# Patient Record
Sex: FEMALE | Race: WHITE | NOT HISPANIC OR LATINO
[De-identification: names, ages, dates, MRNs, and addresses within clinical notes are randomized per-mention and may not be internally consistent; named-entity substitution may affect disease eponyms.]

---

## 2017-01-06 PROBLEM — J40 BRONCHITIS: Status: ACTIVE | Noted: 2017-01-06

## 2017-02-28 ENCOUNTER — RX RENEWAL (OUTPATIENT)
Age: 82
End: 2017-02-28

## 2017-03-23 ENCOUNTER — APPOINTMENT (OUTPATIENT)
Dept: INTERNAL MEDICINE | Facility: CLINIC | Age: 82
End: 2017-03-23

## 2017-03-23 VITALS
SYSTOLIC BLOOD PRESSURE: 136 MMHG | DIASTOLIC BLOOD PRESSURE: 74 MMHG | HEART RATE: 80 BPM | WEIGHT: 136 LBS | HEIGHT: 62 IN | BODY MASS INDEX: 25.03 KG/M2 | OXYGEN SATURATION: 96 %

## 2017-06-05 ENCOUNTER — APPOINTMENT (OUTPATIENT)
Dept: INTERNAL MEDICINE | Facility: CLINIC | Age: 82
End: 2017-06-05

## 2017-06-05 VITALS — SYSTOLIC BLOOD PRESSURE: 140 MMHG | DIASTOLIC BLOOD PRESSURE: 80 MMHG

## 2017-06-05 VITALS
BODY MASS INDEX: 25.03 KG/M2 | HEIGHT: 62 IN | DIASTOLIC BLOOD PRESSURE: 80 MMHG | TEMPERATURE: 98.2 F | WEIGHT: 136 LBS | HEART RATE: 83 BPM | SYSTOLIC BLOOD PRESSURE: 158 MMHG | OXYGEN SATURATION: 97 %

## 2017-06-05 LAB
ALBUMIN SERPL ELPH-MCNC: 4.3 G/DL
ALP BLD-CCNC: 92 U/L
ALT SERPL-CCNC: 13 U/L
ANION GAP SERPL CALC-SCNC: 18 MMOL/L
APPEARANCE: CLEAR
AST SERPL-CCNC: 17 U/L
BACTERIA: NEGATIVE
BASOPHILS # BLD AUTO: 0.04 K/UL
BASOPHILS NFR BLD AUTO: 0.6 %
BILIRUB SERPL-MCNC: 0.3 MG/DL
BILIRUBIN URINE: NEGATIVE
BLOOD URINE: NEGATIVE
BUN SERPL-MCNC: 16 MG/DL
CALCIUM OXALATE CRYSTALS: ABNORMAL
CALCIUM SERPL-MCNC: 9.5 MG/DL
CHLORIDE SERPL-SCNC: 97 MMOL/L
CHOLEST SERPL-MCNC: 251 MG/DL
CHOLEST/HDLC SERPL: 2 RATIO
CO2 SERPL-SCNC: 22 MMOL/L
COLOR: YELLOW
CREAT SERPL-MCNC: 0.93 MG/DL
EOSINOPHIL # BLD AUTO: 0.08 K/UL
EOSINOPHIL NFR BLD AUTO: 1.2 %
GLUCOSE QUALITATIVE U: NORMAL MG/DL
GLUCOSE SERPL-MCNC: 160 MG/DL
HBA1C MFR BLD HPLC: 6.7 %
HCT VFR BLD CALC: 42.2 %
HDLC SERPL-MCNC: 126 MG/DL
HGB BLD-MCNC: 13.8 G/DL
IMM GRANULOCYTES NFR BLD AUTO: 0.1 %
KETONES URINE: NEGATIVE
LDLC SERPL CALC-MCNC: 114 MG/DL
LEUKOCYTE ESTERASE URINE: ABNORMAL
LYMPHOCYTES # BLD AUTO: 1.93 K/UL
LYMPHOCYTES NFR BLD AUTO: 28.3 %
MAN DIFF?: NORMAL
MCHC RBC-ENTMCNC: 30.8 PG
MCHC RBC-ENTMCNC: 32.7 GM/DL
MCV RBC AUTO: 94.2 FL
MICROSCOPIC-UA: NORMAL
MONOCYTES # BLD AUTO: 0.83 K/UL
MONOCYTES NFR BLD AUTO: 12.2 %
NEUTROPHILS # BLD AUTO: 3.93 K/UL
NEUTROPHILS NFR BLD AUTO: 57.6 %
NITRITE URINE: NEGATIVE
PH URINE: 6
PLATELET # BLD AUTO: 310 K/UL
POTASSIUM SERPL-SCNC: 5 MMOL/L
PROT SERPL-MCNC: 7.3 G/DL
PROTEIN URINE: NEGATIVE MG/DL
RBC # BLD: 4.48 M/UL
RBC # FLD: 14.1 %
RED BLOOD CELLS URINE: 2 /HPF
SODIUM SERPL-SCNC: 137 MMOL/L
SPECIFIC GRAVITY URINE: 1.01
SQUAMOUS EPITHELIAL CELLS: 2 /HPF
TRIGL SERPL-MCNC: 54 MG/DL
UROBILINOGEN URINE: NORMAL MG/DL
WBC # FLD AUTO: 6.82 K/UL
WHITE BLOOD CELLS URINE: 8 /HPF

## 2017-06-22 ENCOUNTER — APPOINTMENT (OUTPATIENT)
Dept: INTERNAL MEDICINE | Facility: CLINIC | Age: 82
End: 2017-06-22

## 2017-06-29 ENCOUNTER — APPOINTMENT (OUTPATIENT)
Dept: INTERNAL MEDICINE | Facility: CLINIC | Age: 82
End: 2017-06-29

## 2017-06-29 VITALS
DIASTOLIC BLOOD PRESSURE: 70 MMHG | OXYGEN SATURATION: 98 % | WEIGHT: 136 LBS | HEIGHT: 62 IN | TEMPERATURE: 98.3 F | HEART RATE: 107 BPM | BODY MASS INDEX: 25.03 KG/M2 | SYSTOLIC BLOOD PRESSURE: 140 MMHG

## 2017-06-29 VITALS — DIASTOLIC BLOOD PRESSURE: 80 MMHG | SYSTOLIC BLOOD PRESSURE: 130 MMHG

## 2017-09-28 ENCOUNTER — APPOINTMENT (OUTPATIENT)
Dept: INTERNAL MEDICINE | Facility: CLINIC | Age: 82
End: 2017-09-28
Payer: MEDICARE

## 2017-09-28 VITALS
HEART RATE: 85 BPM | BODY MASS INDEX: 24.51 KG/M2 | SYSTOLIC BLOOD PRESSURE: 138 MMHG | OXYGEN SATURATION: 98 % | DIASTOLIC BLOOD PRESSURE: 70 MMHG | WEIGHT: 134 LBS

## 2017-09-28 DIAGNOSIS — Z23 ENCOUNTER FOR IMMUNIZATION: ICD-10-CM

## 2017-09-28 DIAGNOSIS — E87.5 HYPERKALEMIA: ICD-10-CM

## 2017-09-28 LAB
ALBUMIN SERPL ELPH-MCNC: 4 G/DL
ALP BLD-CCNC: 95 U/L
ALT SERPL-CCNC: 19 U/L
ANION GAP SERPL CALC-SCNC: 17 MMOL/L
APPEARANCE: CLEAR
AST SERPL-CCNC: 18 U/L
BACTERIA: NEGATIVE
BASOPHILS # BLD AUTO: 0.04 K/UL
BASOPHILS NFR BLD AUTO: 0.7 %
BILIRUB SERPL-MCNC: 0.4 MG/DL
BILIRUBIN URINE: NEGATIVE
BLOOD URINE: NEGATIVE
BUN SERPL-MCNC: 14 MG/DL
CALCIUM SERPL-MCNC: 9.4 MG/DL
CHLORIDE SERPL-SCNC: 101 MMOL/L
CHOLEST SERPL-MCNC: 226 MG/DL
CHOLEST/HDLC SERPL: 2.1 RATIO
CO2 SERPL-SCNC: 22 MMOL/L
COLOR: YELLOW
CREAT SERPL-MCNC: 0.98 MG/DL
EOSINOPHIL # BLD AUTO: 0.14 K/UL
EOSINOPHIL NFR BLD AUTO: 2.4 %
GLUCOSE QUALITATIVE U: NORMAL MG/DL
GLUCOSE SERPL-MCNC: 162 MG/DL
HBA1C MFR BLD HPLC: 6.3 %
HCT VFR BLD CALC: 41.9 %
HDLC SERPL-MCNC: 108 MG/DL
HGB BLD-MCNC: 13.6 G/DL
HYALINE CASTS: 1 /LPF
IMM GRANULOCYTES NFR BLD AUTO: 0.2 %
KETONES URINE: NEGATIVE
LDLC SERPL CALC-MCNC: 105 MG/DL
LEUKOCYTE ESTERASE URINE: ABNORMAL
LYMPHOCYTES # BLD AUTO: 1.91 K/UL
LYMPHOCYTES NFR BLD AUTO: 32.8 %
MAN DIFF?: NORMAL
MCHC RBC-ENTMCNC: 30.6 PG
MCHC RBC-ENTMCNC: 32.5 GM/DL
MCV RBC AUTO: 94.4 FL
MICROSCOPIC-UA: NORMAL
MONOCYTES # BLD AUTO: 0.8 K/UL
MONOCYTES NFR BLD AUTO: 13.7 %
NEUTROPHILS # BLD AUTO: 2.93 K/UL
NEUTROPHILS NFR BLD AUTO: 50.2 %
NITRITE URINE: NEGATIVE
PH URINE: 6
PLATELET # BLD AUTO: 269 K/UL
POTASSIUM SERPL-SCNC: 5.8 MMOL/L
PROT SERPL-MCNC: 7.2 G/DL
PROTEIN URINE: NEGATIVE MG/DL
RBC # BLD: 4.44 M/UL
RBC # FLD: 14.2 %
RED BLOOD CELLS URINE: 1 /HPF
SODIUM SERPL-SCNC: 140 MMOL/L
SPECIFIC GRAVITY URINE: 1.01
SQUAMOUS EPITHELIAL CELLS: 4 /HPF
T4 FREE SERPL-MCNC: 1.9 NG/DL
TRIGL SERPL-MCNC: 67 MG/DL
TSH SERPL-ACNC: 3.82 UIU/ML
UROBILINOGEN URINE: NORMAL MG/DL
WBC # FLD AUTO: 5.83 K/UL
WHITE BLOOD CELLS URINE: 4 /HPF

## 2017-09-28 PROCEDURE — 90686 IIV4 VACC NO PRSV 0.5 ML IM: CPT

## 2017-09-28 PROCEDURE — G0008: CPT

## 2017-09-28 PROCEDURE — 99213 OFFICE O/P EST LOW 20 MIN: CPT | Mod: 25

## 2017-10-02 LAB
ALBUMIN SERPL ELPH-MCNC: 4.2 G/DL
ALP BLD-CCNC: 90 U/L
ALT SERPL-CCNC: 13 U/L
ANION GAP SERPL CALC-SCNC: 16 MMOL/L
AST SERPL-CCNC: 16 U/L
BILIRUB SERPL-MCNC: 0.2 MG/DL
BUN SERPL-MCNC: 17 MG/DL
CALCIUM SERPL-MCNC: 9.2 MG/DL
CHLORIDE SERPL-SCNC: 100 MMOL/L
CO2 SERPL-SCNC: 24 MMOL/L
CREAT SERPL-MCNC: 0.9 MG/DL
GLUCOSE SERPL-MCNC: 148 MG/DL
POTASSIUM SERPL-SCNC: 4.8 MMOL/L
PROT SERPL-MCNC: 7.3 G/DL
SODIUM SERPL-SCNC: 140 MMOL/L

## 2017-11-24 ENCOUNTER — RX RENEWAL (OUTPATIENT)
Age: 82
End: 2017-11-24

## 2017-12-14 ENCOUNTER — APPOINTMENT (OUTPATIENT)
Dept: INTERNAL MEDICINE | Facility: CLINIC | Age: 82
End: 2017-12-14
Payer: MEDICARE

## 2017-12-14 VITALS
DIASTOLIC BLOOD PRESSURE: 72 MMHG | OXYGEN SATURATION: 98 % | HEART RATE: 84 BPM | SYSTOLIC BLOOD PRESSURE: 128 MMHG | WEIGHT: 133 LBS | BODY MASS INDEX: 24.33 KG/M2

## 2017-12-14 PROCEDURE — 99213 OFFICE O/P EST LOW 20 MIN: CPT

## 2018-03-15 ENCOUNTER — APPOINTMENT (OUTPATIENT)
Dept: INTERNAL MEDICINE | Facility: CLINIC | Age: 83
End: 2018-03-15
Payer: MEDICARE

## 2018-03-15 VITALS
WEIGHT: 136 LBS | OXYGEN SATURATION: 97 % | HEART RATE: 88 BPM | BODY MASS INDEX: 24.88 KG/M2 | SYSTOLIC BLOOD PRESSURE: 140 MMHG | DIASTOLIC BLOOD PRESSURE: 68 MMHG

## 2018-03-15 PROCEDURE — 99213 OFFICE O/P EST LOW 20 MIN: CPT

## 2018-05-17 ENCOUNTER — APPOINTMENT (OUTPATIENT)
Dept: INTERNAL MEDICINE | Facility: CLINIC | Age: 83
End: 2018-05-17
Payer: MEDICARE

## 2018-05-17 VITALS
SYSTOLIC BLOOD PRESSURE: 143 MMHG | HEART RATE: 81 BPM | DIASTOLIC BLOOD PRESSURE: 79 MMHG | WEIGHT: 134 LBS | BODY MASS INDEX: 24.66 KG/M2 | OXYGEN SATURATION: 95 % | HEIGHT: 62 IN

## 2018-05-17 PROCEDURE — 36415 COLL VENOUS BLD VENIPUNCTURE: CPT

## 2018-05-17 PROCEDURE — 99213 OFFICE O/P EST LOW 20 MIN: CPT | Mod: 25

## 2018-05-17 NOTE — HISTORY OF PRESENT ILLNESS
[FreeTextEntry1] : Not taking Januvia anymore; Numerous side affects; [de-identified] : As outlined; Seen by  Dr. Jaramillo and hemorrhoids treated; All   wounds healed as per Dr. Jaramillo; Sugar has gone up since Januvia stopped\par Blood sugar this morning 147: \par Check 1 AIC today;  Appetite good:

## 2018-05-21 LAB
ALBUMIN SERPL ELPH-MCNC: 4.3 G/DL
ALP BLD-CCNC: 79 U/L
ALT SERPL-CCNC: 18 U/L
ANION GAP SERPL CALC-SCNC: 15 MMOL/L
AST SERPL-CCNC: 19 U/L
BILIRUB SERPL-MCNC: 0.3 MG/DL
BUN SERPL-MCNC: 15 MG/DL
CALCIUM SERPL-MCNC: 9.7 MG/DL
CHLORIDE SERPL-SCNC: 99 MMOL/L
CO2 SERPL-SCNC: 24 MMOL/L
CREAT SERPL-MCNC: 0.93 MG/DL
GLUCOSE SERPL-MCNC: 119 MG/DL
HBA1C MFR BLD HPLC: 6.8 %
POTASSIUM SERPL-SCNC: 5 MMOL/L
PROT SERPL-MCNC: 7.3 G/DL
SODIUM SERPL-SCNC: 138 MMOL/L

## 2018-06-18 ENCOUNTER — RX RENEWAL (OUTPATIENT)
Age: 83
End: 2018-06-18

## 2018-07-17 ENCOUNTER — APPOINTMENT (OUTPATIENT)
Dept: INTERNAL MEDICINE | Facility: CLINIC | Age: 83
End: 2018-07-17
Payer: MEDICARE

## 2018-07-17 VITALS
OXYGEN SATURATION: 99 % | WEIGHT: 133.25 LBS | SYSTOLIC BLOOD PRESSURE: 138 MMHG | TEMPERATURE: 98.8 F | HEIGHT: 62 IN | DIASTOLIC BLOOD PRESSURE: 80 MMHG | BODY MASS INDEX: 24.52 KG/M2 | HEART RATE: 100 BPM

## 2018-07-17 PROCEDURE — 99213 OFFICE O/P EST LOW 20 MIN: CPT | Mod: 25

## 2018-07-17 PROCEDURE — 36415 COLL VENOUS BLD VENIPUNCTURE: CPT

## 2018-07-17 NOTE — HISTORY OF PRESENT ILLNESS
[FreeTextEntry1] : No chief complaint [de-identified] : No chief complaint; Followed by Dr. Bower; re Diabetes;  No problems with medications. Asking for shingle vaccine; Had gotten pneumococcal vaccine. No change in other medications  pending review of labs

## 2018-07-24 LAB
ALBUMIN SERPL ELPH-MCNC: 4.2 G/DL
ALP BLD-CCNC: 75 U/L
ALT SERPL-CCNC: 15 U/L
ANION GAP SERPL CALC-SCNC: 14 MMOL/L
AST SERPL-CCNC: 17 U/L
BASOPHILS # BLD AUTO: 0.03 K/UL
BASOPHILS NFR BLD AUTO: 0.5 %
BILIRUB SERPL-MCNC: 0.2 MG/DL
BUN SERPL-MCNC: 15 MG/DL
CALCIUM SERPL-MCNC: 9 MG/DL
CHLORIDE SERPL-SCNC: 101 MMOL/L
CO2 SERPL-SCNC: 23 MMOL/L
CREAT SERPL-MCNC: 0.73 MG/DL
EOSINOPHIL # BLD AUTO: 0.1 K/UL
EOSINOPHIL NFR BLD AUTO: 1.6 %
GLUCOSE SERPL-MCNC: 138 MG/DL
HBA1C MFR BLD HPLC: 7.1 %
HCT VFR BLD CALC: 41.3 %
HGB BLD-MCNC: 13.1 G/DL
IMM GRANULOCYTES NFR BLD AUTO: 0.2 %
LYMPHOCYTES # BLD AUTO: 1.82 K/UL
LYMPHOCYTES NFR BLD AUTO: 28.7 %
MAN DIFF?: NORMAL
MCHC RBC-ENTMCNC: 30.8 PG
MCHC RBC-ENTMCNC: 31.7 GM/DL
MCV RBC AUTO: 96.9 FL
MONOCYTES # BLD AUTO: 0.66 K/UL
MONOCYTES NFR BLD AUTO: 10.4 %
NEUTROPHILS # BLD AUTO: 3.73 K/UL
NEUTROPHILS NFR BLD AUTO: 58.6 %
PLATELET # BLD AUTO: 256 K/UL
POTASSIUM SERPL-SCNC: 5 MMOL/L
PROT SERPL-MCNC: 6.9 G/DL
RBC # BLD: 4.26 M/UL
RBC # FLD: 14.7 %
SODIUM SERPL-SCNC: 138 MMOL/L
WBC # FLD AUTO: 6.35 K/UL

## 2018-10-17 ENCOUNTER — APPOINTMENT (OUTPATIENT)
Age: 83
End: 2018-10-17
Payer: MEDICARE

## 2018-10-17 ENCOUNTER — APPOINTMENT (OUTPATIENT)
Dept: INTERNAL MEDICINE | Facility: CLINIC | Age: 83
End: 2018-10-17

## 2018-10-17 VITALS — OXYGEN SATURATION: 98 % | HEART RATE: 87 BPM | SYSTOLIC BLOOD PRESSURE: 148 MMHG | DIASTOLIC BLOOD PRESSURE: 87 MMHG

## 2018-10-17 VITALS — HEART RATE: 82 BPM | DIASTOLIC BLOOD PRESSURE: 74 MMHG | SYSTOLIC BLOOD PRESSURE: 130 MMHG

## 2018-10-17 PROCEDURE — 99213 OFFICE O/P EST LOW 20 MIN: CPT

## 2018-10-17 NOTE — ASSESSMENT
[FreeTextEntry1] : Appears to have a stable examination;  WIll go to [facility] for exercises;

## 2018-10-29 ENCOUNTER — APPOINTMENT (OUTPATIENT)
Dept: INTERNAL MEDICINE | Facility: CLINIC | Age: 83
End: 2018-10-29
Payer: MEDICARE

## 2018-10-29 VITALS
BODY MASS INDEX: 24.29 KG/M2 | HEIGHT: 62 IN | TEMPERATURE: 98.3 F | HEART RATE: 86 BPM | WEIGHT: 132 LBS | DIASTOLIC BLOOD PRESSURE: 80 MMHG | OXYGEN SATURATION: 98 % | SYSTOLIC BLOOD PRESSURE: 139 MMHG

## 2018-10-29 PROCEDURE — 99212 OFFICE O/P EST SF 10 MIN: CPT

## 2018-10-29 NOTE — HISTORY OF PRESENT ILLNESS
[FreeTextEntry1] : Spider veins bleeding [de-identified] : As above;  Some leg swelling;  Has now stopped bleeding;  No pain; Ambulating well;

## 2018-10-29 NOTE — ASSESSMENT
[FreeTextEntry1] : As outlined in my above note; Compression stockings;  If it starts bleeding again the will have her seen by vascular surgeon;

## 2019-01-16 ENCOUNTER — APPOINTMENT (OUTPATIENT)
Dept: INTERNAL MEDICINE | Facility: CLINIC | Age: 84
End: 2019-01-16
Payer: MEDICARE

## 2019-01-16 VITALS
WEIGHT: 132 LBS | OXYGEN SATURATION: 98 % | BODY MASS INDEX: 24.29 KG/M2 | DIASTOLIC BLOOD PRESSURE: 88 MMHG | HEART RATE: 87 BPM | TEMPERATURE: 98.6 F | HEIGHT: 62 IN | SYSTOLIC BLOOD PRESSURE: 158 MMHG

## 2019-01-16 VITALS — SYSTOLIC BLOOD PRESSURE: 140 MMHG | DIASTOLIC BLOOD PRESSURE: 80 MMHG

## 2019-01-16 PROCEDURE — 99213 OFFICE O/P EST LOW 20 MIN: CPT

## 2019-01-16 NOTE — HISTORY OF PRESENT ILLNESS
[FreeTextEntry1] : Follow up on various medical problems; Diabetes, hypertension,  [de-identified] : As above;  Discussion took place regarding compression stockings;  Has compression stockings on today however;  No problem going up and down stairs;

## 2019-04-16 ENCOUNTER — APPOINTMENT (OUTPATIENT)
Dept: INTERNAL MEDICINE | Facility: CLINIC | Age: 84
End: 2019-04-16
Payer: MEDICARE

## 2019-04-16 VITALS
OXYGEN SATURATION: 99 % | TEMPERATURE: 98.7 F | HEART RATE: 95 BPM | SYSTOLIC BLOOD PRESSURE: 172 MMHG | WEIGHT: 131 LBS | BODY MASS INDEX: 24.11 KG/M2 | HEIGHT: 62 IN | DIASTOLIC BLOOD PRESSURE: 84 MMHG

## 2019-04-16 VITALS — SYSTOLIC BLOOD PRESSURE: 135 MMHG | DIASTOLIC BLOOD PRESSURE: 80 MMHG

## 2019-04-16 PROCEDURE — 36415 COLL VENOUS BLD VENIPUNCTURE: CPT

## 2019-04-16 PROCEDURE — 99213 OFFICE O/P EST LOW 20 MIN: CPT | Mod: 25

## 2019-04-16 NOTE — PHYSICAL EXAM
[No Acute Distress] : no acute distress [Well Nourished] : well nourished [Well Developed] : well developed [Well-Appearing] : well-appearing [PERRL] : pupils equal round and reactive to light [Normal Sclera/Conjunctiva] : normal sclera/conjunctiva [EOMI] : extraocular movements intact [Normal Outer Ear/Nose] : the outer ears and nose were normal in appearance [Normal Oropharynx] : the oropharynx was normal [No JVD] : no jugular venous distention [Supple] : supple [No Lymphadenopathy] : no lymphadenopathy [Thyroid Normal, No Nodules] : the thyroid was normal and there were no nodules present [No Respiratory Distress] : no respiratory distress  [Clear to Auscultation] : lungs were clear to auscultation bilaterally [No Accessory Muscle Use] : no accessory muscle use [Normal Rate] : normal rate  [Regular Rhythm] : with a regular rhythm [Normal S1, S2] : normal S1 and S2 [No Murmur] : no murmur heard [No Carotid Bruits] : no carotid bruits [No Abdominal Bruit] : a ~M bruit was not heard ~T in the abdomen [No Varicosities] : no varicosities [No Edema] : there was no peripheral edema [Pedal Pulses Present] : the pedal pulses are present [No Extremity Clubbing/Cyanosis] : no extremity clubbing/cyanosis [No Palpable Aorta] : no palpable aorta [Soft] : abdomen soft [Non Tender] : non-tender [Non-distended] : non-distended [No Masses] : no abdominal mass palpated [No HSM] : no HSM [Normal Bowel Sounds] : normal bowel sounds [Normal Posterior Cervical Nodes] : no posterior cervical lymphadenopathy [Normal Anterior Cervical Nodes] : no anterior cervical lymphadenopathy [No CVA Tenderness] : no CVA  tenderness [No Spinal Tenderness] : no spinal tenderness [No Joint Swelling] : no joint swelling [Grossly Normal Strength/Tone] : grossly normal strength/tone [Normal Gait] : normal gait [No Rash] : no rash [Coordination Grossly Intact] : coordination grossly intact [No Focal Deficits] : no focal deficits [Deep Tendon Reflexes (DTR)] : deep tendon reflexes were 2+ and symmetric [Normal Affect] : the affect was normal [Normal Insight/Judgement] : insight and judgment were intact

## 2019-04-16 NOTE — HEALTH RISK ASSESSMENT
[No falls in past year] : Patient reported no falls in the past year [0] : 2) Feeling down, depressed, or hopeless: Not at all (0) [] : No [[id number]] : 0

## 2019-04-16 NOTE — HISTORY OF PRESENT ILLNESS
[FreeTextEntry1] : Recently seen by /Sathish; Labs noted;  Also seen by Dr. Jaramillo   Some blood in stool [de-identified] : Presently feels not right;  Also constipation.  Expressed thoughts of depression;\par Labs reviewed from Dr. Bower;   Thyroid function normal; Also A1C 7.2

## 2019-04-16 NOTE — ASSESSMENT
[FreeTextEntry1] : Looks okay; All labs to be done;  No change in medications;  Further plans after review of studies

## 2019-04-18 LAB
ALBUMIN SERPL ELPH-MCNC: 4.2 G/DL
ALP BLD-CCNC: 111 U/L
ALT SERPL-CCNC: 13 U/L
ANION GAP SERPL CALC-SCNC: 13 MMOL/L
AST SERPL-CCNC: 17 U/L
BASOPHILS # BLD AUTO: 0.06 K/UL
BASOPHILS NFR BLD AUTO: 0.7 %
BILIRUB SERPL-MCNC: 0.3 MG/DL
BUN SERPL-MCNC: 15 MG/DL
CALCIUM SERPL-MCNC: 9.6 MG/DL
CHLORIDE SERPL-SCNC: 95 MMOL/L
CO2 SERPL-SCNC: 22 MMOL/L
CREAT SERPL-MCNC: 0.71 MG/DL
EOSINOPHIL # BLD AUTO: 0.05 K/UL
EOSINOPHIL NFR BLD AUTO: 0.6 %
ESTIMATED AVERAGE GLUCOSE: 169 MG/DL
GLUCOSE SERPL-MCNC: 196 MG/DL
HBA1C MFR BLD HPLC: 7.5 %
HCT VFR BLD CALC: 44.4 %
HGB BLD-MCNC: 14.1 G/DL
IMM GRANULOCYTES NFR BLD AUTO: 0.4 %
LYMPHOCYTES # BLD AUTO: 1.49 K/UL
LYMPHOCYTES NFR BLD AUTO: 17.6 %
MAN DIFF?: NORMAL
MCHC RBC-ENTMCNC: 30.9 PG
MCHC RBC-ENTMCNC: 31.8 GM/DL
MCV RBC AUTO: 97.4 FL
MONOCYTES # BLD AUTO: 0.9 K/UL
MONOCYTES NFR BLD AUTO: 10.7 %
NEUTROPHILS # BLD AUTO: 5.92 K/UL
NEUTROPHILS NFR BLD AUTO: 70 %
PLATELET # BLD AUTO: 253 K/UL
POTASSIUM SERPL-SCNC: 5 MMOL/L
PROT SERPL-MCNC: 6.9 G/DL
RBC # BLD: 4.56 M/UL
RBC # FLD: 13.6 %
SODIUM SERPL-SCNC: 130 MMOL/L
T4 FREE SERPL-MCNC: 1.7 NG/DL
TSH SERPL-ACNC: 2.86 UIU/ML
WBC # FLD AUTO: 8.45 K/UL

## 2019-05-03 ENCOUNTER — APPOINTMENT (OUTPATIENT)
Dept: INTERNAL MEDICINE | Facility: CLINIC | Age: 84
End: 2019-05-03
Payer: MEDICARE

## 2019-05-03 ENCOUNTER — LABORATORY RESULT (OUTPATIENT)
Age: 84
End: 2019-05-03

## 2019-05-03 VITALS
SYSTOLIC BLOOD PRESSURE: 142 MMHG | HEART RATE: 97 BPM | BODY MASS INDEX: 23.59 KG/M2 | TEMPERATURE: 98.6 F | DIASTOLIC BLOOD PRESSURE: 82 MMHG | OXYGEN SATURATION: 98 % | WEIGHT: 129 LBS

## 2019-05-03 VITALS — SYSTOLIC BLOOD PRESSURE: 130 MMHG | DIASTOLIC BLOOD PRESSURE: 80 MMHG

## 2019-05-03 PROCEDURE — 36415 COLL VENOUS BLD VENIPUNCTURE: CPT

## 2019-05-03 PROCEDURE — 99213 OFFICE O/P EST LOW 20 MIN: CPT | Mod: 25

## 2019-05-03 NOTE — PHYSICAL EXAM
[No Acute Distress] : no acute distress [Well Nourished] : well nourished [Well Developed] : well developed [Well-Appearing] : well-appearing [Normal Sclera/Conjunctiva] : normal sclera/conjunctiva [PERRL] : pupils equal round and reactive to light [EOMI] : extraocular movements intact [Normal Oropharynx] : the oropharynx was normal [Normal Outer Ear/Nose] : the outer ears and nose were normal in appearance [No JVD] : no jugular venous distention [Supple] : supple [No Lymphadenopathy] : no lymphadenopathy [Thyroid Normal, No Nodules] : the thyroid was normal and there were no nodules present [Clear to Auscultation] : lungs were clear to auscultation bilaterally [No Respiratory Distress] : no respiratory distress  [Normal Rate] : normal rate  [No Accessory Muscle Use] : no accessory muscle use [Regular Rhythm] : with a regular rhythm [Normal S1, S2] : normal S1 and S2 [No Murmur] : no murmur heard [No Carotid Bruits] : no carotid bruits [No Abdominal Bruit] : a ~M bruit was not heard ~T in the abdomen [No Varicosities] : no varicosities [No Edema] : there was no peripheral edema [Pedal Pulses Present] : the pedal pulses are present [No Palpable Aorta] : no palpable aorta [Soft] : abdomen soft [No Extremity Clubbing/Cyanosis] : no extremity clubbing/cyanosis [Non Tender] : non-tender [Non-distended] : non-distended [Normal Bowel Sounds] : normal bowel sounds [No HSM] : no HSM [No Masses] : no abdominal mass palpated [Normal Posterior Cervical Nodes] : no posterior cervical lymphadenopathy [Normal Anterior Cervical Nodes] : no anterior cervical lymphadenopathy [No Spinal Tenderness] : no spinal tenderness [No CVA Tenderness] : no CVA  tenderness [No Joint Swelling] : no joint swelling [Grossly Normal Strength/Tone] : grossly normal strength/tone [No Rash] : no rash [Coordination Grossly Intact] : coordination grossly intact [Normal Gait] : normal gait [Deep Tendon Reflexes (DTR)] : deep tendon reflexes were 2+ and symmetric [No Focal Deficits] : no focal deficits [Normal Insight/Judgement] : insight and judgment were intact [Normal Affect] : the affect was normal

## 2019-05-03 NOTE — HISTORY OF PRESENT ILLNESS
[FreeTextEntry1] : Emergency office vist; Has been feeling fatigued; Multiple phone calls; Also glucoses erractic [de-identified] : In addition to above feels constipated at times; Some days feels better than others; Was able to go up stairs without difficulty today; Last labs patient with hyponatremia;  Did get Shringrix vaccine recently

## 2019-05-03 NOTE — HEALTH RISK ASSESSMENT
[0] : 1) Little interest or pleasure doing things: Not at all (0) [No falls in past year] : Patient reported no falls in the past year [] : No [[id number]] : 0

## 2019-05-03 NOTE — ASSESSMENT
[FreeTextEntry1] : Unclear the reason for all her symptoms; The hyponatremia last visit could be a contributing factor; Will repeat all labs including Urine and Urine Sodium and make recommendation after review of these studies; Her exam however is normal;

## 2019-05-06 LAB
ALBUMIN SERPL ELPH-MCNC: 3.9 G/DL
ALP BLD-CCNC: 98 U/L
ALT SERPL-CCNC: 18 U/L
ANION GAP SERPL CALC-SCNC: 12 MMOL/L
APPEARANCE: ABNORMAL
AST SERPL-CCNC: 17 U/L
BASOPHILS # BLD AUTO: 0.06 K/UL
BASOPHILS NFR BLD AUTO: 0.8 %
BILIRUB SERPL-MCNC: 0.2 MG/DL
BILIRUBIN URINE: NEGATIVE
BLOOD URINE: NEGATIVE
BUN SERPL-MCNC: 17 MG/DL
CALCIUM SERPL-MCNC: 9.1 MG/DL
CHLORIDE SERPL-SCNC: 97 MMOL/L
CO2 SERPL-SCNC: 25 MMOL/L
COLOR: YELLOW
CREAT SERPL-MCNC: 0.9 MG/DL
EOSINOPHIL # BLD AUTO: 0.04 K/UL
EOSINOPHIL NFR BLD AUTO: 0.5 %
ESTIMATED AVERAGE GLUCOSE: 169 MG/DL
GLUCOSE QUALITATIVE U: ABNORMAL
GLUCOSE SERPL-MCNC: 213 MG/DL
HBA1C MFR BLD HPLC: 7.5 %
HCT VFR BLD CALC: 40.7 %
HGB BLD-MCNC: 13.2 G/DL
IMM GRANULOCYTES NFR BLD AUTO: 0.3 %
KETONES URINE: NEGATIVE
LEUKOCYTE ESTERASE URINE: ABNORMAL
LYMPHOCYTES # BLD AUTO: 1.34 K/UL
LYMPHOCYTES NFR BLD AUTO: 17.6 %
MAN DIFF?: NORMAL
MCHC RBC-ENTMCNC: 31.2 PG
MCHC RBC-ENTMCNC: 32.4 GM/DL
MCV RBC AUTO: 96.2 FL
MONOCYTES # BLD AUTO: 0.88 K/UL
MONOCYTES NFR BLD AUTO: 11.6 %
NEUTROPHILS # BLD AUTO: 5.26 K/UL
NEUTROPHILS NFR BLD AUTO: 69.2 %
NITRITE URINE: NEGATIVE
PH URINE: 6
PLATELET # BLD AUTO: 284 K/UL
POTASSIUM SERPL-SCNC: 4.9 MMOL/L
PROT SERPL-MCNC: 6.2 G/DL
PROTEIN URINE: NORMAL
RBC # BLD: 4.23 M/UL
RBC # FLD: 13.7 %
SODIUM ?TM SUB UR QN: 84 MMOL/L
SODIUM SERPL-SCNC: 134 MMOL/L
SPECIFIC GRAVITY URINE: 1.02
UROBILINOGEN URINE: NORMAL
WBC # FLD AUTO: 7.6 K/UL

## 2019-05-06 RX ORDER — FLUCONAZOLE 150 MG/1
150 TABLET ORAL
Qty: 3 | Refills: 3 | Status: ACTIVE | COMMUNITY
Start: 2019-05-06 | End: 1900-01-01

## 2019-05-16 ENCOUNTER — LABORATORY RESULT (OUTPATIENT)
Age: 84
End: 2019-05-16

## 2019-05-16 ENCOUNTER — APPOINTMENT (OUTPATIENT)
Dept: INTERNAL MEDICINE | Facility: CLINIC | Age: 84
End: 2019-05-16
Payer: MEDICARE

## 2019-05-16 VITALS
DIASTOLIC BLOOD PRESSURE: 79 MMHG | OXYGEN SATURATION: 97 % | BODY MASS INDEX: 23.74 KG/M2 | HEIGHT: 62 IN | SYSTOLIC BLOOD PRESSURE: 143 MMHG | WEIGHT: 129 LBS | HEART RATE: 94 BPM | TEMPERATURE: 98.4 F

## 2019-05-16 PROCEDURE — 99213 OFFICE O/P EST LOW 20 MIN: CPT

## 2019-05-16 NOTE — HEALTH RISK ASSESSMENT
[No falls in past year] : Patient reported no falls in the past year [0] : 2) Feeling down, depressed, or hopeless: Not at all (0) [] : No [[id number]] : 0

## 2019-05-16 NOTE — HISTORY OF PRESENT ILLNESS
[FreeTextEntry1] : Feels better after taking Fluconazole;  Repeated dose another three days [de-identified] : In addition appetite good and sugars improved;

## 2019-05-17 ENCOUNTER — LABORATORY RESULT (OUTPATIENT)
Age: 84
End: 2019-05-17

## 2019-05-17 LAB
APPEARANCE: ABNORMAL
BILIRUBIN URINE: NEGATIVE
BLOOD URINE: NEGATIVE
COLOR: NORMAL
GLUCOSE QUALITATIVE U: ABNORMAL
KETONES URINE: NEGATIVE
LEUKOCYTE ESTERASE URINE: ABNORMAL
NITRITE URINE: NEGATIVE
PH URINE: 6
PROTEIN URINE: NEGATIVE
SPECIFIC GRAVITY URINE: 1.01
UROBILINOGEN URINE: NORMAL

## 2019-05-30 ENCOUNTER — FORM ENCOUNTER (OUTPATIENT)
Age: 84
End: 2019-05-30

## 2019-05-31 ENCOUNTER — OUTPATIENT (OUTPATIENT)
Dept: OUTPATIENT SERVICES | Facility: HOSPITAL | Age: 84
LOS: 1 days | End: 2019-05-31
Payer: MEDICARE

## 2019-05-31 ENCOUNTER — APPOINTMENT (OUTPATIENT)
Dept: ULTRASOUND IMAGING | Facility: HOSPITAL | Age: 84
End: 2019-05-31
Payer: MEDICARE

## 2019-05-31 PROCEDURE — 76770 US EXAM ABDO BACK WALL COMP: CPT

## 2019-05-31 PROCEDURE — 76770 US EXAM ABDO BACK WALL COMP: CPT | Mod: 26

## 2019-07-15 ENCOUNTER — APPOINTMENT (OUTPATIENT)
Dept: INTERNAL MEDICINE | Facility: CLINIC | Age: 84
End: 2019-07-15
Payer: MEDICARE

## 2019-07-15 ENCOUNTER — LABORATORY RESULT (OUTPATIENT)
Age: 84
End: 2019-07-15

## 2019-07-15 VITALS
WEIGHT: 132 LBS | OXYGEN SATURATION: 97 % | HEART RATE: 82 BPM | HEIGHT: 62 IN | BODY MASS INDEX: 24.29 KG/M2 | SYSTOLIC BLOOD PRESSURE: 148 MMHG | DIASTOLIC BLOOD PRESSURE: 81 MMHG | TEMPERATURE: 97.8 F

## 2019-07-15 PROCEDURE — 36415 COLL VENOUS BLD VENIPUNCTURE: CPT

## 2019-07-15 PROCEDURE — 99213 OFFICE O/P EST LOW 20 MIN: CPT | Mod: 25

## 2019-07-15 NOTE — HISTORY OF PRESENT ILLNESS
[FreeTextEntry1] : Follow up on many problems: Hypertension. Hypothyroidism; Also Diabetes [de-identified] : As above; Saw Dr. Bower and no changes made in medication' Urine problem appears to have resolved; Left hand swollen;

## 2019-07-15 NOTE — ASSESSMENT
[FreeTextEntry1] : Stable examination;  No change in medications ; Will obtain all labs; fax to Dr. Vannesa Bower

## 2019-07-15 NOTE — HEALTH RISK ASSESSMENT
[No] : No [No falls in past year] : Patient reported no falls in the past year [0] : 2) Feeling down, depressed, or hopeless: Not at all (0) [] : No [[id number]] : 0

## 2019-07-16 ENCOUNTER — LABORATORY RESULT (OUTPATIENT)
Age: 84
End: 2019-07-16

## 2019-07-22 LAB
APPEARANCE: ABNORMAL
BILIRUBIN URINE: NEGATIVE
BLOOD URINE: NORMAL
CHOLEST SERPL-MCNC: 215 MG/DL
CHOLEST/HDLC SERPL: 2.1 RATIO
COLOR: YELLOW
ESTIMATED AVERAGE GLUCOSE: 171 MG/DL
GLUCOSE QUALITATIVE U: ABNORMAL
HBA1C MFR BLD HPLC: 7.6 %
HDLC SERPL-MCNC: 102 MG/DL
KETONES URINE: NEGATIVE
LDLC SERPL CALC-MCNC: 99 MG/DL
LEUKOCYTE ESTERASE URINE: ABNORMAL
NITRITE URINE: NEGATIVE
PH URINE: 6
PROTEIN URINE: NORMAL
SPECIFIC GRAVITY URINE: 1.01
T4 FREE SERPL-MCNC: 1.6 NG/DL
TRIGL SERPL-MCNC: 71 MG/DL
TSH SERPL-ACNC: 2.78 UIU/ML
UROBILINOGEN URINE: NORMAL

## 2019-09-16 ENCOUNTER — APPOINTMENT (OUTPATIENT)
Dept: INTERNAL MEDICINE | Facility: CLINIC | Age: 84
End: 2019-09-16
Payer: MEDICARE

## 2019-09-16 VITALS
BODY MASS INDEX: 24.29 KG/M2 | TEMPERATURE: 97.9 F | OXYGEN SATURATION: 98 % | HEART RATE: 89 BPM | SYSTOLIC BLOOD PRESSURE: 137 MMHG | WEIGHT: 132 LBS | DIASTOLIC BLOOD PRESSURE: 81 MMHG | HEIGHT: 62 IN

## 2019-09-16 PROCEDURE — 99213 OFFICE O/P EST LOW 20 MIN: CPT | Mod: 25

## 2019-09-16 PROCEDURE — 36415 COLL VENOUS BLD VENIPUNCTURE: CPT

## 2019-09-16 NOTE — HISTORY OF PRESENT ILLNESS
[FreeTextEntry1] : Feels like she has another UTI; Burning [de-identified] : In addition to above lost 4 teeth;

## 2019-09-16 NOTE — HEALTH RISK ASSESSMENT
[No] : No [No falls in past year] : Patient reported no falls in the past year [0] : 1) Little interest or pleasure doing things: Not at all (0) [] : No [[id number]] : 0

## 2019-10-02 LAB
25(OH)D3 SERPL-MCNC: 23.2 NG/ML
ALBUMIN SERPL ELPH-MCNC: 4.3 G/DL
ALP BLD-CCNC: 136 U/L
ALT SERPL-CCNC: 14 U/L
ANION GAP SERPL CALC-SCNC: 12 MMOL/L
APPEARANCE: ABNORMAL
AST SERPL-CCNC: 18 U/L
BACTERIA: NEGATIVE
BASOPHILS # BLD AUTO: 0.05 K/UL
BASOPHILS NFR BLD AUTO: 0.7 %
BILIRUB SERPL-MCNC: 0.3 MG/DL
BILIRUBIN URINE: NEGATIVE
BLOOD URINE: NEGATIVE
BUN SERPL-MCNC: 16 MG/DL
CALCIUM SERPL-MCNC: 9.5 MG/DL
CHLORIDE SERPL-SCNC: 98 MMOL/L
CHOLEST SERPL-MCNC: 222 MG/DL
CHOLEST/HDLC SERPL: 2.1 RATIO
CO2 SERPL-SCNC: 25 MMOL/L
COLOR: NORMAL
CREAT SERPL-MCNC: 0.81 MG/DL
EOSINOPHIL # BLD AUTO: 0.06 K/UL
EOSINOPHIL NFR BLD AUTO: 0.8 %
ESTIMATED AVERAGE GLUCOSE: 169 MG/DL
GLUCOSE QUALITATIVE U: ABNORMAL
GLUCOSE SERPL-MCNC: 152 MG/DL
HBA1C MFR BLD HPLC: 7.5 %
HCT VFR BLD CALC: 44 %
HDLC SERPL-MCNC: 106 MG/DL
HGB BLD-MCNC: 13.7 G/DL
HYALINE CASTS: 0 /LPF
IMM GRANULOCYTES NFR BLD AUTO: 0.3 %
KETONES URINE: NEGATIVE
LDLC SERPL CALC-MCNC: 102 MG/DL
LEUKOCYTE ESTERASE URINE: ABNORMAL
LYMPHOCYTES # BLD AUTO: 1.7 K/UL
LYMPHOCYTES NFR BLD AUTO: 22.8 %
MAN DIFF?: NORMAL
MCHC RBC-ENTMCNC: 31.1 GM/DL
MCHC RBC-ENTMCNC: 31.1 PG
MCV RBC AUTO: 99.8 FL
MICROSCOPIC-UA: NORMAL
MONOCYTES # BLD AUTO: 0.92 K/UL
MONOCYTES NFR BLD AUTO: 12.4 %
NEUTROPHILS # BLD AUTO: 4.69 K/UL
NEUTROPHILS NFR BLD AUTO: 63 %
NITRITE URINE: NEGATIVE
PH URINE: 6.5
PLATELET # BLD AUTO: 292 K/UL
POTASSIUM SERPL-SCNC: 5.2 MMOL/L
PROT SERPL-MCNC: 7.3 G/DL
PROTEIN URINE: NEGATIVE
RBC # BLD: 4.41 M/UL
RBC # FLD: 13.7 %
RED BLOOD CELLS URINE: 4 /HPF
SODIUM SERPL-SCNC: 135 MMOL/L
SPECIFIC GRAVITY URINE: 1.01
SQUAMOUS EPITHELIAL CELLS: 8 /HPF
T4 FREE SERPL-MCNC: 1.5 NG/DL
TRIGL SERPL-MCNC: 71 MG/DL
TSH SERPL-ACNC: 3.79 UIU/ML
UROBILINOGEN URINE: NORMAL
WBC # FLD AUTO: 7.44 K/UL
WHITE BLOOD CELLS URINE: 13 /HPF

## 2019-11-11 ENCOUNTER — APPOINTMENT (OUTPATIENT)
Dept: INTERNAL MEDICINE | Facility: CLINIC | Age: 84
End: 2019-11-11
Payer: MEDICARE

## 2019-11-11 VITALS
OXYGEN SATURATION: 99 % | HEIGHT: 62 IN | SYSTOLIC BLOOD PRESSURE: 152 MMHG | TEMPERATURE: 97.8 F | BODY MASS INDEX: 24.48 KG/M2 | HEART RATE: 82 BPM | WEIGHT: 133 LBS | DIASTOLIC BLOOD PRESSURE: 84 MMHG

## 2019-11-11 DIAGNOSIS — Z00.00 ENCOUNTER FOR GENERAL ADULT MEDICAL EXAMINATION W/OUT ABNORMAL FINDINGS: ICD-10-CM

## 2019-11-11 PROCEDURE — 99213 OFFICE O/P EST LOW 20 MIN: CPT

## 2019-11-11 NOTE — HISTORY OF PRESENT ILLNESS
[FreeTextEntry1] : Patient here for follow up; \par  [de-identified] : Saw Dr. Cardoza and all okay;  Hearing decreased;  Medication with out change;

## 2019-11-11 NOTE — PHYSICAL EXAM
[No Acute Distress] : no acute distress [Well Nourished] : well nourished [Well Developed] : well developed [Well-Appearing] : well-appearing [Normal Sclera/Conjunctiva] : normal sclera/conjunctiva [PERRL] : pupils equal round and reactive to light [EOMI] : extraocular movements intact [Normal Outer Ear/Nose] : the outer ears and nose were normal in appearance [Normal Oropharynx] : the oropharynx was normal [No JVD] : no jugular venous distention [No Lymphadenopathy] : no lymphadenopathy [Thyroid Normal, No Nodules] : the thyroid was normal and there were no nodules present [Supple] : supple [No Respiratory Distress] : no respiratory distress  [No Accessory Muscle Use] : no accessory muscle use [Clear to Auscultation] : lungs were clear to auscultation bilaterally [Normal Rate] : normal rate  [Regular Rhythm] : with a regular rhythm [Normal S1, S2] : normal S1 and S2 [No Murmur] : no murmur heard [No Abdominal Bruit] : a ~M bruit was not heard ~T in the abdomen [No Carotid Bruits] : no carotid bruits [No Varicosities] : no varicosities [Pedal Pulses Present] : the pedal pulses are present [No Edema] : there was no peripheral edema [No Palpable Aorta] : no palpable aorta [No Extremity Clubbing/Cyanosis] : no extremity clubbing/cyanosis [Soft] : abdomen soft [Non Tender] : non-tender [Non-distended] : non-distended [No Masses] : no abdominal mass palpated [No HSM] : no HSM [Normal Bowel Sounds] : normal bowel sounds [Normal Posterior Cervical Nodes] : no posterior cervical lymphadenopathy [Normal Anterior Cervical Nodes] : no anterior cervical lymphadenopathy [No CVA Tenderness] : no CVA  tenderness [No Spinal Tenderness] : no spinal tenderness [No Joint Swelling] : no joint swelling [No Rash] : no rash [Grossly Normal Strength/Tone] : grossly normal strength/tone [Coordination Grossly Intact] : coordination grossly intact [No Focal Deficits] : no focal deficits [Normal Gait] : normal gait [Deep Tendon Reflexes (DTR)] : deep tendon reflexes were 2+ and symmetric [Normal Affect] : the affect was normal [Normal Insight/Judgement] : insight and judgment were intact

## 2020-01-13 ENCOUNTER — APPOINTMENT (OUTPATIENT)
Dept: INTERNAL MEDICINE | Facility: CLINIC | Age: 85
End: 2020-01-13
Payer: MEDICARE

## 2020-01-13 VITALS
DIASTOLIC BLOOD PRESSURE: 85 MMHG | HEIGHT: 62 IN | OXYGEN SATURATION: 99 % | SYSTOLIC BLOOD PRESSURE: 159 MMHG | HEART RATE: 91 BPM | BODY MASS INDEX: 24.29 KG/M2 | TEMPERATURE: 98.7 F | WEIGHT: 132 LBS

## 2020-01-13 DIAGNOSIS — Z87.440 PERSONAL HISTORY OF URINARY (TRACT) INFECTIONS: ICD-10-CM

## 2020-01-13 DIAGNOSIS — I78.1 NEVUS, NON-NEOPLASTIC: ICD-10-CM

## 2020-01-13 PROCEDURE — 36415 COLL VENOUS BLD VENIPUNCTURE: CPT

## 2020-01-13 PROCEDURE — 99213 OFFICE O/P EST LOW 20 MIN: CPT | Mod: 25

## 2020-01-13 NOTE — ASSESSMENT
[FreeTextEntry1] : Not clear reason for above symptoms; Will however start antibiotic since sounds like last episode\par All labs and urine

## 2020-01-13 NOTE — HEALTH RISK ASSESSMENT
[No] : No [No falls in past year] : Patient reported no falls in the past year [0] : 2) Feeling down, depressed, or hopeless: Not at all (0) [[id number]] : 0

## 2020-01-13 NOTE — HISTORY OF PRESENT ILLNESS
[FreeTextEntry1] : Feels weak; Fatigued;  [de-identified] : In addition feels depressed at times in addition to the fatigue; Also cyst under arm\par Trying therapy but weak;\par Has appetite; \par No fever;

## 2020-01-13 NOTE — PHYSICAL EXAM
[No Acute Distress] : no acute distress [Well Nourished] : well nourished [Well-Appearing] : well-appearing [Normal Sclera/Conjunctiva] : normal sclera/conjunctiva [Well Developed] : well developed [EOMI] : extraocular movements intact [PERRL] : pupils equal round and reactive to light [Normal Oropharynx] : the oropharynx was normal [No JVD] : no jugular venous distention [Normal Outer Ear/Nose] : the outer ears and nose were normal in appearance [Supple] : supple [No Lymphadenopathy] : no lymphadenopathy [No Respiratory Distress] : no respiratory distress  [Thyroid Normal, No Nodules] : the thyroid was normal and there were no nodules present [Clear to Auscultation] : lungs were clear to auscultation bilaterally [No Accessory Muscle Use] : no accessory muscle use [Normal Rate] : normal rate  [Regular Rhythm] : with a regular rhythm [No Murmur] : no murmur heard [Normal S1, S2] : normal S1 and S2 [No Carotid Bruits] : no carotid bruits [No Varicosities] : no varicosities [No Abdominal Bruit] : a ~M bruit was not heard ~T in the abdomen [Pedal Pulses Present] : the pedal pulses are present [No Palpable Aorta] : no palpable aorta [No Edema] : there was no peripheral edema [Soft] : abdomen soft [No Extremity Clubbing/Cyanosis] : no extremity clubbing/cyanosis [Non-distended] : non-distended [Non Tender] : non-tender [No HSM] : no HSM [No Masses] : no abdominal mass palpated [Normal Bowel Sounds] : normal bowel sounds [Normal Posterior Cervical Nodes] : no posterior cervical lymphadenopathy [No Spinal Tenderness] : no spinal tenderness [No CVA Tenderness] : no CVA  tenderness [Normal Anterior Cervical Nodes] : no anterior cervical lymphadenopathy [Grossly Normal Strength/Tone] : grossly normal strength/tone [No Joint Swelling] : no joint swelling [Coordination Grossly Intact] : coordination grossly intact [No Rash] : no rash [Normal Gait] : normal gait [No Focal Deficits] : no focal deficits [Deep Tendon Reflexes (DTR)] : deep tendon reflexes were 2+ and symmetric [Normal Affect] : the affect was normal [Normal Insight/Judgement] : insight and judgment were intact

## 2020-01-16 LAB
ALBUMIN SERPL ELPH-MCNC: 3.8 G/DL
ALP BLD-CCNC: 287 U/L
ALT SERPL-CCNC: 13 U/L
ANION GAP SERPL CALC-SCNC: 12 MMOL/L
APPEARANCE: CLEAR
AST SERPL-CCNC: 21 U/L
BASOPHILS # BLD AUTO: 0.04 K/UL
BASOPHILS NFR BLD AUTO: 0.4 %
BILIRUB SERPL-MCNC: 0.2 MG/DL
BILIRUBIN URINE: NEGATIVE
BLOOD URINE: NEGATIVE
BUN SERPL-MCNC: 15 MG/DL
CALCIUM SERPL-MCNC: 9.3 MG/DL
CHLORIDE SERPL-SCNC: 95 MMOL/L
CO2 SERPL-SCNC: 24 MMOL/L
COLOR: NORMAL
CREAT SERPL-MCNC: 0.73 MG/DL
EOSINOPHIL # BLD AUTO: 0.05 K/UL
EOSINOPHIL NFR BLD AUTO: 0.6 %
ESTIMATED AVERAGE GLUCOSE: 189 MG/DL
GLUCOSE QUALITATIVE U: ABNORMAL
GLUCOSE SERPL-MCNC: 194 MG/DL
HBA1C MFR BLD HPLC: 8.2 %
HCT VFR BLD CALC: 43.3 %
HGB BLD-MCNC: 14 G/DL
IMM GRANULOCYTES NFR BLD AUTO: 0.3 %
KETONES URINE: ABNORMAL
LEUKOCYTE ESTERASE URINE: NEGATIVE
LYMPHOCYTES # BLD AUTO: 1.4 K/UL
LYMPHOCYTES NFR BLD AUTO: 15.7 %
MAN DIFF?: NORMAL
MCHC RBC-ENTMCNC: 30.8 PG
MCHC RBC-ENTMCNC: 32.3 GM/DL
MCV RBC AUTO: 95.2 FL
MONOCYTES # BLD AUTO: 1.06 K/UL
MONOCYTES NFR BLD AUTO: 11.9 %
NEUTROPHILS # BLD AUTO: 6.36 K/UL
NEUTROPHILS NFR BLD AUTO: 71.1 %
NITRITE URINE: NEGATIVE
PH URINE: 6.5
PLATELET # BLD AUTO: 336 K/UL
POTASSIUM SERPL-SCNC: 5.1 MMOL/L
PROT SERPL-MCNC: 6.8 G/DL
PROTEIN URINE: NORMAL
RBC # BLD: 4.55 M/UL
RBC # FLD: 13.5 %
SODIUM SERPL-SCNC: 131 MMOL/L
SPECIFIC GRAVITY URINE: 1.01
T4 FREE SERPL-MCNC: 1.6 NG/DL
TSH SERPL-ACNC: 2.69 UIU/ML
UROBILINOGEN URINE: NORMAL
WBC # FLD AUTO: 8.94 K/UL

## 2020-01-28 ENCOUNTER — LABORATORY RESULT (OUTPATIENT)
Age: 85
End: 2020-01-28

## 2020-01-28 ENCOUNTER — APPOINTMENT (OUTPATIENT)
Dept: INTERNAL MEDICINE | Facility: CLINIC | Age: 85
End: 2020-01-28
Payer: MEDICARE

## 2020-01-28 PROCEDURE — 36415 COLL VENOUS BLD VENIPUNCTURE: CPT

## 2020-01-30 ENCOUNTER — INPATIENT (INPATIENT)
Facility: HOSPITAL | Age: 85
LOS: 7 days | Discharge: HOME | DRG: 580 | End: 2020-02-07
Attending: INTERNAL MEDICINE | Admitting: INTERNAL MEDICINE
Payer: MEDICARE

## 2020-01-30 VITALS
HEIGHT: 63 IN | WEIGHT: 130.07 LBS | RESPIRATION RATE: 16 BRPM | DIASTOLIC BLOOD PRESSURE: 81 MMHG | HEART RATE: 92 BPM | TEMPERATURE: 98 F | SYSTOLIC BLOOD PRESSURE: 154 MMHG | OXYGEN SATURATION: 93 %

## 2020-01-30 DIAGNOSIS — J90 PLEURAL EFFUSION, NOT ELSEWHERE CLASSIFIED: ICD-10-CM

## 2020-01-30 DIAGNOSIS — E03.9 HYPOTHYROIDISM, UNSPECIFIED: ICD-10-CM

## 2020-01-30 DIAGNOSIS — R63.8 OTHER SYMPTOMS AND SIGNS CONCERNING FOOD AND FLUID INTAKE: ICD-10-CM

## 2020-01-30 DIAGNOSIS — Z91.89 OTHER SPECIFIED PERSONAL RISK FACTORS, NOT ELSEWHERE CLASSIFIED: ICD-10-CM

## 2020-01-30 DIAGNOSIS — R74.8 ABNORMAL LEVELS OF OTHER SERUM ENZYMES: ICD-10-CM

## 2020-01-30 DIAGNOSIS — E87.1 HYPO-OSMOLALITY AND HYPONATREMIA: ICD-10-CM

## 2020-01-30 DIAGNOSIS — I10 ESSENTIAL (PRIMARY) HYPERTENSION: ICD-10-CM

## 2020-01-30 DIAGNOSIS — E11.9 TYPE 2 DIABETES MELLITUS WITHOUT COMPLICATIONS: ICD-10-CM

## 2020-01-30 DIAGNOSIS — R53.81 OTHER MALAISE: ICD-10-CM

## 2020-01-30 LAB
ALBUMIN SERPL ELPH-MCNC: 3.8 G/DL — SIGNIFICANT CHANGE UP (ref 3.3–5)
ALBUMIN SERPL ELPH-MCNC: 4 G/DL
ALP BLD-CCNC: 323 U/L
ALP SERPL-CCNC: 367 U/L — HIGH (ref 40–120)
ALT FLD-CCNC: 11 U/L — SIGNIFICANT CHANGE UP (ref 10–45)
ALT SERPL-CCNC: 11 U/L
ANION GAP SERPL CALC-SCNC: 13 MMOL/L — SIGNIFICANT CHANGE UP (ref 5–17)
ANION GAP SERPL CALC-SCNC: 16 MMOL/L
APPEARANCE UR: CLEAR — SIGNIFICANT CHANGE UP
APPEARANCE: CLEAR
AST SERPL-CCNC: 22 U/L
AST SERPL-CCNC: 29 U/L — SIGNIFICANT CHANGE UP (ref 10–40)
BASOPHILS # BLD AUTO: 0.06 K/UL
BASOPHILS NFR BLD AUTO: 0.8 %
BILIRUB SERPL-MCNC: 0.2 MG/DL — SIGNIFICANT CHANGE UP (ref 0.2–1.2)
BILIRUB SERPL-MCNC: 0.3 MG/DL
BILIRUB UR-MCNC: NEGATIVE — SIGNIFICANT CHANGE UP
BILIRUBIN URINE: NEGATIVE
BLOOD URINE: ABNORMAL
BUN SERPL-MCNC: 11 MG/DL — SIGNIFICANT CHANGE UP (ref 7–23)
BUN SERPL-MCNC: 12 MG/DL
CALCIUM SERPL-MCNC: 9.1 MG/DL — SIGNIFICANT CHANGE UP (ref 8.4–10.5)
CALCIUM SERPL-MCNC: 9.4 MG/DL
CHLORIDE SERPL-SCNC: 91 MMOL/L
CHLORIDE SERPL-SCNC: 96 MMOL/L — SIGNIFICANT CHANGE UP (ref 96–108)
CO2 SERPL-SCNC: 22 MMOL/L
CO2 SERPL-SCNC: 24 MMOL/L — SIGNIFICANT CHANGE UP (ref 22–31)
COLOR SPEC: YELLOW — SIGNIFICANT CHANGE UP
COLOR: YELLOW
CREAT SERPL-MCNC: 0.68 MG/DL — SIGNIFICANT CHANGE UP (ref 0.5–1.3)
CREAT SERPL-MCNC: 0.8 MG/DL
DIFF PNL FLD: NEGATIVE — SIGNIFICANT CHANGE UP
EOSINOPHIL # BLD AUTO: 0.07 K/UL
EOSINOPHIL NFR BLD AUTO: 1 %
ESTIMATED AVERAGE GLUCOSE: 192 MG/DL
GGT SERPL-CCNC: 14 U/L — SIGNIFICANT CHANGE UP (ref 8–40)
GLUCOSE QUALITATIVE U: ABNORMAL
GLUCOSE SERPL-MCNC: 218 MG/DL — HIGH (ref 70–99)
GLUCOSE SERPL-MCNC: 253 MG/DL
GLUCOSE UR QL: 100
HBA1C MFR BLD HPLC: 8.3 %
HCT VFR BLD CALC: 42.4 % — SIGNIFICANT CHANGE UP (ref 34.5–45)
HCT VFR BLD CALC: 43.9 %
HGB BLD-MCNC: 13.8 G/DL — SIGNIFICANT CHANGE UP (ref 11.5–15.5)
HGB BLD-MCNC: 13.9 G/DL
IMM GRANULOCYTES NFR BLD AUTO: 0.4 %
KETONES UR-MCNC: 15 MG/DL
KETONES URINE: NORMAL
LEUKOCYTE ESTERASE UR-ACNC: NEGATIVE — SIGNIFICANT CHANGE UP
LEUKOCYTE ESTERASE URINE: NEGATIVE
LYMPHOCYTES # BLD AUTO: 1.44 K/UL
LYMPHOCYTES NFR BLD AUTO: 19.6 %
MAGNESIUM SERPL-MCNC: 2.1 MG/DL — SIGNIFICANT CHANGE UP (ref 1.6–2.6)
MAN DIFF?: NORMAL
MCHC RBC-ENTMCNC: 30 PG — SIGNIFICANT CHANGE UP (ref 27–34)
MCHC RBC-ENTMCNC: 30.5 PG
MCHC RBC-ENTMCNC: 31.7 GM/DL
MCHC RBC-ENTMCNC: 32.5 GM/DL — SIGNIFICANT CHANGE UP (ref 32–36)
MCV RBC AUTO: 92.2 FL — SIGNIFICANT CHANGE UP (ref 80–100)
MCV RBC AUTO: 96.3 FL
MONOCYTES # BLD AUTO: 0.94 K/UL
MONOCYTES NFR BLD AUTO: 12.8 %
NEUTROPHILS # BLD AUTO: 4.81 K/UL
NEUTROPHILS NFR BLD AUTO: 65.4 %
NITRITE UR-MCNC: NEGATIVE — SIGNIFICANT CHANGE UP
NITRITE URINE: NEGATIVE
NRBC # BLD: 0 /100 WBCS — SIGNIFICANT CHANGE UP (ref 0–0)
NT-PROBNP SERPL-SCNC: 114 PG/ML — SIGNIFICANT CHANGE UP (ref 0–300)
PH UR: 6.5 — SIGNIFICANT CHANGE UP (ref 5–8)
PH URINE: 6.5
PHOSPHATE SERPL-MCNC: 2.7 MG/DL — SIGNIFICANT CHANGE UP (ref 2.5–4.5)
PLATELET # BLD AUTO: 318 K/UL
PLATELET # BLD AUTO: 351 K/UL — SIGNIFICANT CHANGE UP (ref 150–400)
POTASSIUM SERPL-MCNC: 4.7 MMOL/L — SIGNIFICANT CHANGE UP (ref 3.5–5.3)
POTASSIUM SERPL-SCNC: 4.7 MMOL/L — SIGNIFICANT CHANGE UP (ref 3.5–5.3)
POTASSIUM SERPL-SCNC: 5.2 MMOL/L
PROT SERPL-MCNC: 6.9 G/DL
PROT SERPL-MCNC: 7.1 G/DL — SIGNIFICANT CHANGE UP (ref 6–8.3)
PROT UR-MCNC: NEGATIVE MG/DL — SIGNIFICANT CHANGE UP
PROTEIN URINE: ABNORMAL
RBC # BLD: 4.56 M/UL
RBC # BLD: 4.6 M/UL — SIGNIFICANT CHANGE UP (ref 3.8–5.2)
RBC # FLD: 13.2 % — SIGNIFICANT CHANGE UP (ref 10.3–14.5)
RBC # FLD: 13.6 %
SODIUM SERPL-SCNC: 129 MMOL/L
SODIUM SERPL-SCNC: 133 MMOL/L — LOW (ref 135–145)
SP GR SPEC: 1.02 — SIGNIFICANT CHANGE UP (ref 1–1.03)
SPECIFIC GRAVITY URINE: 1.02
TROPONIN T SERPL-MCNC: <0.01 NG/ML — SIGNIFICANT CHANGE UP (ref 0–0.01)
UROBILINOGEN FLD QL: 0.2 E.U./DL — SIGNIFICANT CHANGE UP
UROBILINOGEN URINE: NORMAL
WBC # BLD: 9.51 K/UL — SIGNIFICANT CHANGE UP (ref 3.8–10.5)
WBC # FLD AUTO: 7.35 K/UL
WBC # FLD AUTO: 9.51 K/UL — SIGNIFICANT CHANGE UP (ref 3.8–10.5)

## 2020-01-30 PROCEDURE — 93010 ELECTROCARDIOGRAM REPORT: CPT

## 2020-01-30 PROCEDURE — 71045 X-RAY EXAM CHEST 1 VIEW: CPT | Mod: 26,77

## 2020-01-30 PROCEDURE — 99222 1ST HOSP IP/OBS MODERATE 55: CPT | Mod: GC

## 2020-01-30 PROCEDURE — 74177 CT ABD & PELVIS W/CONTRAST: CPT | Mod: 26

## 2020-01-30 PROCEDURE — 71045 X-RAY EXAM CHEST 1 VIEW: CPT | Mod: 26

## 2020-01-30 PROCEDURE — 99285 EMERGENCY DEPT VISIT HI MDM: CPT | Mod: 25

## 2020-01-30 RX ORDER — CHOLECALCIFEROL (VITAMIN D3) 125 MCG
1000 CAPSULE ORAL DAILY
Refills: 0 | Status: DISCONTINUED | OUTPATIENT
Start: 2020-01-30 | End: 2020-02-07

## 2020-01-30 RX ORDER — GLUCAGON INJECTION, SOLUTION 0.5 MG/.1ML
1 INJECTION, SOLUTION SUBCUTANEOUS ONCE
Refills: 0 | Status: DISCONTINUED | OUTPATIENT
Start: 2020-01-30 | End: 2020-02-07

## 2020-01-30 RX ORDER — DEXTROSE 50 % IN WATER 50 %
12.5 SYRINGE (ML) INTRAVENOUS ONCE
Refills: 0 | Status: DISCONTINUED | OUTPATIENT
Start: 2020-01-30 | End: 2020-02-07

## 2020-01-30 RX ORDER — DEXTROSE 50 % IN WATER 50 %
25 SYRINGE (ML) INTRAVENOUS ONCE
Refills: 0 | Status: DISCONTINUED | OUTPATIENT
Start: 2020-01-30 | End: 2020-02-07

## 2020-01-30 RX ORDER — DEXTROSE 50 % IN WATER 50 %
15 SYRINGE (ML) INTRAVENOUS ONCE
Refills: 0 | Status: DISCONTINUED | OUTPATIENT
Start: 2020-01-30 | End: 2020-02-07

## 2020-01-30 RX ORDER — SODIUM CHLORIDE 9 MG/ML
1000 INJECTION INTRAMUSCULAR; INTRAVENOUS; SUBCUTANEOUS
Refills: 0 | Status: DISCONTINUED | OUTPATIENT
Start: 2020-01-30 | End: 2020-02-02

## 2020-01-30 RX ORDER — SODIUM CHLORIDE 9 MG/ML
1000 INJECTION, SOLUTION INTRAVENOUS
Refills: 0 | Status: DISCONTINUED | OUTPATIENT
Start: 2020-01-30 | End: 2020-02-07

## 2020-01-30 RX ORDER — LEVOTHYROXINE SODIUM 125 MCG
88 TABLET ORAL DAILY
Refills: 0 | Status: DISCONTINUED | OUTPATIENT
Start: 2020-01-30 | End: 2020-02-07

## 2020-01-30 RX ORDER — INSULIN LISPRO 100/ML
VIAL (ML) SUBCUTANEOUS
Refills: 0 | Status: DISCONTINUED | OUTPATIENT
Start: 2020-01-30 | End: 2020-02-07

## 2020-01-30 RX ORDER — SODIUM CHLORIDE 9 MG/ML
1000 INJECTION INTRAMUSCULAR; INTRAVENOUS; SUBCUTANEOUS
Refills: 0 | Status: DISCONTINUED | OUTPATIENT
Start: 2020-01-30 | End: 2020-01-30

## 2020-01-30 RX ADMIN — SODIUM CHLORIDE 250 MILLILITER(S): 9 INJECTION INTRAMUSCULAR; INTRAVENOUS; SUBCUTANEOUS at 18:37

## 2020-01-30 NOTE — H&P ADULT - PROBLEM SELECTOR PLAN 2
Alk phos 367, AST/ALT normal.  GGT wnl, unlikely to be liver origin.  Ddx includes malignancy with bone infiltration.  Has been up-trending since 9/19 from 136 > 287 >323 >367  - f/u Liver imaging with CT  - Calcium normal, Vit D 25-OH low at 23.2 as outpatient 9/19.   - f/u Vit D,25-OH and PTH  - c/w Vit D supplementation

## 2020-01-30 NOTE — H&P ADULT - NSHPPHYSICALEXAM_GEN_ALL_CORE
Vital Signs Last 12 Hrs  T(F): 98.1 (01-30-20 @ 20:24), Max: 98.1 (01-30-20 @ 20:24)  HR: 82 (01-30-20 @ 20:24) (82 - 92)  BP: 144/78 (01-30-20 @ 20:24) (144/78 - 160/82)  BP(mean): --  RR: 18 (01-30-20 @ 20:24) (16 - 18)  SpO2: 97% (01-30-20 @ 20:24) (93% - 97%)    PHYSICAL EXAM:  Constitutional: NAD, comfortable in bed, speaking in full sentences.  HEENT: NC/AT, PERRLA, EOMI, no conjunctival pallor or scleral icterus, dry MM  Neck: Supple, no JVD  Respiratory: CTA B/L. No w/r/r.   Cardiovascular: RRR, normal S1 and S2, no m/r/g.   Back: no step offs or spinal tenderness  Gastrointestinal: +BS, soft NTND, no guarding or rebound tenderness, no palpable masses   Extremities: 1+ edema of b/l LE extremities, wwp; no cyanosis or clubbing.   Vascular: Pulses equal and strong throughout.   Neurological: AAOx3, no CN deficits, 5/5 strength b/l UEs and LEs and sensation intact throughout.   Skin: No gross skin abnormalities or rashes

## 2020-01-30 NOTE — H&P ADULT - NSHPLABSRESULTS_GEN_ALL_CORE
LABS:                         13.8   9.51  )-----------( 351      ( 30 Jan 2020 18:45 )             42.4     01-30    133<L>  |  96  |  11  ----------------------------<  218<H>  4.7   |  24  |  0.68    Ca    9.1      30 Jan 2020 18:45  Phos  2.7     01-30  Mg     2.1     01-30    TPro  7.1  /  Alb  3.8  /  TBili  0.2  /  DBili  x   /  AST  29  /  ALT  11  /  AlkPhos  367<H>  01-30        CARDIAC MARKERS ( 30 Jan 2020 18:45 )  x     / <0.01 ng/mL / x     / x     / x          Serum Pro-Brain Natriuretic Peptide: 114 pg/mL (01-30 @ 18:45)        RADIOLOGY, EKG & ADDITIONAL TESTS: LABS:                         13.8   9.51  )-----------( 351      ( 30 Jan 2020 18:45 )             42.4     01-30    133<L>  |  96  |  11  ----------------------------<  218<H>  4.7   |  24  |  0.68    Ca    9.1      30 Jan 2020 18:45  Phos  2.7     01-30  Mg     2.1     01-30    TPro  7.1  /  Alb  3.8  /  TBili  0.2  /  DBili  x   /  AST  29  /  ALT  11  /  AlkPhos  367<H>  01-30        CARDIAC MARKERS ( 30 Jan 2020 18:45 )  x     / <0.01 ng/mL / x     / x     / x          Serum Pro-Brain Natriuretic Peptide: 114 pg/mL (01-30 @ 18:45)        RADIOLOGY, EKG & ADDITIONAL TESTS:  Reviewed.

## 2020-01-30 NOTE — ED ADULT TRIAGE NOTE - CHIEF COMPLAINT QUOTE
Patient complains of feeling general malaise X 1 month, states was seen by Dr. Ania Hargrove and tests done , noted to be dehydrated and states has been drinking more since but still feeling some weakness.  Sent by PMD to go to ED for further evaluation.  NO chest pain or SOB.  POC glucose 218.  EKG in progress.

## 2020-01-30 NOTE — H&P ADULT - ATTENDING COMMENTS
Patient seen and examined; Patient with palpable breast mass and bone mets with pleural effusion; Will need tap of fluid and will have breast surgeon see patient; Patient is aware of these findings and agrees to stay

## 2020-01-30 NOTE — H&P ADULT - HISTORY OF PRESENT ILLNESS
87 yo F with hx of DM2, Grave's disease, HLD, HTN, lymphedema, endometrial carcinoma s/p hysterectomy and bilateral oophorectomy, and benign pancreatic tail adenoma who presents for roughly 1 month of generalized malaise and weakness.    In the ED, VS were TMax: 98.1, HR 82 - 92, /78 - 160/82, RR 16 - 18, SpO2 93% - 97% on RA.  Labs significant for   CXR with possible L sided effusion.   EKG showed     Started on NS at 250cc/hr and admitted for further workup. 87 yo F with hx of DM2, Grave's disease, HLD, HTN, lymphedema, endometrial carcinoma s/p hysterectomy and bilateral oophorectomy, and benign pancreatic tail adenoma who presents for roughly 1 month of generalized malaise and weakness.  Patient reports she has had episodes of generalized weakness and fatigue to the point that she finds it difficult to have the energy to get out of bed or cook for herself.  On a past episode she was dx with UTI and symptoms improved however she was seen both 1/13 and 1/28 as an outpatient and UAs were negative for infection.  She was empirically started on Keflex as outpatient which was then discontinued when Ucx negative, otherwise has had no other recent abx or changes in her medications.  She denies fevers, chills, N/V, abdominal pain, chest pain, palpitations, diarrhea, constipation, urinary frequency, urgency or dysuria.  She denies weight loss and is up to date on colonoscopies.    In the ED, VS were TMax: 98.1, HR 82 - 92, /78 - 160/82, RR 16 - 18, SpO2 93% - 97% on RA.  Labs significant for Na 133, Glucose 218, Alk phos 367.  CXR with possible L sided effusion.  EKG NSR with PACs.    Started on NS at 250cc/hr and admitted for further workup. 87 yo F with hx of DM2, Grave's disease, HLD, HTN, lymphedema, endometrial carcinoma s/p hysterectomy and bilateral oophorectomy, and benign pancreatic tail adenoma who presents for roughly 1 month of generalized malaise and weakness.  Patient reports she has had episodes of generalized weakness and fatigue to the point that she finds it difficult to have the energy to get out of bed or cook for herself.  On a past episode she was dx with UTI and symptoms improved however she was seen both 1/13 and 1/28 as an outpatient and UAs were negative for infection.  She was empirically started on Keflex as outpatient which was then discontinued when Ucx negative, otherwise has had no other recent abx or changes in her medications.  She denies fevers, chills, N/V, abdominal pain, chest pain, palpitations, diarrhea, constipation, urinary frequency, urgency or dysuria.  She denies weight loss and is up to date on colonoscopies.    In the ED, VS were TMax: 98.1, HR 82 - 92, /78 - 160/82, RR 16 - 18, SpO2 93% - 97% on RA.  Labs significant for Na 133, Glucose 218, Alk phos 367.  CXR with L sided effusion.  EKG NSR with PACs.    Started on NS at 250cc/hr and admitted for further workup.

## 2020-01-30 NOTE — H&P ADULT - PROBLEM SELECTOR PLAN 3
Chronic as an outpatient, mild hypoNa likely due to poor po intake  - Na 133 on admission  - on mIVF given dry appearance on admission, trend in AM  - goal to correct no more than 8 in first 24 hours

## 2020-01-30 NOTE — ED ADULT NURSE NOTE - OBJECTIVE STATEMENT
pt complains of feeling general malaise, "feeling not right" for 1month, states was seen by Dr. Ania Hargrove and tests done, noted to be dehydrated and states has been drinking more since but still feeling some weakness.  Sent by PMD to go to ED for further evaluation. per pt, pt was treated for UTI,  Denies chest pain or SOB, dizziness, urinary symptoms, fever/chills, n/v/d,  neuro intact. pt sent by PMD to ED for evaluation. pt complains general malaise, "feeling not right" for 1month, states was seen by Dr. Ania Hargrove and tests done, noted to be dehydrated and states has been drinking more since but still feeling some weakness. per pt, pt was treated for UTI,  Denies chest pain or SOB, dizziness, urinary symptoms, fever/chills, n/v/d, neuro intact, alert, orientedx3.

## 2020-01-30 NOTE — ED PROVIDER NOTE - CLINICAL SUMMARY MEDICAL DECISION MAKING FREE TEXT BOX
85 y/o F w/ generalized fatigue. Dr. Hargrove request CT of abd and pelvic for eval of pt. Pt was given IV and will be admitted.

## 2020-01-30 NOTE — H&P ADULT - NSICDXPASTSURGICALHX_GEN_ALL_CORE_FT
PAST SURGICAL HISTORY:  Other acquired absence of organ S/P splenectomy    Other acquired absence of organ S/P cholecystectomy    Status post cataract extraction S/P cataract extraction    Status post total hysterectomy S/P hysterectomy

## 2020-01-30 NOTE — H&P ADULT - PROBLEM SELECTOR PLAN 1
Roughly 1mo hx of malaise with episodes in the past as well.  Very broad ddx as described above including malignancy, psychiatric, nephritic syndrome.  Infectious etiology unlikely given normal WBC, neg UAs  - f/u CT C/A/P for malignancy workup  - consider psych consult if medical workup nonrevealing  - TSH wnl Roughly 1mo hx of malaise with episodes in the past as well.  Very broad ddx as described above including malignancy, psychiatric, nephritic syndrome.  Infectious etiology unlikely given normal WBC, neg UAs  - f/u CT C/A/P for malignancy workup  - consider thoracentesis of L sided pleural effusion in AM  - consider psych consult if medical workup nonrevealing  - TSH wnl

## 2020-01-30 NOTE — ED ADULT NURSE NOTE - CHPI ED NUR SYMPTOMS NEG
no fever/no tingling/no nausea/no vomiting/no chills/no dizziness/no pain/no decreased eating/drinking

## 2020-01-30 NOTE — H&P ADULT - PROBLEM SELECTOR PLAN 8
F: mIVF with NS at 100cc/Hr  E: replete electrolytes K< 4, Mg <2  N: DASH/TLC, carb consistent diet  DVT PPx: Lovenox  Code status: Full Code F: mIVF with NS at 100cc/Hr  E: replete electrolytes K< 4, Mg <2  N: DASH/TLC, carb consistent diet  DVT PPx: Will start Lovenox if not having thoracentesis  Code status: Full Code

## 2020-01-30 NOTE — H&P ADULT - ASSESSMENT
85 yo F with hx of DM2, Grave's disease, HLD, HTN, lymphedema, endometrial carcinoma s/p hysterectomy and bilateral oophorectomy, and benign pancreatic tail adenoma who presents for roughly 1 month of generalized malaise and weakness. DDx is broad.  Malignancy on the differential given generalized fatigue with hx endometrial carcinoma s/p hysterectomy with bilateral oophorectomy, elevated alk phos, hx of benign pancreatic adenoma, ? breast nodule and unilateral pleural effusion although patient without weight loss, other B symptoms and has been UTD on other screening.  Neurologic disorders such as myasthenia given hx of autoimmune disorders with DM and graves/hypothroidism less likely given weakness worst upon waking up and appears to improve as patient becomes active.  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  Of note most recent outpatient UA with proteinuria and red cells as well as LE edema, fatigue, HLD, HTN and Vit D deficiency would consider glomerular pathology more likely to be nephritic range given normal albumin although less likely given repeat UA on admission without proteinuria.

## 2020-01-30 NOTE — ED PROVIDER NOTE - PSH
Other acquired absence of organ  S/P cholecystectomy  Other acquired absence of organ  S/P splenectomy  Status post cataract extraction  S/P cataract extraction  Status post total hysterectomy  S/P hysterectomy

## 2020-01-30 NOTE — ED ADULT NURSE REASSESSMENT NOTE - NS ED NURSE REASSESS COMMENT FT1
Pt. remains in the ED, axox3, in no acute distress.  Pt. is admitted and awaiting an admitted bed.  Pt. moved to holding to wait for a room.

## 2020-01-30 NOTE — ED ADULT NURSE NOTE - NSIMPLEMENTINTERV_GEN_ALL_ED
Implemented All Fall with Harm Risk Interventions:  [place] to call system. Call bell, personal items and telephone within reach. Instruct patient to call for assistance. Room bathroom lighting operational. Non-slip footwear when patient is off stretcher. Physically safe environment: no spills, clutter or unnecessary equipment. Stretcher in lowest position, wheels locked, appropriate side rails in place. Provide visual cue, wrist band, yellow gown, etc. Monitor gait and stability. Monitor for mental status changes and reorient to person, place, and time. Review medications for side effects contributing to fall risk. Reinforce activity limits and safety measures with patient and family. Provide visual clues: red socks.

## 2020-01-30 NOTE — H&P ADULT - PROBLEM SELECTOR PLAN 9
1) PCP Contacted on Admission: Y --> Dr. Hargrove following inpatient  2) Date of Contact with PCP:  3) PCP Contacted at Discharge: (Y/N)  4) Summary of Handoff Given to PCP:   5) Post-Discharge Appointment Date and Location:

## 2020-01-30 NOTE — ED PROVIDER NOTE - OBJECTIVE STATEMENT
87 y/o F comes in w/ generalized weakness and fatigue intermittently for the past month. Pt was seen by Dr. Hargrove. Labs were done and was told to go to the ED for further management. Denies fever. Pt having poor appetite and sleeping all day. States some days are better but sxs persisted for a few weeks which prompted pt to visit the ED.

## 2020-01-30 NOTE — H&P ADULT - NSICDXPASTMEDICALHX_GEN_ALL_CORE_FT
PAST MEDICAL HISTORY:  HTN (hypertension)     Hyperthyroidism     Type 2 diabetes mellitus DM (diabetes mellitus)

## 2020-01-30 NOTE — ED PROVIDER NOTE - DIAGNOSTIC INTERPRETATION
CXR wet read: The heart appears to be within normal limits in transverse diameter.  No acute infiltrates. (+) left sided effusion.  The chest wall and surrounding bony structures appear normal.

## 2020-01-30 NOTE — H&P ADULT - PROBLEM SELECTOR PLAN 5
L sided pleural effusion on CXR  - consider malignancy with possible breast nodule noted as outpatient  - f/u CT chest L sided pleural effusion on CXR  - consider malignancy with possible breast nodule noted as outpatient  - f/u CT chest  - holding AM dose of DVT ppx for possible thoracentesis

## 2020-01-31 DIAGNOSIS — R53.83 OTHER FATIGUE: ICD-10-CM

## 2020-01-31 DIAGNOSIS — N63.0 UNSPECIFIED LUMP IN UNSPECIFIED BREAST: ICD-10-CM

## 2020-01-31 DIAGNOSIS — C79.51 SECONDARY MALIGNANT NEOPLASM OF BONE: ICD-10-CM

## 2020-01-31 LAB
24R-OH-CALCIDIOL SERPL-MCNC: 32.8 NG/ML — SIGNIFICANT CHANGE UP (ref 30–80)
ALBUMIN SERPL ELPH-MCNC: 3.2 G/DL — LOW (ref 3.3–5)
ALP SERPL-CCNC: 321 U/L — HIGH (ref 40–120)
ALT FLD-CCNC: 8 U/L — LOW (ref 10–45)
ANION GAP SERPL CALC-SCNC: 11 MMOL/L — SIGNIFICANT CHANGE UP (ref 5–17)
APTT BLD: 30.7 SEC — SIGNIFICANT CHANGE UP (ref 27.5–36.3)
AST SERPL-CCNC: 23 U/L — SIGNIFICANT CHANGE UP (ref 10–40)
BASOPHILS # BLD AUTO: 0.05 K/UL — SIGNIFICANT CHANGE UP (ref 0–0.2)
BASOPHILS NFR BLD AUTO: 0.6 % — SIGNIFICANT CHANGE UP (ref 0–2)
BILIRUB SERPL-MCNC: 0.3 MG/DL — SIGNIFICANT CHANGE UP (ref 0.2–1.2)
BUN SERPL-MCNC: 8 MG/DL — SIGNIFICANT CHANGE UP (ref 7–23)
CALCIUM SERPL-MCNC: 8.7 MG/DL — SIGNIFICANT CHANGE UP (ref 8.4–10.5)
CALCIUM SERPL-MCNC: 8.8 MG/DL — SIGNIFICANT CHANGE UP (ref 8.4–10.5)
CHLORIDE SERPL-SCNC: 103 MMOL/L — SIGNIFICANT CHANGE UP (ref 96–108)
CO2 SERPL-SCNC: 22 MMOL/L — SIGNIFICANT CHANGE UP (ref 22–31)
CREAT SERPL-MCNC: 0.69 MG/DL — SIGNIFICANT CHANGE UP (ref 0.5–1.3)
EOSINOPHIL # BLD AUTO: 0.09 K/UL — SIGNIFICANT CHANGE UP (ref 0–0.5)
EOSINOPHIL NFR BLD AUTO: 1.1 % — SIGNIFICANT CHANGE UP (ref 0–6)
GLUCOSE BLDC GLUCOMTR-MCNC: 118 MG/DL — HIGH (ref 70–99)
GLUCOSE BLDC GLUCOMTR-MCNC: 155 MG/DL — HIGH (ref 70–99)
GLUCOSE BLDC GLUCOMTR-MCNC: 160 MG/DL — HIGH (ref 70–99)
GLUCOSE BLDC GLUCOMTR-MCNC: 161 MG/DL — HIGH (ref 70–99)
GLUCOSE BLDC GLUCOMTR-MCNC: 225 MG/DL — HIGH (ref 70–99)
GLUCOSE BLDC GLUCOMTR-MCNC: 226 MG/DL — HIGH (ref 70–99)
GLUCOSE SERPL-MCNC: 167 MG/DL — HIGH (ref 70–99)
HCT VFR BLD CALC: 40.9 % — SIGNIFICANT CHANGE UP (ref 34.5–45)
HGB BLD-MCNC: 13.2 G/DL — SIGNIFICANT CHANGE UP (ref 11.5–15.5)
IMM GRANULOCYTES NFR BLD AUTO: 0.5 % — SIGNIFICANT CHANGE UP (ref 0–1.5)
INR BLD: 1.12 — SIGNIFICANT CHANGE UP (ref 0.88–1.16)
LDH SERPL L TO P-CCNC: 205 U/L — SIGNIFICANT CHANGE UP (ref 50–242)
LYMPHOCYTES # BLD AUTO: 1.56 K/UL — SIGNIFICANT CHANGE UP (ref 1–3.3)
LYMPHOCYTES # BLD AUTO: 19.9 % — SIGNIFICANT CHANGE UP (ref 13–44)
MAGNESIUM SERPL-MCNC: 1.9 MG/DL — SIGNIFICANT CHANGE UP (ref 1.6–2.6)
MCHC RBC-ENTMCNC: 30.1 PG — SIGNIFICANT CHANGE UP (ref 27–34)
MCHC RBC-ENTMCNC: 32.3 GM/DL — SIGNIFICANT CHANGE UP (ref 32–36)
MCV RBC AUTO: 93.4 FL — SIGNIFICANT CHANGE UP (ref 80–100)
MONOCYTES # BLD AUTO: 1.12 K/UL — HIGH (ref 0–0.9)
MONOCYTES NFR BLD AUTO: 14.3 % — HIGH (ref 2–14)
NEUTROPHILS # BLD AUTO: 4.97 K/UL — SIGNIFICANT CHANGE UP (ref 1.8–7.4)
NEUTROPHILS NFR BLD AUTO: 63.6 % — SIGNIFICANT CHANGE UP (ref 43–77)
NRBC # BLD: 0 /100 WBCS — SIGNIFICANT CHANGE UP (ref 0–0)
PHOSPHATE SERPL-MCNC: 2.7 MG/DL — SIGNIFICANT CHANGE UP (ref 2.5–4.5)
PLATELET # BLD AUTO: 332 K/UL — SIGNIFICANT CHANGE UP (ref 150–400)
POTASSIUM SERPL-MCNC: 4.9 MMOL/L — SIGNIFICANT CHANGE UP (ref 3.5–5.3)
POTASSIUM SERPL-SCNC: 4.9 MMOL/L — SIGNIFICANT CHANGE UP (ref 3.5–5.3)
PROT SERPL-MCNC: 6.5 G/DL — SIGNIFICANT CHANGE UP (ref 6–8.3)
PROTHROM AB SERPL-ACNC: 12.8 SEC — SIGNIFICANT CHANGE UP (ref 10–12.9)
PTH-INTACT FLD-MCNC: 52 PG/ML — SIGNIFICANT CHANGE UP (ref 15–65)
RBC # BLD: 4.38 M/UL — SIGNIFICANT CHANGE UP (ref 3.8–5.2)
RBC # FLD: 13.1 % — SIGNIFICANT CHANGE UP (ref 10.3–14.5)
SODIUM SERPL-SCNC: 136 MMOL/L — SIGNIFICANT CHANGE UP (ref 135–145)
WBC # BLD: 7.83 K/UL — SIGNIFICANT CHANGE UP (ref 3.8–10.5)
WBC # FLD AUTO: 7.83 K/UL — SIGNIFICANT CHANGE UP (ref 3.8–10.5)

## 2020-01-31 PROCEDURE — 99223 1ST HOSP IP/OBS HIGH 75: CPT | Mod: GC

## 2020-01-31 PROCEDURE — 99232 SBSQ HOSP IP/OBS MODERATE 35: CPT | Mod: GC

## 2020-01-31 PROCEDURE — 71260 CT THORAX DX C+: CPT | Mod: 26

## 2020-01-31 RX ORDER — MAGNESIUM SULFATE 500 MG/ML
1 VIAL (ML) INJECTION ONCE
Refills: 0 | Status: COMPLETED | OUTPATIENT
Start: 2020-01-31 | End: 2020-01-31

## 2020-01-31 RX ADMIN — SODIUM CHLORIDE 100 MILLILITER(S): 9 INJECTION INTRAMUSCULAR; INTRAVENOUS; SUBCUTANEOUS at 22:04

## 2020-01-31 RX ADMIN — Medication 1000 UNIT(S): at 13:56

## 2020-01-31 RX ADMIN — Medication 4: at 13:24

## 2020-01-31 RX ADMIN — Medication 2: at 22:04

## 2020-01-31 RX ADMIN — Medication 2: at 00:04

## 2020-01-31 RX ADMIN — Medication 2: at 06:19

## 2020-01-31 RX ADMIN — SODIUM CHLORIDE 100 MILLILITER(S): 9 INJECTION INTRAMUSCULAR; INTRAVENOUS; SUBCUTANEOUS at 00:05

## 2020-01-31 RX ADMIN — Medication 88 MICROGRAM(S): at 06:45

## 2020-01-31 RX ADMIN — Medication 100 GRAM(S): at 11:13

## 2020-01-31 NOTE — CONSULT NOTE ADULT - PROBLEM SELECTOR RECOMMENDATION 3
Breast surgery on board. Plans to biopsy breast lesion  - F/u breast biopsy Breast surgery on board. Plans to biopsy breast lesion  - F/u breast biopsy      Fellow Addendum: Patient seen and examined at bedside. Agree with above findings. POCUS examination revealed a small effusion on L side that is close in proximity to heart and lungs. She has no respiratory symptoms and yield for diagnostic purposes is low for pleural effusions. We will hold off on thoracentesis for now and perform daily POCUS. If she starts to have symptoms, we will consider drainage. For diagnostic purposes, bone lesions will likely have higher yield.

## 2020-01-31 NOTE — CONSULT NOTE ADULT - PROBLEM SELECTOR RECOMMENDATION 9
Patient w/ pleural effusion on L seen on CT chest. In the setting of recent breast findings/concern for metastasis effusion is most likely malignant. Low concern for infectious causes in the absence of fever/leukocytosis and low concern for tubercular as patient w/ no travel history and no cavitary lesions seen on CT.   - Will perform thoracentesis and f/u results

## 2020-01-31 NOTE — PROGRESS NOTE ADULT - PROBLEM SELECTOR PLAN 3
Chronic as an outpatient, mild hypoNa likely due to poor po intake  - Na 133 on admission, now 136  - on mIVF given dry appearance on admission  - monitor closely Chronic as an outpatient, mild hypoNa likely due to poor po intake  - Na 133 on admission, now 136 improving   - monitor closely

## 2020-01-31 NOTE — CONSULT NOTE ADULT - ATTENDING COMMENTS
87 yo F with hx of endometrial CA s/p hysterectomy and bilateral oophorectomy with new left side pleural effusion and new L sided breast mass w/ L axillary LAD and concern for mets to the bone. We were consulted for possible thoracentesis for diagnosis and staging.  Labs and old records were reviewed. Lung ultrasound was done at bedside (reviewed by me), it showed left side small pleural effusion. Ultrasound window for thoracentesis was not safe because pleural effusion is small. CT chest was also reviewed by me (left side pleural effusion).  Plan:  - Would hold off on thoracentesis for now as patient is asymptomatic from pulmonary stand point and pleural effusion is small. Will consider thoracentesis if patient became symptomatic or pleural effusion increases in size.  - Would recommend bone biopsy for better diagnostic yield for metastasis,   - We will continue to follow 0p and repeat lung ultrasound.  - Rest as above

## 2020-01-31 NOTE — CONSULT NOTE ADULT - SUBJECTIVE AND OBJECTIVE BOX
PULMONARY SERVICE INITIAL CONSULT NOTE    HPI:  87 yo F with hx of DM2, Grave's disease, HLD, HTN, lymphedema, endometrial carcinoma s/p hysterectomy and bilateral oophorectomy, melanoma of the LLE s/p excision (no chemo/radiation), L breast lumpectomy in the past w/ benign findings, and benign pancreatic tail adenoma who presents for roughly 1 month of generalized malaise and weakness.  Patient reports she has had episodes of generalized weakness and fatigue to the point that she finds it difficult to have the energy to get out of bed or cook for herself.  On a past episode she was dx with UTI and symptoms improved however she was seen both  and  as an outpatient and UAs were negative for infection.  She was empirically started on Keflex as outpatient which was then discontinued when Ucx negative, otherwise has had no other recent abx or changes in her medications.  She denies fevers, chills, N/V, abdominal pain, chest pain, palpitations, diarrhea, constipation, urinary frequency, urgency or dysuria.  She denies weight loss and is up to date on colonoscopies.    Of note, patient denies recent history of travel and no tick exposures. She previously worked as a  at numerous offices around the city with the most recent by [facility] however she retired 1 year prior to . She thinks her neighbors may have had mold in their apartment but she does not believe she had any exposure as she is allergic to mold and has had no URI symptoms that she usually has with exposure. She continues to have a good appetite and denies weight loss.      REVIEW OF SYSTEMS:  Constitutional: No fever, weight loss or fatigue  Eyes: No eye pain, visual disturbances, or discharge  ENMT:  No difficulty hearing, tinnitus, vertigo; No sinus or throat pain  Neck: No pain, stiffness or neck swelling  Respiratory: see HPI  Cardiovascular: No chest pain, palpitations, dizziness or leg swelling  Gastrointestinal: No abdominal or epigastric pain. No nausea, vomiting or hematemesis; No diarrhea or constipation. No melena or hematochezia.  Genitourinary: No dysuria, frequency, hematuria or incontinence  Neurological: No headaches, memory loss, loss of strength, numbness or tremors  Skin: No itching, burning, rashes or lesions   Lymph Nodes: No enlarged glands  Endocrine: No heat or cold intolerance; No hair loss  Musculoskeletal: No joint pain or swelling; No muscle, back or extremity pain  Psychiatric: No depression, anxiety, mood swings or difficulty sleeping  Heme/Lymph: No easy bruising or bleeding gums  Allergy and Immunologic: No hives or eczema    PAST MEDICAL & SURGICAL HISTORY:  Hyperthyroidism  HTN (hypertension)  Type 2 diabetes mellitus: DM (diabetes mellitus)  Status post cataract extraction: S/P cataract extraction  Other acquired absence of organ: S/P cholecystectomy  Other acquired absence of organ: S/P splenectomy  Status post total hysterectomy: S/P hysterectomy      FAMILY HISTORY:      SOCIAL HISTORY:  Smoking Status: [ ] Current, [x ] Former, [ ] Never  Social smoker in her 20s but never daily smoker.    MEDICATIONS:  Pulmonary:    Antimicrobials:    Anticoagulants:    Onc:    GI/:    Endocrine:  dextrose 40% Gel 15 Gram(s) Oral once PRN  dextrose 50% Injectable 12.5 Gram(s) IV Push once  dextrose 50% Injectable 25 Gram(s) IV Push once  dextrose 50% Injectable 25 Gram(s) IV Push once  glucagon  Injectable 1 milliGRAM(s) IntraMuscular once PRN  insulin lispro (HumaLOG) corrective regimen sliding scale   SubCutaneous Before meals and at bedtime  levothyroxine 88 MICROGram(s) Oral daily    Cardiac:    Other Medications:  cholecalciferol 1000 Unit(s) Oral daily  dextrose 5%. 1000 milliLiter(s) IV Continuous <Continuous>  sodium chloride 0.9%. 1000 milliLiter(s) IV Continuous <Continuous>      Allergies    No Known Allergies    Intolerances        Vital Signs Last 24 Hrs  T(C): 36.8 (2020 11:04), Max: 36.8 (2020 11:04)  T(F): 98.3 (2020 11:04), Max: 98.3 (2020 11:04)  HR: 91 (2020 11:04) (59 - 92)  BP: 144/85 (2020 11:04) (144/78 - 160/82)  BP(mean): --  RR: 18 (2020 11:04) (16 - 18)  SpO2: 96% (2020 11:04) (93% - 97%)        PHYSICAL EXAM:  Constitutional: Elderly female walking around in the ED not on oxygen in NAD  Head: NC/AT  EENT: PERRL, anicteric sclera; oropharynx clear, MMM  Neck: supple, no appreciable JVD  Breast: L sided breast mass   Respiratory: reduced breath sounds at LLL base  Cardiovascular: +S1/S2, RRR  Gastrointestinal: soft, NT/ND; +BSx4  Extremities: WWP; no edema, clubbing or cyanosis  Vascular: 2+ radial, DP/PT pulses B/L  Neurological: AAOx3; mild R sided facial droop  Lymph: L axillary LAD    LABS:      CBC Full  -  ( 2020 05:55 )  WBC Count : 7.83 K/uL  RBC Count : 4.38 M/uL  Hemoglobin : 13.2 g/dL  Hematocrit : 40.9 %  Platelet Count - Automated : 332 K/uL  Mean Cell Volume : 93.4 fl  Mean Cell Hemoglobin : 30.1 pg  Mean Cell Hemoglobin Concentration : 32.3 gm/dL  Auto Neutrophil # : 4.97 K/uL  Auto Lymphocyte # : 1.56 K/uL  Auto Monocyte # : 1.12 K/uL  Auto Eosinophil # : 0.09 K/uL  Auto Basophil # : 0.05 K/uL  Auto Neutrophil % : 63.6 %  Auto Lymphocyte % : 19.9 %  Auto Monocyte % : 14.3 %  Auto Eosinophil % : 1.1 %  Auto Basophil % : 0.6 %        136  |  103  |  8   ----------------------------<  167<H>  4.9   |  22  |  0.69    Ca    8.7      2020 05:55  Phos  2.7       Mg     1.9         TPro  6.5  /  Alb  3.2<L>  /  TBili  0.3  /  DBili  x   /  AST  23  /  ALT  8<L>  /  AlkPhos  321<H>      PT/INR - ( 2020 05:55 )   PT: 12.8 sec;   INR: 1.12          PTT - ( 2020 05:55 )  PTT:30.7 sec      Urinalysis Basic - ( 2020 21:35 )    Color: Yellow / Appearance: Clear / S.020 / pH: x  Gluc: x / Ketone: 15 mg/dL  / Bili: Negative / Urobili: 0.2 E.U./dL   Blood: x / Protein: NEGATIVE mg/dL / Nitrite: NEGATIVE   Leuk Esterase: NEGATIVE / RBC: x / WBC x   Sq Epi: x / Non Sq Epi: x / Bacteria: x                RADIOLOGY & ADDITIONAL STUDIES: PULMONARY SERVICE INITIAL CONSULT NOTE    HPI:  85 yo F non-smoker (though previously smoked socially in her 20s) with hx of DM2, Grave's disease, HLD, HTN, lymphedema, endometrial carcinoma s/p hysterectomy and bilateral oophorectomy, melanoma of the LLE s/p excision (no chemo/radiation), L breast lumpectomy in the past w/ benign findings, and benign pancreatic tail adenoma who presents for roughly 1 month of generalized malaise and weakness.  Patient reports she has had episodes of generalized weakness and fatigue to the point that she finds it difficult to have the energy to get out of bed or cook for herself.  On a past episode she was dx with UTI and symptoms improved however she was seen both  and  as an outpatient and UAs were negative for infection.  She was empirically started on Keflex as outpatient which was then discontinued when Ucx negative, otherwise has had no other recent abx or changes in her medications.  She denies fevers, chills, N/V, abdominal pain, chest pain, palpitations, diarrhea, constipation, urinary frequency, urgency or dysuria.  She denies weight loss and is up to date on colonoscopies.    Of note, patient denies recent history of travel and no tick exposures. She previously worked as a  at numerous offices around the city with the most recent by [facility] however she retired 1 year prior to . She thinks her neighbors may have had mold in their apartment but she does not believe she had any exposure as she is allergic to mold and has had no URI symptoms that she usually has with exposure. She continues to have a good appetite and denies weight loss.      REVIEW OF SYSTEMS:  Constitutional: No fever, weight loss or fatigue  Eyes: No eye pain, visual disturbances, or discharge  ENMT:  No difficulty hearing, tinnitus, vertigo; No sinus or throat pain  Neck: No pain, stiffness or neck swelling  Respiratory: see HPI  Cardiovascular: No chest pain, palpitations, dizziness or leg swelling  Gastrointestinal: No abdominal or epigastric pain. No nausea, vomiting or hematemesis; No diarrhea or constipation. No melena or hematochezia.  Genitourinary: No dysuria, frequency, hematuria or incontinence  Neurological: No headaches, memory loss, loss of strength, numbness or tremors  Skin: No itching, burning, rashes or lesions   Lymph Nodes: No enlarged glands  Endocrine: No heat or cold intolerance; No hair loss  Musculoskeletal: No joint pain or swelling; No muscle, back or extremity pain  Psychiatric: No depression, anxiety, mood swings or difficulty sleeping  Heme/Lymph: No easy bruising or bleeding gums  Allergy and Immunologic: No hives or eczema    PAST MEDICAL & SURGICAL HISTORY:  Hyperthyroidism  HTN (hypertension)  Type 2 diabetes mellitus: DM (diabetes mellitus)  Status post cataract extraction: S/P cataract extraction  Other acquired absence of organ: S/P cholecystectomy  Other acquired absence of organ: S/P splenectomy  Status post total hysterectomy: S/P hysterectomy      FAMILY HISTORY:      SOCIAL HISTORY:  Smoking Status: [ ] Current, [x ] Former, [ ] Never  Social smoker in her 20s but never daily smoker.    MEDICATIONS:  Pulmonary:    Antimicrobials:    Anticoagulants:    Onc:    GI/:    Endocrine:  dextrose 40% Gel 15 Gram(s) Oral once PRN  dextrose 50% Injectable 12.5 Gram(s) IV Push once  dextrose 50% Injectable 25 Gram(s) IV Push once  dextrose 50% Injectable 25 Gram(s) IV Push once  glucagon  Injectable 1 milliGRAM(s) IntraMuscular once PRN  insulin lispro (HumaLOG) corrective regimen sliding scale   SubCutaneous Before meals and at bedtime  levothyroxine 88 MICROGram(s) Oral daily    Cardiac:    Other Medications:  cholecalciferol 1000 Unit(s) Oral daily  dextrose 5%. 1000 milliLiter(s) IV Continuous <Continuous>  sodium chloride 0.9%. 1000 milliLiter(s) IV Continuous <Continuous>      Allergies    No Known Allergies    Intolerances        Vital Signs Last 24 Hrs  T(C): 36.8 (2020 11:04), Max: 36.8 (2020 11:04)  T(F): 98.3 (2020 11:04), Max: 98.3 (2020 11:04)  HR: 91 (2020 11:04) (59 - 92)  BP: 144/85 (2020 11:04) (144/78 - 160/82)  BP(mean): --  RR: 18 (2020 11:04) (16 - 18)  SpO2: 96% (2020 11:04) (93% - 97%)        PHYSICAL EXAM:  Constitutional: Elderly female walking around in the ED not on oxygen in NAD  Head: NC/AT  EENT: PERRL, anicteric sclera; oropharynx clear, MMM  Neck: supple, no appreciable JVD  Breast: L sided breast mass   Respiratory: reduced breath sounds at LLL base  Cardiovascular: +S1/S2, RRR  Gastrointestinal: soft, NT/ND; +BSx4  Extremities: WWP; no edema, clubbing or cyanosis  Vascular: 2+ radial, DP/PT pulses B/L  Neurological: AAOx3; mild R sided facial droop  Lymph: L axillary LAD    LABS:      CBC Full  -  ( 2020 05:55 )  WBC Count : 7.83 K/uL  RBC Count : 4.38 M/uL  Hemoglobin : 13.2 g/dL  Hematocrit : 40.9 %  Platelet Count - Automated : 332 K/uL  Mean Cell Volume : 93.4 fl  Mean Cell Hemoglobin : 30.1 pg  Mean Cell Hemoglobin Concentration : 32.3 gm/dL  Auto Neutrophil # : 4.97 K/uL  Auto Lymphocyte # : 1.56 K/uL  Auto Monocyte # : 1.12 K/uL  Auto Eosinophil # : 0.09 K/uL  Auto Basophil # : 0.05 K/uL  Auto Neutrophil % : 63.6 %  Auto Lymphocyte % : 19.9 %  Auto Monocyte % : 14.3 %  Auto Eosinophil % : 1.1 %  Auto Basophil % : 0.6 %        136  |  103  |  8   ----------------------------<  167<H>  4.9   |  22  |  0.69    Ca    8.7      2020 05:55  Phos  2.7       Mg     1.9         TPro  6.5  /  Alb  3.2<L>  /  TBili  0.3  /  DBili  x   /  AST  23  /  ALT  8<L>  /  AlkPhos  321<H>      PT/INR - ( 2020 05:55 )   PT: 12.8 sec;   INR: 1.12          PTT - ( 2020 05:55 )  PTT:30.7 sec      Urinalysis Basic - ( 2020 21:35 )    Color: Yellow / Appearance: Clear / S.020 / pH: x  Gluc: x / Ketone: 15 mg/dL  / Bili: Negative / Urobili: 0.2 E.U./dL   Blood: x / Protein: NEGATIVE mg/dL / Nitrite: NEGATIVE   Leuk Esterase: NEGATIVE / RBC: x / WBC x   Sq Epi: x / Non Sq Epi: x / Bacteria: x                RADIOLOGY & ADDITIONAL STUDIES: PULMONARY SERVICE INITIAL CONSULT NOTE    HPI:  87 yo F non-smoker (though previously smoked socially in her 20s) with hx of DM2, Grave's disease, HLD, HTN, lymphedema, endometrial carcinoma s/p hysterectomy and bilateral oophorectomy, melanoma of the LLE s/p excision (no chemo/radiation), L breast lumpectomy in the past w/ benign findings, and benign pancreatic tail adenoma who presents for roughly 1 month of generalized malaise and weakness.  Patient reports she has had episodes of generalized weakness and fatigue to the point that she finds it difficult to have the energy to get out of bed or cook for herself.  On a past episode she was dx with UTI and symptoms improved however she was seen both  and  as an outpatient and UAs were negative for infection.  She was empirically started on Keflex as outpatient which was then discontinued when Ucx negative, otherwise has had no other recent abx or changes in her medications.  She denies fevers, chills, N/V, abdominal pain, chest pain, palpitations, diarrhea, constipation, urinary frequency, urgency or dysuria.  She denies weight loss and is up to date on colonoscopies.    Of note, patient denies recent history of travel and no tick exposures. She previously worked as a  at numerous offices around the city with the most recent by [facility] however she retired 1 year prior to . She thinks her neighbors may have had mold in their apartment but she does not believe she had any exposure as she is allergic to mold and has had no URI symptoms that she usually has with exposure. She continues to have a good appetite and denies weight loss.      REVIEW OF SYSTEMS:  Constitutional: No fever, weight loss or fatigue  Eyes: No eye pain, visual disturbances, or discharge  ENMT:  No difficulty hearing, tinnitus, vertigo; No sinus or throat pain  Neck: No pain, stiffness or neck swelling  Respiratory: see HPI  Cardiovascular: No chest pain, palpitations, dizziness or leg swelling  Gastrointestinal: No abdominal or epigastric pain. No nausea, vomiting or hematemesis; No diarrhea or constipation. No melena or hematochezia.  Genitourinary: No dysuria, frequency, hematuria or incontinence  Neurological: No headaches, memory loss, loss of strength, numbness or tremors  Skin: No itching, burning, rashes or lesions   Lymph Nodes: No enlarged glands  Endocrine: No heat or cold intolerance; No hair loss  Musculoskeletal: No joint pain or swelling; No muscle, back or extremity pain  Psychiatric: No depression, anxiety, mood swings or difficulty sleeping  Heme/Lymph: No easy bruising or bleeding gums  Allergy and Immunologic: No hives or eczema    PAST MEDICAL & SURGICAL HISTORY:  Hyperthyroidism  HTN (hypertension)  Type 2 diabetes mellitus: DM (diabetes mellitus)  Status post cataract extraction: S/P cataract extraction  Other acquired absence of organ: S/P cholecystectomy  Other acquired absence of organ: S/P splenectomy  Status post total hysterectomy: S/P hysterectomy      FAMILY HISTORY:      SOCIAL HISTORY:  Smoking Status: [ ] Current, [x ] Former, [ ] Never  Social smoker in her 20s but never daily smoker.    MEDICATIONS:  Pulmonary:    Antimicrobials:    Anticoagulants:    Onc:    GI/:    Endocrine:  dextrose 40% Gel 15 Gram(s) Oral once PRN  dextrose 50% Injectable 12.5 Gram(s) IV Push once  dextrose 50% Injectable 25 Gram(s) IV Push once  dextrose 50% Injectable 25 Gram(s) IV Push once  glucagon  Injectable 1 milliGRAM(s) IntraMuscular once PRN  insulin lispro (HumaLOG) corrective regimen sliding scale   SubCutaneous Before meals and at bedtime  levothyroxine 88 MICROGram(s) Oral daily    Cardiac:    Other Medications:  cholecalciferol 1000 Unit(s) Oral daily  dextrose 5%. 1000 milliLiter(s) IV Continuous <Continuous>  sodium chloride 0.9%. 1000 milliLiter(s) IV Continuous <Continuous>      Allergies    No Known Allergies    Intolerances        Vital Signs Last 24 Hrs  T(C): 36.8 (2020 11:04), Max: 36.8 (2020 11:04)  T(F): 98.3 (2020 11:04), Max: 98.3 (2020 11:04)  HR: 91 (2020 11:04) (59 - 92)  BP: 144/85 (2020 11:04) (144/78 - 160/82)  BP(mean): --  RR: 18 (2020 11:04) (16 - 18)  SpO2: 96% (2020 11:04) (93% - 97%)        PHYSICAL EXAM:  Constitutional: Elderly female walking around in the ED not on oxygen in NAD  Head: NC/AT  EENT: PERRL, anicteric sclera; oropharynx clear, MMM  Neck: supple, no appreciable JVD  Breast: L sided breast mass   Respiratory: reduced breath sounds at LLL base  Cardiovascular: +S1/S2, RRR  Gastrointestinal: soft, NT/ND; +BSx4  Extremities: WWP; no edema, clubbing or cyanosis  Vascular: 2+ radial, DP/PT pulses B/L  Neurological: AAOx3; mild R sided facial droop  Lymph: L axillary LAD    LABS:      CBC Full  -  ( 2020 05:55 )  WBC Count : 7.83 K/uL  RBC Count : 4.38 M/uL  Hemoglobin : 13.2 g/dL  Hematocrit : 40.9 %  Platelet Count - Automated : 332 K/uL  Mean Cell Volume : 93.4 fl  Mean Cell Hemoglobin : 30.1 pg  Mean Cell Hemoglobin Concentration : 32.3 gm/dL  Auto Neutrophil # : 4.97 K/uL  Auto Lymphocyte # : 1.56 K/uL  Auto Monocyte # : 1.12 K/uL  Auto Eosinophil # : 0.09 K/uL  Auto Basophil # : 0.05 K/uL  Auto Neutrophil % : 63.6 %  Auto Lymphocyte % : 19.9 %  Auto Monocyte % : 14.3 %  Auto Eosinophil % : 1.1 %  Auto Basophil % : 0.6 %        136  |  103  |  8   ----------------------------<  167<H>  4.9   |  22  |  0.69    Ca    8.7      2020 05:55  Phos  2.7       Mg     1.9         TPro  6.5  /  Alb  3.2<L>  /  TBili  0.3  /  DBili  x   /  AST  23  /  ALT  8<L>  /  AlkPhos  321<H>      PT/INR - ( 2020 05:55 )   PT: 12.8 sec;   INR: 1.12          PTT - ( 2020 05:55 )  PTT:30.7 sec      Urinalysis Basic - ( 2020 21:35 )    Color: Yellow / Appearance: Clear / S.020 / pH: x  Gluc: x / Ketone: 15 mg/dL  / Bili: Negative / Urobili: 0.2 E.U./dL   Blood: x / Protein: NEGATIVE mg/dL / Nitrite: NEGATIVE   Leuk Esterase: NEGATIVE / RBC: x / WBC x   Sq Epi: x / Non Sq Epi: x / Bacteria: x                RADIOLOGY & ADDITIONAL STUDIES:  EXAM:  CT CHEST IC                          EXAM:  CT ABDOMEN AND PELVIS IC                          PROCEDURE DATE:  2020          INTERPRETATION:  CT of the chest, abdomen and pelvis with contrast dated 2020 10:27 PM    INDICATION: Generalized weakness, poor appetite. Reported history of endometrial carcinoma.    TECHNIQUE: CT of the chest, abdomen and pelvis was performed using intravenous contrast.  Axial, sagittal and coronal images were produced and reviewed.    INTRAVENOUS CONTRAST: 90 cc of Optiray 350.    PRIOR STUDIES: None.    FINDINGS: Normal heart size. Coronary calcifications. No pericardial effusion. No thoracic aortic aneurysm.    2 cm mass in the retroareolar region of the left breast. Left axillary lymphadenopathy measuring up to 2 cm. Infiltration of the  Axillary fat.    No right pleural effusion. Moderate size left pleural effusion. Slight infiltration of the fat overlying the left hemidiaphragm anteriorly. Atelectasis in the left lung base.    Several hepatic cysts the largest measuring 8.4 cm with thin internal septation. Status post cholecystectomy. Status post splenectomy. Residual splenule in the left upper quadrant. Possible resection of tail of pancreas. Remainder of the pancreas is unremarkable.    Unremarkable right adrenal gland. Nonspecific 1.6 cm nodule left adrenal gland. A few renal cysts. 0.6 cm nonobstructive left kidney stone. No hydronephrosis. No retroperitoneal lymphadenopathy.    Limited evaluation of bowel without contrast. Colonic diverticulosis. No bowel obstruction or free air. No ascites.     Unremarkable bladder. Status post hysterectomy. No adnexal mass.    Innumerable sclerotic bony lesions throughout the skeleton consistent with metastasis. There are innumerable sclerotic lesions throughout the thoracolumbar spine, sternum, multiple ribs and pelvis.      IMPRESSION:    Left breast mass and left axillary lymphadenopathy, suspicious for breast neoplasm. Findings appear amenable to ultrasound-guided biopsy.    Extensive osteoblastic bony metastasis.    Moderate size large pleural effusion. A neoplastic etiology must be considered. Consider pleural tap.    Status post hysterectomy, cholecystectomy splenectomy and possible distal pancreatectomy.

## 2020-01-31 NOTE — PROGRESS NOTE ADULT - SUBJECTIVE AND OBJECTIVE BOX
INTERVAL HPI/OVERNIGHT EVENTS:  Events noted; Patient with likely breast cancer with bone met and malignant pleural effusion; Will need further evaluation.        MEDICATIONS  (STANDING):  cholecalciferol 1000 Unit(s) Oral daily  dextrose 5%. 1000 milliLiter(s) (50 mL/Hr) IV Continuous <Continuous>  dextrose 50% Injectable 12.5 Gram(s) IV Push once  dextrose 50% Injectable 25 Gram(s) IV Push once  dextrose 50% Injectable 25 Gram(s) IV Push once  insulin lispro (HumaLOG) corrective regimen sliding scale   SubCutaneous Before meals and at bedtime  levothyroxine 88 MICROGram(s) Oral daily  magnesium sulfate  IVPB 1 Gram(s) IV Intermittent once  sodium chloride 0.9%. 1000 milliLiter(s) (100 mL/Hr) IV Continuous <Continuous>    MEDICATIONS  (PRN):  dextrose 40% Gel 15 Gram(s) Oral once PRN Blood Glucose LESS THAN 70 milliGRAM(s)/deciliter  glucagon  Injectable 1 milliGRAM(s) IntraMuscular once PRN Glucose LESS THAN 70 milligrams/deciliter      Allergies    No Known Allergies    Intolerances        Vital Signs Last 24 Hrs  T(C): 36.4 (2020 05:00), Max: 36.7 (2020 20:24)  T(F): 97.5 (2020 05:00), Max: 98.1 (2020 20:24)  HR: 59 (2020 00:18) (59 - 92)  BP: 150/79 (2020 05:00) (144/78 - 160/82)  BP(mean): --  RR: 18 (2020 05:00) (16 - 18)  SpO2: 96% (2020 05:00) (93% - 97%)             Eyes: BRIDGER    ENMT: Negative    Neck: Supple    Back:  no tenderness     Respiratory:  clear   Left breat mass    Cardiovascular: S1 S2    Gastrointestinal: soft    Genitourinary:    Extremities:  no edema    Vascular:    Neurological: intact    Skin:    Lymph Nodes:            LABS:                        13.2   7.83  )-----------( 332      ( 2020 05:55 )             40.9         136  |  103  |  8   ----------------------------<  167<H>  4.9   |  22  |  0.69    Ca    8.7      2020 05:55  Phos  2.7       Mg     1.9         TPro  6.5  /  Alb  3.2<L>  /  TBili  0.3  /  DBili  x   /  AST  23  /  ALT  8<L>  /  AlkPhos  321<H>      PT/INR - ( 2020 05:55 )   PT: 12.8 sec;   INR: 1.12          PTT - ( 2020 05:55 )  PTT:30.7 sec  Urinalysis Basic - ( 2020 21:35 )    Color: Yellow / Appearance: Clear / S.020 / pH: x  Gluc: x / Ketone: 15 mg/dL  / Bili: Negative / Urobili: 0.2 E.U./dL   Blood: x / Protein: NEGATIVE mg/dL / Nitrite: NEGATIVE   Leuk Esterase: NEGATIVE / RBC: x / WBC x   Sq Epi: x / Non Sq Epi: x / Bacteria: x        RADIOLOGY & ADDITIONAL TESTS:

## 2020-01-31 NOTE — CONSULT NOTE ADULT - PROBLEM SELECTOR RECOMMENDATION 2
Patient w/ malaise. No recent history of tick exposure. Has not traveled outside of [facility]. Most likely in the setting of findings on CT  - Will f/u thoracentesis and breast biopsy results

## 2020-01-31 NOTE — PROGRESS NOTE ADULT - SUBJECTIVE AND OBJECTIVE BOX
OVERNIGHT EVENTS: admitted overnight    SUBJECTIVE / INTERVAL HPI: Patient seen and examined at bedside. Continues to feel weakness but otherwise no other complaints. She denies chest pain, fever, chills, N/V, SOB, diarrhea, constipation, dysuria     VITAL SIGNS:  Vital Signs Last 24 Hrs  T(C): 36.8 (2020 11:04), Max: 36.8 (2020 11:04)  T(F): 98.3 (2020 11:04), Max: 98.3 (2020 11:04)  HR: 91 (2020 11:04) (59 - 92)  BP: 144/85 (2020 11:04) (144/78 - 160/82)  BP(mean): --  RR: 18 (2020 11:04) (16 - 18)  SpO2: 96% (2020 11:04) (93% - 97%)        PHYSICAL EXAM:  General: pleasant elderly female, NAD< laying comfortably in bed  HEENT: NC/AT, anicteric sclera, MMM  Neck: supple  Cardiovascular: +S1/S2, RRR, no murmurs or gallops  Respiratory: decreased beath sounds L lung, no wheezing, no crackles, R lung CTA  Gastrointestinal: soft, NT/ND, +BSx4  Extremities: WWP, no edema, clubbing or cyanosis  Vascular: 2+ radial, DP/PT pulses B/L  Neurological: AAOx3, no focal deficits    MEDICATIONS  (STANDING):  cholecalciferol 1000 Unit(s) Oral daily  dextrose 5%. 1000 milliLiter(s) (50 mL/Hr) IV Continuous <Continuous>  dextrose 50% Injectable 12.5 Gram(s) IV Push once  dextrose 50% Injectable 25 Gram(s) IV Push once  dextrose 50% Injectable 25 Gram(s) IV Push once  insulin lispro (HumaLOG) corrective regimen sliding scale   SubCutaneous Before meals and at bedtime  levothyroxine 88 MICROGram(s) Oral daily  sodium chloride 0.9%. 1000 milliLiter(s) (100 mL/Hr) IV Continuous <Continuous>    MEDICATIONS  (PRN):  dextrose 40% Gel 15 Gram(s) Oral once PRN Blood Glucose LESS THAN 70 milliGRAM(s)/deciliter  glucagon  Injectable 1 milliGRAM(s) IntraMuscular once PRN Glucose LESS THAN 70 milligrams/deciliter    Allergies    No Known Allergies    Intolerances        LABS:                        13.2   7.83  )-----------( 332      ( 2020 05:55 )             40.9         136  |  103  |  8   ----------------------------<  167<H>  4.9   |  22  |  0.69    Ca    8.7      2020 05:55  Phos  2.7       Mg     1.9         TPro  6.5  /  Alb  3.2<L>  /  TBili  0.3  /  DBili  x   /  AST  23  /  ALT  8<L>  /  AlkPhos  321<H>      PT/INR - ( 2020 05:55 )   PT: 12.8 sec;   INR: 1.12          PTT - ( 2020 05:55 )  PTT:30.7 sec  Urinalysis Basic - ( 2020 21:35 )    Color: Yellow / Appearance: Clear / S.020 / pH: x  Gluc: x / Ketone: 15 mg/dL  / Bili: Negative / Urobili: 0.2 E.U./dL   Blood: x / Protein: NEGATIVE mg/dL / Nitrite: NEGATIVE   Leuk Esterase: NEGATIVE / RBC: x / WBC x   Sq Epi: x / Non Sq Epi: x / Bacteria: x      CAPILLARY BLOOD GLUCOSE      POCT Blood Glucose.: 225 mg/dL (2020 13:03)        Culture - Urine (collected 20 @ 22:31)  Source: .Urine Clean Catch (Midstream)  Preliminary Report (20 @ 11:20):    No growth to date.      < from: CT Chest w/ IV Cont (20 @ 08:52) >  IMPRESSION:    Left breast mass and left axillary lymphadenopathy, suspicious for breast neoplasm. Findings appear amenable to ultrasound-guided biopsy.    Extensive osteoblastic bony metastasis.    Moderate size large pleural effusion. A neoplastic etiology must be considered. Consider pleural tap.    Status post hysterectomy, cholecystectomy splenectomy and possible distal pancreatectomy.    < end of copied text >    < from: CT Abdomen and Pelvis w/ IV Cont (20 @ 22:27) >  IMPRESSION:    Left breast mass and left axillary lymphadenopathy, suspicious for breast neoplasm. Findings appear amenable to ultrasound-guided biopsy.    Extensive osteoblastic bony metastasis.    Moderate size large pleural effusion. A neoplastic etiology must be considered. Consider pleural tap.    Status post hysterectomy, cholecystectomy splenectomy and possible distal pancreatectomy.    < end of copied text >

## 2020-01-31 NOTE — PROGRESS NOTE ADULT - PROBLEM SELECTOR PLAN 5
L sided pleural effusion on CXR  - thoracentesis today  - consider malignancy with possible breast nodule noted as outpatient L sided pleural effusion on CXR. Likely from breast malignancy   - thoracentesis today per pulm  - f/u thora results

## 2020-01-31 NOTE — CONSULT NOTE ADULT - SUBJECTIVE AND OBJECTIVE BOX
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 Found to have left breast mass on examination and CT scan with left axillary LAD, left pleural effusion and multiple osteoblastic bone mets and high ALP with normal GGT. Pending US guided core bx of breast lesion and thoracentesis. General surgery consulted for newly identified breast mass. Pt denies breast pain, discharge or skin changes. Last mammography a number of years ago - normal according to pt. Pt had b/l lumpectomies for benign masses in her 60s. Denies FH of breast cancer. Denies weight loss or B symptoms. Denies f/c/n/v/d. Tolerating PO, regular flatus and bowel movements. Last colonoscopy last year - normal.       PAST MEDICAL & SURGICAL HISTORY:  Hyperthyroidism  HTN (hypertension)  Type 2 diabetes mellitus: DM (diabetes mellitus)  Status post cataract extraction: S/P cataract extraction  Other acquired absence of organ: S/P cholecystectomy  Other acquired absence of organ: S/P splenectomy  Status post total hysterectomy: S/P hysterectomy      MEDICATIONS  (STANDING):  cholecalciferol 1000 Unit(s) Oral daily  dextrose 5%. 1000 milliLiter(s) (50 mL/Hr) IV Continuous <Continuous>  dextrose 50% Injectable 12.5 Gram(s) IV Push once  dextrose 50% Injectable 25 Gram(s) IV Push once  dextrose 50% Injectable 25 Gram(s) IV Push once  insulin lispro (HumaLOG) corrective regimen sliding scale   SubCutaneous Before meals and at bedtime  levothyroxine 88 MICROGram(s) Oral daily  sodium chloride 0.9%. 1000 milliLiter(s) (100 mL/Hr) IV Continuous <Continuous>    MEDICATIONS  (PRN):  dextrose 40% Gel 15 Gram(s) Oral once PRN Blood Glucose LESS THAN 70 milliGRAM(s)/deciliter  glucagon  Injectable 1 milliGRAM(s) IntraMuscular once PRN Glucose LESS THAN 70 milligrams/deciliter      Allergies    No Known Allergies    Intolerances        FAMILY HISTORY:  No FH of breast cancer    ROS: otherwise negative.    Vital Signs Last 24 Hrs  T(C): 36.8 (2020 11:04), Max: 36.8 (2020 11:04)  T(F): 98.3 (2020 11:04), Max: 98.3 (2020 11:04)  HR: 91 (2020 11:04) (59 - 92)  BP: 144/85 (2020 11:04) (144/78 - 160/82)  BP(mean): --  RR: 18 (2020 11:04) (16 - 18)  SpO2: 96% (2020 11:04) (93% - 97%)    I&O's Summary      Physical Exam:  General: NAD, resting comfortably in bed  HEENT: MMM  Pulmonary: nonlabored breathing, normal resp effort  Cardiovascular: RRR  Abdominal: soft, nontender, nondistended  Breast: Left breast retroareolar mass 2cm, no overlying skin changes. Previous lumpectomies well healed. No masses palpable in the right breast. Left axillary LAD. No spinal tenderness.   Extremities: WWP, no edema, no calf tenderness  Neuro: no focal deficits  Psych: pleasant    LABS:                        13.2   7.83  )-----------( 332      ( 2020 05:55 )             40.9     31    136  |  103  |  8   ----------------------------<  167<H>  4.9   |  22  |  0.69    Ca    8.7      2020 05:55  Phos  2.7       Mg     1.9         TPro  6.5  /  Alb  3.2<L>  /  TBili  0.3  /  DBili  x   /  AST  23  /  ALT  8<L>  /  AlkPhos  321<H>      PT/INR - ( 2020 05:55 )   PT: 12.8 sec;   INR: 1.12          PTT - ( 2020 05:55 )  PTT:30.7 sec  Urinalysis Basic - ( 2020 21:35 )    Color: Yellow / Appearance: Clear / S.020 / pH: x  Gluc: x / Ketone: 15 mg/dL  / Bili: Negative / Urobili: 0.2 E.U./dL   Blood: x / Protein: NEGATIVE mg/dL / Nitrite: NEGATIVE   Leuk Esterase: NEGATIVE / RBC: x / WBC x   Sq Epi: x / Non Sq Epi: x / Bacteria: x      CAPILLARY BLOOD GLUCOSE      POCT Blood Glucose.: 225 mg/dL (2020 13:03)  POCT Blood Glucose.: 226 mg/dL (2020 11:38)  POCT Blood Glucose.: 155 mg/dL (2020 06:18)  POCT Blood Glucose.: 161 mg/dL (2020 23:59)  POCT Blood Glucose.: 218 mg/dL (2020 17:08)    LIVER FUNCTIONS - ( 2020 05:55 )  Alb: 3.2 g/dL / Pro: 6.5 g/dL / ALK PHOS: 321 U/L / ALT: 8 U/L / AST: 23 U/L / GGT: x             Cultures:  Culture Results:   No growth to date. ( @ 22:31)      RADIOLOGY & ADDITIONAL STUDIES:  < from: CT Chest w/ IV Cont (20 @ 08:52) >      IMPRESSION:    Left breast mass and left axillary lymphadenopathy, suspicious for breast neoplasm. Findings appear amenable to ultrasound-guided biopsy.    Extensive osteoblastic bony metastasis.    Moderate size large pleural effusion. A neoplastic etiology must be considered. Consider pleural tap.    Status post hysterectomy, cholecystectomy splenectomy and possible distal pancreatectomy.        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 Found to have left breast mass on examination and CT scan with left axillary LAD, left pleural effusion and multiple osteoblastic bone mets and high ALP with normal GGT. Pending US guided core bx of breast lesion and thoracentesis. General surgery consulted for newly identified breast mass. Pt denies breast pain, discharge or skin changes. Last mammography a number of years ago - normal according to pt. Pt had b/l lumpectomies for benign masses in her 60s. Denies FH of breast cancer. Denies weight loss or B symptoms. Denies f/c/n/v/d. Tolerating PO, regular flatus and bowel movements. Last colonoscopy last year - normal.     PAST MEDICAL & SURGICAL HISTORY:  Hyperthyroidism  HTN (hypertension)  Type 2 diabetes mellitus: DM (diabetes mellitus)  Status post cataract extraction: S/P cataract extraction  Other acquired absence of organ: S/P cholecystectomy  Other acquired absence of organ: S/P splenectomy  Status post total hysterectomy: S/P hysterectomy      MEDICATIONS  (STANDING):  cholecalciferol 1000 Unit(s) Oral daily  dextrose 5%. 1000 milliLiter(s) (50 mL/Hr) IV Continuous <Continuous>  dextrose 50% Injectable 12.5 Gram(s) IV Push once  dextrose 50% Injectable 25 Gram(s) IV Push once  dextrose 50% Injectable 25 Gram(s) IV Push once  insulin lispro (HumaLOG) corrective regimen sliding scale   SubCutaneous Before meals and at bedtime  levothyroxine 88 MICROGram(s) Oral daily  sodium chloride 0.9%. 1000 milliLiter(s) (100 mL/Hr) IV Continuous <Continuous>    MEDICATIONS  (PRN):  dextrose 40% Gel 15 Gram(s) Oral once PRN Blood Glucose LESS THAN 70 milliGRAM(s)/deciliter  glucagon  Injectable 1 milliGRAM(s) IntraMuscular once PRN Glucose LESS THAN 70 milligrams/deciliter      Allergies    No Known Allergies    Intolerances        FAMILY HISTORY:  No FH of breast cancer    ROS: otherwise negative.    Vital Signs Last 24 Hrs  T(C): 36.8 (2020 11:04), Max: 36.8 (2020 11:04)  T(F): 98.3 (2020 11:04), Max: 98.3 (2020 11:04)  HR: 91 (2020 11:04) (59 - 92)  BP: 144/85 (2020 11:04) (144/78 - 160/82)  BP(mean): --  RR: 18 (2020 11:04) (16 - 18)  SpO2: 96% (2020 11:04) (93% - 97%)    I&O's Summary      Physical Exam:  General: NAD, resting comfortably in bed  HEENT: MMM  Pulmonary: nonlabored breathing, normal resp effort  Cardiovascular: RRR  Abdominal: soft, nontender, nondistended  Breast: Left breast retroareolar mass 2cm, no overlying skin changes. Previous lumpectomies well healed. No masses palpable in the right breast. Left axillary LAD. No spinal tenderness.   Extremities: WWP, no edema, no calf tenderness  Neuro: no focal deficits  Psych: pleasant    LABS:                        13.2   7.83  )-----------( 332      ( 2020 05:55 )             40.9         136  |  103  |  8   ----------------------------<  167<H>  4.9   |  22  |  0.69    Ca    8.7      2020 05:55  Phos  2.7       Mg     1.9         TPro  6.5  /  Alb  3.2<L>  /  TBili  0.3  /  DBili  x   /  AST  23  /  ALT  8<L>  /  AlkPhos  321<H>      PT/INR - ( 2020 05:55 )   PT: 12.8 sec;   INR: 1.12          PTT - ( 2020 05:55 )  PTT:30.7 sec  Urinalysis Basic - ( 2020 21:35 )    Color: Yellow / Appearance: Clear / S.020 / pH: x  Gluc: x / Ketone: 15 mg/dL  / Bili: Negative / Urobili: 0.2 E.U./dL   Blood: x / Protein: NEGATIVE mg/dL / Nitrite: NEGATIVE   Leuk Esterase: NEGATIVE / RBC: x / WBC x   Sq Epi: x / Non Sq Epi: x / Bacteria: x      CAPILLARY BLOOD GLUCOSE      POCT Blood Glucose.: 225 mg/dL (2020 13:03)  POCT Blood Glucose.: 226 mg/dL (2020 11:38)  POCT Blood Glucose.: 155 mg/dL (2020 06:18)  POCT Blood Glucose.: 161 mg/dL (2020 23:59)  POCT Blood Glucose.: 218 mg/dL (2020 17:08)    LIVER FUNCTIONS - ( 2020 05:55 )  Alb: 3.2 g/dL / Pro: 6.5 g/dL / ALK PHOS: 321 U/L / ALT: 8 U/L / AST: 23 U/L / GGT: x             Cultures:  Culture Results:   No growth to date. ( @ 22:31)      RADIOLOGY & ADDITIONAL STUDIES:  < from: CT Chest w/ IV Cont (20 @ 08:52) >      IMPRESSION:    Left breast mass and left axillary lymphadenopathy, suspicious for breast neoplasm. Findings appear amenable to ultrasound-guided biopsy.    Extensive osteoblastic bony metastasis.    Moderate size large pleural effusion. A neoplastic etiology must be considered. Consider pleural tap.    Status post hysterectomy, cholecystectomy splenectomy and possible distal pancreatectomy.        < end of copied text >

## 2020-01-31 NOTE — PROGRESS NOTE ADULT - PROBLEM SELECTOR PLAN 2
Alk phos 367, AST/ALT normal.  GGT wnl, unlikely to be liver origin.  Ddx includes malignancy with bone infiltration.  Has been up-trending since 9/19 from 136 > 287 >323 >367. Vit D,25-OH and PTH WNL.   - Calcium normal, Vit D 25-OH low at 23.2 as outpatient 9/19. Alk phos 367, AST/ALT normal.  GGT wnl, unlikely to be liver origin.  Ddx includes malignancy with bone infiltration.  Has been up-trending since 9/19 from 136 > 287 >323 >367.   - Calcium normal  - Vit D,25-OH and PTH WNL.

## 2020-02-01 LAB
ANION GAP SERPL CALC-SCNC: 8 MMOL/L — SIGNIFICANT CHANGE UP (ref 5–17)
BUN SERPL-MCNC: 10 MG/DL — SIGNIFICANT CHANGE UP (ref 7–23)
CALCIUM SERPL-MCNC: 8.2 MG/DL — LOW (ref 8.4–10.5)
CHLORIDE SERPL-SCNC: 103 MMOL/L — SIGNIFICANT CHANGE UP (ref 96–108)
CO2 SERPL-SCNC: 21 MMOL/L — LOW (ref 22–31)
CREAT SERPL-MCNC: 0.58 MG/DL — SIGNIFICANT CHANGE UP (ref 0.5–1.3)
CULTURE RESULTS: SIGNIFICANT CHANGE UP
GLUCOSE BLDC GLUCOMTR-MCNC: 131 MG/DL — HIGH (ref 70–99)
GLUCOSE BLDC GLUCOMTR-MCNC: 157 MG/DL — HIGH (ref 70–99)
GLUCOSE BLDC GLUCOMTR-MCNC: 189 MG/DL — HIGH (ref 70–99)
GLUCOSE BLDC GLUCOMTR-MCNC: 196 MG/DL — HIGH (ref 70–99)
GLUCOSE SERPL-MCNC: 151 MG/DL — HIGH (ref 70–99)
HCT VFR BLD CALC: 38 % — SIGNIFICANT CHANGE UP (ref 34.5–45)
HGB BLD-MCNC: 12.3 G/DL — SIGNIFICANT CHANGE UP (ref 11.5–15.5)
MAGNESIUM SERPL-MCNC: 1.8 MG/DL — SIGNIFICANT CHANGE UP (ref 1.6–2.6)
MCHC RBC-ENTMCNC: 30.1 PG — SIGNIFICANT CHANGE UP (ref 27–34)
MCHC RBC-ENTMCNC: 32.4 GM/DL — SIGNIFICANT CHANGE UP (ref 32–36)
MCV RBC AUTO: 92.9 FL — SIGNIFICANT CHANGE UP (ref 80–100)
NRBC # BLD: 0 /100 WBCS — SIGNIFICANT CHANGE UP (ref 0–0)
PHOSPHATE SERPL-MCNC: 2.8 MG/DL — SIGNIFICANT CHANGE UP (ref 2.5–4.5)
PLATELET # BLD AUTO: 312 K/UL — SIGNIFICANT CHANGE UP (ref 150–400)
POTASSIUM SERPL-MCNC: 4.3 MMOL/L — SIGNIFICANT CHANGE UP (ref 3.5–5.3)
POTASSIUM SERPL-SCNC: 4.3 MMOL/L — SIGNIFICANT CHANGE UP (ref 3.5–5.3)
RBC # BLD: 4.09 M/UL — SIGNIFICANT CHANGE UP (ref 3.8–5.2)
RBC # FLD: 13.4 % — SIGNIFICANT CHANGE UP (ref 10.3–14.5)
SODIUM SERPL-SCNC: 132 MMOL/L — LOW (ref 135–145)
SPECIMEN SOURCE: SIGNIFICANT CHANGE UP
WBC # BLD: 8.91 K/UL — SIGNIFICANT CHANGE UP (ref 3.8–10.5)
WBC # FLD AUTO: 8.91 K/UL — SIGNIFICANT CHANGE UP (ref 3.8–10.5)

## 2020-02-01 PROCEDURE — 99232 SBSQ HOSP IP/OBS MODERATE 35: CPT

## 2020-02-01 RX ORDER — MAGNESIUM SULFATE 500 MG/ML
2 VIAL (ML) INJECTION ONCE
Refills: 0 | Status: COMPLETED | OUTPATIENT
Start: 2020-02-01 | End: 2020-02-01

## 2020-02-01 RX ADMIN — Medication 88 MICROGRAM(S): at 07:24

## 2020-02-01 RX ADMIN — Medication 2: at 22:20

## 2020-02-01 RX ADMIN — Medication 50 GRAM(S): at 11:04

## 2020-02-01 RX ADMIN — Medication 2: at 12:12

## 2020-02-01 RX ADMIN — SODIUM CHLORIDE 100 MILLILITER(S): 9 INJECTION INTRAMUSCULAR; INTRAVENOUS; SUBCUTANEOUS at 07:25

## 2020-02-01 RX ADMIN — Medication 1000 UNIT(S): at 11:04

## 2020-02-01 RX ADMIN — Medication 2: at 17:58

## 2020-02-01 NOTE — PROGRESS NOTE ADULT - SUBJECTIVE AND OBJECTIVE BOX
OVERNIGHT EVENTS: renita    SUBJECTIVE / INTERVAL HPI: Patient seen and examined at bedside. Patient resting comfortably. Denies chest pain, abdominal pain, SOB, dysuria, headache, fever, chills.      VITAL SIGNS:  Vital Signs Last 24 Hrs  T(C): 36.7 (2020 13:58), Max: 36.8 (2020 05:58)  T(F): 98 (2020 13:58), Max: 98.2 (2020 05:58)  HR: 85 (:58) (85 - 88)  BP: 123/74 (:58) (123/74 - 151/77)  BP(mean): --  RR: 15 (:58) (15 - 16)  SpO2: 95% (:58) (95% - 97%)      20 @ 07:01  -  20 @ 07:00  --------------------------------------------------------  IN: 1200 mL / OUT: 0 mL / NET: 1200 mL        PHYSICAL EXAM:  General: pleasant elderly female, NAD< laying comfortably in bed  HEENT: NC/AT, anicteric sclera, MMM  Neck: supple  Cardiovascular: +S1/S2, RRR, no murmurs or gallops  Respiratory: decreased beath sounds L lung, no wheezing, no crackles, R lung CTA  Gastrointestinal: soft, NT/ND, +BSx4  Extremities: WWP, no edema, clubbing or cyanosis  Vascular: 2+ radial, DP/PT pulses B/L  Neurological: AAOx3, no focal deficits    MEDICATIONS  (STANDING):  cholecalciferol 1000 Unit(s) Oral daily  dextrose 5%. 1000 milliLiter(s) (50 mL/Hr) IV Continuous <Continuous>  dextrose 50% Injectable 12.5 Gram(s) IV Push once  dextrose 50% Injectable 25 Gram(s) IV Push once  dextrose 50% Injectable 25 Gram(s) IV Push once  insulin lispro (HumaLOG) corrective regimen sliding scale   SubCutaneous Before meals and at bedtime  levothyroxine 88 MICROGram(s) Oral daily  sodium chloride 0.9%. 1000 milliLiter(s) (100 mL/Hr) IV Continuous <Continuous>    MEDICATIONS  (PRN):  dextrose 40% Gel 15 Gram(s) Oral once PRN Blood Glucose LESS THAN 70 milliGRAM(s)/deciliter  glucagon  Injectable 1 milliGRAM(s) IntraMuscular once PRN Glucose LESS THAN 70 milligrams/deciliter    Allergies    No Known Allergies    Intolerances        LABS:                        12.3   8.91  )-----------( 312      ( 2020 08:06 )             38.0     02-    132<L>  |  103  |  10  ----------------------------<  151<H>  4.3   |  21<L>  |  0.58    Ca    8.2<L>      2020 08:06  Phos  2.8     02-  Mg     1.8     02-    TPro  6.5  /  Alb  3.2<L>  /  TBili  0.3  /  DBili  x   /  AST  23  /  ALT  8<L>  /  AlkPhos  321<H>      PT/INR - ( 2020 05:55 )   PT: 12.8 sec;   INR: 1.12          PTT - ( 2020 05:55 )  PTT:30.7 sec  Urinalysis Basic - ( 2020 21:35 )    Color: Yellow / Appearance: Clear / S.020 / pH: x  Gluc: x / Ketone: 15 mg/dL  / Bili: Negative / Urobili: 0.2 E.U./dL   Blood: x / Protein: NEGATIVE mg/dL / Nitrite: NEGATIVE   Leuk Esterase: NEGATIVE / RBC: x / WBC x   Sq Epi: x / Non Sq Epi: x / Bacteria: x      CAPILLARY BLOOD GLUCOSE      POCT Blood Glucose.: 189 mg/dL (2020 12:05)          RADIOLOGY & ADDITIONAL TESTS: Reviewed.

## 2020-02-01 NOTE — PROGRESS NOTE ADULT - PROBLEM SELECTOR PLAN 3
Chronic as an outpatient, mild hypoNa likely due to poor po intake  - Na 133 on admission, now 136 improving   - monitor closely

## 2020-02-01 NOTE — PROGRESS NOTE ADULT - PROBLEM SELECTOR PLAN 5
L sided pleural effusion on CXR. Likely from breast malignancy   - thoracentesis today per pulm  - f/u thora results

## 2020-02-01 NOTE — PROGRESS NOTE ADULT - SUBJECTIVE AND OBJECTIVE BOX
Interval Events: Reviewed  Patient seen and examined at bedside.    Patient is a 86y old  Female who presents with a chief complaint of Generalized weakness (2020 13:37).   Pt awake has no fever, no chest pain, no shortness of breath, no chills.       PAST MEDICAL & SURGICAL HISTORY:  Hyperthyroidism  HTN (hypertension)  Type 2 diabetes mellitus: DM (diabetes mellitus)  Status post cataract extraction: S/P cataract extraction  Other acquired absence of organ: S/P cholecystectomy  Other acquired absence of organ: S/P splenectomy  Status post total hysterectomy: S/P hysterectomy      MEDICATIONS:  Pulmonary:    Antimicrobials:    Anticoagulants:    Cardiac:      Allergies    No Known Allergies    Intolerances        Vital Signs Last 24 Hrs  T(C): 36.8 (2020 05:58), Max: 36.8 (2020 05:58)  T(F): 98.2 (2020 05:58), Max: 98.2 (2020 05:58)  HR: 86 (2020 05:58) (86 - 90)  BP: 151/77 (2020 05:58) (111/73 - 151/77)  BP(mean): --  RR: 16 (2020 05:58) (16 - 18)  SpO2: 97% (2020 05:58) (94% - 97%)     @ 07:01  -  - @ 07:00  --------------------------------------------------------  IN: 1200 mL / OUT: 0 mL / NET: 1200 mL          Review of Systems:   •	General: negative  •	Skin/Breast: negative  •	Ophthalmologic: negative  •	ENMT: negative  •	Respiratory and Thorax: negative  •	Cardiovascular: negative  •	Gastrointestinal: negative  •	Genitourinary: negative  •	Musculoskeletal: negative  •	Neurological: negative  •	Psychiatric: negative  •	Hematology/Lymphatics: negative  •	Endocrine: negative  •	Allergic/Immunologic: negative    Physical Exam:   • Constitutional:	Well-developed, well nourished  • Eyes:	EOMI; PERRL; no drainage or redness  • ENMT:	No oral lesions; no gross abnormalities  • Neck	no thyromegaly or nodules  • Breasts:	not examined  • Back:	No deformity or limitation of movement  • Respiratory:	Breath Sounds equal & clear to auscultation, no accessory muscle use  • Cardiovascular:	Regular rate & rhythm, normal S1, S2; no murmurs, gallops or rubs; no S3, S4  • Gastrointestinal:	Soft, non-tender, no hepatosplenomegaly, normal bowel sounds  • Genitourinary:	not examined  • Rectal: not examined  • Extremities:	No cyanosis, clubbing or edema  • Vascular:	Equal and normal pulses (dorsalis pedis)  • Neurologica:l	not examined  • Skin:	No lesions; no rash  • Lymph Nodes:	No lymphadedenopathy  • Musculoskeletal:	No joint pain, swelling or deformity; no limitation of movement        LABS:      CBC Full  -  ( 2020 08:06 )  WBC Count : 8.91 K/uL  RBC Count : 4.09 M/uL  Hemoglobin : 12.3 g/dL  Hematocrit : 38.0 %  Platelet Count - Automated : 312 K/uL  Mean Cell Volume : 92.9 fl  Mean Cell Hemoglobin : 30.1 pg  Mean Cell Hemoglobin Concentration : 32.4 gm/dL  Auto Neutrophil # : x  Auto Lymphocyte # : x  Auto Monocyte # : x  Auto Eosinophil # : x  Auto Basophil # : x  Auto Neutrophil % : x  Auto Lymphocyte % : x  Auto Monocyte % : x  Auto Eosinophil % : x  Auto Basophil % : x    02-    132<L>  |  103  |  10  ----------------------------<  151<H>  4.3   |  21<L>  |  0.58    Ca    8.2<L>      2020 08:06  Phos  2.8     02-  Mg     1.8     -    TPro  6.5  /  Alb  3.2<L>  /  TBili  0.3  /  DBili  x   /  AST  23  /  ALT  8<L>  /  AlkPhos  321<H>      PT/INR - ( 2020 05:55 )   PT: 12.8 sec;   INR: 1.12          PTT - ( 2020 05:55 )  PTT:30.7 sec      Urinalysis Basic - ( 2020 21:35 )    Color: Yellow / Appearance: Clear / S.020 / pH: x  Gluc: x / Ketone: 15 mg/dL  / Bili: Negative / Urobili: 0.2 E.U./dL   Blood: x / Protein: NEGATIVE mg/dL / Nitrite: NEGATIVE   Leuk Esterase: NEGATIVE / RBC: x / WBC x   Sq Epi: x / Non Sq Epi: x / Bacteria: x              Culture Results:   Less than 10,000 cols/cc; Insignificant amount of growth. ( @ 22:31)      RADIOLOGY & ADDITIONAL STUDIES (The following images were personally reviewed):  Rosales:                                     No  Urine output:                       adequate  DVT prophylaxis:                 Yes  Flattus:                                  Yes  Bowel movement:              No

## 2020-02-01 NOTE — PROGRESS NOTE ADULT - PROBLEM SELECTOR PLAN 2
Alk phos 367, AST/ALT normal.  GGT wnl, unlikely to be liver origin.  Ddx includes malignancy with bone infiltration.  Has been up-trending since 9/19 from 136 > 287 >323 >367.   - Calcium normal  - Vit D,25-OH and PTH WNL.

## 2020-02-01 NOTE — PROGRESS NOTE ADULT - PROBLEM SELECTOR PLAN 3
Chronic as an outpatient, mild hypoNa likely due to poor po intake  - Na 133 on admission, -->136->132  - monitor closely

## 2020-02-02 LAB
ANION GAP SERPL CALC-SCNC: 10 MMOL/L — SIGNIFICANT CHANGE UP (ref 5–17)
BUN SERPL-MCNC: 12 MG/DL — SIGNIFICANT CHANGE UP (ref 7–23)
CALCIUM SERPL-MCNC: 7.9 MG/DL — LOW (ref 8.4–10.5)
CHLORIDE SERPL-SCNC: 101 MMOL/L — SIGNIFICANT CHANGE UP (ref 96–108)
CO2 SERPL-SCNC: 21 MMOL/L — LOW (ref 22–31)
CREAT SERPL-MCNC: 0.6 MG/DL — SIGNIFICANT CHANGE UP (ref 0.5–1.3)
GLUCOSE BLDC GLUCOMTR-MCNC: 140 MG/DL — HIGH (ref 70–99)
GLUCOSE BLDC GLUCOMTR-MCNC: 159 MG/DL — HIGH (ref 70–99)
GLUCOSE BLDC GLUCOMTR-MCNC: 177 MG/DL — HIGH (ref 70–99)
GLUCOSE BLDC GLUCOMTR-MCNC: 184 MG/DL — HIGH (ref 70–99)
GLUCOSE SERPL-MCNC: 161 MG/DL — HIGH (ref 70–99)
HCT VFR BLD CALC: 37.5 % — SIGNIFICANT CHANGE UP (ref 34.5–45)
HGB BLD-MCNC: 12.3 G/DL — SIGNIFICANT CHANGE UP (ref 11.5–15.5)
MAGNESIUM SERPL-MCNC: 1.9 MG/DL — SIGNIFICANT CHANGE UP (ref 1.6–2.6)
MCHC RBC-ENTMCNC: 30.4 PG — SIGNIFICANT CHANGE UP (ref 27–34)
MCHC RBC-ENTMCNC: 32.8 GM/DL — SIGNIFICANT CHANGE UP (ref 32–36)
MCV RBC AUTO: 92.8 FL — SIGNIFICANT CHANGE UP (ref 80–100)
NRBC # BLD: 0 /100 WBCS — SIGNIFICANT CHANGE UP (ref 0–0)
PHOSPHATE SERPL-MCNC: 2.9 MG/DL — SIGNIFICANT CHANGE UP (ref 2.5–4.5)
PLATELET # BLD AUTO: 304 K/UL — SIGNIFICANT CHANGE UP (ref 150–400)
POTASSIUM SERPL-MCNC: 4 MMOL/L — SIGNIFICANT CHANGE UP (ref 3.5–5.3)
POTASSIUM SERPL-SCNC: 4 MMOL/L — SIGNIFICANT CHANGE UP (ref 3.5–5.3)
RBC # BLD: 4.04 M/UL — SIGNIFICANT CHANGE UP (ref 3.8–5.2)
RBC # FLD: 13.3 % — SIGNIFICANT CHANGE UP (ref 10.3–14.5)
SODIUM SERPL-SCNC: 132 MMOL/L — LOW (ref 135–145)
WBC # BLD: 9.99 K/UL — SIGNIFICANT CHANGE UP (ref 3.8–10.5)
WBC # FLD AUTO: 9.99 K/UL — SIGNIFICANT CHANGE UP (ref 3.8–10.5)

## 2020-02-02 PROCEDURE — 99232 SBSQ HOSP IP/OBS MODERATE 35: CPT

## 2020-02-02 RX ADMIN — Medication 2: at 21:31

## 2020-02-02 RX ADMIN — SODIUM CHLORIDE 100 MILLILITER(S): 9 INJECTION INTRAMUSCULAR; INTRAVENOUS; SUBCUTANEOUS at 04:03

## 2020-02-02 RX ADMIN — Medication 2: at 17:47

## 2020-02-02 RX ADMIN — Medication 2: at 08:17

## 2020-02-02 RX ADMIN — Medication 88 MICROGRAM(S): at 06:49

## 2020-02-02 RX ADMIN — Medication 1000 UNIT(S): at 13:11

## 2020-02-02 NOTE — PROGRESS NOTE ADULT - SUBJECTIVE AND OBJECTIVE BOX
Interval Events: Reviewed  Patient seen and examined at bedside.    Patient is a 86y old  Female who presents with a chief complaint of Generalized weakness (01 Feb 2020 16:01).  Pt awake has no complaint, denies fever, chills, chest pain, lives alone at home       PAST MEDICAL & SURGICAL HISTORY:  Hyperthyroidism  HTN (hypertension)  Type 2 diabetes mellitus: DM (diabetes mellitus)  Status post cataract extraction: S/P cataract extraction  Other acquired absence of organ: S/P cholecystectomy  Other acquired absence of organ: S/P splenectomy  Status post total hysterectomy: S/P hysterectomy      MEDICATIONS:  Pulmonary:    Antimicrobials:    Anticoagulants:    Cardiac:      Allergies    No Known Allergies    Intolerances        Vital Signs Last 24 Hrs  T(C): 36.2 (02 Feb 2020 05:57), Max: 36.7 (01 Feb 2020 13:58)  T(F): 97.2 (02 Feb 2020 05:57), Max: 98 (01 Feb 2020 13:58)  HR: 93 (02 Feb 2020 05:57) (82 - 93)  BP: 153/81 (02 Feb 2020 05:57) (123/74 - 153/81)  BP(mean): --  RR: 18 (02 Feb 2020 05:57) (15 - 18)  SpO2: 96% (02 Feb 2020 05:57) (95% - 97%)    02-01 @ 07:01  -  02-02 @ 07:00  --------------------------------------------------------  IN: 1200 mL / OUT: 0 mL / NET: 1200 mL          Review of Systems:   •	General: negative  •	Skin/Breast: negative  •	Ophthalmologic: negative  •	ENMT: negative  •	Respiratory and Thorax: negative  •	Cardiovascular: negative  •	Gastrointestinal: negative  •	Genitourinary: negative  •	Musculoskeletal: negative  •	Neurological: negative  •	Psychiatric: negative  •	Hematology/Lymphatics: negative  •	Endocrine: negative  •	Allergic/Immunologic: negative    Physical Exam:   • Constitutional:	Well-developed, well nourished  • Eyes:	EOMI; PERRL; no drainage or redness  • ENMT:	No oral lesions; no gross abnormalities  • Neck	no thyromegaly or nodules  • Breasts:	not examined  • Back:	No deformity or limitation of movement  • Respiratory:	Breath Sounds equal & clear to auscultation, no accessory muscle use  • Cardiovascular:	Regular rate & rhythm, normal S1, S2; no murmurs, gallops or rubs; no S3, S4  • Gastrointestinal:	Soft, non-tender, no hepatosplenomegaly, normal bowel sounds  • Genitourinary:	not examined  • Rectal: not examined  • Extremities:	No cyanosis, clubbing or edema  • Vascular:	Equal and normal pulses (dorsalis pedis)  • Neurologica:l	not examined  • Skin:	No lesions; no rash  • Lymph Nodes:	No lymphadedenopathy  • Musculoskeletal:	No joint pain, swelling or deformity; no limitation of movement        LABS:      CBC Full  -  ( 02 Feb 2020 05:54 )  WBC Count : 9.99 K/uL  RBC Count : 4.04 M/uL  Hemoglobin : 12.3 g/dL  Hematocrit : 37.5 %  Platelet Count - Automated : 304 K/uL  Mean Cell Volume : 92.8 fl  Mean Cell Hemoglobin : 30.4 pg  Mean Cell Hemoglobin Concentration : 32.8 gm/dL  Auto Neutrophil # : x  Auto Lymphocyte # : x  Auto Monocyte # : x  Auto Eosinophil # : x  Auto Basophil # : x  Auto Neutrophil % : x  Auto Lymphocyte % : x  Auto Monocyte % : x  Auto Eosinophil % : x  Auto Basophil % : x    02-02    132<L>  |  101  |  12  ----------------------------<  161<H>  4.0   |  21<L>  |  0.60    Ca    7.9<L>      02 Feb 2020 05:54  Phos  2.9     02-02  Mg     1.9     02-02                          RADIOLOGY & ADDITIONAL STUDIES (The following images were personally reviewed):  Rosales:                                     No  Urine output:                       adequate  DVT prophylaxis:                 Yes  Flattus:                                  Yes  Bowel movement:              No

## 2020-02-03 LAB
ANION GAP SERPL CALC-SCNC: 9 MMOL/L — SIGNIFICANT CHANGE UP (ref 5–17)
APTT BLD: 28 SEC — SIGNIFICANT CHANGE UP (ref 27.5–36.3)
BUN SERPL-MCNC: 14 MG/DL — SIGNIFICANT CHANGE UP (ref 7–23)
CALCIUM SERPL-MCNC: 8.4 MG/DL — SIGNIFICANT CHANGE UP (ref 8.4–10.5)
CHLORIDE SERPL-SCNC: 97 MMOL/L — SIGNIFICANT CHANGE UP (ref 96–108)
CO2 SERPL-SCNC: 23 MMOL/L — SIGNIFICANT CHANGE UP (ref 22–31)
CREAT SERPL-MCNC: 0.77 MG/DL — SIGNIFICANT CHANGE UP (ref 0.5–1.3)
GLUCOSE BLDC GLUCOMTR-MCNC: 139 MG/DL — HIGH (ref 70–99)
GLUCOSE BLDC GLUCOMTR-MCNC: 143 MG/DL — HIGH (ref 70–99)
GLUCOSE BLDC GLUCOMTR-MCNC: 151 MG/DL — HIGH (ref 70–99)
GLUCOSE BLDC GLUCOMTR-MCNC: 264 MG/DL — HIGH (ref 70–99)
GLUCOSE SERPL-MCNC: 158 MG/DL — HIGH (ref 70–99)
HCT VFR BLD CALC: 37.7 % — SIGNIFICANT CHANGE UP (ref 34.5–45)
HGB BLD-MCNC: 12.5 G/DL — SIGNIFICANT CHANGE UP (ref 11.5–15.5)
INR BLD: 1.16 — SIGNIFICANT CHANGE UP (ref 0.88–1.16)
MAGNESIUM SERPL-MCNC: 1.9 MG/DL — SIGNIFICANT CHANGE UP (ref 1.6–2.6)
MCHC RBC-ENTMCNC: 30.4 PG — SIGNIFICANT CHANGE UP (ref 27–34)
MCHC RBC-ENTMCNC: 33.2 GM/DL — SIGNIFICANT CHANGE UP (ref 32–36)
MCV RBC AUTO: 91.7 FL — SIGNIFICANT CHANGE UP (ref 80–100)
NRBC # BLD: 0 /100 WBCS — SIGNIFICANT CHANGE UP (ref 0–0)
PLATELET # BLD AUTO: 337 K/UL — SIGNIFICANT CHANGE UP (ref 150–400)
POTASSIUM SERPL-MCNC: 4.7 MMOL/L — SIGNIFICANT CHANGE UP (ref 3.5–5.3)
POTASSIUM SERPL-SCNC: 4.7 MMOL/L — SIGNIFICANT CHANGE UP (ref 3.5–5.3)
PROTHROM AB SERPL-ACNC: 13.3 SEC — HIGH (ref 10–12.9)
RBC # BLD: 4.11 M/UL — SIGNIFICANT CHANGE UP (ref 3.8–5.2)
RBC # FLD: 13.6 % — SIGNIFICANT CHANGE UP (ref 10.3–14.5)
SODIUM SERPL-SCNC: 129 MMOL/L — LOW (ref 135–145)
WBC # BLD: 9.05 K/UL — SIGNIFICANT CHANGE UP (ref 3.8–10.5)
WBC # FLD AUTO: 9.05 K/UL — SIGNIFICANT CHANGE UP (ref 3.8–10.5)

## 2020-02-03 PROCEDURE — 99232 SBSQ HOSP IP/OBS MODERATE 35: CPT | Mod: GC

## 2020-02-03 PROCEDURE — 71045 X-RAY EXAM CHEST 1 VIEW: CPT | Mod: 26

## 2020-02-03 RX ORDER — MAGNESIUM SULFATE 500 MG/ML
1 VIAL (ML) INJECTION ONCE
Refills: 0 | Status: COMPLETED | OUTPATIENT
Start: 2020-02-03 | End: 2020-02-03

## 2020-02-03 RX ORDER — ENOXAPARIN SODIUM 100 MG/ML
40 INJECTION SUBCUTANEOUS EVERY 24 HOURS
Refills: 0 | Status: DISCONTINUED | OUTPATIENT
Start: 2020-02-03 | End: 2020-02-07

## 2020-02-03 RX ADMIN — Medication 2: at 17:37

## 2020-02-03 RX ADMIN — Medication 6: at 13:05

## 2020-02-03 RX ADMIN — ENOXAPARIN SODIUM 40 MILLIGRAM(S): 100 INJECTION SUBCUTANEOUS at 17:37

## 2020-02-03 RX ADMIN — Medication 88 MICROGRAM(S): at 06:43

## 2020-02-03 RX ADMIN — Medication 1000 UNIT(S): at 11:46

## 2020-02-03 RX ADMIN — Medication 100 GRAM(S): at 11:46

## 2020-02-03 NOTE — DIETITIAN INITIAL EVALUATION ADULT. - PROBLEM SELECTOR PLAN 5
L sided pleural effusion on CXR  - consider malignancy with possible breast nodule noted as outpatient  - f/u CT chest  - holding AM dose of DVT ppx for possible thoracentesis

## 2020-02-03 NOTE — PROGRESS NOTE ADULT - PROBLEM SELECTOR PLAN 3
Chronic as an outpatient, mild hypoNa likely due to poor po intake  - Na 129 today  - Continue to monitor closely

## 2020-02-03 NOTE — DIETITIAN INITIAL EVALUATION ADULT. - PROBLEM SELECTOR PLAN 8
F: mIVF with NS at 100cc/Hr  E: replete electrolytes K< 4, Mg <2  N: DASH/TLC, carb consistent diet  DVT PPx: Will start Lovenox if not having thoracentesis  Code status: Full Code

## 2020-02-03 NOTE — DIETITIAN INITIAL EVALUATION ADULT. - PROBLEM SELECTOR PLAN 1
Roughly 1mo hx of malaise with episodes in the past as well.  Very broad ddx as described above including malignancy, psychiatric, nephritic syndrome.  Infectious etiology unlikely given normal WBC, neg UAs  - f/u CT C/A/P for malignancy workup  - consider thoracentesis of L sided pleural effusion in AM  - consider psych consult if medical workup nonrevealing  - TSH wnl

## 2020-02-03 NOTE — PROGRESS NOTE ADULT - SUBJECTIVE AND OBJECTIVE BOX
OVERNIGHT EVENTS:  No acute events overnight.    SUBJECTIVE / INTERVAL HPI: Patient seen and examined at bedside. This morning reports low appetite, lack of motivation. Has been constipated for 3-4 days but is in no discomfort. Denies any complaints of chest pain, SOB, abdominal pain, N/V/D.    VITAL SIGNS:  Vital Signs Last 24 Hrs  T(C): 36.7 (03 Feb 2020 06:04), Max: 36.7 (02 Feb 2020 20:40)  T(F): 98 (03 Feb 2020 06:04), Max: 98.1 (02 Feb 2020 20:40)  HR: 89 (03 Feb 2020 06:04) (75 - 89)  BP: 133/79 (03 Feb 2020 06:04) (133/79 - 153/80)  BP(mean): 100 (02 Feb 2020 20:40) (100 - 100)  RR: 17 (03 Feb 2020 06:04) (16 - 17)  SpO2: 96% (03 Feb 2020 06:04) (95% - 96%)    PHYSICAL EXAM:    General: Elderly female lying comfortably in bed, NAD  HEENT: NC/AT, PERRL, anicteric sclera; MMM  Neck: supple  Cardiovascular: +S1/S2, RRR  Respiratory: CTA B/L, no W/R/R  Gastrointestinal: soft, NT/ND, +BS  Extremities: WWP, no edema or cyanosis  Vascular: 2+ radial, DP/PT pulses B/L  Neurological: AAOx3, no focal deficits    MEDICATIONS:  MEDICATIONS  (STANDING):  cholecalciferol 1000 Unit(s) Oral daily  dextrose 5%. 1000 milliLiter(s) (50 mL/Hr) IV Continuous <Continuous>  dextrose 50% Injectable 12.5 Gram(s) IV Push once  dextrose 50% Injectable 25 Gram(s) IV Push once  dextrose 50% Injectable 25 Gram(s) IV Push once  insulin lispro (HumaLOG) corrective regimen sliding scale   SubCutaneous Before meals and at bedtime  levothyroxine 88 MICROGram(s) Oral daily    MEDICATIONS  (PRN):  dextrose 40% Gel 15 Gram(s) Oral once PRN Blood Glucose LESS THAN 70 milliGRAM(s)/deciliter  glucagon  Injectable 1 milliGRAM(s) IntraMuscular once PRN Glucose LESS THAN 70 milligrams/deciliter      ALLERGIES:  Allergies    No Known Allergies    Intolerances        LABS:                        12.3   9.99  )-----------( 304      ( 02 Feb 2020 05:54 )             37.5     02-02    132<L>  |  101  |  12  ----------------------------<  161<H>  4.0   |  21<L>  |  0.60    Ca    7.9<L>      02 Feb 2020 05:54  Phos  2.9     02-02  Mg     1.9     02-02          CAPILLARY BLOOD GLUCOSE      POCT Blood Glucose.: 139 mg/dL (03 Feb 2020 07:19)      RADIOLOGY & ADDITIONAL TESTS: Reviewed. OVERNIGHT EVENTS:  No acute events overnight.    SUBJECTIVE / INTERVAL HPI: Patient seen and examined at bedside. This morning reports low appetite, lack of motivation. Has been constipated for 3-4 days but is in no discomfort. Denies any complaints of chest pain, SOB, abdominal pain, N/V/D.    VITAL SIGNS:  Vital Signs Last 24 Hrs  T(C): 36.7 (03 Feb 2020 06:04), Max: 36.7 (02 Feb 2020 20:40)  T(F): 98 (03 Feb 2020 06:04), Max: 98.1 (02 Feb 2020 20:40)  HR: 89 (03 Feb 2020 06:04) (75 - 89)  BP: 133/79 (03 Feb 2020 06:04) (133/79 - 153/80)  BP(mean): 100 (02 Feb 2020 20:40) (100 - 100)  RR: 17 (03 Feb 2020 06:04) (16 - 17)  SpO2: 96% (03 Feb 2020 06:04) (95% - 96%)    PHYSICAL EXAM:    General: Elderly female lying comfortably in bed, NAD  HEENT: NC/AT, PERRL, anicteric sclera; MMM  Neck: supple  Cardiovascular: +S1/S2, RRR  Respiratory: CTA B/L, no W/R/R  Gastrointestinal: soft, NT/ND, +BS  Extremities: WWP, no edema or cyanosis  Vascular: 2+ radial, DP/PT pulses B/L  Neurological: AAOx3, no focal deficits  Psychiatric: Flat affect    MEDICATIONS:  MEDICATIONS  (STANDING):  cholecalciferol 1000 Unit(s) Oral daily  dextrose 5%. 1000 milliLiter(s) (50 mL/Hr) IV Continuous <Continuous>  dextrose 50% Injectable 12.5 Gram(s) IV Push once  dextrose 50% Injectable 25 Gram(s) IV Push once  dextrose 50% Injectable 25 Gram(s) IV Push once  insulin lispro (HumaLOG) corrective regimen sliding scale   SubCutaneous Before meals and at bedtime  levothyroxine 88 MICROGram(s) Oral daily    MEDICATIONS  (PRN):  dextrose 40% Gel 15 Gram(s) Oral once PRN Blood Glucose LESS THAN 70 milliGRAM(s)/deciliter  glucagon  Injectable 1 milliGRAM(s) IntraMuscular once PRN Glucose LESS THAN 70 milligrams/deciliter      ALLERGIES:  Allergies    No Known Allergies    Intolerances        LABS:                        12.3   9.99  )-----------( 304      ( 02 Feb 2020 05:54 )             37.5     02-02    132<L>  |  101  |  12  ----------------------------<  161<H>  4.0   |  21<L>  |  0.60    Ca    7.9<L>      02 Feb 2020 05:54  Phos  2.9     02-02  Mg     1.9     02-02          CAPILLARY BLOOD GLUCOSE      POCT Blood Glucose.: 139 mg/dL (03 Feb 2020 07:19)      RADIOLOGY & ADDITIONAL TESTS: Reviewed.

## 2020-02-03 NOTE — DIETITIAN INITIAL EVALUATION ADULT. - OTHER INFO
Pt seen for initial assessment. 86 year old F F with hx of DM2, Grave's disease, HLD, HTN, lymphedema, endometrial carcinoma s/p hysterectomy and bilateral oophorectomy, and benign pancreatic tail adenoma who presents for roughly 1 month of generalized malaise and weakness, found to have left breast mass and left axillary lymphadenopathy, suspicious for breast neoplasm, extensive osteoblastic bony metastasis, and L pleural effusion, likely breast cancer with mets to bone admitted for further workup. Skin: no edema, intact. Pt seen resting in bed, awake, alert. Pt is on DASH, cstCHO no snack diet, reports a lack of appetite but forcing herself to eat, consuming majority of foods from meal tray. Reports fair appetite PTA, prepares most of her own foods at home or consumes frozen meals, consumes 3 meals/day +occasional snacks, has never tried ONS. NKFA. Denies difficulty chewing/swallowing. Denies GI distress, last BM 1/31. Not reporting pain at this time. RD to follow up per protocol.

## 2020-02-03 NOTE — DIETITIAN INITIAL EVALUATION ADULT. - ENERGY NEEDS
Ht: 5'3", Wt: 59.3kg, IBW: 52.3kg, 113.4%IBW.   Needs calculated based on [facility] standards of care. Needs adjusted for age, suspicion for catabolic illness w/ mets.   Defer fluids to primary care team 2/2 hyponatremia.

## 2020-02-03 NOTE — PROGRESS NOTE ADULT - SUBJECTIVE AND OBJECTIVE BOX
INTERVAL HPI/OVERNIGHT EVENTS:  Appears quite dpressed; No diagnostic study has been done yet;  [facility] to see patient;  No pain      MEDICATIONS  (STANDING):  cholecalciferol 1000 Unit(s) Oral daily  dextrose 5%. 1000 milliLiter(s) (50 mL/Hr) IV Continuous <Continuous>  dextrose 50% Injectable 12.5 Gram(s) IV Push once  dextrose 50% Injectable 25 Gram(s) IV Push once  dextrose 50% Injectable 25 Gram(s) IV Push once  insulin lispro (HumaLOG) corrective regimen sliding scale   SubCutaneous Before meals and at bedtime  levothyroxine 88 MICROGram(s) Oral daily    MEDICATIONS  (PRN):  dextrose 40% Gel 15 Gram(s) Oral once PRN Blood Glucose LESS THAN 70 milliGRAM(s)/deciliter  glucagon  Injectable 1 milliGRAM(s) IntraMuscular once PRN Glucose LESS THAN 70 milligrams/deciliter      Allergies    No Known Allergies    Intolerances        Vital Signs Last 24 Hrs  T(C): 36.7 (03 Feb 2020 06:04), Max: 36.7 (02 Feb 2020 20:40)  T(F): 98 (03 Feb 2020 06:04), Max: 98.1 (02 Feb 2020 20:40)  HR: 89 (03 Feb 2020 06:04) (75 - 89)  BP: 133/79 (03 Feb 2020 06:04) (133/79 - 153/80)  BP(mean): 100 (02 Feb 2020 20:40) (100 - 100)  RR: 17 (03 Feb 2020 06:04) (16 - 17)  SpO2: 96% (03 Feb 2020 06:04) (95% - 96%)          Constitutional: Awake and alert    Eyes: BRIDGER    ENMT: Negative    Neck: Supple    Back:  no tenderness     Respiratory:  clear     Cardiovascular: S1 S2    Gastrointestinal: soft    Genitourinary:       Extremities:  no edema    Vascular:    Neurological: intact    Skin:    Lymph Nodes:            LABS:                        12.5   9.05  )-----------( 337      ( 03 Feb 2020 07:43 )             37.7     02-03    129<L>  |  97  |  14  ----------------------------<  158<H>  4.7   |  23  |  0.77    Ca    8.4      03 Feb 2020 07:43  Phos  2.9     02-02  Mg     1.9     02-03      PT/INR - ( 03 Feb 2020 07:43 )   PT: 13.3 sec;   INR: 1.16          PTT - ( 03 Feb 2020 07:43 )  PTT:28.0 sec      RADIOLOGY & ADDITIONAL TESTS:

## 2020-02-03 NOTE — DIETITIAN INITIAL EVALUATION ADULT. - DIET TYPE
Monitor BG, consider obtain updated HgbA1c to further assess need for cstCHO diet. Consider liberalize DASH diet given advanced age and suspected catabolic illness to encourage PO intake and provide pt w/ more calorie dense food options. Monitor PO, monitor need for addition of ONS. Pt would like to defer addition of ONS at this time.

## 2020-02-03 NOTE — PROGRESS NOTE ADULT - PROBLEM SELECTOR PLAN 2
Alk phos 321, AST/ALT normal.  GGT wnl, unlikely to be liver origin.  Ddx includes malignancy with bone infiltration.  Has been up-trending since 9/19 from 136 > 287 >323 >367 >321  - Management as above

## 2020-02-03 NOTE — DIETITIAN INITIAL EVALUATION ADULT. - PROBLEM SELECTOR PLAN 4
Hx of DM2 with A1c as outpatient ranging from 7.5-8.3  - on Glimepiride 1mg BID as outpatient  - FSG TID and qHS and MISS while inpatient

## 2020-02-04 ENCOUNTER — RESULT REVIEW (OUTPATIENT)
Age: 85
End: 2020-02-04

## 2020-02-04 DIAGNOSIS — F32.9 MAJOR DEPRESSIVE DISORDER, SINGLE EPISODE, UNSPECIFIED: ICD-10-CM

## 2020-02-04 DIAGNOSIS — C79.51 SECONDARY MALIGNANT NEOPLASM OF BONE: ICD-10-CM

## 2020-02-04 DIAGNOSIS — Z71.89 OTHER SPECIFIED COUNSELING: ICD-10-CM

## 2020-02-04 DIAGNOSIS — Z51.5 ENCOUNTER FOR PALLIATIVE CARE: ICD-10-CM

## 2020-02-04 DIAGNOSIS — D49.3 NEOPLASM OF UNSPECIFIED BEHAVIOR OF BREAST: ICD-10-CM

## 2020-02-04 LAB
ANION GAP SERPL CALC-SCNC: 10 MMOL/L — SIGNIFICANT CHANGE UP (ref 5–17)
APTT BLD: 29.1 SEC — SIGNIFICANT CHANGE UP (ref 27.5–36.3)
BUN SERPL-MCNC: 13 MG/DL — SIGNIFICANT CHANGE UP (ref 7–23)
CALCIUM SERPL-MCNC: 8.5 MG/DL — SIGNIFICANT CHANGE UP (ref 8.4–10.5)
CHLORIDE SERPL-SCNC: 95 MMOL/L — LOW (ref 96–108)
CO2 SERPL-SCNC: 23 MMOL/L — SIGNIFICANT CHANGE UP (ref 22–31)
CREAT SERPL-MCNC: 0.67 MG/DL — SIGNIFICANT CHANGE UP (ref 0.5–1.3)
GLUCOSE BLDC GLUCOMTR-MCNC: 148 MG/DL — HIGH (ref 70–99)
GLUCOSE BLDC GLUCOMTR-MCNC: 207 MG/DL — HIGH (ref 70–99)
GLUCOSE BLDC GLUCOMTR-MCNC: 208 MG/DL — HIGH (ref 70–99)
GLUCOSE BLDC GLUCOMTR-MCNC: 209 MG/DL — HIGH (ref 70–99)
GLUCOSE SERPL-MCNC: 256 MG/DL — HIGH (ref 70–99)
HCT VFR BLD CALC: 39.5 % — SIGNIFICANT CHANGE UP (ref 34.5–45)
HGB BLD-MCNC: 13 G/DL — SIGNIFICANT CHANGE UP (ref 11.5–15.5)
INR BLD: 1.15 — SIGNIFICANT CHANGE UP (ref 0.88–1.16)
MAGNESIUM SERPL-MCNC: 1.8 MG/DL — SIGNIFICANT CHANGE UP (ref 1.6–2.6)
MCHC RBC-ENTMCNC: 30.4 PG — SIGNIFICANT CHANGE UP (ref 27–34)
MCHC RBC-ENTMCNC: 32.9 GM/DL — SIGNIFICANT CHANGE UP (ref 32–36)
MCV RBC AUTO: 92.3 FL — SIGNIFICANT CHANGE UP (ref 80–100)
NRBC # BLD: 0 /100 WBCS — SIGNIFICANT CHANGE UP (ref 0–0)
PLATELET # BLD AUTO: 346 K/UL — SIGNIFICANT CHANGE UP (ref 150–400)
POTASSIUM SERPL-MCNC: 4.5 MMOL/L — SIGNIFICANT CHANGE UP (ref 3.5–5.3)
POTASSIUM SERPL-SCNC: 4.5 MMOL/L — SIGNIFICANT CHANGE UP (ref 3.5–5.3)
PROTHROM AB SERPL-ACNC: 13.2 SEC — HIGH (ref 10–12.9)
RBC # BLD: 4.28 M/UL — SIGNIFICANT CHANGE UP (ref 3.8–5.2)
RBC # FLD: 13.5 % — SIGNIFICANT CHANGE UP (ref 10.3–14.5)
SODIUM SERPL-SCNC: 128 MMOL/L — LOW (ref 135–145)
WBC # BLD: 8.37 K/UL — SIGNIFICANT CHANGE UP (ref 3.8–10.5)
WBC # FLD AUTO: 8.37 K/UL — SIGNIFICANT CHANGE UP (ref 3.8–10.5)

## 2020-02-04 PROCEDURE — 99497 ADVNCD CARE PLAN 30 MIN: CPT | Mod: 25

## 2020-02-04 PROCEDURE — 99232 SBSQ HOSP IP/OBS MODERATE 35: CPT

## 2020-02-04 PROCEDURE — 19083 BX BREAST 1ST LESION US IMAG: CPT | Mod: LT

## 2020-02-04 PROCEDURE — 88360 TUMOR IMMUNOHISTOCHEM/MANUAL: CPT | Mod: 26

## 2020-02-04 PROCEDURE — 88305 TISSUE EXAM BY PATHOLOGIST: CPT | Mod: 26

## 2020-02-04 PROCEDURE — 76641 ULTRASOUND BREAST COMPLETE: CPT | Mod: 26,LT

## 2020-02-04 PROCEDURE — 99232 SBSQ HOSP IP/OBS MODERATE 35: CPT | Mod: GC

## 2020-02-04 PROCEDURE — 99358 PROLONG SERVICE W/O CONTACT: CPT

## 2020-02-04 PROCEDURE — 19084 BX BREAST ADD LESION US IMAG: CPT | Mod: LT

## 2020-02-04 PROCEDURE — 77065 DX MAMMO INCL CAD UNI: CPT | Mod: 26,LT

## 2020-02-04 PROCEDURE — 99223 1ST HOSP IP/OBS HIGH 75: CPT

## 2020-02-04 PROCEDURE — 77066 DX MAMMO INCL CAD BI: CPT | Mod: 26

## 2020-02-04 PROCEDURE — G0279: CPT | Mod: 26

## 2020-02-04 PROCEDURE — 88341 IMHCHEM/IMCYTCHM EA ADD ANTB: CPT | Mod: 26,59

## 2020-02-04 PROCEDURE — 88342 IMHCHEM/IMCYTCHM 1ST ANTB: CPT | Mod: 26,59

## 2020-02-04 RX ORDER — ACETAMINOPHEN 500 MG
650 TABLET ORAL EVERY 6 HOURS
Refills: 0 | Status: DISCONTINUED | OUTPATIENT
Start: 2020-02-04 | End: 2020-02-07

## 2020-02-04 RX ADMIN — Medication 4: at 22:14

## 2020-02-04 RX ADMIN — Medication 1000 UNIT(S): at 12:23

## 2020-02-04 RX ADMIN — ENOXAPARIN SODIUM 40 MILLIGRAM(S): 100 INJECTION SUBCUTANEOUS at 17:26

## 2020-02-04 RX ADMIN — Medication 4: at 18:25

## 2020-02-04 RX ADMIN — Medication 88 MICROGRAM(S): at 07:00

## 2020-02-04 NOTE — CONSULT NOTE ADULT - PROBLEM SELECTOR RECOMMENDATION 3
Pending US-guided biopsy today.  -plan is to f/u path as an outpatient and discuss potential treatment options  -educated patient on hospice services in case she decided to not pursue treatment

## 2020-02-04 NOTE — GOALS OF CARE CONVERSATION - ADVANCED CARE PLANNING - CONVERSATION DETAILS
Advance Care Planning Meeting  Start time: 11:40AM; End time: 12:00PM; Total time: 20min  A face to face meeting to discuss advance care planning was held today regarding: KAI FRANCES  Primary decision maker: Patient has ability to make complex medical decisions    Discussed advance directives including, but not limited to, healthcare proxy and code status.  Decision regarding code status: DNR/DNI  Documentation completed today: GLENN    Patient has had these conversations with her HCP but has no formal documentation. She states that she wants to be realistic while facing this new malignancy diagnosis. Her plan is to complete this biopsy for tissue diagnosis and follow-up for the potential treatment options. She does not want to sacrifice quality of life for length of life. She believes if the only treatment options are aggressive medications with significant side effect profiles then she will likely forego treatment. Educated the patient on home hospice services in the event that she decides to not pursue treatment.

## 2020-02-04 NOTE — CONSULT NOTE ADULT - SUBJECTIVE AND OBJECTIVE BOX
[facility]    KAI FRANCES  MRN-[id number]    Chief complaint: weakness    HPI: 86 year old woman admitted with progressive generalized malaise and weakness, she was found to have left breast mass and left axillary lymphadenopathy, suspicious for breast neoplasm, extensive osteoblastic bony metastasis, and L pleural effusion     PAST MEDICAL & SURGICAL HISTORY:  Hyperthyroidism  HTN (hypertension)  Type 2 diabetes mellitus: DM (diabetes mellitus)  Status post cataract extraction: S/P cataract extraction  Other acquired absence of organ: S/P cholecystectomy  Other acquired absence of organ: S/P splenectomy  Status post total hysterectomy: S/P hysterectomy      MEDICATIONS  (STANDING):  cholecalciferol 1000 Unit(s) Oral daily  dextrose 5%. 1000 milliLiter(s) IV Continuous <Continuous>  dextrose 50% Injectable 12.5 Gram(s) IV Push once  dextrose 50% Injectable 25 Gram(s) IV Push once  dextrose 50% Injectable 25 Gram(s) IV Push once  enoxaparin Injectable 40 milliGRAM(s) SubCutaneous every 24 hours  insulin lispro (HumaLOG) corrective regimen sliding scale   SubCutaneous Before meals and at bedtime  levothyroxine 88 MICROGram(s) Oral daily      Allergies    No Known Allergies    Intolerances          FAMILY HISTORY:      ROS:  + weakness and fatigue  No back pain or other skeletal complaints  No SOB or CP  Mild cough  No nausea or vomiting  No leg pain or leg swelling.    ROS is otherwise negative.    Physical exam:    Vital signs  Vital Signs Last 24 Hrs  T(C): 37 (02-04-20 @ 05:42), Max: 37 (02-04-20 @ 05:42)  T(F): 98.6 (02-04-20 @ 05:42), Max: 98.6 (02-04-20 @ 05:42)  HR: 83 (02-04-20 @ 05:42) (83 - 89)  BP: 146/70 (02-04-20 @ 05:42) (121/67 - 154/77)  BP(mean): --  RR: 16 (02-04-20 @ 05:42) (15 - 16)  SpO2: 95% (02-04-20 @ 05:42) (95% - 96%)  HEENT: PERRLA, pink mucosae  + matted/firm  L axillary lymphadenopathy  Cardiovascular: Regular rhythm/rate, no murmurs, rubs, gallops  Respiratory:  decreased BS on L  Abdomen: soft, non tender, non distended, no palpable masses, no palpable hepatosplenomegaly  Extremities:  no peripheral edema  Neuro: alert, awake and oriented x 3, no focal abnormalities  Skin: warm, no obvious lesions on visible skin    LABS:  CBC Full  -  ( 03 Feb 2020 07:43 )  WBC Count : 9.05 K/uL  Hemoglobin : 12.5 g/dL  Hematocrit : 37.7 %  Platelet Count - Automated : 337 K/uL  Mean Cell Volume : 91.7 fl  Mean Cell Hemoglobin : 30.4 pg  Mean Cell Hemoglobin Concentration : 33.2 gm/dL  Auto Neutrophil # : x  Auto Lymphocyte # : x  Auto Monocyte # : x  Auto Eosinophil # : x  Auto Basophil # : x  Auto Neutrophil % : x  Auto Lymphocyte % : x  Auto Monocyte % : x  Auto Eosinophil % : x  Auto Basophil % : x    03 Feb 2020 07:43    129    |  97     |  14     ----------------------------<  158    4.7     |  23     |  0.77     Ca    8.4        03 Feb 2020 07:43  Mg     1.9       03 Feb 2020 07:43        PERTINENT IMAGING STUDIES: reviewed    ASSESSMENT:     L breast mass/axillary LAD, skeletal lesions/pleural effusion    PLAN:    Findings c/w metastatic breast ca  We need tissue diagnosis with ER/AR/HER 2 status  The patient is scheduled for breast biopsy  I would recommend also obtaining tissue from a metastatic focus (thoracentesis seems the most approachable way to get cytology)  She is clinically stable  We will discuss therapeutic approach once the diagnosis is confirmed  Please check CEA, [id number]    Thank you    Chrystal Funez MD  [phone]

## 2020-02-04 NOTE — CONSULT NOTE ADULT - SUBJECTIVE AND OBJECTIVE BOX
KAI FRANCES          MRN-[id number]            (1933)    HPI:  85 yo F with hx of DM2, Grave's disease, HLD, HTN, lymphedema, endometrial carcinoma s/p hysterectomy and bilateral oophorectomy, and benign pancreatic tail adenoma who presents for roughly 1 month of generalized malaise and weakness.  Patient reports she has had episodes of generalized weakness and fatigue to the point that she finds it difficult to have the energy to get out of bed or cook for herself. On initial imaging, patient noted to have breast mass and multiple bony lesions concerning for metastatic breast CA. She is pending a biopsy. (2020 20:40)    Patient seen and examined at bedside. She denies any physical complaints such as pain, nausea, dyspnea. She endorses intermittent depressed mood and this is her biggest concern. She states the depression can arise without warning and is so debilitating that she will do nothing for the day. She is unable to identify triggers or alleviating factors. She denies any HI/SI. She states she had a good day yesterday, ate her meals, ambulated, etc. She is concerned about going home and having increased depression to the point that she does not take care of herself. She is worried about being a burden on those around her since she lives alone. She has support in her friends and a cousin in [place] who is also her HCP. When she told him that she was in [facility], he came to visit the same day. I used this to reinforce that the patient would likely be surprised how much people want to help if they are called upon. Patient is very practical and realistic re: the concern for new metastatic malignancy. She understands that she cannot live forever and is more concerned about her Quality of Life than the Length of it. She is anticipating the US biopsy today and she sees this as an opportunity give her clarity and some concrete results for her condition.     PAST MEDICAL & SURGICAL HISTORY:  Hyperthyroidism  HTN (hypertension)  Type 2 diabetes mellitus: DM (diabetes mellitus)  Status post cataract extraction: S/P cataract extraction  Other acquired absence of organ: S/P cholecystectomy  Other acquired absence of organ: S/P splenectomy  Status post total hysterectomy: S/P hysterectomy    FAMILY HISTORY:  Reviewed and found non contributory in mother or father    SOCIAL HISTORY:   Lives Alone, not , and no Children. No aides.  Family is a cousin in [place].  Has friends for support but also elderly.  Retired .  Denies Smoking, Substance abuse. Endorses Social EtOH.    ROS:                  Dyspnea (Elissa 0-10): 0                       N/V (Y/N): N                             Secretions (Y/N) : N               Agitation(Y/N): N  Pain (Y/N): N      -Provocation/Palliation:  -Quality/Quantity:  -Radiating:  -Severity:  -Timing/Frequency:  -Impact on ADLs:    General:  Denied  HEENT:    Denied  Neck:  Denied  CVS:  Denied  Resp:  Denied  GI:  Denied  :  Denied  Musc:  Denied  Neuro:  Denied  Psych: +depression  Skin:  Denied  Lymph:  Denied    ALLERGIES:  No Known Allergies    Opiate Naive (Y/N): Y  -iStop reviewed (Y/N): (Ref#: [id number]) -> No Rx's    MEDICATIONS:      MEDICATIONS  (STANDING):  cholecalciferol 1000 Unit(s) Oral daily  enoxaparin Injectable 40 milliGRAM(s) SubCutaneous every 24 hours  insulin lispro (HumaLOG) corrective regimen sliding scale   SubCutaneous Before meals and at bedtime  levothyroxine 88 MICROGram(s) Oral daily    LABS:  Reviewed  CBC:                        13.0   8.37  )-----------( 346      ( 2020 10:20 )             39.5     CMP:    02-04    128<L>  |  95<L>  |  13  ----------------------------<  256<H>  4.5   |  23  |  0.67    Ca    8.5      2020 10:20  Mg     1.8     02-04    PT/INR - ( 2020 10:20 )   PT: 13.2 sec;   INR: 1.15     PTT - ( 2020 10:20 )  PTT:29.1 sec    IMAGING:  Reviewed  < from: CT Chest Abdomen Pelvis w/ IV Contrast (20 @ 08:52) >  Normal heart size. Coronary calcifications. No pericardial effusion. No thoracic aortic aneurysm. 2 cm mass in the retroareolar region of the left breast. Left axillary lymphadenopathy measuring up to 2 cm. Infiltration of the Axillary fat. No right pleural effusion. Moderate size left pleural effusion. Slight infiltration of the fat overlying the left hemidiaphragm anteriorly. Atelectasis in the left lung base. Several hepatic cysts the largest measuring 8.4 cm with thin internal septation. Status post cholecystectomy. Status post splenectomy. Residual splenule in the left upper quadrant. Possible resection of tail of pancreas. Remainder of the pancreas is unremarkable. Unremarkable right adrenal gland. Nonspecific 1.6 cm nodule left adrenal gland. A few renal cysts. 0.6 cm nonobstructive left kidney stone. No hydronephrosis. No retroperitoneal lymphadenopathy. Limited evaluation of bowel without contrast. Colonic diverticulosis. No bowel obstruction or free air. No ascites. Unremarkable bladder. Status post hysterectomy. No adnexal mass. Innumerable sclerotic bony lesions throughout the skeleton consistent with metastasis. There are innumerable sclerotic lesions throughout the thoracolumbar spine, sternum, multiple ribs and pelvis.    IMPRESSION: Left breast mass and left axillary lymphadenopathy, suspicious for breast neoplasm. Findings appear amenable to ultrasound-guided biopsy.  Extensive osteoblastic bony metastasis.  Moderate size large pleural effusion. A neoplastic etiology must be considered. Consider pleural tap.  Status post hysterectomy, cholecystectomy splenectomy and possible distal pancreatectomy.    PHYSICAL EXAM:  T(C): 36.8 (- @ 10:42), Max: 37 (-20 @ 05:42)  HR: 95 (-20 @ 10:42) (83 - 95)  BP: 144/76 (-20 @ 10:42) (121/67 - 154/77)  RR: 16 (20 @ 10:42) (15 - 16)  SpO2: 96% (20 @ 10:42) (95% - 96%)    General: NAD, Resting Comfortably  HEENT: PERRL, EOMI, MMM  Neck: supple, no JVD  CVS: RRR, +S1/S2  Resp: CTAB  GI: soft, NT, ND, +BS  : no lyons  Musc: no clubbing, cyanosis, edema  Neuro: AAOx3, moving all extremities spontaneously, nonfocal  Psych: Appropriate   Skin: No overt rash  Lymph: No cervical LAD  Preadmit Karnofsky:  70%           Current Karnofsky:     50%  Cachexia (Y/N): N  BMI: 23.2    ADVANCED DIRECTIVES:     DNR/DNI     HCP    Decision maker: The patient is able to participate in complex medical decision making  Legal surrogate: Gerson Sung (HCP/Cousin) [phone]    GOALS OF CARE DISCUSSION:       Palliative care info/counseling provided	           Advanced Directives addressed please see Advance Care Planning Note	           Documentation of GOC: GLENN	          REFERRALS:	        Unit SW/Case Mgmt              Patient/Family Support       Music Therapy       PT/OT

## 2020-02-04 NOTE — CONSULT NOTE ADULT - PROBLEM SELECTOR RECOMMENDATION 4
In addition to the EM visit, an advance care planning meeting was performed  Start time: 11:40AM  End time: 12:00PM  Total time: 20min  A face to face meeting to discuss advance care planning was held today regarding: KAI FRANCES  Primary decision maker: Patient has ability to make complex medical decisions  Alternate/surrogate: Gerson Sung (HCP/Cousin) [phone]  Discussed advance directives including, but not limited to, healthcare proxy and code status.  Decision regarding code status: DNR/DNI  Documentation completed today: MOLST    Please see GOC document for further details.

## 2020-02-04 NOTE — CONSULT NOTE ADULT - CONSULT REASON
Goals of Care/Complex Medical Decision Making in the setting of likely Metastatic Breast CA
Pleural effusion
Suspected metastatic breast ca
Newly identified breast mass

## 2020-02-04 NOTE — PROGRESS NOTE ADULT - PROBLEM SELECTOR PLAN 3
Pt expresses anhedonia, decreased appetite. Is interested in seeing a psychiatrist.  - Psych consulted for depression

## 2020-02-04 NOTE — PROGRESS NOTE ADULT - SUBJECTIVE AND OBJECTIVE BOX
OVERNIGHT EVENTS:  No acute events overnight.    SUBJECTIVE / INTERVAL HPI: Patient seen and examined at bedside. Pt complains of depression. Denies any complaints of chest pain, SOB, abdominal pain, N/V/D.    VITAL SIGNS:  Vital Signs Last 24 Hrs  T(C): 36.8 (04 Feb 2020 10:42), Max: 37 (04 Feb 2020 05:42)  T(F): 98.2 (04 Feb 2020 10:42), Max: 98.6 (04 Feb 2020 05:42)  HR: 95 (04 Feb 2020 10:42) (83 - 95)  BP: 144/76 (04 Feb 2020 10:42) (144/76 - 154/77)  BP(mean): --  RR: 16 (04 Feb 2020 10:42) (16 - 16)  SpO2: 96% (04 Feb 2020 10:42) (95% - 96%)    PHYSICAL EXAM:    General: Lying comfortably in bed, NAD  HEENT: NC/AT, PERRL, anicteric sclera; MMM  Neck: supple  Cardiovascular: +S1/S2, RRR  Respiratory: CTA B/L, no W/R/R  Gastrointestinal: soft, NT/ND, +BS  Extremities: WWP, no edema or cyanosis  Vascular: 2+ radial, DP/PT pulses B/L  Neurological: AAOx3, no focal deficits    MEDICATIONS:  MEDICATIONS  (STANDING):  cholecalciferol 1000 Unit(s) Oral daily  dextrose 5%. 1000 milliLiter(s) (50 mL/Hr) IV Continuous <Continuous>  dextrose 50% Injectable 12.5 Gram(s) IV Push once  dextrose 50% Injectable 25 Gram(s) IV Push once  dextrose 50% Injectable 25 Gram(s) IV Push once  enoxaparin Injectable 40 milliGRAM(s) SubCutaneous every 24 hours  insulin lispro (HumaLOG) corrective regimen sliding scale   SubCutaneous Before meals and at bedtime  levothyroxine 88 MICROGram(s) Oral daily    MEDICATIONS  (PRN):  dextrose 40% Gel 15 Gram(s) Oral once PRN Blood Glucose LESS THAN 70 milliGRAM(s)/deciliter  glucagon  Injectable 1 milliGRAM(s) IntraMuscular once PRN Glucose LESS THAN 70 milligrams/deciliter      ALLERGIES:  Allergies    No Known Allergies    Intolerances        LABS:                        13.0   8.37  )-----------( 346      ( 04 Feb 2020 10:20 )             39.5     02-04    128<L>  |  95<L>  |  13  ----------------------------<  256<H>  4.5   |  23  |  0.67    Ca    8.5      04 Feb 2020 10:20  Mg     1.8     02-04      PT/INR - ( 04 Feb 2020 10:20 )   PT: 13.2 sec;   INR: 1.15          PTT - ( 04 Feb 2020 10:20 )  PTT:29.1 sec    CAPILLARY BLOOD GLUCOSE      POCT Blood Glucose.: 209 mg/dL (04 Feb 2020 12:32)      RADIOLOGY & ADDITIONAL TESTS: Reviewed. OVERNIGHT EVENTS:  No acute events overnight.    SUBJECTIVE / INTERVAL HPI: Patient seen and examined at bedside. Pt complains of depression. Denies any complaints of chest pain, SOB, abdominal pain, N/V/D.    VITAL SIGNS:  Vital Signs Last 24 Hrs  T(C): 36.8 (04 Feb 2020 10:42), Max: 37 (04 Feb 2020 05:42)  T(F): 98.2 (04 Feb 2020 10:42), Max: 98.6 (04 Feb 2020 05:42)  HR: 95 (04 Feb 2020 10:42) (83 - 95)  BP: 144/76 (04 Feb 2020 10:42) (144/76 - 154/77)  BP(mean): --  RR: 16 (04 Feb 2020 10:42) (16 - 16)  SpO2: 96% (04 Feb 2020 10:42) (95% - 96%)    PHYSICAL EXAM:    General: Elderly female lying comfortably in bed, NAD  HEENT: NC/AT, PERRL, anicteric sclera; MMM  Neck: supple  Cardiovascular: +S1/S2, RRR  Respiratory: CTA B/L, no W/R/R  Gastrointestinal: soft, NT/ND, +BS  Extremities: WWP, no edema or cyanosis  Vascular: 2+ radial, DP/PT pulses B/L  Neurological: AAOx3, no focal deficits  Psychiatric: Flat affect    MEDICATIONS:  MEDICATIONS  (STANDING):  cholecalciferol 1000 Unit(s) Oral daily  dextrose 5%. 1000 milliLiter(s) (50 mL/Hr) IV Continuous <Continuous>  dextrose 50% Injectable 12.5 Gram(s) IV Push once  dextrose 50% Injectable 25 Gram(s) IV Push once  dextrose 50% Injectable 25 Gram(s) IV Push once  enoxaparin Injectable 40 milliGRAM(s) SubCutaneous every 24 hours  insulin lispro (HumaLOG) corrective regimen sliding scale   SubCutaneous Before meals and at bedtime  levothyroxine 88 MICROGram(s) Oral daily    MEDICATIONS  (PRN):  dextrose 40% Gel 15 Gram(s) Oral once PRN Blood Glucose LESS THAN 70 milliGRAM(s)/deciliter  glucagon  Injectable 1 milliGRAM(s) IntraMuscular once PRN Glucose LESS THAN 70 milligrams/deciliter      ALLERGIES:  Allergies    No Known Allergies    Intolerances        LABS:                        13.0   8.37  )-----------( 346      ( 04 Feb 2020 10:20 )             39.5     02-04    128<L>  |  95<L>  |  13  ----------------------------<  256<H>  4.5   |  23  |  0.67    Ca    8.5      04 Feb 2020 10:20  Mg     1.8     02-04      PT/INR - ( 04 Feb 2020 10:20 )   PT: 13.2 sec;   INR: 1.15          PTT - ( 04 Feb 2020 10:20 )  PTT:29.1 sec    CAPILLARY BLOOD GLUCOSE      POCT Blood Glucose.: 209 mg/dL (04 Feb 2020 12:32)      RADIOLOGY & ADDITIONAL TESTS: Reviewed.

## 2020-02-04 NOTE — PROGRESS NOTE ADULT - SUBJECTIVE AND OBJECTIVE BOX
INTERVAL HPI/OVERNIGHT EVENTS:  Interim reviewed; Feels depressed that no diagnostic studies have been done. Will contact radiology;  Also Oncology consult noted;  Appetite good;       MEDICATIONS  (STANDING):  cholecalciferol 1000 Unit(s) Oral daily  dextrose 5%. 1000 milliLiter(s) (50 mL/Hr) IV Continuous <Continuous>  dextrose 50% Injectable 12.5 Gram(s) IV Push once  dextrose 50% Injectable 25 Gram(s) IV Push once  dextrose 50% Injectable 25 Gram(s) IV Push once  enoxaparin Injectable 40 milliGRAM(s) SubCutaneous every 24 hours  insulin lispro (HumaLOG) corrective regimen sliding scale   SubCutaneous Before meals and at bedtime  levothyroxine 88 MICROGram(s) Oral daily    MEDICATIONS  (PRN):  dextrose 40% Gel 15 Gram(s) Oral once PRN Blood Glucose LESS THAN 70 milliGRAM(s)/deciliter  glucagon  Injectable 1 milliGRAM(s) IntraMuscular once PRN Glucose LESS THAN 70 milligrams/deciliter      Allergies    No Known Allergies    Intolerances        Vital Signs Last 24 Hrs  T(C): 37 (04 Feb 2020 05:42), Max: 37 (04 Feb 2020 05:42)  T(F): 98.6 (04 Feb 2020 05:42), Max: 98.6 (04 Feb 2020 05:42)  HR: 83 (04 Feb 2020 05:42) (83 - 89)  BP: 146/70 (04 Feb 2020 05:42) (121/67 - 154/77)  BP(mean): --  RR: 16 (04 Feb 2020 05:42) (15 - 16)  SpO2: 95% (04 Feb 2020 05:42) (95% - 96%)          Constitutional: Awake    Eyes: BRIDGER    ENMT: Negative    Neck: Supple    Back:  no tenderness     Respiratory:  clear     Cardiovascular: S1 S2    Gastrointestinal: soft    Genitourinary:    Extremities:  no edema    Vascular:    Neurological:    Skin:    Lymph Nodes:            LABS:                        12.5   9.05  )-----------( 337      ( 03 Feb 2020 07:43 )             37.7     02-03    129<L>  |  97  |  14  ----------------------------<  158<H>  4.7   |  23  |  0.77    Ca    8.4      03 Feb 2020 07:43  Mg     1.9     02-03      PT/INR - ( 03 Feb 2020 07:43 )   PT: 13.3 sec;   INR: 1.16          PTT - ( 03 Feb 2020 07:43 )  PTT:28.0 sec      RADIOLOGY & ADDITIONAL TESTS:

## 2020-02-04 NOTE — CONSULT NOTE ADULT - PROBLEM SELECTOR RECOMMENDATION 9
In the setting of progressive decline and newly found malignancy. Patient without SI or HI but she is concerned about caring for herself at home.  -patient is amenable to Psych eval  -reinforced coping skills  -supportive services referrals placed: Chaplaincy, Music Therapy, Massage Therapy

## 2020-02-04 NOTE — CONSULT NOTE ADULT - PROBLEM SELECTOR RECOMMENDATION 2
Likely due to metastatic breast CA.  -asymptomatic currently; if pain were to arise, low threshold to start Decadron (4mg q12h PO/IV given low symptom burden)

## 2020-02-04 NOTE — CHART NOTE - NSCHARTNOTEFT_GEN_A_CORE
PALLIATIVE MEDICINE COORDINATION OF CARE NOTE FOR KAI FRANCES  [  ] ED trigger    [  ] MICU trigger    [ x ] Consult    Patient will be seen as full consult within 24h. Consult requested for: __GOC______    __30____ Minutes; Start: __0800__  End: _0830___, of non-face-to-face prolonged service provided that relates to (face-to-face) care that has or will occur and ongoing patient management, including one or more of the following:     - Reviewed records from other physicians or other health care professional services, including one or more of the following: other medical records and diagnostic / radiology study results     HPI:  87 yo F with hx of DM2, Grave's disease, HLD, HTN, lymphedema, endometrial carcinoma s/p hysterectomy and bilateral oophorectomy, and benign pancreatic tail adenoma who presents for roughly 1 month of generalized malaise and weakness.  Patient reports she has had episodes of generalized weakness and fatigue to the point that she finds it difficult to have the energy to get out of bed or cook for herself.  On a past episode she was dx with UTI and symptoms improved however she was seen both 1/13 and 1/28 as an outpatient and UAs were negative for infection.  She was empirically started on Keflex as outpatient which was then discontinued when Ucx negative, otherwise has had no other recent abx or changes in her medications.  She denies fevers, chills, N/V, abdominal pain, chest pain, palpitations, diarrhea, constipation, urinary frequency, urgency or dysuria.  She denies weight loss and is up to date on colonoscopies.    In the ED, VS were TMax: 98.1, HR 82 - 92, /78 - 160/82, RR 16 - 18, SpO2 93% - 97% on RA.  Labs significant for Na 133, Glucose 218, Alk phos 367.  CXR with L sided effusion.  EKG NSR with PACs.    Started on NS at 250cc/hr and admitted for further workup. (30 Jan 2020 20:40)      - Other: iStop reviewed.    NO Rx found on iStop review. Ref #: [id number]    - Other: Medication reviewed.    The patient HAS NOT used PRN's in the last 24h.    MEDICATIONS  (STANDING):  cholecalciferol 1000 Unit(s) Oral daily  dextrose 5%. 1000 milliLiter(s) (50 mL/Hr) IV Continuous <Continuous>  dextrose 50% Injectable 12.5 Gram(s) IV Push once  dextrose 50% Injectable 25 Gram(s) IV Push once  dextrose 50% Injectable 25 Gram(s) IV Push once  enoxaparin Injectable 40 milliGRAM(s) SubCutaneous every 24 hours  insulin lispro (HumaLOG) corrective regimen sliding scale   SubCutaneous Before meals and at bedtime  levothyroxine 88 MICROGram(s) Oral daily    MEDICATIONS  (PRN):  dextrose 40% Gel 15 Gram(s) Oral once PRN Blood Glucose LESS THAN 70 milliGRAM(s)/deciliter  glucagon  Injectable 1 milliGRAM(s) IntraMuscular once PRN Glucose LESS THAN 70 milligrams/deciliter      - Other: Advanced directives    Patient is currently a full code and no advance directives or HCP found in the chart review. She has Gerson Zhang [phone] and Helena Mares [phone] listed as contacts.        - Other: Coordination/Plan of care     Discussed with primary team

## 2020-02-04 NOTE — PROGRESS NOTE ADULT - SUBJECTIVE AND OBJECTIVE BOX
PULMONARY CONSULT FOLLOW-UP NOTE    INTERVAL HISTORY:   Reviewed chart and overnight events; patient seen and examined at bedside.  no new complaints  no overnight event    MEDICATIONS:  Pulmonary:    Antimicrobials:    Anticoagulants:  enoxaparin Injectable 40 milliGRAM(s) SubCutaneous every 24 hours    Cardiac:      Allergies    No Known Allergies    Intolerances        Vital Signs Last 24 Hrs  T(C): 36.9 (04 Feb 2020 17:29), Max: 37 (04 Feb 2020 05:42)  T(F): 98.5 (04 Feb 2020 17:29), Max: 98.6 (04 Feb 2020 05:42)  HR: 90 (04 Feb 2020 17:29) (83 - 95)  BP: 131/74 (04 Feb 2020 17:29) (131/74 - 154/77)  BP(mean): --  RR: 18 (04 Feb 2020 17:29) (16 - 18)  SpO2: 95% (04 Feb 2020 17:29) (95% - 96%)        PHYSICAL EXAM:  Constitutional: WDWN  HEENT: NC/AT; PERRL, anicteric sclera; MMM  Neck: supple  Cardiovascular: +S1/S2, RRR  Respiratory: decreased breath sounds left base otherwise clear. no W/R/R  Gastrointestinal: soft, NT/ND; +BSx4  Extremities: WWP; no edema, clubbing or cyanosis  Vascular: 2+ radial, DP/PT pulses B/L  Neurological: AAOx3; no focal deficits    LABS:      CBC Full  -  ( 04 Feb 2020 10:20 )  WBC Count : 8.37 K/uL  RBC Count : 4.28 M/uL  Hemoglobin : 13.0 g/dL  Hematocrit : 39.5 %  Platelet Count - Automated : 346 K/uL  Mean Cell Volume : 92.3 fl  Mean Cell Hemoglobin : 30.4 pg  Mean Cell Hemoglobin Concentration : 32.9 gm/dL  Auto Neutrophil # : x  Auto Lymphocyte # : x  Auto Monocyte # : x  Auto Eosinophil # : x  Auto Basophil # : x  Auto Neutrophil % : x  Auto Lymphocyte % : x  Auto Monocyte % : x  Auto Eosinophil % : x  Auto Basophil % : x    02-04    128<L>  |  95<L>  |  13  ----------------------------<  256<H>  4.5   |  23  |  0.67    Ca    8.5      04 Feb 2020 10:20  Mg     1.8     02-04      PT/INR - ( 04 Feb 2020 10:20 )   PT: 13.2 sec;   INR: 1.15          PTT - ( 04 Feb 2020 10:20 )  PTT:29.1 sec                      RADIOLOGY & ADDITIONAL STUDIES:  reviewed.     POCUS:   simple small left sided pleural effusion.

## 2020-02-04 NOTE — CONSULT NOTE ADULT - ASSESSMENT
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 Found to have left breast mass on examination and CT scan with left axillary LAD, left pleural effusion and multiple osteoblastic bone mets and high ALP with normal GGT. Pending US guided core bx of breast lesion and thoracentesis. General surgery consulted for newly identified breast mass.     Plan  No acute surgical intervention indicated  Follow up breast biopsy and thoracentesis results.  Medical oncology consult  Rest of care as per primary team  Surgery team 1C will continue to follow  Discussed with attending and chief resident.
85 yo F with hx of endometrial CA s/p hysterectomy and bilateral oophorectomy w/ numerous cyst removals of breast and axillae presenting w/ 1 month of fatigue/malaise now found to have L pleural effusion and new L sided breast mass w/ L axillary LAD and concern for mets to the bone. Pulmonary consulted for concern for malignant pleural effusion
87yo F with PMH of T2DM, Grave's Disease, h/o Endometrial CA (s/p hysterectomy and bilateral oophorectomy), and h/o benign Pancreatic adenoma p/w weakness/fatigue found to have breast mass and diffuse osseus lesions on imaging concerning for metastatic Breast CA.

## 2020-02-04 NOTE — CONSULT NOTE ADULT - PROBLEM SELECTOR RECOMMENDATION 5
Support provided to patient. Patient to have access to supportive services during rest of hospital stay as the patient/family deemed necessary ie. Chaplaincy, Massage therapy, Music therapy, Patient and family supportive services, Palliative SW, etc.    Recommended Chaplaincy, Music Therapy, Massage Therapy.

## 2020-02-05 DIAGNOSIS — F32.1 MAJOR DEPRESSIVE DISORDER, SINGLE EPISODE, MODERATE: ICD-10-CM

## 2020-02-05 LAB
ANION GAP SERPL CALC-SCNC: 9 MMOL/L — SIGNIFICANT CHANGE UP (ref 5–17)
BUN SERPL-MCNC: 15 MG/DL — SIGNIFICANT CHANGE UP (ref 7–23)
CALCIUM SERPL-MCNC: 8.6 MG/DL — SIGNIFICANT CHANGE UP (ref 8.4–10.5)
CHLORIDE SERPL-SCNC: 99 MMOL/L — SIGNIFICANT CHANGE UP (ref 96–108)
CO2 SERPL-SCNC: 24 MMOL/L — SIGNIFICANT CHANGE UP (ref 22–31)
CREAT SERPL-MCNC: 0.8 MG/DL — SIGNIFICANT CHANGE UP (ref 0.5–1.3)
GLUCOSE BLDC GLUCOMTR-MCNC: 154 MG/DL — HIGH (ref 70–99)
GLUCOSE BLDC GLUCOMTR-MCNC: 182 MG/DL — HIGH (ref 70–99)
GLUCOSE BLDC GLUCOMTR-MCNC: 183 MG/DL — HIGH (ref 70–99)
GLUCOSE BLDC GLUCOMTR-MCNC: 196 MG/DL — HIGH (ref 70–99)
GLUCOSE SERPL-MCNC: 170 MG/DL — HIGH (ref 70–99)
HCT VFR BLD CALC: 36.8 % — SIGNIFICANT CHANGE UP (ref 34.5–45)
HGB BLD-MCNC: 11.7 G/DL — SIGNIFICANT CHANGE UP (ref 11.5–15.5)
MAGNESIUM SERPL-MCNC: 1.8 MG/DL — SIGNIFICANT CHANGE UP (ref 1.6–2.6)
MCHC RBC-ENTMCNC: 29.5 PG — SIGNIFICANT CHANGE UP (ref 27–34)
MCHC RBC-ENTMCNC: 31.8 GM/DL — LOW (ref 32–36)
MCV RBC AUTO: 92.9 FL — SIGNIFICANT CHANGE UP (ref 80–100)
NRBC # BLD: 0 /100 WBCS — SIGNIFICANT CHANGE UP (ref 0–0)
PLATELET # BLD AUTO: 334 K/UL — SIGNIFICANT CHANGE UP (ref 150–400)
POTASSIUM SERPL-MCNC: 4.6 MMOL/L — SIGNIFICANT CHANGE UP (ref 3.5–5.3)
POTASSIUM SERPL-SCNC: 4.6 MMOL/L — SIGNIFICANT CHANGE UP (ref 3.5–5.3)
RBC # BLD: 3.96 M/UL — SIGNIFICANT CHANGE UP (ref 3.8–5.2)
RBC # FLD: 13.3 % — SIGNIFICANT CHANGE UP (ref 10.3–14.5)
SODIUM SERPL-SCNC: 132 MMOL/L — LOW (ref 135–145)
WBC # BLD: 8.63 K/UL — SIGNIFICANT CHANGE UP (ref 3.8–10.5)
WBC # FLD AUTO: 8.63 K/UL — SIGNIFICANT CHANGE UP (ref 3.8–10.5)

## 2020-02-05 PROCEDURE — 99233 SBSQ HOSP IP/OBS HIGH 50: CPT

## 2020-02-05 PROCEDURE — 99223 1ST HOSP IP/OBS HIGH 75: CPT

## 2020-02-05 PROCEDURE — 99232 SBSQ HOSP IP/OBS MODERATE 35: CPT | Mod: GC

## 2020-02-05 RX ORDER — MAGNESIUM SULFATE 500 MG/ML
2 VIAL (ML) INJECTION ONCE
Refills: 0 | Status: COMPLETED | OUTPATIENT
Start: 2020-02-05 | End: 2020-02-05

## 2020-02-05 RX ORDER — VENLAFAXINE HCL 75 MG
37.5 CAPSULE, EXT RELEASE 24 HR ORAL DAILY
Refills: 0 | Status: DISCONTINUED | OUTPATIENT
Start: 2020-02-05 | End: 2020-02-06

## 2020-02-05 RX ORDER — VENLAFAXINE HCL 75 MG
37.5 CAPSULE, EXT RELEASE 24 HR ORAL DAILY
Refills: 0 | Status: DISCONTINUED | OUTPATIENT
Start: 2020-02-05 | End: 2020-02-05

## 2020-02-05 RX ADMIN — Medication 2: at 09:06

## 2020-02-05 RX ADMIN — Medication 50 GRAM(S): at 17:49

## 2020-02-05 RX ADMIN — Medication 1000 UNIT(S): at 12:04

## 2020-02-05 RX ADMIN — Medication 37.5 MILLIGRAM(S): at 16:05

## 2020-02-05 RX ADMIN — Medication 2: at 22:24

## 2020-02-05 RX ADMIN — Medication 2: at 12:51

## 2020-02-05 RX ADMIN — ENOXAPARIN SODIUM 40 MILLIGRAM(S): 100 INJECTION SUBCUTANEOUS at 16:05

## 2020-02-05 RX ADMIN — Medication 88 MICROGRAM(S): at 07:02

## 2020-02-05 RX ADMIN — Medication 2: at 17:46

## 2020-02-05 NOTE — BEHAVIORAL HEALTH ASSESSMENT NOTE - SUICIDE PROTECTIVE FACTORS
Latter day beliefs/Identifies reasons for living/Responsibility to family and others/Has future plans/Supportive social network of family or friends/Cultural, spiritual and/or moral attitudes against suicide

## 2020-02-05 NOTE — BEHAVIORAL HEALTH ASSESSMENT NOTE - SUMMARY
Plan:    1) CL will follow and will see if psychology intern available for individual therapy.     2)Would start effexor XR 37.5 mg for depression. 86 year old  F with hx of DM2, Grave's disease, HLD, HTN, lymphedema, endometrial carcinoma s/p hysterectomy and bilateral oophorectomy, and benign pancreatic tail adenoma who presents for roughly 1 month of generalized malaise and weakness, found to have left breast mass and left axillary lymphadenopathy, suspicious for breast neoplasm, extensive osteoblastic bony metastasis, and L pleural effusion, likely breast cancer with mets to bone admitted for further workup. She asked to see psychiatry and says she has been depressed for about a year. She has had decreased energy, concentration, and interest. She feels the depression may have been the first sign something was off with her body again. Pt has never had any psychiatric treatment in the past, nor has she ever been depressed in the past. She lives alone and while she does have some friends in the city, she has little family. She worries about what the quality of her remaining years will be. With me she did not seem to fully entertain the possibility of dying, but I wonder is that is not entering her thinking on some level. She is open to both therapy and meds.    Plan:    1) CL will follow and will see if psychology intern available for individual therapy.     2)Would start effexor XR 37.5 mg for depression.

## 2020-02-05 NOTE — BEHAVIORAL HEALTH ASSESSMENT NOTE - AXIS III
DM2, Grave's disease, HLD, HTN, lymphedema, endometrial carcinoma s/p hysterectomy and bilateral oophorectomy, and benign pancreatic tail adenoma who presents for roughly 1 month of generalized malaise and weakness, found to have left breast mass and left axillary lymphadenopathy, suspicious for breast neoplasm, extensive osteoblastic bony metastasis, and L pleural effusion, likely breast cancer with mets to bone

## 2020-02-05 NOTE — BEHAVIORAL HEALTH ASSESSMENT NOTE - HPI (INCLUDE ILLNESS QUALITY, SEVERITY, DURATION, TIMING, CONTEXT, MODIFYING FACTORS, ASSOCIATED SIGNS AND SYMPTOMS)
86 year old  F with hx of DM2, Grave's disease, HLD, HTN, lymphedema, endometrial carcinoma s/p hysterectomy and bilateral oophorectomy, and benign pancreatic tail adenoma who presents for roughly 1 month of generalized malaise and weakness, found to have left breast mass and left axillary lymphadenopathy, suspicious for breast neoplasm, extensive osteoblastic bony metastasis, and L pleural effusion, likely breast cancer with mets to bone admitted for further workup. She asked to see psychiatry and says she has been depressed for about a year. She has had decreased energy, concentration, and interest. She feels the depression may have been the first sign something was off with her body again. Pt has never had any psychiatric treatment in the past, nor has she ever been depressed in the past. She lives alone and while she does have some friends in the city, she has little family. She worries about what the quality of her remaining years will be. With me she did not seem to fully entertain the possibility of dying, but I wonder is that is not entering her thinking on some level. She is open to both therapy and meds.

## 2020-02-05 NOTE — BEHAVIORAL HEALTH ASSESSMENT NOTE - OTHER
not assessed in bed comes off as constricted at first, but reactive when we got to topics she likes to talk about (e.g. Catholicism) none

## 2020-02-05 NOTE — BEHAVIORAL HEALTH ASSESSMENT NOTE - NSBHADMITCOUNSEL_PSY_A_CORE
diagnostic results/impressions and/or recommended studies/risks and benefits of treatment options/importance of adherence to chosen treatment/client/family/caregiver education/instructions for management, treatment and follow up/risk factor reduction/prognosis/other...

## 2020-02-05 NOTE — PHYSICAL THERAPY INITIAL EVALUATION ADULT - PERTINENT HX OF CURRENT PROBLEM, REHAB EVAL
86 year old F F with hx of DM2, Grave's disease, HLD, HTN, lymphedema, endometrial carcinoma s/p hysterectomy and bilateral oophorectomy, and benign pancreatic tail adenoma who presents for roughly 1 month of generalized malaise and weakness, found to have left breast mass and left axillary lymphadenopathy, suspicious for breast neoplasm, extensive osteoblastic bony metastasis, and L pleural effusion, likely breast cancer with mets to bone admitted for further workup

## 2020-02-05 NOTE — PHYSICAL THERAPY INITIAL EVALUATION ADULT - CRITERIA FOR SKILLED THERAPEUTIC INTERVENTIONS
risk reduction/prevention/anticipated discharge recommendation/therapy frequency/impairments found/functional limitations in following categories/rehab potential

## 2020-02-05 NOTE — BEHAVIORAL HEALTH ASSESSMENT NOTE - DIFFERENTIAL
MDD, consider adjustment disorder with depression or depression secondary to general medical problem (breast CA)

## 2020-02-05 NOTE — PROGRESS NOTE ADULT - PROBLEM SELECTOR PLAN 3
s/p US-guided biopsy  -patient intends to follow-up path as an outpatient and hear potential treatment options  -patient was educated on hospice services in case she decided to forego further DMT

## 2020-02-05 NOTE — PROGRESS NOTE ADULT - PROBLEM SELECTOR PLAN 2
-currently asymptomatic; if pain were to arise, low threshold to start Decadron (4mg q12h PO/IV given low symptom burden)

## 2020-02-05 NOTE — PHYSICAL THERAPY INITIAL EVALUATION ADULT - ADDITIONAL COMMENTS
Pt reports living alone in an elevator access apartment with a ramp to enter. Pt reports being completely independent with all ADL's and mobility occasionally using a SC.

## 2020-02-05 NOTE — PHYSICAL THERAPY INITIAL EVALUATION ADULT - PLANNED THERAPY INTERVENTIONS, PT EVAL
balance training/bed mobility training/transfer training/neuromuscular re-education/postural re-education/strengthening

## 2020-02-05 NOTE — PROGRESS NOTE ADULT - SUBJECTIVE AND OBJECTIVE BOX
KAI FRANCES             MRN-[id number]    CC: Depressed Mood    HPI:  85 yo F with hx of DM2, Grave's disease, HLD, HTN, lymphedema, endometrial carcinoma s/p hysterectomy and bilateral oophorectomy, and benign pancreatic tail adenoma who presents for roughly 1 month of generalized malaise and weakness.  Patient reports she has had episodes of generalized weakness and fatigue to the point that she finds it difficult to have the energy to get out of bed or cook for herself. On initial imaging, patient noted to have breast mass and multiple bony lesions concerning for metastatic breast CA. She is pending a biopsy. (30 Jan 2020 20:40)    SUBJECTIVE:  Patient seen and examined at bedside. She reports prolonged biopsy experience yesterday but denies any pain. She states her mood is fine today but she is still concerned about going home. She would like to be started on an antidepressant prior to her discharge as she is worried that on the bad days she will not take care of herself. She otherwise denies any physical symptoms. She had no questions after reviewing the MOLST form on her own time.       ROS:                                    N/V (Y/N): N                             Secretions (Y/N) : N               Agitation(Y/N): N  Pain (Y/N): N      -Provocation/Palliation:  -Quality/Quantity:  -Radiating:  -Severity:  -Timing/Frequency:  -Impact on ADLs:    OTHER REVIEW OF SYSTEMS: Otherwise Negative unless noted Above.    ALLERGIES:  No Known Allergies    Opiate Naive (Y/N): Y  -iStop reviewed (Y/N): (Ref#: [id number]) -> No Rx's    MEDICATIONS: Reviewed     MEDICATIONS  (STANDING):  cholecalciferol 1000 Unit(s) Oral daily  dextrose 5%. 1000 milliLiter(s) (50 mL/Hr) IV Continuous <Continuous>  dextrose 50% Injectable 12.5 Gram(s) IV Push once  dextrose 50% Injectable 25 Gram(s) IV Push once  dextrose 50% Injectable 25 Gram(s) IV Push once  enoxaparin Injectable 40 milliGRAM(s) SubCutaneous every 24 hours  insulin lispro (HumaLOG) corrective regimen sliding scale   SubCutaneous Before meals and at bedtime  levothyroxine 88 MICROGram(s) Oral daily    MEDICATIONS  (PRN):  acetaminophen   Tablet .. 650 milliGRAM(s) Oral every 6 hours PRN Mild Pain (1 - 3)    PHYSICAL EXAM:  T(C): 36.5 (02-05-20 @ 11:05), Max: 36.9 (02-04-20 @ 17:29)  HR: 83 (02-05-20 @ 11:05) (83 - 91)  BP: 136/73 (02-05-20 @ 11:05) (129/74 - 136/73)  RR: 18 (02-05-20 @ 11:05) (16 - 18)  SpO2: 95% (02-05-20 @ 11:05) (94% - 95%)    General: NAD, Resting Comfortably  HEENT: PERRL, EOMI, MMM  Neck: supple, no JVD  CVS: RRR, +S1/S2  Resp: CTAB  GI: soft, NT, ND, +BS  : no lyons  Musc: no clubbing, cyanosis, edema  Neuro: AAOx3, moving all extremities spontaneously, nonfocal  Psych: Appropriate   Skin: No overt rash  Lymph: No cervical LAD    LABS: REVIEWED  CBC:                        11.7   8.63  )-----------( 334      ( 05 Feb 2020 07:18 )             36.8     CMP:    02-05    132<L>  |  99  |  15  ----------------------------<  170<H>  4.6   |  24  |  0.80    Ca    8.6      05 Feb 2020 07:18  Mg     1.8     02-05    IMAGING: REVIEWED    ADVANCED DIRECTIVES:     DNR/DNI     HCP    Decision maker: The patient is able to participate in complex medical decision making  Legal surrogate: Gerson Sung (HCP/Cousin) [phone]    GOALS OF CARE DISCUSSION:       Palliative care info/counseling provided	           Advanced Directives addressed please see Advance Care Planning Note	           Documentation of GOC: MOLST	          REFERRALS:	        Unit SW/Case Mgmt              Patient/Family Support       Music Therapy       PT/OT

## 2020-02-05 NOTE — PROGRESS NOTE ADULT - SUBJECTIVE AND OBJECTIVE BOX
OVERNIGHT EVENTS: No acute events overnight.    SUBJECTIVE / INTERVAL HPI: Patient seen and examined at bedside. Pt complains of depression. Denies any complaints of chest pain, SOB, abdominal pain, N/V/D.    VITAL SIGNS:  Vital Signs Last 24 Hrs  T(C): 36.5 (05 Feb 2020 11:05), Max: 36.9 (04 Feb 2020 17:29)  T(F): 97.7 (05 Feb 2020 11:05), Max: 98.5 (04 Feb 2020 17:29)  HR: 83 (05 Feb 2020 11:05) (83 - 91)  BP: 136/73 (05 Feb 2020 11:05) (129/74 - 136/73)  RR: 18 (05 Feb 2020 11:05) (16 - 18)  SpO2: 95% (05 Feb 2020 11:05) (94% - 95%)    PHYSICAL EXAM:  General: Elderly female lying comfortably in bed, NAD  HEENT: PERRL, anicteric sclera; MMM  Neck: supple  Cardiovascular: +S1/S2, RRR  Respiratory: CTA B/L, no W/R/R  Gastrointestinal: soft, NT/ND, +BS  Extremities: WWP, no edema or cyanosis  Vascular: 2+ radial, DP/PT pulses B/L  Neurological: AAOx3, no focal deficits  Psychiatric: Flat affect    MEDICATIONS:  MEDICATIONS  (STANDING):  cholecalciferol 1000 Unit(s) Oral daily  dextrose 5%. 1000 milliLiter(s) (50 mL/Hr) IV Continuous <Continuous>  dextrose 50% Injectable 12.5 Gram(s) IV Push once  dextrose 50% Injectable 25 Gram(s) IV Push once  dextrose 50% Injectable 25 Gram(s) IV Push once  enoxaparin Injectable 40 milliGRAM(s) SubCutaneous every 24 hours  insulin lispro (HumaLOG) corrective regimen sliding scale   SubCutaneous Before meals and at bedtime  levothyroxine 88 MICROGram(s) Oral daily    MEDICATIONS  (PRN):  dextrose 40% Gel 15 Gram(s) Oral once PRN Blood Glucose LESS THAN 70 milliGRAM(s)/deciliter  glucagon  Injectable 1 milliGRAM(s) IntraMuscular once PRN Glucose LESS THAN 70 milligrams/deciliter      ALLERGIES:  Allergies    No Known Allergies    Intolerances        LABS:                         11.7   8.63  )-----------( 334      ( 05 Feb 2020 07:18 )             36.8     02-05    132<L>  |  99  |  15  ----------------------------<  170<H>  4.6   |  24  |  0.80    Ca    8.6      05 Feb 2020 07:18  Mg     1.8     02-05      PT/INR - ( 04 Feb 2020 10:20 )   PT: 13.2 sec;   INR: 1.15          PTT - ( 04 Feb 2020 10:20 )  PTT:29.1 sec              RADIOLOGY, EKG & ADDITIONAL TESTS: Reviewed.

## 2020-02-05 NOTE — BEHAVIORAL HEALTH ASSESSMENT NOTE - NSBHADMITCOUNSELOTHER_PSY_A_CORE FT
Discussed growing up Rastafarian and flaws of Worship Rastafarian, her support system, her growing up and living most of her life in [place], and her concerns about future quality of life.

## 2020-02-05 NOTE — BEHAVIORAL HEALTH ASSESSMENT NOTE - NSBHCONSULTRECOMMENDOTHER_PSY_A_CORE FT
1) CL will follow and will see if psychology intern available for individual therapy.     2)Would start effexor XR 37.5 mg for depression.

## 2020-02-05 NOTE — PROGRESS NOTE ADULT - PROBLEM SELECTOR PLAN 3
Pt expresses anhedonia, decreased appetite. Is interested in seeing a psychiatrist.  - Psych consulted for depression Pt expresses anhedonia, decreased appetite. Is interested in seeing a psychiatrist.  - Psych consulted, recommended Effexor 37.5 mg  - started Effexor 37.5mg on 02/05

## 2020-02-05 NOTE — PROGRESS NOTE ADULT - PROBLEM SELECTOR PLAN 5
Support provided to patient. Patient to have access to supportive services during rest of hospital stay as the patient/family deemed necessary ie. Chaplaincy, Massage therapy, Music therapy, Patient and family supportive services, Palliative SW, etc.    Recommended Chaplaincy, Music Therapy, Massage Therapy. Extensive bedside conversation today; emotional support provided and all questions answered.

## 2020-02-05 NOTE — PROGRESS NOTE ADULT - SUBJECTIVE AND OBJECTIVE BOX
[facility]    KAI FRANCES  MRN-[id number]    Chief complaint: weakness    INTERVAL HX: the patient is comfortable. No pain. No nausea or vomiting. S/p biopsy of the breast lesion and axillary LN    HPI: 86 year old woman admitted with progressive generalized malaise and weakness, she was found to have left breast mass and left axillary lymphadenopathy, suspicious for breast neoplasm, extensive osteoblastic bony metastasis, and L pleural effusion     PAST MEDICAL & SURGICAL HISTORY:  Hyperthyroidism  HTN (hypertension)  Type 2 diabetes mellitus: DM (diabetes mellitus)  Status post cataract extraction: S/P cataract extraction  Other acquired absence of organ: S/P cholecystectomy  Other acquired absence of organ: S/P splenectomy  Status post total hysterectomy: S/P hysterectomy      MEDICATIONS  (STANDING):  cholecalciferol 1000 Unit(s) Oral daily  dextrose 5%. 1000 milliLiter(s) IV Continuous <Continuous>  dextrose 50% Injectable 12.5 Gram(s) IV Push once  dextrose 50% Injectable 25 Gram(s) IV Push once  dextrose 50% Injectable 25 Gram(s) IV Push once  enoxaparin Injectable 40 milliGRAM(s) SubCutaneous every 24 hours  insulin lispro (HumaLOG) corrective regimen sliding scale   SubCutaneous Before meals and at bedtime  levothyroxine 88 MICROGram(s) Oral daily      Allergies    No Known Allergies    Intolerances    FAMILY HISTORY:    ROS:  + weakness and fatigue  No back pain or other skeletal complaints  No SOB or CP  Mild cough  No nausea or vomiting  No leg pain or leg swelling.    ROS is otherwise negative.    Physical exam:    Vital Signs Last 24 Hrs  T(C): 36.9 (05 Feb 2020 05:41), Max: 36.9 (04 Feb 2020 17:29)  T(F): 98.4 (05 Feb 2020 05:41), Max: 98.5 (04 Feb 2020 17:29)  HR: 86 (05 Feb 2020 05:41) (86 - 95)  BP: 129/74 (05 Feb 2020 05:41) (129/74 - 144/76)  BP(mean): --  RR: 18 (05 Feb 2020 05:41) (16 - 18)  SpO2: 94% (05 Feb 2020 05:41) (94% - 96%)  HEENT: PERRLA, pink mucosae  + matted/firm  L axillary lymphadenopathy  Cardiovascular: Regular rhythm/rate, no murmurs, rubs, gallops  Respiratory:  decreased BS on L  Abdomen: soft, non tender, non distended, no palpable masses, no palpable hepatosplenomegaly  Extremities:  no peripheral edema  Neuro: alert, awake and oriented x 3, no focal abnormalities  Skin: warm, no obvious lesions on visible skin    LABS:                        13.0   8.37  )-----------( 346      ( 04 Feb 2020 10:20 )             39.5         CBC Full  -  ( 04 Feb 2020 10:20 )  WBC Count : 8.37 K/uL  RBC Count : 4.28 M/uL  Hemoglobin : 13.0 g/dL  Hematocrit : 39.5 %  Platelet Count - Automated : 346 K/uL  Mean Cell Volume : 92.3 fl  Mean Cell Hemoglobin : 30.4 pg  Mean Cell Hemoglobin Concentration : 32.9 gm/dL  Auto Neutrophil # : x  Auto Lymphocyte # : x  Auto Monocyte # : x  Auto Eosinophil # : x  Auto Basophil # : x  Auto Neutrophil % : x  Auto Lymphocyte % : x  Auto Monocyte % : x  Auto Eosinophil % : x  Auto Basophil % : x        02-04    128<L>  |  95<L>  |  13  ----------------------------<  256<H>  4.5   |  23  |  0.67    Ca    8.5      04 Feb 2020 10:20  Mg     1.8     02-04          PT/INR - ( 04 Feb 2020 10:20 )   PT: 13.2 sec;   INR: 1.15          PTT - ( 04 Feb 2020 10:20 )  PTT:29.1 sec  PERTINENT IMAGING STUDIES: reviewed    ASSESSMENT:     L breast mass/axillary LAD, skeletal lesions/pleural effusion    PLAN:    Clinically stable  Findings c/w metastatic breast ca  S/p breast bx and axillary LN bx  Await final path with ER/MN/HER 2 status  Ideally we should also confirm diagnosis from a metastatic site, if feasible I would consider thoracentesis  We will discuss therapeutic approach once the diagnosis is confirmed  Please check CEA, [id number]    Thank you    Chrystal Funez MD  [phone]

## 2020-02-05 NOTE — PROGRESS NOTE ADULT - SUBJECTIVE AND OBJECTIVE BOX
INTERVAL HPI/OVERNIGHT EVENTS:  Patient is frightened about going home. Wants to see a psychiatrist; Pathology will take a number of days to return;       MEDICATIONS  (STANDING):  cholecalciferol 1000 Unit(s) Oral daily  dextrose 5%. 1000 milliLiter(s) (50 mL/Hr) IV Continuous <Continuous>  dextrose 50% Injectable 12.5 Gram(s) IV Push once  dextrose 50% Injectable 25 Gram(s) IV Push once  dextrose 50% Injectable 25 Gram(s) IV Push once  enoxaparin Injectable 40 milliGRAM(s) SubCutaneous every 24 hours  insulin lispro (HumaLOG) corrective regimen sliding scale   SubCutaneous Before meals and at bedtime  levothyroxine 88 MICROGram(s) Oral daily    MEDICATIONS  (PRN):  acetaminophen   Tablet .. 650 milliGRAM(s) Oral every 6 hours PRN Mild Pain (1 - 3)  dextrose 40% Gel 15 Gram(s) Oral once PRN Blood Glucose LESS THAN 70 milliGRAM(s)/deciliter  glucagon  Injectable 1 milliGRAM(s) IntraMuscular once PRN Glucose LESS THAN 70 milligrams/deciliter      Allergies    No Known Allergies    Intolerances        Vital Signs Last 24 Hrs  T(C): 36.9 (05 Feb 2020 05:41), Max: 36.9 (04 Feb 2020 17:29)  T(F): 98.4 (05 Feb 2020 05:41), Max: 98.5 (04 Feb 2020 17:29)  HR: 86 (05 Feb 2020 05:41) (86 - 91)  BP: 129/74 (05 Feb 2020 05:41) (129/74 - 134/77)  BP(mean): --  RR: 18 (05 Feb 2020 05:41) (16 - 18)  SpO2: 94% (05 Feb 2020 05:41) (94% - 95%)          Constitutional: Awake     Eyes: BRIDGER    ENMT: Negative    Neck: Supple    Back:  no tenderness     Respiratory:  clear    Cardiovascular: S1 S2    Gastrointestinal: soft    Genitourinary:    Extremities: no edema    Vascular:    Neurological:    Skin:    Lymph Nodes:            LABS:                        11.7   8.63  )-----------( 334      ( 05 Feb 2020 07:18 )             36.8     02-05    132<L>  |  99  |  15  ----------------------------<  170<H>  4.6   |  24  |  0.80    Ca    8.6      05 Feb 2020 07:18  Mg     1.8     02-05      PT/INR - ( 04 Feb 2020 10:20 )   PT: 13.2 sec;   INR: 1.15          PTT - ( 04 Feb 2020 10:20 )  PTT:29.1 sec      RADIOLOGY & ADDITIONAL TESTS:

## 2020-02-06 DIAGNOSIS — R11.0 NAUSEA: ICD-10-CM

## 2020-02-06 LAB
ANION GAP SERPL CALC-SCNC: 10 MMOL/L — SIGNIFICANT CHANGE UP (ref 5–17)
ANION GAP SERPL CALC-SCNC: 9 MMOL/L — SIGNIFICANT CHANGE UP (ref 5–17)
BUN SERPL-MCNC: 11 MG/DL — SIGNIFICANT CHANGE UP (ref 7–23)
BUN SERPL-MCNC: 14 MG/DL — SIGNIFICANT CHANGE UP (ref 7–23)
CALCIUM SERPL-MCNC: 8.1 MG/DL — LOW (ref 8.4–10.5)
CALCIUM SERPL-MCNC: 8.5 MG/DL — SIGNIFICANT CHANGE UP (ref 8.4–10.5)
CEA SERPL-MCNC: 7.1 NG/ML — HIGH (ref 0–3.8)
CHLORIDE SERPL-SCNC: 94 MMOL/L — LOW (ref 96–108)
CHLORIDE SERPL-SCNC: 96 MMOL/L — SIGNIFICANT CHANGE UP (ref 96–108)
CO2 SERPL-SCNC: 22 MMOL/L — SIGNIFICANT CHANGE UP (ref 22–31)
CO2 SERPL-SCNC: 23 MMOL/L — SIGNIFICANT CHANGE UP (ref 22–31)
CREAT SERPL-MCNC: 0.55 MG/DL — SIGNIFICANT CHANGE UP (ref 0.5–1.3)
CREAT SERPL-MCNC: 0.66 MG/DL — SIGNIFICANT CHANGE UP (ref 0.5–1.3)
GLUCOSE BLDC GLUCOMTR-MCNC: 124 MG/DL — HIGH (ref 70–99)
GLUCOSE BLDC GLUCOMTR-MCNC: 187 MG/DL — HIGH (ref 70–99)
GLUCOSE BLDC GLUCOMTR-MCNC: 195 MG/DL — HIGH (ref 70–99)
GLUCOSE BLDC GLUCOMTR-MCNC: 240 MG/DL — HIGH (ref 70–99)
GLUCOSE SERPL-MCNC: 187 MG/DL — HIGH (ref 70–99)
GLUCOSE SERPL-MCNC: 196 MG/DL — HIGH (ref 70–99)
HCT VFR BLD CALC: 37 % — SIGNIFICANT CHANGE UP (ref 34.5–45)
HGB BLD-MCNC: 12.3 G/DL — SIGNIFICANT CHANGE UP (ref 11.5–15.5)
MAGNESIUM SERPL-MCNC: 1.9 MG/DL — SIGNIFICANT CHANGE UP (ref 1.6–2.6)
MCHC RBC-ENTMCNC: 30.2 PG — SIGNIFICANT CHANGE UP (ref 27–34)
MCHC RBC-ENTMCNC: 33.2 GM/DL — SIGNIFICANT CHANGE UP (ref 32–36)
MCV RBC AUTO: 90.9 FL — SIGNIFICANT CHANGE UP (ref 80–100)
NRBC # BLD: 0 /100 WBCS — SIGNIFICANT CHANGE UP (ref 0–0)
OSMOLALITY SERPL: 270 MOSMOL/KG — LOW (ref 280–301)
OSMOLALITY UR: 700 MOSMOL/KG — HIGH (ref 100–650)
PHOSPHATE SERPL-MCNC: 2.9 MG/DL — SIGNIFICANT CHANGE UP (ref 2.5–4.5)
PLATELET # BLD AUTO: 357 K/UL — SIGNIFICANT CHANGE UP (ref 150–400)
POTASSIUM SERPL-MCNC: 4.4 MMOL/L — SIGNIFICANT CHANGE UP (ref 3.5–5.3)
POTASSIUM SERPL-MCNC: 5 MMOL/L — SIGNIFICANT CHANGE UP (ref 3.5–5.3)
POTASSIUM SERPL-SCNC: 4.4 MMOL/L — SIGNIFICANT CHANGE UP (ref 3.5–5.3)
POTASSIUM SERPL-SCNC: 5 MMOL/L — SIGNIFICANT CHANGE UP (ref 3.5–5.3)
RBC # BLD: 4.07 M/UL — SIGNIFICANT CHANGE UP (ref 3.8–5.2)
RBC # FLD: 13.2 % — SIGNIFICANT CHANGE UP (ref 10.3–14.5)
SODIUM SERPL-SCNC: 126 MMOL/L — LOW (ref 135–145)
SODIUM SERPL-SCNC: 128 MMOL/L — LOW (ref 135–145)
SODIUM UR-SCNC: 134 MMOL/L — SIGNIFICANT CHANGE UP
SURGICAL PATHOLOGY STUDY: SIGNIFICANT CHANGE UP
URATE SERPL-MCNC: 2.5 MG/DL — SIGNIFICANT CHANGE UP (ref 2.5–7)
WBC # BLD: 8.39 K/UL — SIGNIFICANT CHANGE UP (ref 3.8–10.5)
WBC # FLD AUTO: 8.39 K/UL — SIGNIFICANT CHANGE UP (ref 3.8–10.5)

## 2020-02-06 PROCEDURE — 99233 SBSQ HOSP IP/OBS HIGH 50: CPT

## 2020-02-06 PROCEDURE — 99232 SBSQ HOSP IP/OBS MODERATE 35: CPT | Mod: GC

## 2020-02-06 RX ORDER — MAGNESIUM SULFATE 500 MG/ML
1 VIAL (ML) INJECTION ONCE
Refills: 0 | Status: COMPLETED | OUTPATIENT
Start: 2020-02-06 | End: 2020-02-06

## 2020-02-06 RX ORDER — MIRTAZAPINE 45 MG/1
7.5 TABLET, ORALLY DISINTEGRATING ORAL AT BEDTIME
Refills: 0 | Status: DISCONTINUED | OUTPATIENT
Start: 2020-02-06 | End: 2020-02-07

## 2020-02-06 RX ORDER — ONDANSETRON 8 MG/1
4 TABLET, FILM COATED ORAL ONCE
Refills: 0 | Status: COMPLETED | OUTPATIENT
Start: 2020-02-06 | End: 2020-02-06

## 2020-02-06 RX ORDER — MIRTAZAPINE 45 MG/1
7.5 TABLET, ORALLY DISINTEGRATING ORAL AT BEDTIME
Refills: 0 | Status: DISCONTINUED | OUTPATIENT
Start: 2020-02-06 | End: 2020-02-06

## 2020-02-06 RX ADMIN — ENOXAPARIN SODIUM 40 MILLIGRAM(S): 100 INJECTION SUBCUTANEOUS at 18:00

## 2020-02-06 RX ADMIN — Medication 1000 UNIT(S): at 12:13

## 2020-02-06 RX ADMIN — Medication 88 MICROGRAM(S): at 06:49

## 2020-02-06 RX ADMIN — ONDANSETRON 4 MILLIGRAM(S): 8 TABLET, FILM COATED ORAL at 07:56

## 2020-02-06 RX ADMIN — MIRTAZAPINE 7.5 MILLIGRAM(S): 45 TABLET, ORALLY DISINTEGRATING ORAL at 22:57

## 2020-02-06 RX ADMIN — Medication 2: at 12:13

## 2020-02-06 RX ADMIN — ONDANSETRON 4 MILLIGRAM(S): 8 TABLET, FILM COATED ORAL at 22:57

## 2020-02-06 RX ADMIN — Medication 4: at 18:00

## 2020-02-06 RX ADMIN — Medication 2: at 08:57

## 2020-02-06 RX ADMIN — Medication 100 GRAM(S): at 07:46

## 2020-02-06 NOTE — PROGRESS NOTE BEHAVIORAL HEALTH - NSBHCONSULTRECOMMENDOTHER_PSY_A_CORE FT
1) CL will follow and psychology intern available for individual therapy.     2)Would swith effexor to remeron 7.5 mg qhs for depression    3)She can calll [phone] to make an appointment with Ada Baez for Dr. Beyer, [address] [place] [address], for psychiatric outpatient follow-up.

## 2020-02-06 NOTE — PROGRESS NOTE ADULT - SUBJECTIVE AND OBJECTIVE BOX
[facility]    KAI FRANCES  MRN-[id number]    Chief complaint: weakness    INTERVAL HX: the patient feels OK this am. She experienced nausea last night shortly after starting antidepressant.  No pain. S/p biopsy of the breast lesion and axillary LN-path pending    HPI: 86 year old woman admitted with progressive generalized malaise and weakness, she was found to have left breast mass and left axillary lymphadenopathy, suspicious for breast neoplasm, extensive osteoblastic bony metastasis, and L pleural effusion     PAST MEDICAL & SURGICAL HISTORY:  Hyperthyroidism  HTN (hypertension)  Type 2 diabetes mellitus: DM (diabetes mellitus)  Status post cataract extraction: S/P cataract extraction  Other acquired absence of organ: S/P cholecystectomy  Other acquired absence of organ: S/P splenectomy  Status post total hysterectomy: S/P hysterectomy      MEDICATIONS  (STANDING):  cholecalciferol 1000 Unit(s) Oral daily  dextrose 5%. 1000 milliLiter(s) IV Continuous <Continuous>  dextrose 50% Injectable 12.5 Gram(s) IV Push once  dextrose 50% Injectable 25 Gram(s) IV Push once  dextrose 50% Injectable 25 Gram(s) IV Push once  enoxaparin Injectable 40 milliGRAM(s) SubCutaneous every 24 hours  insulin lispro (HumaLOG) corrective regimen sliding scale   SubCutaneous Before meals and at bedtime  levothyroxine 88 MICROGram(s) Oral daily      Allergies    No Known Allergies    Intolerances    FAMILY HISTORY:    ROS:  + weakness and fatigue  No back pain or other skeletal complaints  No SOB or CP  Mild cough  No nausea or vomiting  No leg pain or leg swelling.    ROS is otherwise negative.    Physical exam:  Vital Signs Last 24 Hrs  T(C): 36.8 (06 Feb 2020 05:36), Max: 36.8 (06 Feb 2020 05:36)  T(F): 98.3 (06 Feb 2020 05:36), Max: 98.3 (06 Feb 2020 05:36)  HR: 87 (06 Feb 2020 05:36) (83 - 90)  BP: 143/78 (06 Feb 2020 05:36) (129/71 - 143/78)  BP(mean): --  RR: 16 (06 Feb 2020 05:36) (16 - 20)  SpO2: 95% (06 Feb 2020 05:36) (94% - 96%)  HEENT: PERRLA, pink mucosae  + matted/firm  L axillary lymphadenopathy  Cardiovascular: Regular rhythm/rate, no murmurs, rubs, gallops  Respiratory:  decreased BS on L  Abdomen: soft, non tender, non distended, no palpable masses, no palpable hepatosplenomegaly  Extremities:  no peripheral edema  Neuro: alert, awake and oriented x 3, no focal abnormalities  Skin: warm, no obvious lesions on visible skin    LABS:                        12.3   8.39  )-----------( 357      ( 06 Feb 2020 05:40 )             37.0         CBC Full  -  ( 06 Feb 2020 05:40 )  WBC Count : 8.39 K/uL  RBC Count : 4.07 M/uL  Hemoglobin : 12.3 g/dL  Hematocrit : 37.0 %  Platelet Count - Automated : 357 K/uL  Mean Cell Volume : 90.9 fl  Mean Cell Hemoglobin : 30.2 pg  Mean Cell Hemoglobin Concentration : 33.2 gm/dL  Auto Neutrophil # : x  Auto Lymphocyte # : x  Auto Monocyte # : x  Auto Eosinophil # : x  Auto Basophil # : x  Auto Neutrophil % : x  Auto Lymphocyte % : x  Auto Monocyte % : x  Auto Eosinophil % : x  Auto Basophil % : x        02-06    128<L>  |  96  |  14  ----------------------------<  187<H>  5.0   |  22  |  0.66    Ca    8.5      06 Feb 2020 05:40  Phos  2.9     02-06  Mg     1.9     02-06          PT/INR - ( 04 Feb 2020 10:20 )   PT: 13.2 sec;   INR: 1.15          PTT - ( 04 Feb 2020 10:20 )  PTT:29.1 sec  PERTINENT IMAGING STUDIES: reviewed    ASSESSMENT:     L breast mass/axillary LAD, skeletal lesions/pleural effusion    PLAN:    Clinically stable  Findings c/w metastatic breast ca  S/p breast bx and axillary LN bx  Pathology is pending  Therapeutic decision will be made after we see final path with ER/NC/HER 2 status  thoracentesis not feasible  Please check CEA, [id number]    Thank you    Chrystal Funez MD  [phone]

## 2020-02-06 NOTE — PROGRESS NOTE ADULT - SUBJECTIVE AND OBJECTIVE BOX
KAI FRANCES             [id number]    SUBJECTIVE:    Patient complaining of Nausea since the start of her Effexor.     ROS:  N/V (Y/N): Y                            Secretions (Y/N) : N               Agitation(Y/N): N  Pain (Y/N): N      -Provocation/Palliation: n/a  -Quality/Quantity: n/a   -Radiating: n/a  -Severity: n/a  -Timing/Frequency: n/a   -Impact on ADLs: n/a    OTHER REVIEW OF SYSTEMS: Otherwise Negative unless noted Above.    PEx:  T(C): 36.8 (02-06-20 @ 05:36), Max: 36.8 (02-06-20 @ 05:36)  HR: 87 (02-06-20 @ 05:36) (84 - 90)  BP: 143/78 (02-06-20 @ 05:36) (129/71 - 143/78)  RR: 16 (02-06-20 @ 05:36) (16 - 20)  SpO2: 95% (02-06-20 @ 05:36) (94% - 96%)  Wt(kg): --    General: NAD, appears uncomfortable secondary ot Nausea.  HEENT: PERRL, EOMI, MMM  Neck: supple, no JVD  CVS: RRR, +S1/S2  Resp: CTAB  GI: soft, NT, ND, +BS  : no lyons  Musc: no clubbing, cyanosis, edema  Neuro: AAOx3, moving all extremities spontaneously, nonfocal  Psych: Appropriate   Skin: No overt rash  Lymph: No cervical LAD      ALLERGIES: No Known Allergies      OPIATE NAÏVE (Y/N): Y    MEDICATIONS: REVIEWED  MEDICATIONS  (STANDING):  cholecalciferol 1000 Unit(s) Oral daily  dextrose 5%. 1000 milliLiter(s) (50 mL/Hr) IV Continuous <Continuous>  dextrose 50% Injectable 12.5 Gram(s) IV Push once  dextrose 50% Injectable 25 Gram(s) IV Push once  dextrose 50% Injectable 25 Gram(s) IV Push once  enoxaparin Injectable 40 milliGRAM(s) SubCutaneous every 24 hours  insulin lispro (HumaLOG) corrective regimen sliding scale   SubCutaneous Before meals and at bedtime  levothyroxine 88 MICROGram(s) Oral daily  mirtazapine 7.5 milliGRAM(s) Oral at bedtime    MEDICATIONS  (PRN):  acetaminophen   Tablet .. 650 milliGRAM(s) Oral every 6 hours PRN Mild Pain (1 - 3)  dextrose 40% Gel 15 Gram(s) Oral once PRN Blood Glucose LESS THAN 70 milliGRAM(s)/deciliter  glucagon  Injectable 1 milliGRAM(s) IntraMuscular once PRN Glucose LESS THAN 70 milligrams/deciliter      LABS: REVIEWED  CBC:                        12.3   8.39  )-----------( 357      ( 06 Feb 2020 05:40 )             37.0     CMP:    02-06    126<L>  |  94<L>  |  11  ----------------------------<  196<H>  4.4   |  23  |  0.55    Ca    8.1<L>      06 Feb 2020 10:58  Phos  2.9     02-06  Mg     1.9     02-06        IMAGING: REVIEWED    ADVANCED DIRECTIVES:     DNR/DNI     HCP    Decision maker: The patient is able to participate in complex medical decision making  Legal surrogate: Gerson Sung (HCP/Cousin) [phone]    GOALS OF CARE DISCUSSION:       Palliative care info/counseling provided	           Advanced Directives addressed please see Advance Care Planning Note	           Documentation of DHARMESH: GLENN	          REFERRALS:	        Unit SW/Case Mgmt              Patient/Family Support       Music Therapy       PT/OT

## 2020-02-06 NOTE — PROGRESS NOTE ADULT - PROBLEM SELECTOR PLAN 2
Potentially due to SNRI, a common side effect due to serotonergic activity. Zofran would be the most effective antiemetic if this is the etiology.  -recommend Zofran 8mg q8h ATC until nausea resolves

## 2020-02-06 NOTE — PROGRESS NOTE BEHAVIORAL HEALTH - MUSCLE TONE / STRENGTH
Number of Occurrences:   1     Standing Expiration Date:   11/11/2019     Order Specific Question:   Reason for exam:     Answer:   wheezing, grunting        Patient given educational materials - see patient instructions. Discussed use, benefit, and side effects of prescribed medications. All patient questions answered. Pt voiced understanding. Patient agreedwith treatment plan. Follow up as needed    Patient Instructions   Increase fluid intake such as water and watered down juice. Take full course of antibiotic  Nebulizer treatments as needed  Rotate tylenol and motrin for pain.         Electronically signed by GLENN Monroy on 11/11/2018 at 3:20 PM Other

## 2020-02-06 NOTE — PROGRESS NOTE BEHAVIORAL HEALTH - NSBHCONSULTFOLLOWAFTERCARE_PSY_A_CORE FT
1) CL will follow and psychology intern available for individual therapy.     2)Would swith effexor to remeron 7.5 mg qhs for depression    3)She can calll [phone] to make an appointment with Ada Baez for Dr. Beyer, [address] [place] [address], for psychiatric outpatient follow-up.

## 2020-02-06 NOTE — PROGRESS NOTE BEHAVIORAL HEALTH - OTHER
not assessed in bed comes off as constricted at first, but reactive when we got to topics she likes to talk about (e.g. Catholicism)

## 2020-02-06 NOTE — PROGRESS NOTE BEHAVIORAL HEALTH - NSBHADMITCOUNSELOTHER_PSY_A_CORE FT
spoke about side effects, therapy, other antidepressants, and seeing Dr. Beyer as outpatient. Discussed her worries about leaving the hospital.

## 2020-02-06 NOTE — PROGRESS NOTE BEHAVIORAL HEALTH - NSBHADMITCOUNSEL_PSY_A_CORE
diagnostic results/impressions and/or recommended studies/importance of adherence to chosen treatment/risks and benefits of treatment options/instructions for management, treatment and follow up/risk factor reduction/other.../client/family/caregiver education/prognosis

## 2020-02-06 NOTE — PROGRESS NOTE ADULT - SUBJECTIVE AND OBJECTIVE BOX
INTERVAL HPI/OVERNIGHT EVENTS:  Feeling miserable and has nausea; Not able to eat breakfast. She feels it is from the Effexor that was given to her yesterday; Pathology still pending      MEDICATIONS  (STANDING):  cholecalciferol 1000 Unit(s) Oral daily  dextrose 5%. 1000 milliLiter(s) (50 mL/Hr) IV Continuous <Continuous>  dextrose 50% Injectable 12.5 Gram(s) IV Push once  dextrose 50% Injectable 25 Gram(s) IV Push once  dextrose 50% Injectable 25 Gram(s) IV Push once  enoxaparin Injectable 40 milliGRAM(s) SubCutaneous every 24 hours  insulin lispro (HumaLOG) corrective regimen sliding scale   SubCutaneous Before meals and at bedtime  levothyroxine 88 MICROGram(s) Oral daily    MEDICATIONS  (PRN):  acetaminophen   Tablet .. 650 milliGRAM(s) Oral every 6 hours PRN Mild Pain (1 - 3)  dextrose 40% Gel 15 Gram(s) Oral once PRN Blood Glucose LESS THAN 70 milliGRAM(s)/deciliter  glucagon  Injectable 1 milliGRAM(s) IntraMuscular once PRN Glucose LESS THAN 70 milligrams/deciliter      Allergies    No Known Allergies    Intolerances        Vital Signs Last 24 Hrs  T(C): 36.8 (06 Feb 2020 05:36), Max: 36.8 (06 Feb 2020 05:36)  T(F): 98.3 (06 Feb 2020 05:36), Max: 98.3 (06 Feb 2020 05:36)  HR: 87 (06 Feb 2020 05:36) (83 - 90)  BP: 143/78 (06 Feb 2020 05:36) (129/71 - 143/78)  BP(mean): --  RR: 16 (06 Feb 2020 05:36) (16 - 20)  SpO2: 95% (06 Feb 2020 05:36) (94% - 96%)          Constitutional:  Awake  and alert    Eyes: BRIDGER    ENMT: Negative    Neck: Supple    Back:  no tenderness     Respiratory:  clear    Cardiovascular: S1 S2    Gastrointestinal: soft    Genitourinary:    Extremities:  no edema    Vascular:    Neurological:    Skin:    Lymph Nodes:            LABS:                        12.3   8.39  )-----------( 357      ( 06 Feb 2020 05:40 )             37.0     02-06    128<L>  |  96  |  14  ----------------------------<  187<H>  5.0   |  22  |  0.66    Ca    8.5      06 Feb 2020 05:40  Phos  2.9     02-06  Mg     1.9     02-06      PT/INR - ( 04 Feb 2020 10:20 )   PT: 13.2 sec;   INR: 1.15          PTT - ( 04 Feb 2020 10:20 )  PTT:29.1 sec      RADIOLOGY & ADDITIONAL TESTS: INTERVAL HPI/OVERNIGHT EVENTS:  Feeling miserable and has nausea; Not able to eat breakfast. She feels it is from the Effexor that was given to her yesterday; Pathology still pending      VITAL SIGNS:  Vital Signs Last 24 Hrs  T(C): 36.8 (06 Feb 2020 05:36), Max: 36.8 (06 Feb 2020 05:36)  T(F): 98.3 (06 Feb 2020 05:36), Max: 98.3 (06 Feb 2020 05:36)  HR: 87 (06 Feb 2020 05:36) (83 - 90)  BP: 143/78 (06 Feb 2020 05:36) (129/71 - 143/78)  RR: 16 (06 Feb 2020 05:36) (16 - 20)  SpO2: 95% (06 Feb 2020 05:36) (94% - 96%)    PHYSICAL EXAM:  General: Elderly female resting comfortably in bed, NAD  HEENT: PERRL, anicteric sclera; MMM  Neck: supple  Cardiovascular: +S1/S2, RRR  Respiratory: CTA B/L, no W/R/R  Gastrointestinal: soft, NT/ND, +BS  Extremities: WWP, no edema or cyanosis  Vascular: 2+ radial, DP/PT pulses B/L  Neurological: AAOx3, no focal deficits  Psychiatric: Flat affect    MEDICATIONS:  MEDICATIONS  (STANDING):  cholecalciferol 1000 Unit(s) Oral daily  dextrose 5%. 1000 milliLiter(s) (50 mL/Hr) IV Continuous <Continuous>  dextrose 50% Injectable 12.5 Gram(s) IV Push once  dextrose 50% Injectable 25 Gram(s) IV Push once  dextrose 50% Injectable 25 Gram(s) IV Push once  enoxaparin Injectable 40 milliGRAM(s) SubCutaneous every 24 hours  insulin lispro (HumaLOG) corrective regimen sliding scale   SubCutaneous Before meals and at bedtime  levothyroxine 88 MICROGram(s) Oral daily  mirtazapine 7.5 milliGRAM(s) Oral at bedtime    MEDICATIONS  (PRN):  acetaminophen   Tablet .. 650 milliGRAM(s) Oral every 6 hours PRN Mild Pain (1 - 3)  dextrose 40% Gel 15 Gram(s) Oral once PRN Blood Glucose LESS THAN 70 milliGRAM(s)/deciliter  glucagon  Injectable 1 milliGRAM(s) IntraMuscular once PRN Glucose LESS THAN 70 milligrams/deciliter      ALLERGIES:  Allergies    No Known Allergies    Intolerances        LABS:                        12.3   8.39  )-----------( 357      ( 06 Feb 2020 05:40 )             37.0     02-06    126<L>  |  94<L>  |  11  ----------------------------<  196<H>  4.4   |  23  |  0.55    Ca    8.1<L>      06 Feb 2020 10:58  Phos  2.9     02-06  Mg     1.9     02-06    PT/INR - ( 04 Feb 2020 10:20 )   PT: 13.2 sec;   INR: 1.15       PTT - ( 04 Feb 2020 10:20 )  PTT:29.1 sec      CAPILLARY BLOOD GLUCOSE  POCT Blood Glucose.: 195 mg/dL (06 Feb 2020 11:55)      RADIOLOGY & ADDITIONAL TESTS: Reviewed.

## 2020-02-06 NOTE — PROGRESS NOTE ADULT - PROBLEM SELECTOR PLAN 7
Pt expresses anhedonia, decreased appetite. Is interested in seeing a psychiatrist.  - Psych consulted, recommended Effexor 37.5 mg  - started Effexor 37.5mg on 02/05 Pt expresses anhedonia, decreased appetite. Is interested in seeing a psychiatrist.  - Psych consulted, recommended Effexor 37.5 mg  - started Effexor 37.5mg on 02/05, stopped due to nausea on 02/06  - started remeron 7.5mg qhs on 02/06

## 2020-02-06 NOTE — PROGRESS NOTE ADULT - PROBLEM SELECTOR PLAN 2
Alk phos 321, AST/ALT normal.  GGT wnl, unlikely to be liver origin.  Ddx includes malignancy with bone infiltration.  Has been up-trending since 9/19 from 136 > 287 >323 >367 >321  - Management as above    #Hyponatremia Alk phos 321, AST/ALT normal.  GGT wnl, unlikely to be liver origin.  Ddx includes malignancy with bone infiltration.  Has been up-trending since 9/19 from 136 > 287 >323 >367 >321  - Management as above

## 2020-02-06 NOTE — PROGRESS NOTE ADULT - PROBLEM SELECTOR PLAN 5
L sided pleural effusion on CXR. Likely from breast malignancy   - Continue to monitor  - pulm consulted, effusion too small to safely do a thoracentesis

## 2020-02-06 NOTE — PROGRESS NOTE ADULT - PROBLEM SELECTOR PLAN 3
Chronic as an outpatient, mild hypoNa likely due to poor po intake  - Na 126 today  - f/u serum osmolality and urine sodium, osmolality  - Continue to monitor closely.

## 2020-02-06 NOTE — PROGRESS NOTE BEHAVIORAL HEALTH - SUMMARY
86 year old  F with hx of DM2, Grave's disease, HLD, HTN, lymphedema, endometrial carcinoma s/p hysterectomy and bilateral oophorectomy, and benign pancreatic tail adenoma who presents for roughly 1 month of generalized malaise and weakness, found to have left breast mass and left axillary lymphadenopathy, suspicious for breast neoplasm, extensive osteoblastic bony metastasis, and L pleural effusion, likely breast cancer with mets to bone admitted for further workup. She asked to see psychiatry and says she has been depressed for about a year. She has had decreased energy, concentration, and interest. She feels the depression may have been the first sign something was off with her body again. Pt has never had any psychiatric treatment in the past, nor has she ever been depressed in the past. She lives alone and while she does have some friends in the city, she has little family. She worries about what the quality of her remaining years will be. With me she did not seem to fully entertain the possibility of dying, but I wonder is that is not entering her thinking on some level. She is open to both therapy and meds.    Plan:    1) CL will follow and psychology intern available for individual therapy.     2)Would swith effexor to remeron 7.5 mg qhs for depression    3)She can calll [phone] to make an appointment with Ada Baez for Dr. Beyer, [facility] [address] [address], for psychiatric outpatient follow-up.

## 2020-02-07 ENCOUNTER — TRANSCRIPTION ENCOUNTER (OUTPATIENT)
Age: 85
End: 2020-02-07

## 2020-02-07 VITALS
TEMPERATURE: 98 F | HEART RATE: 90 BPM | RESPIRATION RATE: 18 BRPM | OXYGEN SATURATION: 96 % | SYSTOLIC BLOOD PRESSURE: 144 MMHG | DIASTOLIC BLOOD PRESSURE: 74 MMHG

## 2020-02-07 LAB
ANION GAP SERPL CALC-SCNC: 11 MMOL/L — SIGNIFICANT CHANGE UP (ref 5–17)
ANION GAP SERPL CALC-SCNC: 9 MMOL/L — SIGNIFICANT CHANGE UP (ref 5–17)
BUN SERPL-MCNC: 10 MG/DL — SIGNIFICANT CHANGE UP (ref 7–23)
BUN SERPL-MCNC: 10 MG/DL — SIGNIFICANT CHANGE UP (ref 7–23)
CALCIUM SERPL-MCNC: 8.5 MG/DL — SIGNIFICANT CHANGE UP (ref 8.4–10.5)
CALCIUM SERPL-MCNC: 8.6 MG/DL — SIGNIFICANT CHANGE UP (ref 8.4–10.5)
CANCER AG27-29 SERPL-ACNC: 310.4 U/ML — HIGH (ref 0–38.6)
CHLORIDE SERPL-SCNC: 92 MMOL/L — LOW (ref 96–108)
CHLORIDE SERPL-SCNC: 94 MMOL/L — LOW (ref 96–108)
CO2 SERPL-SCNC: 25 MMOL/L — SIGNIFICANT CHANGE UP (ref 22–31)
CO2 SERPL-SCNC: 26 MMOL/L — SIGNIFICANT CHANGE UP (ref 22–31)
CREAT SERPL-MCNC: 0.61 MG/DL — SIGNIFICANT CHANGE UP (ref 0.5–1.3)
CREAT SERPL-MCNC: 0.67 MG/DL — SIGNIFICANT CHANGE UP (ref 0.5–1.3)
GLUCOSE BLDC GLUCOMTR-MCNC: 143 MG/DL — HIGH (ref 70–99)
GLUCOSE BLDC GLUCOMTR-MCNC: 256 MG/DL — HIGH (ref 70–99)
GLUCOSE SERPL-MCNC: 145 MG/DL — HIGH (ref 70–99)
GLUCOSE SERPL-MCNC: 216 MG/DL — HIGH (ref 70–99)
HCT VFR BLD CALC: 37.6 % — SIGNIFICANT CHANGE UP (ref 34.5–45)
HGB BLD-MCNC: 12.4 G/DL — SIGNIFICANT CHANGE UP (ref 11.5–15.5)
MAGNESIUM SERPL-MCNC: 1.9 MG/DL — SIGNIFICANT CHANGE UP (ref 1.6–2.6)
MCHC RBC-ENTMCNC: 30.2 PG — SIGNIFICANT CHANGE UP (ref 27–34)
MCHC RBC-ENTMCNC: 33 GM/DL — SIGNIFICANT CHANGE UP (ref 32–36)
MCV RBC AUTO: 91.7 FL — SIGNIFICANT CHANGE UP (ref 80–100)
NRBC # BLD: 0 /100 WBCS — SIGNIFICANT CHANGE UP (ref 0–0)
PHOSPHATE SERPL-MCNC: 3.1 MG/DL — SIGNIFICANT CHANGE UP (ref 2.5–4.5)
PLATELET # BLD AUTO: 380 K/UL — SIGNIFICANT CHANGE UP (ref 150–400)
POTASSIUM SERPL-MCNC: 4.6 MMOL/L — SIGNIFICANT CHANGE UP (ref 3.5–5.3)
POTASSIUM SERPL-MCNC: 5.8 MMOL/L — HIGH (ref 3.5–5.3)
POTASSIUM SERPL-SCNC: 4.6 MMOL/L — SIGNIFICANT CHANGE UP (ref 3.5–5.3)
POTASSIUM SERPL-SCNC: 5.8 MMOL/L — HIGH (ref 3.5–5.3)
RBC # BLD: 4.1 M/UL — SIGNIFICANT CHANGE UP (ref 3.8–5.2)
RBC # FLD: 13.2 % — SIGNIFICANT CHANGE UP (ref 10.3–14.5)
SODIUM SERPL-SCNC: 128 MMOL/L — LOW (ref 135–145)
SODIUM SERPL-SCNC: 129 MMOL/L — LOW (ref 135–145)
WBC # BLD: 6.8 K/UL — SIGNIFICANT CHANGE UP (ref 3.8–10.5)
WBC # FLD AUTO: 6.8 K/UL — SIGNIFICANT CHANGE UP (ref 3.8–10.5)

## 2020-02-07 PROCEDURE — 80048 BASIC METABOLIC PNL TOTAL CA: CPT

## 2020-02-07 PROCEDURE — 76641 ULTRASOUND BREAST COMPLETE: CPT

## 2020-02-07 PROCEDURE — 88360 TUMOR IMMUNOHISTOCHEM/MANUAL: CPT

## 2020-02-07 PROCEDURE — G0279: CPT

## 2020-02-07 PROCEDURE — 71045 X-RAY EXAM CHEST 1 VIEW: CPT

## 2020-02-07 PROCEDURE — 97161 PT EVAL LOW COMPLEX 20 MIN: CPT

## 2020-02-07 PROCEDURE — 99358 PROLONG SERVICE W/O CONTACT: CPT

## 2020-02-07 PROCEDURE — 74177 CT ABD & PELVIS W/CONTRAST: CPT

## 2020-02-07 PROCEDURE — 77065 DX MAMMO INCL CAD UNI: CPT

## 2020-02-07 PROCEDURE — 19084 BX BREAST ADD LESION US IMAG: CPT

## 2020-02-07 PROCEDURE — 82310 ASSAY OF CALCIUM: CPT

## 2020-02-07 PROCEDURE — 84550 ASSAY OF BLOOD/URIC ACID: CPT

## 2020-02-07 PROCEDURE — 86850 RBC ANTIBODY SCREEN: CPT

## 2020-02-07 PROCEDURE — 86300 IMMUNOASSAY TUMOR CA 15-3: CPT

## 2020-02-07 PROCEDURE — 99285 EMERGENCY DEPT VISIT HI MDM: CPT | Mod: 25

## 2020-02-07 PROCEDURE — 85730 THROMBOPLASTIN TIME PARTIAL: CPT

## 2020-02-07 PROCEDURE — 85610 PROTHROMBIN TIME: CPT

## 2020-02-07 PROCEDURE — 99232 SBSQ HOSP IP/OBS MODERATE 35: CPT | Mod: GC

## 2020-02-07 PROCEDURE — 83735 ASSAY OF MAGNESIUM: CPT

## 2020-02-07 PROCEDURE — 83615 LACTATE (LD) (LDH) ENZYME: CPT

## 2020-02-07 PROCEDURE — 88305 TISSUE EXAM BY PATHOLOGIST: CPT

## 2020-02-07 PROCEDURE — 85025 COMPLETE CBC W/AUTO DIFF WBC: CPT

## 2020-02-07 PROCEDURE — 82378 CARCINOEMBRYONIC ANTIGEN: CPT

## 2020-02-07 PROCEDURE — 88341 IMHCHEM/IMCYTCHM EA ADD ANTB: CPT

## 2020-02-07 PROCEDURE — 82977 ASSAY OF GGT: CPT

## 2020-02-07 PROCEDURE — 84443 ASSAY THYROID STIM HORMONE: CPT

## 2020-02-07 PROCEDURE — 84100 ASSAY OF PHOSPHORUS: CPT

## 2020-02-07 PROCEDURE — 84484 ASSAY OF TROPONIN QUANT: CPT

## 2020-02-07 PROCEDURE — 81003 URINALYSIS AUTO W/O SCOPE: CPT

## 2020-02-07 PROCEDURE — 86901 BLOOD TYPING SEROLOGIC RH(D): CPT

## 2020-02-07 PROCEDURE — 83935 ASSAY OF URINE OSMOLALITY: CPT

## 2020-02-07 PROCEDURE — 84300 ASSAY OF URINE SODIUM: CPT

## 2020-02-07 PROCEDURE — 38505 NEEDLE BIOPSY LYMPH NODES: CPT

## 2020-02-07 PROCEDURE — 83930 ASSAY OF BLOOD OSMOLALITY: CPT

## 2020-02-07 PROCEDURE — 82306 VITAMIN D 25 HYDROXY: CPT

## 2020-02-07 PROCEDURE — 77066 DX MAMMO INCL CAD BI: CPT

## 2020-02-07 PROCEDURE — 19083 BX BREAST 1ST LESION US IMAG: CPT

## 2020-02-07 PROCEDURE — 36415 COLL VENOUS BLD VENIPUNCTURE: CPT

## 2020-02-07 PROCEDURE — 71260 CT THORAX DX C+: CPT

## 2020-02-07 PROCEDURE — 85027 COMPLETE CBC AUTOMATED: CPT

## 2020-02-07 PROCEDURE — 83690 ASSAY OF LIPASE: CPT

## 2020-02-07 PROCEDURE — 87086 URINE CULTURE/COLONY COUNT: CPT

## 2020-02-07 PROCEDURE — 82962 GLUCOSE BLOOD TEST: CPT

## 2020-02-07 PROCEDURE — 76942 ECHO GUIDE FOR BIOPSY: CPT

## 2020-02-07 PROCEDURE — 80053 COMPREHEN METABOLIC PANEL: CPT

## 2020-02-07 PROCEDURE — 93005 ELECTROCARDIOGRAM TRACING: CPT

## 2020-02-07 PROCEDURE — 83880 ASSAY OF NATRIURETIC PEPTIDE: CPT

## 2020-02-07 PROCEDURE — 83970 ASSAY OF PARATHORMONE: CPT

## 2020-02-07 PROCEDURE — A4648: CPT

## 2020-02-07 RX ORDER — MIRTAZAPINE 45 MG/1
1 TABLET, ORALLY DISINTEGRATING ORAL
Qty: 30 | Refills: 0
Start: 2020-02-07 | End: 2020-03-07

## 2020-02-07 RX ORDER — ACETAMINOPHEN 500 MG
2 TABLET ORAL
Qty: 0 | Refills: 0 | DISCHARGE
Start: 2020-02-07

## 2020-02-07 RX ORDER — SODIUM CHLORIDE 9 MG/ML
1 INJECTION INTRAMUSCULAR; INTRAVENOUS; SUBCUTANEOUS
Qty: 28 | Refills: 0
Start: 2020-02-07 | End: 2020-02-20

## 2020-02-07 RX ORDER — SODIUM CHLORIDE 9 MG/ML
1 INJECTION INTRAMUSCULAR; INTRAVENOUS; SUBCUTANEOUS
Refills: 0 | Status: DISCONTINUED | OUTPATIENT
Start: 2020-02-07 | End: 2020-02-07

## 2020-02-07 RX ADMIN — Medication 88 MICROGRAM(S): at 07:50

## 2020-02-07 RX ADMIN — Medication 6: at 12:20

## 2020-02-07 NOTE — DISCHARGE NOTE PROVIDER - HOSPITAL COURSE
Patient is 86F with past medical history of DM2, Grave's disease, HLD, HTN, lymphedema, endometrial carcinoma s/p hysterectomy and bilateral oophorectomy, and benign pancreatic tail adenoma.    Presented with roughly 1 month of generalized malaise and weakness, found to have left breast mass and left axillary lymphadenopathy, suspicious for breast neoplasm, extensive osteoblastic bony metastasis, and L pleural effusion, likely breast cancer with mets to bone admitted for further workup.            Problem List/Main Diagnoses (system-based) & Inpatient treatment course:         Breast mass    - Presented with 1mo hx of malaise. CT C/A/P positive for left breast mass and left axillary lymphadenopathy, suspicious for breast neoplasm, extensive osteoblastic bony metastasis    - Dr. Funez (heme/onc) consulted, f/u rec's    - CEA 7.1, Ca 27-29 310.4    - f/u results of breast biopsy outpatient    - Palliative consulted, f/u rec's        Bone metastases    - Alk phos 321, AST/ALT normal.  GGT wnl, unlikely to be liver origin.  Ddx includes malignancy with bone infiltration.  Has been up-trending since 9/19 from 136 > 287 >323 >367 >321 (on 01/31/20)    - Management as above.        Hyponatremia    - Chronic as an outpatient, mild hypoNa likely due to poor po intake    - Na 129 today    - serum osmolality <270, urine sodium 134, urine osmolality 700    - differential includes SIADH, hypothyroidism which patient has a history of    - 1g sodium tab 2 times a day    - 1.5L fluid restriction    - Continue to monitor closely.         Type 2 diabetes mellitus    - Hx of DM2 with A1c as outpatient ranging from 7.5-8.3    - on Glimepiride 1mg BID as outpatient    - FSG TID and qHS and MISS while inpatient.         Pleural effusion    - L sided pleural effusion on CXR. Likely from breast malignancy     - Continue to monitor    - pulm consulted, effusion too small to safely do a thoracentesis.         HTN (hypertension)    - Hx of HTN not on treatment    - will continue to monitor given dehydration on admission.            Depression    - Pt expresses anhedonia, decreased appetite. Is interested in seeing a psychiatrist.    - Psych consulted, recommended Effexor 37.5 mg    - started Effexor 37.5mg on 02/05, stopped due to nausea on 02/06    - started remeron 7.5mg qhs on 02/06.        Hypothyroidism    - TSH WNL    - c/w home synthroid 88mcg daily.             New medications: 7.5mg mirtazapine qhs and 1g sodium chloride BID    Labs to be followed outpatient: BMP for hyponatremia and Biopsy results

## 2020-02-07 NOTE — DISCHARGE NOTE PROVIDER - NSDCCPCAREPLAN_GEN_ALL_CORE_FT
PRINCIPAL DISCHARGE DIAGNOSIS  Diagnosis: Breast mass  Assessment and Plan of Treatment: You were found to have a left breast mass that was biopsied and pending results. You also had left axillary lymphadenopathy, suspicious for breast neoplasm, extensive osteoblastic bony metastasis, and L pleural effusion. You must followup with Dr. Funez outpatient about the results and best course of treatment for you.      SECONDARY DISCHARGE DIAGNOSES  Diagnosis: Hyponatremia  Assessment and Plan of Treatment: Your soidum level in your blood was low and you were started on salt tablets, please take 1g of the sodium chloride tablet 2 times a day. Please also limit your water and other fluid intake to 1.5L as best you can. Please followup with Dr. Hargrove to assess your sodium level outpatient.    Diagnosis: Fatigue  Assessment and Plan of Treatment: You were admitted for 1 month of generalized malaise and weakness that is likely related to your hyponatremia and breast mass. You felt better today as your sodium level increased to 129. You must continue to take the salt tablets and restrict your fluid intake.    Diagnosis: Depression  Assessment and Plan of Treatment: You were started on mirtazapine (remeron) 7.5mg at bedtime. Please continue this medication outpatient and followup with your PCP.

## 2020-02-07 NOTE — DISCHARGE NOTE PROVIDER - CARE PROVIDER_API CALL
Ania Hargrove)  Critical Care Medicine; Internal Medicine  [address], [address]  [place] [address]  Phone: [phone]  Fax: [phone]  Established Patient  Scheduled Appointment: 02/20/2020 02:00 PM    Chrystal Funez)  Hematology; Medical Oncology  [address], [address]  [place] [address]  Phone: [phone]  Fax: [phone]  Follow Up Time: 1 week

## 2020-02-07 NOTE — PROGRESS NOTE ADULT - SUBJECTIVE AND OBJECTIVE BOX
KAI FRANCES  MRN-[id number]    Chief complaint: weakness    INTERVAL HX: The patient feels better today.  States it is the first day that she feels well as she was having nausea with her antidepressant.  No pain. S/p biopsy of the breast lesion and axillary LN with path showing poorly differentiated carcinoma ER+OK-HER2-.    HPI: 86 year old woman admitted with progressive generalized malaise and weakness, she was found to have left breast mass and left axillary lymphadenopathy, suspicious for breast neoplasm, extensive osteoblastic bony metastasis, and L pleural effusion     PAST MEDICAL & SURGICAL HISTORY:  Hyperthyroidism  HTN (hypertension)  Type 2 diabetes mellitus: DM (diabetes mellitus)  Status post cataract extraction: S/P cataract extraction  Other acquired absence of organ: S/P cholecystectomy  Other acquired absence of organ: S/P splenectomy  Status post total hysterectomy: S/P hysterectomy    MEDICATIONS  (STANDING):  cholecalciferol 1000 Unit(s) Oral daily  dextrose 5%. 1000 milliLiter(s) (50 mL/Hr) IV Continuous <Continuous>  dextrose 50% Injectable 12.5 Gram(s) IV Push once  dextrose 50% Injectable 25 Gram(s) IV Push once  dextrose 50% Injectable 25 Gram(s) IV Push once  enoxaparin Injectable 40 milliGRAM(s) SubCutaneous every 24 hours  insulin lispro (HumaLOG) corrective regimen sliding scale   SubCutaneous Before meals and at bedtime  levothyroxine 88 MICROGram(s) Oral daily  mirtazapine 7.5 milliGRAM(s) Oral at bedtime  sodium chloride 1 Gram(s) Oral two times a day    MEDICATIONS  (PRN):  acetaminophen   Tablet .. 650 milliGRAM(s) Oral every 6 hours PRN Mild Pain (1 - 3)  dextrose 40% Gel 15 Gram(s) Oral once PRN Blood Glucose LESS THAN 70 milliGRAM(s)/deciliter  glucagon  Injectable 1 milliGRAM(s) IntraMuscular once PRN Glucose LESS THAN 70 milligrams/deciliter    Allergies: No Known Allergies    ROS: 11 pt ROS neg except per hpi.    Physical exam:  Vital Signs Last 24 Hrs  T(C): 36.3 (07 Feb 2020 05:36), Max: 36.6 (06 Feb 2020 14:28)  T(F): 97.3 (07 Feb 2020 05:36), Max: 97.9 (06 Feb 2020 14:28)  HR: 82 (07 Feb 2020 05:36) (82 - 88)  BP: 148/82 (07 Feb 2020 05:36) (138/79 - 148/82)  BP(mean): --  RR: 15 (07 Feb 2020 05:36) (15 - 16)  SpO2: 95% (07 Feb 2020 05:36) (95% - 95%)    Gen: well appearing, lying in bed, NAD  HEENT: PERRLA, pink mucosae  + matted/firm  L axillary lymphadenopathy  Cardiovascular: Regular rhythm/rate, no murmurs, rubs, gallops  Respiratory:  ctab anteriorly  Abdomen: soft, non tender, non distended, no palpable masses, no palpable hepatosplenomegaly  Extremities:  no peripheral edema  Neuro: alert, awake and oriented x 3, no focal abnormalities  Skin: warm, no obvious lesions on visible skin    LABS:                        12.4   6.80  )-----------( 380      ( 07 Feb 2020 06:14 )             37.6     02-07    129<L>  |  94<L>  |  10  ----------------------------<  145<H>  5.8<H>   |  26  |  0.67    Ca    8.6      07 Feb 2020 06:14  Phos  3.1     02-07  Mg     1.9     02-07      PERTINENT IMAGING STUDIES: reviewed    ASSESSMENT:   L breast mass/axillary LAD, skeletal lesions/pleural effusion    PLAN:  Clinically stable.  Findings c/w metastatic breast ca.  S/p breast bx and axillary LN bx with poorly differentiated carcinoma ER+OK-HER2-  Will be treated as an outpatient.  Has f/u arranged for next week.  Thoracentesis not feasible  Please check CEA, [id number]    Thank you  Sridevi Alexis MD for [facility]  [phone]

## 2020-02-07 NOTE — DISCHARGE NOTE PROVIDER - PROVIDER TOKENS
PROVIDER:[TOKEN:[[id number]],SCHEDULEDAPPT:[02/20/2020],SCHEDULEDAPPTTIME:[02:00 PM],ESTABLISHEDPATIENT:[T]],PROVIDER:[TOKEN:[[id number]],FOLLOWUP:[1 week]]

## 2020-02-07 NOTE — PROGRESS NOTE ADULT - PROBLEM SELECTOR PROBLEM 5
Bone metastases
Pleural effusion
Advanced care planning/counseling discussion
Palliative care by specialist
Pleural effusion
Type 2 diabetes mellitus
Pleural effusion
Type 2 diabetes mellitus
Pleural effusion

## 2020-02-07 NOTE — DISCHARGE NOTE NURSING/CASE MANAGEMENT/SOCIAL WORK - NSDCFUADDAPPT_GEN_ALL_CORE_FT
Please followup with your PCP, Dr. Hargrove at your scheduled appointment on 02/20/20 at 2pm.    Please followup with Dr. Funez about the results of your biopsy. The office will call you to schedule an appointment within the next week based on Dr. Funez's availability. If you do not receive a phone call by Monday, please call the office at [phone].

## 2020-02-07 NOTE — PROGRESS NOTE ADULT - PROBLEM SELECTOR PROBLEM 8
Nutrition, metabolism, and development symptoms
Hypothyroidism
Hypothyroidism
Nutrition, metabolism, and development symptoms
Hypothyroidism
Nutrition, metabolism, and development symptoms
Hypothyroidism

## 2020-02-07 NOTE — PROGRESS NOTE ADULT - PROBLEM SELECTOR PROBLEM 7
Depression
Depression
HTN (hypertension)
Hypothyroidism
HTN (hypertension)
Hypothyroidism
Hypothyroidism

## 2020-02-07 NOTE — PROGRESS NOTE ADULT - ATTENDING COMMENTS
Discussed all these findings with the patient; Will get pulmonary consult for thoracocentesis ; Also oncology consult and will needs surgical evaluation,
Discussed plans with patient; As stated appears quite depressed; Will contact radiology regarding the biopsy
Patient seen and examined;  Will see psychiatrist today; Discussed going home with patient; She will need some help at home
Patient seen and examined; Long discussion took place regarding plans for biopsy and treatment; Fluid in chest is to small to tap;   Hopefully biopsy will be done today
As stated above patient with depression and question whether the medication was the cause of nausea; Will however stop the Effexor for now; Hope for discharge tomorrow; Pathology still pending
Patient seen and examined with house-staff during bedside rounds  Resident note read, including vitals, physical findings, laboratory data, and radiological reports.   Revisions included below.  Case discussed with House staff  Direct personal management at bedside  and extensive interpretation of data. Decision making of high complexity.    Will contact Breast surgeon

## 2020-02-07 NOTE — PROGRESS NOTE ADULT - PROBLEM SELECTOR PLAN 8
F: NS at 100cc/Hr  E: replete electrolytes K< 4, Mg <2  N: DASH/TLC, carb consistent diet  DVT PPx: Will start Lovenox if not having thoracentesis  Code status: Full Code
TSH WNL  - c/w home synthroid 88mcg daily
TSH WNL  - c/w home synthroid 88mcg daily
F: None  E: Replete PRN for K< 4, Mg <2  N: DASH/TLC, carb consistent diet  DVT PPx: Holding Lovenox in setting of possible thoracentesis  Code status: Full Code
TSH WNL  - c/w home synthroid 88mcg daily
TSH WNL  - c/w home synthroid 88mcg daily

## 2020-02-07 NOTE — PROGRESS NOTE ADULT - ASSESSMENT
85yo F with PMH of T2DM, Grave's Disease, h/o Endometrial CA (s/p hysterectomy and bilateral oophorectomy), and h/o benign Pancreatic adenoma p/w weakness/fatigue found to have breast mass and diffuse osseus lesions on imaging concerning for metastatic Breast CA.
85yo F with PMH of T2DM, Grave's Disease, h/o Endometrial CA (s/p hysterectomy and bilateral oophorectomy), and h/o benign Pancreatic adenoma p/w weakness/fatigue found to have breast mass and diffuse osseus lesions on imaging concerning for metastatic Breast CA.
86 year old F F with hx of DM2, Grave's disease, HLD, HTN, lymphedema, endometrial carcinoma s/p hysterectomy and bilateral oophorectomy, and benign pancreatic tail adenoma who presents for roughly 1 month of generalized malaise and weakness, found to have left breast mass and left axillary lymphadenopathy, suspicious for breast neoplasm, extensive osteoblastic bony metastasis, and L pleural effusion, likely breast cancer with mets to bone admitted for further workup
Patient refusing breast biopsy.
85 yo F with hx of endometrial CA s/p hysterectomy and bilateral oophorectomy w/ numerous cyst removals of breast and axillae presenting w/ 1 month of fatigue/malaise now found to have L pleural effusion and new L sided breast mass w/ L axillary LAD and concern for mets to the bone. Pulmonary consulted for concern for malignant pleural effusion.      Problem/Recommendation - 1:  Problem: Pleural effusion. Recommendation: Patient w/ pleural effusion on L seen on CT chest. In the setting of recent breast findings/concern for metastasis effusion is most likely malignant. Low concern for infectious causes in the absence of fever/leukocytosis and low concern for tubercular as patient w/ no travel history and no cavitary lesions seen on CT.   - assess again with POCUS today, results as above.  - Diagnostic thoracentesis would be a low yield, and negative cytology would not be able to rule out malignant pleural effusion. Also pleural effusion is relatively small so finding a safe window is a challenge.   - We had recommended bone biopsy for metastatic work up. If this can not be done for some reason we will follow the size of pleural effusion to see if we can find a safe window if this is enlarging. But yield and low negative predictive value still remains a problem.            Problem/Recommendation - 2:  ·  Problem: Breast mass.  Recommendation: Breast surgery on board. Plans to biopsy breast lesion  - F/u breast biopsy.
86 year old F F with hx of DM2, Grave's disease, HLD, HTN, lymphedema, endometrial carcinoma s/p hysterectomy and bilateral oophorectomy, and benign pancreatic tail adenoma who presents for roughly 1 month of generalized malaise and weakness, found to have left breast mass and left axillary lymphadenopathy, suspicious for breast neoplasm, extensive osteoblastic bony metastasis, and L pleural effusion, likely breast cancer with mets to bone admitted for further workup

## 2020-02-07 NOTE — PROGRESS NOTE ADULT - REASON FOR ADMISSION
Generalized weakness

## 2020-02-07 NOTE — PROGRESS NOTE ADULT - PROBLEM SELECTOR PLAN 7
Pt expresses anhedonia, decreased appetite. Is interested in seeing a psychiatrist.  - Psych consulted, recommended Effexor 37.5 mg  - started Effexor 37.5mg on 02/05, stopped due to nausea on 02/06  - started remeron 7.5mg qhs on 02/06

## 2020-02-07 NOTE — PROGRESS NOTE ADULT - PROBLEM SELECTOR PLAN 6
Support provided to patient. Patient to have access to supportive services during rest of hospital stay as the patient/family deemed necessary ie. Chaplaincy, Massage therapy, Music therapy, Patient and family supportive services, Palliative SW, etc.    Recommended Chaplaincy, Music Therapy, Massage Therapy. Emotional support provided and all questions answered.
Hx of HTN not on treatment  - will continue to monitor given dehydration on admission
L sided pleural effusion on CXR. Likely from breast malignancy   - Continue to monitor  - pulm consulted, effusion too small to safely do a thoracentesis
Hx of HTN not on treatment  - will continue to monitor given dehydration on admission
L sided pleural effusion on CXR. Likely from breast malignancy   - Continue to monitor  -
Hx of HTN not on treatment  - will continue to monitor given dehydration on admission
Hx of HTN not on treatment  - will continue to monitor given dehydration on admission

## 2020-02-07 NOTE — PROGRESS NOTE ADULT - PROBLEM SELECTOR PROBLEM 6
Fatigue
HTN (hypertension)
Palliative care by specialist
HTN (hypertension)
Pleural effusion
HTN (hypertension)
Pleural effusion
HTN (hypertension)

## 2020-02-07 NOTE — DISCHARGE NOTE PROVIDER - NSDCFUADDAPPT_GEN_ALL_CORE_FT
Please followup with your PCP, Dr. Hargrove at your scheduled appointment on 02/20/20 at 2pm.    Please followup with Dr. Funez about the results of your biopsy. The office will call you to schedule an appointment within the next week based on Dr. Funez's availability. If you do not receive a phone call by Monday, please call the office at [phone].

## 2020-02-07 NOTE — PROGRESS NOTE ADULT - PROVIDER SPECIALTY LIST ADULT
Heme/Onc
Internal Medicine
Intervent Cardiology
Palliative Care
Palliative Care
Pulmonology
Radiology
Internal Medicine
Internal Medicine

## 2020-02-07 NOTE — DISCHARGE NOTE PROVIDER - CARE PROVIDERS DIRECT ADDRESSES
[email or web address]

## 2020-02-07 NOTE — CHART NOTE - NSCHARTNOTEFT_GEN_A_CORE
PALLIATIVE MEDICINE COORDINATION OF CARE NOTE FOR KAI FRANCES  [  ] ED trigger    [  ] MICU trigger    [  ] Consult    Patient last seen on: __2/6/2020____ in order to manage: _Support ____ and was recommended: _patient get anti emetics to help her nausea and rotate her Psych meds.     __30__ Minutes; Start: __1145pm___  End: __1215pm_, of non-face-to-face prolonged service provided that relates to (face-to-face) care that has or will occur and ongoing patient management, including one or more of the following:     - Reviewed records from other physicians or other health care professional services, including one or more of the following: other medical records and diagnostic / radiology study results     HPI:  87 yo F with hx of DM2, Grave's disease, HLD, HTN, lymphedema, endometrial carcinoma s/p hysterectomy and bilateral oophorectomy, and benign pancreatic tail adenoma who presents for roughly 1 month of generalized malaise and weakness.  Patient reports she has had episodes of generalized weakness and fatigue to the point that she finds it difficult to have the energy to get out of bed or cook for herself.  On a past episode she was dx with UTI and symptoms improved however she was seen both 1/13 and 1/28 as an outpatient and UAs were negative for infection.  She was empirically started on Keflex as outpatient which was then discontinued when Ucx negative, otherwise has had no other recent abx or changes in her medications.  She denies fevers, chills, N/V, abdominal pain, chest pain, palpitations, diarrhea, constipation, urinary frequency, urgency or dysuria.  She denies weight loss and is up to date on colonoscopies.    In the ED, VS were TMax: 98.1, HR 82 - 92, /78 - 160/82, RR 16 - 18, SpO2 93% - 97% on RA.  Labs significant for Na 133, Glucose 218, Alk phos 367.  CXR with L sided effusion.  EKG NSR with PACs.    Started on NS at 250cc/hr and admitted for further workup. (30 Jan 2020 20:40)    - Other: Medication reviewed.    The patient HAS NOT used PRN's in the last 24h.    MEDICATIONS  (STANDING):  cholecalciferol 1000 Unit(s) Oral daily  dextrose 5%. 1000 milliLiter(s) (50 mL/Hr) IV Continuous <Continuous>  dextrose 50% Injectable 12.5 Gram(s) IV Push once  dextrose 50% Injectable 25 Gram(s) IV Push once  dextrose 50% Injectable 25 Gram(s) IV Push once  enoxaparin Injectable 40 milliGRAM(s) SubCutaneous every 24 hours  insulin lispro (HumaLOG) corrective regimen sliding scale   SubCutaneous Before meals and at bedtime  levothyroxine 88 MICROGram(s) Oral daily  mirtazapine 7.5 milliGRAM(s) Oral at bedtime  sodium chloride 1 Gram(s) Oral two times a day    MEDICATIONS  (PRN):  acetaminophen   Tablet .. 650 milliGRAM(s) Oral every 6 hours PRN Mild Pain (1 - 3)  dextrose 40% Gel 15 Gram(s) Oral once PRN Blood Glucose LESS THAN 70 milliGRAM(s)/deciliter  glucagon  Injectable 1 milliGRAM(s) IntraMuscular once PRN Glucose LESS THAN 70 milligrams/deciliter      - Other: Advanced directives    Patient made herself a DNR/DNI this admission, can refer to goals of care documents       - Other: Coordination/Plan of care     Discussed with primary team    Patient to be discharged home today. Was started on Effexor and had a bad reaction, was then started on Remeron and patient to continue that medication as per psychiatry. Patient to follow up with outpatient psychiatry and will go home today. Palliative care will sign off at this time. Please reconsult as needed.

## 2020-02-07 NOTE — PROGRESS NOTE ADULT - PROBLEM SELECTOR PROBLEM 9
Transition of care performed with sharing of clinical summary
Nutrition, metabolism, and development symptoms
Nutrition, metabolism, and development symptoms
Transition of care performed with sharing of clinical summary
Nutrition, metabolism, and development symptoms
Transition of care performed with sharing of clinical summary
Nutrition, metabolism, and development symptoms

## 2020-02-07 NOTE — PROGRESS NOTE ADULT - PROBLEM SELECTOR PLAN 1
In the setting of progressive decline and newly found malignancy. Patient without SI or HI but she is concerned about caring for herself at home.  -appreciate Psych input, agree with antidepressant but would ask for follow-up for a better tolerated agent if possible  -supportive services referrals placed: Chaplaincy, Music Therapy, Massage Therapy
In the setting of progressive decline and newly found malignancy. Patient without SI or HI but she is concerned about caring for herself at home.  -appreciate Psych input, agree with antidepressant   -extensive conversation with Patient&Family Support  -supportive services referrals placed: Chaplaincy, Music Therapy, Massage Therapy
Presented with 1mo hx of malaise. CT C/A/P positive for left breast mass and left axillary lymphadenopathy, suspicious for breast neoplasm, extensive osteoblastic bony metastasis  - Dr. Funez (heme/onc) consulted, f/u rec's  - f/u results of breast biopsy today  - Palliative consulted, f/u rec's
Presented with 1mo hx of malaise. CT C/A/P positive for left breast mass and left axillary lymphadenopathy, suspicious for breast neoplasm, extensive osteoblastic bony metastasis  - Dr. Funez (heme/onc) consulted, f/u rec's  - f/u results of breast biopsy today  - Palliative consulted, f/u rec's
Roughly 1mo hx of malaise with episodes in the past as well.  Very broad ddx as described above including malignancy, psychiatric, nephritic syndrome.  Infectious etiology unlikely given normal WBC, neg UAs  - f/u CT C/A/P positive for left breast mass and left axillary lymphadenopathy, suspicious for breast neoplasm, extensive osteoblastic bony metastasis  - Ultrasound left breast biospy pending  - TSH wnl
Roughly 1mo hx of malaise with episodes in the past as well.  Very broad ddx as described above including malignancy, psychiatric, nephritic syndrome.  Infectious etiology unlikely given normal WBC, neg UAs  - f/u CT C/A/P positive for left breast mass and left axillary lymphadenopathy, suspicious for breast neoplasm, extensive osteoblastic bony metastasis  - thoracentesis of L sided pleural effusion today  - TSH wnl
Roughly 1mo hx of malaise with episodes in the past as well.  Very broad ddx as described above including malignancy, psychiatric, nephritic syndrome.  Infectious etiology unlikely given normal WBC, neg UAs  - f/u CT C/A/P positive for left breast mass and left axillary lymphadenopathy, suspicious for breast neoplasm, extensive osteoblastic bony metastasis  - thoracentesis of L sided pleural effusion today  - TSH wnl
Presented with 1mo hx of malaise. CT C/A/P positive for left breast mass and left axillary lymphadenopathy, suspicious for breast neoplasm, extensive osteoblastic bony metastasis  - Dr. Lake (heme/onc) consulted, f/u rec's  - f/u results of breast biopsy today  - Palliative consulted, f/u rec's
Roughly 1mo hx of malaise with episodes in the past as well.  Very broad ddx as described above including malignancy, psychiatric, nephritic syndrome. CT C/A/P positive for left breast mass and left axillary lymphadenopathy, suspicious for breast neoplasm, extensive osteoblastic bony metastasis  - Dr. Lake (heme/onc) consulted, f/u rec's  - Psych/onc consulted  - Palliative consulted
Roughly 1mo hx of malaise with episodes in the past as well.  Very broad ddx as described above including malignancy, psychiatric, nephritic syndrome.  Infectious etiology unlikely given normal WBC, neg UAs  - f/u CT C/A/P positive for left breast mass and left axillary lymphadenopathy, suspicious for breast neoplasm, extensive osteoblastic bony metastasis  - thoracentesis of L sided pleural effusion today  - TSH wnl
Presented with 1mo hx of malaise. CT C/A/P positive for left breast mass and left axillary lymphadenopathy, suspicious for breast neoplasm, extensive osteoblastic bony metastasis  - Dr. Funez (heme/onc) consulted, f/u rec's  - f/u results of breast biopsy today  - Palliative consulted, f/u rec's

## 2020-02-07 NOTE — PROGRESS NOTE ADULT - PROBLEM SELECTOR PLAN 9
1) PCP Contacted on Admission: Y --> Dr. Hargrove following inpatient  2) Date of Contact with PCP:  3) PCP Contacted at Discharge: (Y/N)  4) Summary of Handoff Given to PCP:   5) Post-Discharge Appointment Date and Location:
F: None  E: Replete PRN for K< 4, Mg <2  N: DASH/TLC, carb consistent diet  DVT PPx: Holding Lovenox in setting of possible thoracentesis  Code status: Full Code
F: None  E: Replete PRN for K< 4, Mg <2  N: DASH/TLC, carb consistent diet  DVT PPx: Holding Lovenox in setting of possible thoracentesis  Code status: Full Code
1) PCP Contacted on Admission: Y --> Dr. Hargrove following inpatient  2) Date of Contact with PCP:  3) PCP Contacted at Discharge: (Y/N)  4) Summary of Handoff Given to PCP:   5) Post-Discharge Appointment Date and Location:
F: None  E: Replete PRN for K< 4, Mg <2  N: DASH/TLC, carb consistent diet  DVT PPx: Holding Lovenox in setting of possible thoracentesis  Code status: Full Code
F: None  E: Replete PRN for K< 4, Mg <2  N: DASH/TLC, carb consistent diet  DVT PPx: Holding Lovenox in setting of possible thoracentesis  Code status: Full Code

## 2020-02-07 NOTE — PROGRESS NOTE ADULT - PROBLEM SELECTOR PLAN 4
s/p US-guided biopsy  -patient intends to follow-up path as an outpatient and hear potential treatment options  -patient was educated on hospice services in case she decided to forego further DMT
Chronic as an outpatient, mild hypoNa likely due to poor po intake  - Na 132 today  - Continue to monitor closely
GLENN completed w/ DNR/DNI; original given to patient and copy placed in the chart.  Patient has an established HCP: Gerson Sung (Cousin) [phone]
Hx of DM2 with A1c as outpatient ranging from 7.5-8.3  - on Glimepiride 1mg BID as outpatient  - FSG TID and qHS and MISS while inpatient
Chronic as an outpatient, mild hypoNa likely due to poor po intake  - Na 128 today  - Continue to monitor closely
Hx of DM2 with A1c as outpatient ranging from 7.5-8.3  - on Glimepiride 1mg BID as outpatient  - FSG TID and qHS and MISS while inpatient

## 2020-02-07 NOTE — PROGRESS NOTE ADULT - SUBJECTIVE AND OBJECTIVE BOX
INTERVAL HPI/OVERNIGHT EVENTS:  Interim reviewed; Biopsy results noted; Will see Heme-Onc next week; Will discuss surgery with Breast surgeon;      MEDICATIONS  (STANDING):  cholecalciferol 1000 Unit(s) Oral daily  dextrose 5%. 1000 milliLiter(s) (50 mL/Hr) IV Continuous <Continuous>  dextrose 50% Injectable 12.5 Gram(s) IV Push once  dextrose 50% Injectable 25 Gram(s) IV Push once  dextrose 50% Injectable 25 Gram(s) IV Push once  enoxaparin Injectable 40 milliGRAM(s) SubCutaneous every 24 hours  insulin lispro (HumaLOG) corrective regimen sliding scale   SubCutaneous Before meals and at bedtime  levothyroxine 88 MICROGram(s) Oral daily  mirtazapine 7.5 milliGRAM(s) Oral at bedtime  sodium chloride 1 Gram(s) Oral two times a day    MEDICATIONS  (PRN):  acetaminophen   Tablet .. 650 milliGRAM(s) Oral every 6 hours PRN Mild Pain (1 - 3)  dextrose 40% Gel 15 Gram(s) Oral once PRN Blood Glucose LESS THAN 70 milliGRAM(s)/deciliter  glucagon  Injectable 1 milliGRAM(s) IntraMuscular once PRN Glucose LESS THAN 70 milligrams/deciliter      Allergies    No Known Allergies    Intolerances        Vital Signs Last 24 Hrs  T(C): 36.3 (07 Feb 2020 05:36), Max: 36.6 (06 Feb 2020 14:28)  T(F): 97.3 (07 Feb 2020 05:36), Max: 97.9 (06 Feb 2020 14:28)  HR: 82 (07 Feb 2020 05:36) (82 - 88)  BP: 148/82 (07 Feb 2020 05:36) (138/79 - 148/82)  BP(mean): --  RR: 15 (07 Feb 2020 05:36) (15 - 16)  SpO2: 95% (07 Feb 2020 05:36) (95% - 95%)          Constitutional:  Awake    Eyes: BRIDGER    ENMT: Negative    Neck: Supple    Back:  no tenderness     Respiratory:   clear    Cardiovascular: S1 S2    Gastrointestinal:  soft    Genitourinary:    Extremities: no edema    Vascular:    Neurological:    Skin:    Lymph Nodes:            02-06 @ 07:01  -  02-07 @ 07:00  --------------------------------------------------------  IN: 0 mL / OUT: 450 mL / NET: -450 mL      LABS:                        12.4   6.80  )-----------( 380      ( 07 Feb 2020 06:14 )             37.6     02-07    129<L>  |  94<L>  |  10  ----------------------------<  145<H>  5.8<H>   |  26  |  0.67    Ca    8.6      07 Feb 2020 06:14  Phos  3.1     02-07  Mg     1.9     02-07            RADIOLOGY & ADDITIONAL TESTS:

## 2020-02-17 DIAGNOSIS — Z90.710 ACQUIRED ABSENCE OF BOTH CERVIX AND UTERUS: ICD-10-CM

## 2020-02-17 DIAGNOSIS — R74.8 ABNORMAL LEVELS OF OTHER SERUM ENZYMES: ICD-10-CM

## 2020-02-17 DIAGNOSIS — R11.0 NAUSEA: ICD-10-CM

## 2020-02-17 DIAGNOSIS — R59.0 LOCALIZED ENLARGED LYMPH NODES: ICD-10-CM

## 2020-02-17 DIAGNOSIS — Z85.42 PERSONAL HISTORY OF MALIGNANT NEOPLASM OF OTHER PARTS OF UTERUS: ICD-10-CM

## 2020-02-17 DIAGNOSIS — Z90.49 ACQUIRED ABSENCE OF OTHER SPECIFIED PARTS OF DIGESTIVE TRACT: ICD-10-CM

## 2020-02-17 DIAGNOSIS — E86.0 DEHYDRATION: ICD-10-CM

## 2020-02-17 DIAGNOSIS — J91.0 MALIGNANT PLEURAL EFFUSION: ICD-10-CM

## 2020-02-17 DIAGNOSIS — Z79.84 LONG TERM (CURRENT) USE OF ORAL HYPOGLYCEMIC DRUGS: ICD-10-CM

## 2020-02-17 DIAGNOSIS — Z66 DO NOT RESUSCITATE: ICD-10-CM

## 2020-02-17 DIAGNOSIS — E03.9 HYPOTHYROIDISM, UNSPECIFIED: ICD-10-CM

## 2020-02-17 DIAGNOSIS — T43.215A ADVERSE EFFECT OF SELECTIVE SEROTONIN AND NOREPINEPHRINE REUPTAKE INHIBITORS, INITIAL ENCOUNTER: ICD-10-CM

## 2020-02-17 DIAGNOSIS — Z85.820 PERSONAL HISTORY OF MALIGNANT MELANOMA OF SKIN: ICD-10-CM

## 2020-02-17 DIAGNOSIS — C79.51 SECONDARY MALIGNANT NEOPLASM OF BONE: ICD-10-CM

## 2020-02-17 DIAGNOSIS — Z90.722 ACQUIRED ABSENCE OF OVARIES, BILATERAL: ICD-10-CM

## 2020-02-17 DIAGNOSIS — E87.1 HYPO-OSMOLALITY AND HYPONATREMIA: ICD-10-CM

## 2020-02-17 DIAGNOSIS — C50.912 MALIGNANT NEOPLASM OF UNSPECIFIED SITE OF LEFT FEMALE BREAST: ICD-10-CM

## 2020-02-17 DIAGNOSIS — E11.9 TYPE 2 DIABETES MELLITUS WITHOUT COMPLICATIONS: ICD-10-CM

## 2020-02-17 DIAGNOSIS — I10 ESSENTIAL (PRIMARY) HYPERTENSION: ICD-10-CM

## 2020-02-17 DIAGNOSIS — Z51.5 ENCOUNTER FOR PALLIATIVE CARE: ICD-10-CM

## 2020-02-17 DIAGNOSIS — F32.1 MAJOR DEPRESSIVE DISORDER, SINGLE EPISODE, MODERATE: ICD-10-CM

## 2020-02-17 DIAGNOSIS — E78.5 HYPERLIPIDEMIA, UNSPECIFIED: ICD-10-CM

## 2020-02-17 DIAGNOSIS — Z90.81 ACQUIRED ABSENCE OF SPLEEN: ICD-10-CM

## 2020-02-17 DIAGNOSIS — R53.81 OTHER MALAISE: ICD-10-CM

## 2020-02-20 ENCOUNTER — APPOINTMENT (OUTPATIENT)
Dept: INTERNAL MEDICINE | Facility: CLINIC | Age: 85
End: 2020-02-20
Payer: MEDICARE

## 2020-02-20 VITALS
DIASTOLIC BLOOD PRESSURE: 88 MMHG | HEIGHT: 62 IN | BODY MASS INDEX: 23.55 KG/M2 | OXYGEN SATURATION: 97 % | WEIGHT: 128 LBS | TEMPERATURE: 97.7 F | SYSTOLIC BLOOD PRESSURE: 157 MMHG | HEART RATE: 100 BPM

## 2020-02-20 PROBLEM — I10 ESSENTIAL (PRIMARY) HYPERTENSION: Chronic | Status: ACTIVE | Noted: 2020-01-30

## 2020-02-20 PROCEDURE — 99213 OFFICE O/P EST LOW 20 MIN: CPT | Mod: 25

## 2020-02-20 PROCEDURE — 36415 COLL VENOUS BLD VENIPUNCTURE: CPT

## 2020-02-20 RX ORDER — DENOSUMAB 120 MG/1.7ML
120 INJECTION SUBCUTANEOUS
Refills: 0 | Status: ACTIVE | COMMUNITY

## 2020-02-20 RX ORDER — MIRTAZAPINE 7.5 MG/1
7.5 TABLET, FILM COATED ORAL
Qty: 30 | Refills: 0 | Status: ACTIVE | COMMUNITY
Start: 2020-02-20 | End: 1900-01-01

## 2020-02-20 RX ORDER — NORMAL SALT TABLETS 1 G/G
1 TABLET ORAL
Qty: 60 | Refills: 5 | Status: ACTIVE | COMMUNITY

## 2020-02-20 NOTE — HISTORY OF PRESENT ILLNESS
[FreeTextEntry1] : Recent diagnosis pf Breast Cancer ; Left breast; Also Bone mets and pleural effusion; Likely effusion is malignant  [de-identified] : as above has started chemo as well as injection of Xgeva;  PO chemo; anastrozole\par Strength slightly improved;

## 2020-02-20 NOTE — PHYSICAL EXAM
[No Acute Distress] : no acute distress [Well Nourished] : well nourished [Well Developed] : well developed [Well-Appearing] : well-appearing [Normal Sclera/Conjunctiva] : normal sclera/conjunctiva [PERRL] : pupils equal round and reactive to light [EOMI] : extraocular movements intact [Normal Outer Ear/Nose] : the outer ears and nose were normal in appearance [Normal Oropharynx] : the oropharynx was normal [No JVD] : no jugular venous distention [No Lymphadenopathy] : no lymphadenopathy [Supple] : supple [No Respiratory Distress] : no respiratory distress  [Thyroid Normal, No Nodules] : the thyroid was normal and there were no nodules present [No Accessory Muscle Use] : no accessory muscle use [Clear to Auscultation] : lungs were clear to auscultation bilaterally [Normal Rate] : normal rate  [Regular Rhythm] : with a regular rhythm [Normal S1, S2] : normal S1 and S2 [No Murmur] : no murmur heard [No Carotid Bruits] : no carotid bruits [No Abdominal Bruit] : a ~M bruit was not heard ~T in the abdomen [No Varicosities] : no varicosities [No Edema] : there was no peripheral edema [Pedal Pulses Present] : the pedal pulses are present [No Palpable Aorta] : no palpable aorta [No Extremity Clubbing/Cyanosis] : no extremity clubbing/cyanosis [Soft] : abdomen soft [Non Tender] : non-tender [Non-distended] : non-distended [No Masses] : no abdominal mass palpated [Normal Bowel Sounds] : normal bowel sounds [No HSM] : no HSM [Normal Anterior Cervical Nodes] : no anterior cervical lymphadenopathy [No CVA Tenderness] : no CVA  tenderness [Normal Posterior Cervical Nodes] : no posterior cervical lymphadenopathy [Grossly Normal Strength/Tone] : grossly normal strength/tone [No Spinal Tenderness] : no spinal tenderness [No Joint Swelling] : no joint swelling [Coordination Grossly Intact] : coordination grossly intact [No Focal Deficits] : no focal deficits [No Rash] : no rash [Normal Affect] : the affect was normal [Deep Tendon Reflexes (DTR)] : deep tendon reflexes were 2+ and symmetric [Normal Gait] : normal gait [de-identified] : Decreased breath sounds left base [Normal Insight/Judgement] : insight and judgment were intact

## 2020-02-20 NOTE — HEALTH RISK ASSESSMENT
[No] : No [No falls in past year] : Patient reported no falls in the past year [0] : 1) Little interest or pleasure doing things: Not at all (0) [] : No [[id number]] : 0

## 2020-02-21 LAB
ALBUMIN SERPL ELPH-MCNC: 4 G/DL
ALP BLD-CCNC: 592 U/L
ALT SERPL-CCNC: 16 U/L
ANION GAP SERPL CALC-SCNC: 12 MMOL/L
AST SERPL-CCNC: 23 U/L
BASOPHILS # BLD AUTO: 0.08 K/UL
BASOPHILS NFR BLD AUTO: 1 %
BILIRUB SERPL-MCNC: 0.2 MG/DL
BUN SERPL-MCNC: 13 MG/DL
CALCIUM SERPL-MCNC: 9.5 MG/DL
CHLORIDE SERPL-SCNC: 100 MMOL/L
CO2 SERPL-SCNC: 23 MMOL/L
CREAT SERPL-MCNC: 0.78 MG/DL
EOSINOPHIL # BLD AUTO: 0.06 K/UL
EOSINOPHIL NFR BLD AUTO: 0.8 %
GLUCOSE SERPL-MCNC: 169 MG/DL
HCT VFR BLD CALC: 45 %
HGB BLD-MCNC: 13.7 G/DL
IMM GRANULOCYTES NFR BLD AUTO: 0.5 %
LYMPHOCYTES # BLD AUTO: 1.73 K/UL
LYMPHOCYTES NFR BLD AUTO: 21.9 %
MAN DIFF?: NORMAL
MCHC RBC-ENTMCNC: 29.7 PG
MCHC RBC-ENTMCNC: 30.4 GM/DL
MCV RBC AUTO: 97.6 FL
MONOCYTES # BLD AUTO: 0.7 K/UL
MONOCYTES NFR BLD AUTO: 8.9 %
NEUTROPHILS # BLD AUTO: 5.29 K/UL
NEUTROPHILS NFR BLD AUTO: 66.9 %
PLATELET # BLD AUTO: 397 K/UL
POTASSIUM SERPL-SCNC: 5.1 MMOL/L
PROT SERPL-MCNC: 6.9 G/DL
RBC # BLD: 4.61 M/UL
RBC # FLD: 14.4 %
SODIUM SERPL-SCNC: 136 MMOL/L
WBC # FLD AUTO: 7.9 K/UL

## 2020-03-03 NOTE — H&P ADULT - PROBLEM SELECTOR PLAN 4
Both were last written 3 years ago and would require an appt       The flonase is also available without a prescription OTC Hx of DM2 with A1c as outpatient ranging from 7.5-8.3  - on Glimepiride 1mg BID as outpatient  - FSG TID and qHS and MISS while inpatient

## 2020-03-19 ENCOUNTER — APPOINTMENT (OUTPATIENT)
Dept: INTERNAL MEDICINE | Facility: CLINIC | Age: 85
End: 2020-03-19

## 2020-04-28 ENCOUNTER — RX RENEWAL (OUTPATIENT)
Age: 85
End: 2020-04-28

## 2020-06-11 ENCOUNTER — APPOINTMENT (OUTPATIENT)
Dept: INTERNAL MEDICINE | Facility: CLINIC | Age: 85
End: 2020-06-11
Payer: MEDICARE

## 2020-06-11 VITALS
WEIGHT: 131 LBS | TEMPERATURE: 97.7 F | BODY MASS INDEX: 24.11 KG/M2 | OXYGEN SATURATION: 97 % | HEART RATE: 99 BPM | DIASTOLIC BLOOD PRESSURE: 89 MMHG | HEIGHT: 62 IN | SYSTOLIC BLOOD PRESSURE: 157 MMHG

## 2020-06-11 PROCEDURE — 36415 COLL VENOUS BLD VENIPUNCTURE: CPT

## 2020-06-11 PROCEDURE — 99213 OFFICE O/P EST LOW 20 MIN: CPT | Mod: 25

## 2020-06-11 NOTE — HISTORY OF PRESENT ILLNESS
[FreeTextEntry1] : Being followed by Dr. Funez re Breast Cancer; Did have pleural effusion. No repeat chest film done;  [de-identified] : Otherwise some shortness of breath but improved;  Getting Chemo; Also Xgeva . Also some burning with urination\par Appetite good;\par Weigh good\par Consult follow up noted; Repeat imaging studies August

## 2020-06-11 NOTE — ASSESSMENT
[FreeTextEntry1] : Appears to have a stable examination; No change in medications; Will obtain all labs; Also Covid Antibody

## 2020-06-11 NOTE — PHYSICAL EXAM
[No Acute Distress] : no acute distress [Well Nourished] : well nourished [Well-Appearing] : well-appearing [Normal Sclera/Conjunctiva] : normal sclera/conjunctiva [Well Developed] : well developed [PERRL] : pupils equal round and reactive to light [EOMI] : extraocular movements intact [Normal Outer Ear/Nose] : the outer ears and nose were normal in appearance [No JVD] : no jugular venous distention [Normal Oropharynx] : the oropharynx was normal [Thyroid Normal, No Nodules] : the thyroid was normal and there were no nodules present [Supple] : supple [No Lymphadenopathy] : no lymphadenopathy [No Respiratory Distress] : no respiratory distress  [No Accessory Muscle Use] : no accessory muscle use [Clear to Auscultation] : lungs were clear to auscultation bilaterally [Regular Rhythm] : with a regular rhythm [Normal Rate] : normal rate  [No Carotid Bruits] : no carotid bruits [No Murmur] : no murmur heard [Normal S1, S2] : normal S1 and S2 [No Varicosities] : no varicosities [No Abdominal Bruit] : a ~M bruit was not heard ~T in the abdomen [No Edema] : there was no peripheral edema [Pedal Pulses Present] : the pedal pulses are present [No Palpable Aorta] : no palpable aorta [No Extremity Clubbing/Cyanosis] : no extremity clubbing/cyanosis [Soft] : abdomen soft [Non-distended] : non-distended [Non Tender] : non-tender [No HSM] : no HSM [No Masses] : no abdominal mass palpated [Normal Anterior Cervical Nodes] : no anterior cervical lymphadenopathy [Normal Bowel Sounds] : normal bowel sounds [Normal Posterior Cervical Nodes] : no posterior cervical lymphadenopathy [No CVA Tenderness] : no CVA  tenderness [No Spinal Tenderness] : no spinal tenderness [Grossly Normal Strength/Tone] : grossly normal strength/tone [No Rash] : no rash [No Joint Swelling] : no joint swelling [No Focal Deficits] : no focal deficits [Coordination Grossly Intact] : coordination grossly intact [Deep Tendon Reflexes (DTR)] : deep tendon reflexes were 2+ and symmetric [Normal Gait] : normal gait [Normal Affect] : the affect was normal [Normal Insight/Judgement] : insight and judgment were intact

## 2020-06-15 LAB
APPEARANCE: CLEAR
BILIRUBIN URINE: NEGATIVE
BLOOD URINE: NEGATIVE
COLOR: COLORLESS
ESTIMATED AVERAGE GLUCOSE: 169 MG/DL
GLUCOSE QUALITATIVE U: NEGATIVE
HBA1C MFR BLD HPLC: 7.5 %
KETONES URINE: NEGATIVE
LEUKOCYTE ESTERASE URINE: NEGATIVE
NITRITE URINE: NEGATIVE
PH URINE: 6
PROTEIN URINE: NEGATIVE
SARS-COV-2 IGG SERPL IA-ACNC: <0.1 INDEX
SARS-COV-2 IGG SERPL QL IA: NEGATIVE
SPECIFIC GRAVITY URINE: 1.01
T4 FREE SERPL-MCNC: 1.6 NG/DL
TSH SERPL-ACNC: 4.25 UIU/ML
UROBILINOGEN URINE: NORMAL

## 2020-07-28 ENCOUNTER — APPOINTMENT (OUTPATIENT)
Dept: INTERNAL MEDICINE | Facility: CLINIC | Age: 85
End: 2020-07-28
Payer: MEDICARE

## 2020-07-28 VITALS
TEMPERATURE: 98.2 F | OXYGEN SATURATION: 95 % | HEART RATE: 92 BPM | BODY MASS INDEX: 23.83 KG/M2 | HEIGHT: 62 IN | WEIGHT: 129.5 LBS | SYSTOLIC BLOOD PRESSURE: 148 MMHG | DIASTOLIC BLOOD PRESSURE: 84 MMHG

## 2020-07-28 PROCEDURE — 99213 OFFICE O/P EST LOW 20 MIN: CPT

## 2020-07-28 NOTE — PHYSICAL EXAM
[No Acute Distress] : no acute distress [Well-Appearing] : well-appearing [Well Nourished] : well nourished [Well Developed] : well developed [Normal Sclera/Conjunctiva] : normal sclera/conjunctiva [PERRL] : pupils equal round and reactive to light [EOMI] : extraocular movements intact [Normal Outer Ear/Nose] : the outer ears and nose were normal in appearance [Normal Oropharynx] : the oropharynx was normal [No JVD] : no jugular venous distention [No Lymphadenopathy] : no lymphadenopathy [Supple] : supple [Thyroid Normal, No Nodules] : the thyroid was normal and there were no nodules present [No Respiratory Distress] : no respiratory distress  [No Accessory Muscle Use] : no accessory muscle use [Regular Rhythm] : with a regular rhythm [Clear to Auscultation] : lungs were clear to auscultation bilaterally [Normal Rate] : normal rate  [Normal S1, S2] : normal S1 and S2 [No Murmur] : no murmur heard [No Abdominal Bruit] : a ~M bruit was not heard ~T in the abdomen [No Carotid Bruits] : no carotid bruits [No Edema] : there was no peripheral edema [No Varicosities] : no varicosities [Pedal Pulses Present] : the pedal pulses are present [No Extremity Clubbing/Cyanosis] : no extremity clubbing/cyanosis [No Palpable Aorta] : no palpable aorta [Non Tender] : non-tender [Soft] : abdomen soft [Non-distended] : non-distended [No Masses] : no abdominal mass palpated [No HSM] : no HSM [Normal Bowel Sounds] : normal bowel sounds [Normal Posterior Cervical Nodes] : no posterior cervical lymphadenopathy [No CVA Tenderness] : no CVA  tenderness [Normal Anterior Cervical Nodes] : no anterior cervical lymphadenopathy [No Joint Swelling] : no joint swelling [No Spinal Tenderness] : no spinal tenderness [No Rash] : no rash [Grossly Normal Strength/Tone] : grossly normal strength/tone [Coordination Grossly Intact] : coordination grossly intact [Normal Gait] : normal gait [No Focal Deficits] : no focal deficits [Deep Tendon Reflexes (DTR)] : deep tendon reflexes were 2+ and symmetric [Normal Insight/Judgement] : insight and judgment were intact [Normal Affect] : the affect was normal

## 2020-07-28 NOTE — HISTORY OF PRESENT ILLNESS
[FreeTextEntry1] : No chief complaint; Being followed by oncology and all stable; [de-identified] : All her tumor markers are excellent; \par Will get scans; \par Weight is good;

## 2020-09-09 ENCOUNTER — OUTPATIENT (OUTPATIENT)
Dept: OUTPATIENT SERVICES | Facility: HOSPITAL | Age: 85
LOS: 1 days | End: 2020-09-09
Payer: MEDICARE

## 2020-09-09 ENCOUNTER — APPOINTMENT (OUTPATIENT)
Dept: CT IMAGING | Facility: HOSPITAL | Age: 85
End: 2020-09-09
Payer: MEDICARE

## 2020-09-09 PROCEDURE — 71260 CT THORAX DX C+: CPT

## 2020-09-09 PROCEDURE — 71260 CT THORAX DX C+: CPT | Mod: 26

## 2020-10-20 ENCOUNTER — APPOINTMENT (OUTPATIENT)
Dept: INTERNAL MEDICINE | Facility: CLINIC | Age: 85
End: 2020-10-20
Payer: MEDICARE

## 2020-10-20 VITALS
TEMPERATURE: 97.3 F | BODY MASS INDEX: 23.92 KG/M2 | HEIGHT: 62 IN | DIASTOLIC BLOOD PRESSURE: 89 MMHG | SYSTOLIC BLOOD PRESSURE: 163 MMHG | HEART RATE: 94 BPM | OXYGEN SATURATION: 98 % | WEIGHT: 130 LBS

## 2020-10-20 PROCEDURE — 99213 OFFICE O/P EST LOW 20 MIN: CPT | Mod: 25

## 2020-10-20 PROCEDURE — 36415 COLL VENOUS BLD VENIPUNCTURE: CPT

## 2020-10-20 NOTE — ASSESSMENT
[FreeTextEntry1] : Will discuss with Oncology plans regarding the pleural effusion and breast mass; \par Otherwise  repeat labs today

## 2020-10-20 NOTE — PHYSICAL EXAM
[No Acute Distress] : no acute distress [Well-Appearing] : well-appearing [Well Developed] : well developed [Well Nourished] : well nourished [Normal Sclera/Conjunctiva] : normal sclera/conjunctiva [PERRL] : pupils equal round and reactive to light [EOMI] : extraocular movements intact [Fundoscopic Exam Performed] : fundoscopic ~T exam ~C was performed [Normal Outer Ear/Nose] : the outer ears and nose were normal in appearance [Normal Oropharynx] : the oropharynx was normal [Normal TMs] : both tympanic membranes were normal [Normal Nasal Mucosa] : the nasal mucosa was normal [No JVD] : no jugular venous distention [No Lymphadenopathy] : no lymphadenopathy [Normal Rate] : normal rate  [Supple] : supple [Thyroid Normal, No Nodules] : the thyroid was normal and there were no nodules present [Regular Rhythm] : with a regular rhythm [Normal S1, S2] : normal S1 and S2 [Soft] : abdomen soft [Non Tender] : non-tender [No Murmur] : no murmur heard [Non-distended] : non-distended [No Masses] : no abdominal mass palpated [de-identified] : Decreased breath sounds on left olya way up

## 2020-10-20 NOTE — HISTORY OF PRESENT ILLNESS
[FreeTextEntry1] : Being followed by Oncology; Recent CT shows [place] pleural effusion [de-identified] : Ct reviewed; Large pleural effusion without change;  Also Breast mass without change; Chemo without change; Has shortness of breath when going up stairs.\par Shortness of breath when going up hills only\par Appetite good

## 2020-10-20 NOTE — HEALTH RISK ASSESSMENT
[0] : 2) Feeling down, depressed, or hopeless: Not at all (0) [No] : No [No falls in past year] : Patient reported no falls in the past year [] : No [[id number]] : 0

## 2020-10-22 LAB
ALBUMIN SERPL ELPH-MCNC: 4.2 G/DL
ALP BLD-CCNC: 88 U/L
ALT SERPL-CCNC: 13 U/L
ANION GAP SERPL CALC-SCNC: 18 MMOL/L
APPEARANCE: CLEAR
AST SERPL-CCNC: 22 U/L
BILIRUB SERPL-MCNC: 0.3 MG/DL
BILIRUBIN URINE: NEGATIVE
BLOOD URINE: NEGATIVE
BUN SERPL-MCNC: 16 MG/DL
CALCIUM SERPL-MCNC: 9.8 MG/DL
CHLORIDE SERPL-SCNC: 96 MMOL/L
CO2 SERPL-SCNC: 25 MMOL/L
COLOR: NORMAL
CREAT SERPL-MCNC: 0.78 MG/DL
ESTIMATED AVERAGE GLUCOSE: 160 MG/DL
GLUCOSE QUALITATIVE U: NEGATIVE
GLUCOSE SERPL-MCNC: 118 MG/DL
HBA1C MFR BLD HPLC: 7.2 %
KETONES URINE: NEGATIVE
LEUKOCYTE ESTERASE URINE: NEGATIVE
NITRITE URINE: NEGATIVE
PH URINE: 6.5
POTASSIUM SERPL-SCNC: 5.1 MMOL/L
PROT SERPL-MCNC: 7.1 G/DL
PROTEIN URINE: NEGATIVE
SODIUM SERPL-SCNC: 139 MMOL/L
SPECIFIC GRAVITY URINE: 1.01
UROBILINOGEN URINE: NORMAL

## 2020-10-28 ENCOUNTER — RX RENEWAL (OUTPATIENT)
Age: 85
End: 2020-10-28

## 2020-12-21 ENCOUNTER — APPOINTMENT (OUTPATIENT)
Dept: INTERNAL MEDICINE | Facility: CLINIC | Age: 85
End: 2020-12-21
Payer: MEDICARE

## 2020-12-21 VITALS
DIASTOLIC BLOOD PRESSURE: 77 MMHG | BODY MASS INDEX: 23.74 KG/M2 | SYSTOLIC BLOOD PRESSURE: 149 MMHG | WEIGHT: 129 LBS | HEART RATE: 97 BPM | OXYGEN SATURATION: 96 % | HEIGHT: 62 IN | TEMPERATURE: 98.2 F

## 2020-12-21 PROCEDURE — 99213 OFFICE O/P EST LOW 20 MIN: CPT

## 2020-12-21 RX ORDER — BLOOD SUGAR DIAGNOSTIC
STRIP MISCELLANEOUS
Qty: 100 | Refills: 0 | Status: ACTIVE | COMMUNITY
Start: 2020-01-15

## 2020-12-21 RX ORDER — SITAGLIPTIN 100 MG/1
100 TABLET, FILM COATED ORAL
Qty: 30 | Refills: 5 | Status: DISCONTINUED | COMMUNITY
End: 2020-12-21

## 2020-12-21 NOTE — HISTORY OF PRESENT ILLNESS
[FreeTextEntry1] : Getting new Chemo as per Oncology; Feels better;\par Respiratory status is stable;  [de-identified] : Feeling well with present regimen; \par Other medication without change

## 2020-12-21 NOTE — PHYSICAL EXAM
[No Acute Distress] : no acute distress [Well Nourished] : well nourished [Well Developed] : well developed [Well-Appearing] : well-appearing [Normal Sclera/Conjunctiva] : normal sclera/conjunctiva [PERRL] : pupils equal round and reactive to light [EOMI] : extraocular movements intact [Normal Outer Ear/Nose] : the outer ears and nose were normal in appearance [Normal Oropharynx] : the oropharynx was normal [No JVD] : no jugular venous distention [No Lymphadenopathy] : no lymphadenopathy [Supple] : supple [Thyroid Normal, No Nodules] : the thyroid was normal and there were no nodules present [No Respiratory Distress] : no respiratory distress  [No Accessory Muscle Use] : no accessory muscle use [Clear to Auscultation] : lungs were clear to auscultation bilaterally [Normal Percussion] : the chest was normal to percussion [Normal Rate] : normal rate  [Regular Rhythm] : with a regular rhythm [Normal S1, S2] : normal S1 and S2 [No Murmur] : no murmur heard [No Carotid Bruits] : no carotid bruits [No Abdominal Bruit] : a ~M bruit was not heard ~T in the abdomen [No Varicosities] : no varicosities [Pedal Pulses Present] : the pedal pulses are present [No Edema] : there was no peripheral edema [No Palpable Aorta] : no palpable aorta [No Extremity Clubbing/Cyanosis] : no extremity clubbing/cyanosis [Soft] : abdomen soft [Non Tender] : non-tender [Non-distended] : non-distended [No Masses] : no abdominal mass palpated [No HSM] : no HSM [Normal Bowel Sounds] : normal bowel sounds [Normal Posterior Cervical Nodes] : no posterior cervical lymphadenopathy [Normal Anterior Cervical Nodes] : no anterior cervical lymphadenopathy [No CVA Tenderness] : no CVA  tenderness [No Spinal Tenderness] : no spinal tenderness [No Joint Swelling] : no joint swelling [Grossly Normal Strength/Tone] : grossly normal strength/tone [No Rash] : no rash [Coordination Grossly Intact] : coordination grossly intact [No Focal Deficits] : no focal deficits [Normal Gait] : normal gait [Normal Affect] : the affect was normal [Normal Insight/Judgement] : insight and judgment were intact [de-identified] : better airmovement

## 2020-12-21 NOTE — ASSESSMENT
[FreeTextEntry1] : Appears to be stable at this point; \par Will discuss with Dr. Lam\par Check A1C with labs at oncology;

## 2020-12-23 PROBLEM — Z87.440 HISTORY OF URINARY TRACT INFECTION: Status: RESOLVED | Noted: 2019-05-06 | Resolved: 2020-12-23

## 2021-01-04 ENCOUNTER — RX RENEWAL (OUTPATIENT)
Age: 86
End: 2021-01-04

## 2021-02-01 ENCOUNTER — RX RENEWAL (OUTPATIENT)
Age: 86
End: 2021-02-01

## 2021-02-26 RX ORDER — LANCETS
EACH MISCELLANEOUS
Qty: 90 | Refills: 3 | Status: ACTIVE | COMMUNITY
Start: 2017-02-28 | End: 1900-01-01

## 2021-03-08 ENCOUNTER — OUTPATIENT (OUTPATIENT)
Dept: OUTPATIENT SERVICES | Facility: HOSPITAL | Age: 86
LOS: 1 days | End: 2021-03-08

## 2021-03-08 ENCOUNTER — APPOINTMENT (OUTPATIENT)
Dept: CT IMAGING | Facility: CLINIC | Age: 86
End: 2021-03-08
Payer: MEDICARE

## 2021-03-08 PROCEDURE — 71260 CT THORAX DX C+: CPT | Mod: 26,MH

## 2021-03-08 PROCEDURE — 74177 CT ABD & PELVIS W/CONTRAST: CPT | Mod: 26,MH

## 2021-03-29 ENCOUNTER — APPOINTMENT (OUTPATIENT)
Dept: INTERNAL MEDICINE | Facility: CLINIC | Age: 86
End: 2021-03-29
Payer: MEDICARE

## 2021-03-29 VITALS
WEIGHT: 130 LBS | DIASTOLIC BLOOD PRESSURE: 80 MMHG | SYSTOLIC BLOOD PRESSURE: 159 MMHG | TEMPERATURE: 97.9 F | OXYGEN SATURATION: 96 % | BODY MASS INDEX: 23.92 KG/M2 | HEIGHT: 62 IN | HEART RATE: 91 BPM

## 2021-03-29 PROCEDURE — 99213 OFFICE O/P EST LOW 20 MIN: CPT

## 2021-03-29 NOTE — HISTORY OF PRESENT ILLNESS
[FreeTextEntry1] : Stable as per Oncology;\par Repeat CT no progression\par \par FINGER STICK - 144 [de-identified] : Getting two medication as per Dr. Lam;\par Getting vaccinated for Covid 19

## 2021-03-29 NOTE — HEALTH RISK ASSESSMENT
[No] : No [No falls in past year] : Patient reported no falls in the past year [0] : 2) Feeling down, depressed, or hopeless: Not at all (0) [] : No [[facility]] : 0 [[id number]] : 0

## 2021-03-30 ENCOUNTER — RX RENEWAL (OUTPATIENT)
Age: 86
End: 2021-03-30

## 2021-05-10 ENCOUNTER — RX RENEWAL (OUTPATIENT)
Age: 86
End: 2021-05-10

## 2021-05-10 RX ORDER — MIRTAZAPINE 7.5 MG/1
7.5 TABLET, FILM COATED ORAL
Qty: 90 | Refills: 0 | Status: ACTIVE | COMMUNITY
Start: 2020-02-20 | End: 1900-01-01

## 2021-05-24 ENCOUNTER — APPOINTMENT (OUTPATIENT)
Dept: INTERNAL MEDICINE | Facility: CLINIC | Age: 86
End: 2021-05-24
Payer: MEDICARE

## 2021-05-24 VITALS
DIASTOLIC BLOOD PRESSURE: 84 MMHG | BODY MASS INDEX: 23.37 KG/M2 | HEIGHT: 62 IN | OXYGEN SATURATION: 98 % | TEMPERATURE: 97.3 F | WEIGHT: 127 LBS | SYSTOLIC BLOOD PRESSURE: 157 MMHG | HEART RATE: 87 BPM

## 2021-05-24 VITALS — SYSTOLIC BLOOD PRESSURE: 130 MMHG | DIASTOLIC BLOOD PRESSURE: 80 MMHG

## 2021-05-24 PROCEDURE — 82962 GLUCOSE BLOOD TEST: CPT

## 2021-05-24 PROCEDURE — 99213 OFFICE O/P EST LOW 20 MIN: CPT

## 2021-05-24 RX ORDER — ANASTROZOLE TABLETS 1 MG/1
1 TABLET ORAL
Refills: 0 | Status: DISCONTINUED | COMMUNITY
End: 2021-05-24

## 2021-05-24 RX ORDER — CEPHALEXIN 250 MG/1
250 CAPSULE ORAL 3 TIMES DAILY
Qty: 21 | Refills: 1 | Status: DISCONTINUED | COMMUNITY
Start: 2019-05-22 | End: 2021-05-24

## 2021-05-24 RX ORDER — FULVESTRANT 50 MG/ML
INJECTION INTRAMUSCULAR
Refills: 0 | Status: ACTIVE | COMMUNITY

## 2021-05-24 NOTE — HISTORY OF PRESENT ILLNESS
[FreeTextEntry1] : Fully vaccinated for Covid [de-identified] : Feeling okay; \par Fatigue at times; Being followed by Oncology\par Glucoses increased which be secondary to medication; Fulvestrant \par Other medication with out change\par Will get blood work at Dr Lam; \par Respiratory status is stable\par Blood sugars at home 150 range

## 2021-05-24 NOTE — HEALTH RISK ASSESSMENT
[No] : No [No falls in past year] : Patient reported no falls in the past year [0] : 2) Feeling down, depressed, or hopeless: Not at all (0) [] : No [[id number]] : 0

## 2021-05-24 NOTE — ASSESSMENT
[FreeTextEntry1] : Doing well ; Respiratory status stable;\par Will have Dr. Correia obtain addition labs\par Finger stick is 154

## 2021-05-26 LAB — GLUCOSE BLDC GLUCOMTR-MCNC: 154

## 2021-06-23 ENCOUNTER — RX RENEWAL (OUTPATIENT)
Age: 86
End: 2021-06-23

## 2021-07-26 ENCOUNTER — APPOINTMENT (OUTPATIENT)
Dept: INTERNAL MEDICINE | Facility: CLINIC | Age: 86
End: 2021-07-26
Payer: MEDICARE

## 2021-07-26 VITALS
DIASTOLIC BLOOD PRESSURE: 77 MMHG | HEIGHT: 62 IN | HEART RATE: 85 BPM | WEIGHT: 126 LBS | TEMPERATURE: 97.7 F | SYSTOLIC BLOOD PRESSURE: 136 MMHG | OXYGEN SATURATION: 97 % | BODY MASS INDEX: 23.19 KG/M2

## 2021-07-26 DIAGNOSIS — Z87.442 PERSONAL HISTORY OF URINARY CALCULI: ICD-10-CM

## 2021-07-26 LAB — GLUCOSE BLDC GLUCOMTR-MCNC: 144

## 2021-07-26 PROCEDURE — 99213 OFFICE O/P EST LOW 20 MIN: CPT | Mod: 25

## 2021-07-26 PROCEDURE — 82962 GLUCOSE BLOOD TEST: CPT

## 2021-07-26 NOTE — PHYSICAL EXAM
[No Acute Distress] : no acute distress [Well Nourished] : well nourished [Well Developed] : well developed [Well-Appearing] : well-appearing [Normal Sclera/Conjunctiva] : normal sclera/conjunctiva [PERRL] : pupils equal round and reactive to light [EOMI] : extraocular movements intact [Normal Outer Ear/Nose] : the outer ears and nose were normal in appearance [Normal Oropharynx] : the oropharynx was normal [No JVD] : no jugular venous distention [No Lymphadenopathy] : no lymphadenopathy [Supple] : supple [Thyroid Normal, No Nodules] : the thyroid was normal and there were no nodules present [No Respiratory Distress] : no respiratory distress  [No Accessory Muscle Use] : no accessory muscle use [Normal Rate] : normal rate  [Regular Rhythm] : with a regular rhythm [Normal S1, S2] : normal S1 and S2 [No Murmur] : no murmur heard [No Carotid Bruits] : no carotid bruits [No Abdominal Bruit] : a ~M bruit was not heard ~T in the abdomen [No Varicosities] : no varicosities [Pedal Pulses Present] : the pedal pulses are present [No Edema] : there was no peripheral edema [No Palpable Aorta] : no palpable aorta [No Extremity Clubbing/Cyanosis] : no extremity clubbing/cyanosis [Soft] : abdomen soft [Non Tender] : non-tender [Non-distended] : non-distended [No Masses] : no abdominal mass palpated [No HSM] : no HSM [Normal Bowel Sounds] : normal bowel sounds [Normal Posterior Cervical Nodes] : no posterior cervical lymphadenopathy [Normal Anterior Cervical Nodes] : no anterior cervical lymphadenopathy [No CVA Tenderness] : no CVA  tenderness [No Spinal Tenderness] : no spinal tenderness [No Joint Swelling] : no joint swelling [Grossly Normal Strength/Tone] : grossly normal strength/tone [No Rash] : no rash [Coordination Grossly Intact] : coordination grossly intact [No Focal Deficits] : no focal deficits [Normal Gait] : normal gait [Deep Tendon Reflexes (DTR)] : deep tendon reflexes were 2+ and symmetric [Normal Affect] : the affect was normal [Normal Insight/Judgement] : insight and judgment were intact [de-identified] : decrase breath sounds bilaterally

## 2021-07-26 NOTE — HISTORY OF PRESENT ILLNESS
[FreeTextEntry1] : Back on sleep medication; Working well \par Mirtazipine [de-identified] : In addition reparatory status is stable; \par Some difficulty breathing with mask on\par Will get repeat CT scan of Chest /Abdomen\par Energy level poor;\par Will see Oncology next week; \par Oncology medication the same;\par BP has been stable;\par Saw eye doctor and all okay\par Blood sugars have been stable

## 2021-07-26 NOTE — ASSESSMENT
[FreeTextEntry1] : Stable and will follow up with oncology;\par Labs at Oncologist;\par Also repeat scans to be done

## 2021-07-26 NOTE — HEALTH RISK ASSESSMENT
[No] : No [No falls in past year] : Patient reported no falls in the past year [0] : 2) Feeling down, depressed, or hopeless: Not at all (0) [] : No [[id number]] : 0

## 2021-07-30 ENCOUNTER — OUTPATIENT (OUTPATIENT)
Dept: OUTPATIENT SERVICES | Facility: HOSPITAL | Age: 86
LOS: 1 days | End: 2021-07-30

## 2021-07-30 ENCOUNTER — APPOINTMENT (OUTPATIENT)
Dept: CT IMAGING | Facility: CLINIC | Age: 86
End: 2021-07-30
Payer: MEDICARE

## 2021-07-30 PROCEDURE — 74177 CT ABD & PELVIS W/CONTRAST: CPT | Mod: 26,MH

## 2021-07-30 PROCEDURE — 71260 CT THORAX DX C+: CPT | Mod: 26,MH

## 2021-09-16 ENCOUNTER — RX RENEWAL (OUTPATIENT)
Age: 86
End: 2021-09-16

## 2021-09-27 ENCOUNTER — APPOINTMENT (OUTPATIENT)
Dept: INTERNAL MEDICINE | Facility: CLINIC | Age: 86
End: 2021-09-27
Payer: MEDICARE

## 2021-09-27 VITALS
WEIGHT: 126 LBS | OXYGEN SATURATION: 98 % | TEMPERATURE: 97.6 F | DIASTOLIC BLOOD PRESSURE: 87 MMHG | HEIGHT: 62 IN | RESPIRATION RATE: 14 BRPM | BODY MASS INDEX: 23.19 KG/M2 | HEART RATE: 81 BPM | SYSTOLIC BLOOD PRESSURE: 146 MMHG

## 2021-09-27 DIAGNOSIS — Z92.29 PERSONAL HISTORY OF OTHER DRUG THERAPY: ICD-10-CM

## 2021-09-27 DIAGNOSIS — K21.9 GASTRO-ESOPHAGEAL REFLUX DISEASE W/OUT ESOPHAGITIS: ICD-10-CM

## 2021-09-27 LAB — GLUCOSE BLDC GLUCOMTR-MCNC: 135

## 2021-09-27 PROCEDURE — 99213 OFFICE O/P EST LOW 20 MIN: CPT

## 2021-09-27 NOTE — HEALTH RISK ASSESSMENT
[No] : No [No falls in past year] : Patient reported no falls in the past year [0] : 2) Feeling down, depressed, or hopeless: Not at all (0) [] : No [[id number]] : 0

## 2021-09-27 NOTE — HISTORY OF PRESENT ILLNESS
[FreeTextEntry1] : Being followed Dr Casarez; Scan looks good;\par Doing well  [de-identified] : In addition reparatory status is stable; \par Will get third dose of Covid vaccine; Moderna \par Needs hearing test\par Scan noted  Stable metastatic disease\par  \par  \par

## 2021-09-27 NOTE — ASSESSMENT
[FreeTextEntry1] : Stable examination;\par ENT consult;  Dr Glasgow\par No change in current regimen

## 2021-09-27 NOTE — PHYSICAL EXAM
[No Acute Distress] : no acute distress [Well Nourished] : well nourished [Well Developed] : well developed [Well-Appearing] : well-appearing [Normal Sclera/Conjunctiva] : normal sclera/conjunctiva [PERRL] : pupils equal round and reactive to light [EOMI] : extraocular movements intact [Normal Outer Ear/Nose] : the outer ears and nose were normal in appearance [Normal Oropharynx] : the oropharynx was normal [No JVD] : no jugular venous distention [No Lymphadenopathy] : no lymphadenopathy [Supple] : supple [Thyroid Normal, No Nodules] : the thyroid was normal and there were no nodules present [No Respiratory Distress] : no respiratory distress  [No Accessory Muscle Use] : no accessory muscle use [Normal Rate] : normal rate  [Regular Rhythm] : with a regular rhythm [Normal S1, S2] : normal S1 and S2 [No Murmur] : no murmur heard [No Carotid Bruits] : no carotid bruits [No Abdominal Bruit] : a ~M bruit was not heard ~T in the abdomen [No Varicosities] : no varicosities [Pedal Pulses Present] : the pedal pulses are present [No Edema] : there was no peripheral edema [No Palpable Aorta] : no palpable aorta [No Extremity Clubbing/Cyanosis] : no extremity clubbing/cyanosis [Soft] : abdomen soft [Non Tender] : non-tender [Non-distended] : non-distended [No Masses] : no abdominal mass palpated [No HSM] : no HSM [Normal Bowel Sounds] : normal bowel sounds [Normal Posterior Cervical Nodes] : no posterior cervical lymphadenopathy [Normal Anterior Cervical Nodes] : no anterior cervical lymphadenopathy [No CVA Tenderness] : no CVA  tenderness [No Spinal Tenderness] : no spinal tenderness [No Joint Swelling] : no joint swelling [Grossly Normal Strength/Tone] : grossly normal strength/tone [No Rash] : no rash [Coordination Grossly Intact] : coordination grossly intact [No Focal Deficits] : no focal deficits [Normal Gait] : normal gait [Deep Tendon Reflexes (DTR)] : deep tendon reflexes were 2+ and symmetric [Normal Affect] : the affect was normal [Normal Insight/Judgement] : insight and judgment were intact [de-identified] : Decrease breath sounds on right

## 2021-11-30 ENCOUNTER — APPOINTMENT (OUTPATIENT)
Dept: INTERNAL MEDICINE | Facility: CLINIC | Age: 86
End: 2021-11-30
Payer: MEDICARE

## 2021-11-30 ENCOUNTER — INPATIENT (INPATIENT)
Facility: HOSPITAL | Age: 86
LOS: 9 days | Discharge: HOME | DRG: 187 | End: 2021-12-10
Attending: INTERNAL MEDICINE | Admitting: INTERNAL MEDICINE
Payer: MEDICARE

## 2021-11-30 ENCOUNTER — RESULT REVIEW (OUTPATIENT)
Age: 86
End: 2021-11-30

## 2021-11-30 VITALS
RESPIRATION RATE: 16 BRPM | OXYGEN SATURATION: 95 % | DIASTOLIC BLOOD PRESSURE: 77 MMHG | TEMPERATURE: 98 F | HEIGHT: 63 IN | WEIGHT: 121.92 LBS | SYSTOLIC BLOOD PRESSURE: 149 MMHG | HEART RATE: 78 BPM

## 2021-11-30 VITALS
DIASTOLIC BLOOD PRESSURE: 71 MMHG | HEIGHT: 62 IN | BODY MASS INDEX: 22.45 KG/M2 | TEMPERATURE: 97.8 F | HEART RATE: 98 BPM | WEIGHT: 122 LBS | SYSTOLIC BLOOD PRESSURE: 166 MMHG | OXYGEN SATURATION: 95 %

## 2021-11-30 DIAGNOSIS — C54.1 MALIGNANT NEOPLASM OF ENDOMETRIUM: ICD-10-CM

## 2021-11-30 DIAGNOSIS — J90 PLEURAL EFFUSION, NOT ELSEWHERE CLASSIFIED: ICD-10-CM

## 2021-11-30 DIAGNOSIS — E03.9 HYPOTHYROIDISM, UNSPECIFIED: ICD-10-CM

## 2021-11-30 DIAGNOSIS — R07.89 OTHER CHEST PAIN: ICD-10-CM

## 2021-11-30 DIAGNOSIS — R63.8 OTHER SYMPTOMS AND SIGNS CONCERNING FOOD AND FLUID INTAKE: ICD-10-CM

## 2021-11-30 DIAGNOSIS — E87.1 HYPO-OSMOLALITY AND HYPONATREMIA: ICD-10-CM

## 2021-11-30 DIAGNOSIS — C50.919 MALIGNANT NEOPLASM OF UNSPECIFIED SITE OF UNSPECIFIED FEMALE BREAST: ICD-10-CM

## 2021-11-30 DIAGNOSIS — E11.9 TYPE 2 DIABETES MELLITUS WITHOUT COMPLICATIONS: ICD-10-CM

## 2021-11-30 LAB
ALBUMIN SERPL ELPH-MCNC: 4.3 G/DL — SIGNIFICANT CHANGE UP (ref 3.3–5)
ALP SERPL-CCNC: 125 U/L — HIGH (ref 40–120)
ALT FLD-CCNC: 12 U/L — SIGNIFICANT CHANGE UP (ref 10–45)
ANION GAP SERPL CALC-SCNC: 12 MMOL/L — SIGNIFICANT CHANGE UP (ref 5–17)
APTT BLD: 30.8 SEC — SIGNIFICANT CHANGE UP (ref 27.5–35.5)
AST SERPL-CCNC: 19 U/L — SIGNIFICANT CHANGE UP (ref 10–40)
BASOPHILS # BLD AUTO: 0.05 K/UL — SIGNIFICANT CHANGE UP (ref 0–0.2)
BASOPHILS NFR BLD AUTO: 0.4 % — SIGNIFICANT CHANGE UP (ref 0–2)
BILIRUB SERPL-MCNC: 0.3 MG/DL — SIGNIFICANT CHANGE UP (ref 0.2–1.2)
BUN SERPL-MCNC: 13 MG/DL — SIGNIFICANT CHANGE UP (ref 7–23)
CALCIUM SERPL-MCNC: 9.2 MG/DL — SIGNIFICANT CHANGE UP (ref 8.4–10.5)
CHLORIDE SERPL-SCNC: 93 MMOL/L — LOW (ref 96–108)
CO2 SERPL-SCNC: 23 MMOL/L — SIGNIFICANT CHANGE UP (ref 22–31)
CREAT SERPL-MCNC: 0.71 MG/DL — SIGNIFICANT CHANGE UP (ref 0.5–1.3)
EOSINOPHIL # BLD AUTO: 0.04 K/UL — SIGNIFICANT CHANGE UP (ref 0–0.5)
EOSINOPHIL NFR BLD AUTO: 0.4 % — SIGNIFICANT CHANGE UP (ref 0–6)
GLUCOSE BLDC GLUCOMTR-MCNC: 129 MG/DL — HIGH (ref 70–99)
GLUCOSE SERPL-MCNC: 208 MG/DL — HIGH (ref 70–99)
HCT VFR BLD CALC: 43.9 % — SIGNIFICANT CHANGE UP (ref 34.5–45)
HGB BLD-MCNC: 14.7 G/DL — SIGNIFICANT CHANGE UP (ref 11.5–15.5)
IMM GRANULOCYTES NFR BLD AUTO: 0.4 % — SIGNIFICANT CHANGE UP (ref 0–1.5)
INR BLD: 1.01 — SIGNIFICANT CHANGE UP (ref 0.88–1.16)
LYMPHOCYTES # BLD AUTO: 1.17 K/UL — SIGNIFICANT CHANGE UP (ref 1–3.3)
LYMPHOCYTES # BLD AUTO: 10.3 % — LOW (ref 13–44)
MCHC RBC-ENTMCNC: 31.3 PG — SIGNIFICANT CHANGE UP (ref 27–34)
MCHC RBC-ENTMCNC: 33.5 GM/DL — SIGNIFICANT CHANGE UP (ref 32–36)
MCV RBC AUTO: 93.4 FL — SIGNIFICANT CHANGE UP (ref 80–100)
MONOCYTES # BLD AUTO: 1.16 K/UL — HIGH (ref 0–0.9)
MONOCYTES NFR BLD AUTO: 10.3 % — SIGNIFICANT CHANGE UP (ref 2–14)
NEUTROPHILS # BLD AUTO: 8.85 K/UL — HIGH (ref 1.8–7.4)
NEUTROPHILS NFR BLD AUTO: 78.2 % — HIGH (ref 43–77)
NRBC # BLD: 0 /100 WBCS — SIGNIFICANT CHANGE UP (ref 0–0)
NT-PROBNP SERPL-SCNC: 92 PG/ML — SIGNIFICANT CHANGE UP (ref 0–300)
PLATELET # BLD AUTO: 309 K/UL — SIGNIFICANT CHANGE UP (ref 150–400)
POTASSIUM SERPL-MCNC: 4.4 MMOL/L — SIGNIFICANT CHANGE UP (ref 3.5–5.3)
POTASSIUM SERPL-SCNC: 4.4 MMOL/L — SIGNIFICANT CHANGE UP (ref 3.5–5.3)
PROT SERPL-MCNC: 7.9 G/DL — SIGNIFICANT CHANGE UP (ref 6–8.3)
PROTHROM AB SERPL-ACNC: 12.1 SEC — SIGNIFICANT CHANGE UP (ref 10.6–13.6)
RBC # BLD: 4.7 M/UL — SIGNIFICANT CHANGE UP (ref 3.8–5.2)
RBC # FLD: 13.2 % — SIGNIFICANT CHANGE UP (ref 10.3–14.5)
SARS-COV-2 RNA SPEC QL NAA+PROBE: NEGATIVE — SIGNIFICANT CHANGE UP
SODIUM SERPL-SCNC: 128 MMOL/L — LOW (ref 135–145)
TROPONIN T SERPL-MCNC: 0.01 NG/ML — SIGNIFICANT CHANGE UP (ref 0–0.01)
WBC # BLD: 11.31 K/UL — HIGH (ref 3.8–10.5)
WBC # FLD AUTO: 11.31 K/UL — HIGH (ref 3.8–10.5)

## 2021-11-30 PROCEDURE — 88112 CYTOPATH CELL ENHANCE TECH: CPT | Mod: 26

## 2021-11-30 PROCEDURE — 99214 OFFICE O/P EST MOD 30 MIN: CPT

## 2021-11-30 PROCEDURE — 88341 IMHCHEM/IMCYTCHM EA ADD ANTB: CPT | Mod: 26

## 2021-11-30 PROCEDURE — 88342 IMHCHEM/IMCYTCHM 1ST ANTB: CPT | Mod: 26

## 2021-11-30 PROCEDURE — 88305 TISSUE EXAM BY PATHOLOGIST: CPT | Mod: 26

## 2021-11-30 PROCEDURE — 99285 EMERGENCY DEPT VISIT HI MDM: CPT

## 2021-11-30 PROCEDURE — 71045 X-RAY EXAM CHEST 1 VIEW: CPT | Mod: 26

## 2021-11-30 PROCEDURE — 93010 ELECTROCARDIOGRAM REPORT: CPT

## 2021-11-30 PROCEDURE — G1004: CPT

## 2021-11-30 PROCEDURE — 71250 CT THORAX DX C-: CPT | Mod: 26,MG

## 2021-11-30 RX ORDER — SODIUM CHLORIDE 9 MG/ML
1000 INJECTION, SOLUTION INTRAVENOUS
Refills: 0 | Status: DISCONTINUED | OUTPATIENT
Start: 2021-11-30 | End: 2021-12-10

## 2021-11-30 RX ORDER — LEVOTHYROXINE SODIUM 125 MCG
88 TABLET ORAL EVERY 24 HOURS
Refills: 0 | Status: DISCONTINUED | OUTPATIENT
Start: 2021-12-01 | End: 2021-12-10

## 2021-11-30 RX ORDER — GLUCAGON INJECTION, SOLUTION 0.5 MG/.1ML
1 INJECTION, SOLUTION SUBCUTANEOUS ONCE
Refills: 0 | Status: DISCONTINUED | OUTPATIENT
Start: 2021-11-30 | End: 2021-12-10

## 2021-11-30 RX ORDER — DEXTROSE 50 % IN WATER 50 %
12.5 SYRINGE (ML) INTRAVENOUS ONCE
Refills: 0 | Status: DISCONTINUED | OUTPATIENT
Start: 2021-11-30 | End: 2021-12-10

## 2021-11-30 RX ORDER — DEXTROSE 50 % IN WATER 50 %
15 SYRINGE (ML) INTRAVENOUS ONCE
Refills: 0 | Status: DISCONTINUED | OUTPATIENT
Start: 2021-11-30 | End: 2021-12-10

## 2021-11-30 RX ORDER — INSULIN LISPRO 100/ML
VIAL (ML) SUBCUTANEOUS
Refills: 0 | Status: DISCONTINUED | OUTPATIENT
Start: 2021-11-30 | End: 2021-12-10

## 2021-11-30 RX ORDER — DEXTROSE 50 % IN WATER 50 %
25 SYRINGE (ML) INTRAVENOUS ONCE
Refills: 0 | Status: DISCONTINUED | OUTPATIENT
Start: 2021-11-30 | End: 2021-12-10

## 2021-11-30 NOTE — H&P ADULT - HISTORY OF PRESENT ILLNESS
88 year old female PMHx breast cancer, DM2, hypothyroidism, HLD, HTN, lymphedema, endometrial carcinoma s/p hysterectomy and bilateral oophorectomy, and benign pancreatic tail adenoma presents with malaise for 2 weeks associated with intermittent pressure like chest comfort triggered by exertion. Ms. Collins decided to see Dr. Hargrove because she "just didn't feel well" and reported that Dr. Hargrove heard fluid in her lungs and an irregular heart beat and told her to go the the ED.     Ms. Collins denies fevers, chills, HA, chest pain, sob, nausea, vomiting, abdominal pain, diarrhea, constipation, dysuria. She denies orthopnea, LE edema, or trouble walking.    88 year old female PMHx breast cancer, DM2, hypothyroidism, HLD, HTN, lymphedema, endometrial carcinoma s/p hysterectomy and bilateral oophorectomy, and benign pancreatic tail adenoma presents with malaise for 2 weeks associated with intermittent pressure like chest comfort triggered by exertion. Ms. Collins decided to see Dr. Hargrove because she "just didn't feel well". At his office, Dr. Hargrove heard fluid in Ms. Collins's left lung and an irregular heart beat and told her to go the the ED.     Ms. Collins denies fevers, chills, HA, chest pain, sob, nausea, vomiting, abdominal pain, diarrhea, constipation, dysuria. She denies orthopnea, LE edema, or trouble walking.     ED  VS: Temp 98F, HR 78, /77, RR 16 on 95%RA  Labs: WBC 11.31, Na 128, glucose 208  Studies: EKG: NSR, no ischemic changes  Imaging: CT chest: moderate left pleural effusion, highly suspect multiple masses in the left breast with overlying skin thickening, diffuse sclerotic mets CXR: large left pleural effusion, diffuse sclerotic mets   Intervention: No interventions      88 year old female PMHx breast cancer, DM2, hypothyroidism, HLD, HTN, lymphedema, endometrial carcinoma s/p hysterectomy and bilateral oophorectomy, and benign pancreatic tail adenoma presents with malaise for 2 weeks associated with intermittent pressure like chest comfort triggered by exertion. Ms. Collins decided to see Dr. Hargrove because she "just didn't feel well". At his office, Dr. Hargrove heard fluid in Ms. Collins's left lung and an irregular heart beat and told her to go the the ED.     Ms. Collins denies fevers, chills, HA, chest pain, sob, nausea, vomiting, abdominal pain, diarrhea, constipation, dysuria. She denies orthopnea, LE edema, or trouble walking.     ED  VS: Temp 98F, HR 78, /77, RR 16 on 95%RA  Labs: WBC 11.31, Na 128, glucose 208  Studies: EKG: NSR, no ischemic changes  Imaging: CT chest: moderate left pleural effusion, highly suspect multiple masses in the left breast with overlying skin thickening, diffuse sclerotic mets   CXR: large left pleural effusion, diffuse sclerotic mets   Intervention: No interventions

## 2021-11-30 NOTE — H&P ADULT - NSHPLABSRESULTS_GEN_ALL_CORE
LABS:                         14.7   11.31 )-----------( 309      ( 30 Nov 2021 14:15 )             43.9     11-30    128<L>  |  93<L>  |  13  ----------------------------<  208<H>  4.4   |  23  |  0.71    Ca    9.2      30 Nov 2021 14:15    TPro  7.9  /  Alb  4.3  /  TBili  0.3  /  DBili  x   /  AST  19  /  ALT  12  /  AlkPhos  125<H>  11-30    PT/INR - ( 30 Nov 2021 14:15 )   PT: 12.1 sec;   INR: 1.01          PTT - ( 30 Nov 2021 14:15 )  PTT:30.8 sec    CARDIAC MARKERS ( 30 Nov 2021 14:15 )  x     / 0.01 ng/mL / x     / x     / x          Serum Pro-Brain Natriuretic Peptide: 92 pg/mL (11-30 @ 14:15)        RADIOLOGY, EKG & ADDITIONAL TESTS: Reviewed. keep dressing clean dry and intact until follow up visit  follow up with Dr. Murphy next week  elevate both lower extremity  weight bear as tolerated in surgical shoe to bilateral heels

## 2021-11-30 NOTE — ED PROVIDER NOTE - IV ALTEPLASE DOOR HIDDEN
Patricia Morales from [address] called and lvm patient needs refill on revlimid 10 mg
show

## 2021-11-30 NOTE — H&P ADULT - PROBLEM SELECTOR PLAN 1
CT chest shows moderate left pleural effusion, highly suspect multiple masses in the left breast with overlying skin thickening, and diffuse sclerotic mets. Pleural effusion likely malignant in origin   -pulm consult in AM for possible tap   -NPO after midnight  -Type and screen, coags   -DVT held pending tap

## 2021-11-30 NOTE — ASSESSMENT
[FreeTextEntry1] : Appears to have worsening status;\par Fluid appears to be increased on left;\par Heart irregular; \par Will likely admit to hospital for further evaluation

## 2021-11-30 NOTE — ED ADULT NURSE NOTE - NSIMPLEMENTINTERV_GEN_ALL_ED
Implemented All Fall with Harm Risk Interventions:  [place] to call system. Call bell, personal items and telephone within reach. Instruct patient to call for assistance. Room bathroom lighting operational. Non-slip footwear when patient is off stretcher. Physically safe environment: no spills, clutter or unnecessary equipment. Stretcher in lowest position, wheels locked, appropriate side rails in place. Provide visual cue, wrist band, yellow gown, etc. Monitor gait and stability. Monitor for mental status changes and reorient to person, place, and time. Review medications for side effects contributing to fall risk. Reinforce activity limits and safety measures with patient and family. Provide visual clues: red socks.

## 2021-11-30 NOTE — H&P ADULT - NSHPPHYSICALEXAM_GEN_ALL_CORE
LOS:     VITALS:   T(C): 36.7 (11-30-21 @ 13:16), Max: 36.7 (11-30-21 @ 13:16)  HR: 78 (11-30-21 @ 13:16) (78 - 78)  BP: 149/77 (11-30-21 @ 13:16) (149/77 - 149/77)  RR: 16 (11-30-21 @ 13:16) (16 - 16)  SpO2: 95% (11-30-21 @ 13:16) (95% - 95%)    GENERAL: elderly frail woman in NAD, lying in bed comfortably  HEAD:  Atraumatic, Normocephalic  EYES: +injection in right eye, EOMI, PERRLA, conjunctiva and sclera clear  ENT: dry mucous membranes  NECK: Supple, No JVD  CHEST/LUNG: decreased breath sounds on left side of lungs; No rales, rhonchi, wheezing, or rubs. Unlabored respirations  HEART: Regular rate and rhythm; No murmurs, rubs, or gallops  ABDOMEN: BSx4; Soft, nontender, nondistended  EXTREMITIES:  2+ Peripheral Pulses, brisk capillary refill. No clubbing, cyanosis, or edema  NERVOUS SYSTEM:  A&Ox3, no focal deficits   SKIN: No rashes or lesions

## 2021-11-30 NOTE — ED PROVIDER NOTE - WR ORDER DATE AND TIME 1
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 Lazaro Smith at [facility] [place]    Gastrostomy tube in place     History of recent neurosurgical procedure  shunt revision 12/6/2018    Tracheostomy in place

## 2021-11-30 NOTE — H&P ADULT - PROBLEM SELECTOR PLAN 5
Na 128 on admission. Most likely hypotonic hypovolemic hyponatremia 2/2 poor po intake in the setting of malignancy.   -serum osm, urine osm, urine lytes   -trend BMP   -encourage PO intake, NS if hyponatremia fails to resolve

## 2021-11-30 NOTE — ED ADULT TRIAGE NOTE - CHIEF COMPLAINT QUOTE
Pt reports sob and palpitations on exertion x 2-3 weeks, denies chest pain, cough, n/v, fevers. Pt sent in by Dr. Hargrove, as per pt "he said there's fluid in my left lung."

## 2021-11-30 NOTE — H&P ADULT - PROBLEM SELECTOR PLAN 3
Patient reports two year history of breast cancer treated with fulvestrant and Xgeva. Patient most likely presenting with metastatic breast cancer. CT chest shows highly suspect multiple masses in the left breast with overlying skin thickening, left sided pleural effusion, and diffuse sclerotic mets.  -consult Dr. Funez in the AM Patient reports two year history of breast cancer treated with fulvestrant and Xgeva. Patient most likely presenting with metastatic breast cancer with CT chest showing highly suspect multiple masses in the left breast with overlying skin thickening, left sided pleural effusion, and diffuse sclerotic mets.  -consult Dr. Funez in the AM  -pulm consult for pleural effusion and tap

## 2021-11-30 NOTE — H&P ADULT - ASSESSMENT
88 year old female PMHx breast cancer, DM2, hypothyroidism, HLD, HTN, lymphedema, endometrial carcinoma s/p hysterectomy and bilateral oophorectomy, and benign pancreatic tail adenoma presents with malaise for 2 weeks associated with intermittent pressure like chest comfort triggered by exertion. Found to have left sided pleural effusion on CT chest, likely to be malignant in origin. Admitted to medicine for pulm eval and possible tap

## 2021-11-30 NOTE — ED PROVIDER NOTE - ATTENDING CONTRIBUTION TO CARE
No 88 F hx breast CA on oral chemo, DM, HTN presenting with TESFAYE x 2 weeks with intermittent chest discomfort, random no associated symptoms.  Denies le edema or swelling.  Pt was sent by PCP for effusion to L pleural space.  VSS.  Pt looks well.  Decreased BS L base.  EKG non-specific NSR.  Plan labs, CT and admission for drainage.

## 2021-11-30 NOTE — H&P ADULT - NSHPSOCIALHISTORY_GEN_ALL_CORE
Lives alone. Able to perform daily ADLs such as cooking, shopping, and doing chores. Walks without assistance. Denies alcohol, tobacco use, illicit drug use.

## 2021-11-30 NOTE — PATIENT PROFILE ADULT - FALL HARM RISK - HARM RISK INTERVENTIONS

## 2021-11-30 NOTE — H&P ADULT - NSICDXPASTMEDICALHX_GEN_ALL_CORE_FT
PAST MEDICAL HISTORY:  Breast cancer     Endometrial cancer     HTN (hypertension)     Hypothyroid     Type 2 diabetes mellitus DM (diabetes mellitus)

## 2021-11-30 NOTE — HISTORY OF PRESENT ILLNESS
[FreeTextEntry1] : Has not felt well for a month;\par Fatigue and shortness of breath at times;\par Appetite no real change [de-identified] : Was taking sleep medication but stopped now because of questionable side affects; \par Some weight loss

## 2021-11-30 NOTE — ED PROVIDER NOTE - CLINICAL SUMMARY MEDICAL DECISION MAKING FREE TEXT BOX
sob x 2-3 wks. pt well appearing. vss. ecg no ischemic changes, labs noted troponin and BNP negative. + pleural effusion on cxr.  case dw Dr Hargrove and plan for ct scan and admit to his service.

## 2021-11-30 NOTE — ED PROVIDER NOTE - OBJECTIVE STATEMENT
87 y/o female with hx of DM, breast ca, and hyperthyroidism c/o sob x 2-3 wks. pt states sob but able to get around. pt notes intermittent brief episodes of chest discomfort. no ha or dizziness. no cough or fever. no abd pain, n/v. no leg pain or swelling. pt seen by Dr Hargrove today and told irregular heart beat and fluid in left lung and sent to ED. no further complaints.

## 2021-11-30 NOTE — PHYSICAL EXAM
[Normal] : normal gait, coordination grossly intact, no focal deficits and deep tendon reflexes were 2+ and symmetric [de-identified] : Conjuntival hemorrhage   [de-identified] : no breath sounds on left [de-identified] : oirregualr heart rate

## 2021-11-30 NOTE — ED ADULT NURSE NOTE - OBJECTIVE STATEMENT
Pt presents to ED C/O increased SOB upon exertion x 3 weeks. Seen at PCP who recommended to come to ER. Pt states " My Dr. said he could hear fluid in my lungs" Pt has hx of breast CA. Denies CP, cough, fever. Upon exam, no lower leg edema noted, pt has baseline swelling to L arm. Made comfortable, RN continuing to assess.

## 2021-11-30 NOTE — H&P ADULT - PROBLEM SELECTOR PLAN 8
F: none  E: replete mg<2, K<4  N: carb consistent   DVT: none pending pleural tap   Dispo: [facility]   Code: DNR/DNI

## 2021-11-30 NOTE — H&P ADULT - PROBLEM SELECTOR PLAN 2
Patient reports intermittent exertional chest discomfort that is described as "pressure-like" at localized to the center of the sternum. Patient denies personal or family history of cardiac disease. EKG shows NSR with no ischemic changes. Trop negative, BNP negative. CXR negative for acute cardiac pathology.   -monitor Patient reports intermittent exertional chest discomfort that is described as "pressure-like" at localized to the center of the sternum. Patient denies personal or family history of cardiac disease. EKG shows NSR with no ischemic changes. Trop negative, BNP negative.  CXR + pleural effusion. Discomfort most likely 2/2 pleural effusion in setting of metastatic breast cancer   -monitor pain   -pulm consult and possible tap

## 2021-11-30 NOTE — H&P ADULT - PROBLEM SELECTOR PLAN 4
Patient reports being diagnosed with type 2 diabetes at age 80. 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 Home med: glimepiride 1mg bid  -hold home med  -f/u A1C  -place on ISS for now, start basal bolus tomorrow

## 2021-12-01 DIAGNOSIS — J93.9 PNEUMOTHORAX, UNSPECIFIED: ICD-10-CM

## 2021-12-01 LAB
A1C WITH ESTIMATED AVERAGE GLUCOSE RESULT: 7.6 % — HIGH (ref 4–5.6)
ALBUMIN FLD-MCNC: 2.8 G/DL — SIGNIFICANT CHANGE UP
ANION GAP SERPL CALC-SCNC: 6 MMOL/L — SIGNIFICANT CHANGE UP (ref 5–17)
APTT BLD: 28.7 SEC — SIGNIFICANT CHANGE UP (ref 27.5–35.5)
BLD GP AB SCN SERPL QL: NEGATIVE — SIGNIFICANT CHANGE UP
BUN SERPL-MCNC: 12 MG/DL — SIGNIFICANT CHANGE UP (ref 7–23)
CALCIUM SERPL-MCNC: 8.8 MG/DL — SIGNIFICANT CHANGE UP (ref 8.4–10.5)
CHLORIDE SERPL-SCNC: 98 MMOL/L — SIGNIFICANT CHANGE UP (ref 96–108)
CO2 SERPL-SCNC: 27 MMOL/L — SIGNIFICANT CHANGE UP (ref 22–31)
CREAT FLD-MCNC: 0.69 MG/DL — SIGNIFICANT CHANGE UP
CREAT SERPL-MCNC: 0.79 MG/DL — SIGNIFICANT CHANGE UP (ref 0.5–1.3)
ESTIMATED AVERAGE GLUCOSE: 171 MG/DL — HIGH (ref 68–114)
GLUCOSE BLDC GLUCOMTR-MCNC: 117 MG/DL — HIGH (ref 70–99)
GLUCOSE BLDC GLUCOMTR-MCNC: 121 MG/DL — HIGH (ref 70–99)
GLUCOSE BLDC GLUCOMTR-MCNC: 137 MG/DL — HIGH (ref 70–99)
GLUCOSE BLDC GLUCOMTR-MCNC: 216 MG/DL — HIGH (ref 70–99)
GLUCOSE FLD-MCNC: 189 MG/DL — SIGNIFICANT CHANGE UP
GLUCOSE SERPL-MCNC: 129 MG/DL — HIGH (ref 70–99)
GRAM STN FLD: SIGNIFICANT CHANGE UP
HCT VFR BLD CALC: 41.6 % — SIGNIFICANT CHANGE UP (ref 34.5–45)
HGB BLD-MCNC: 13.6 G/DL — SIGNIFICANT CHANGE UP (ref 11.5–15.5)
INR BLD: 1.04 — SIGNIFICANT CHANGE UP (ref 0.88–1.16)
LDH SERPL L TO P-CCNC: 103 U/L — SIGNIFICANT CHANGE UP
MAGNESIUM SERPL-MCNC: 2.1 MG/DL — SIGNIFICANT CHANGE UP (ref 1.6–2.6)
MCHC RBC-ENTMCNC: 31 PG — SIGNIFICANT CHANGE UP (ref 27–34)
MCHC RBC-ENTMCNC: 32.7 GM/DL — SIGNIFICANT CHANGE UP (ref 32–36)
MCV RBC AUTO: 94.8 FL — SIGNIFICANT CHANGE UP (ref 80–100)
NRBC # BLD: 0 /100 WBCS — SIGNIFICANT CHANGE UP (ref 0–0)
OSMOLALITY SERPL: 273 MOSM/KG — LOW (ref 280–301)
PH, PLEURAL FLUID: 7.61 — SIGNIFICANT CHANGE UP
PHOSPHATE SERPL-MCNC: 2.7 MG/DL — SIGNIFICANT CHANGE UP (ref 2.5–4.5)
PLATELET # BLD AUTO: 319 K/UL — SIGNIFICANT CHANGE UP (ref 150–400)
POTASSIUM SERPL-MCNC: 5.1 MMOL/L — SIGNIFICANT CHANGE UP (ref 3.5–5.3)
POTASSIUM SERPL-SCNC: 5.1 MMOL/L — SIGNIFICANT CHANGE UP (ref 3.5–5.3)
PROT FLD-MCNC: 4.7 G/DL — SIGNIFICANT CHANGE UP
PROTHROM AB SERPL-ACNC: 12.5 SEC — SIGNIFICANT CHANGE UP (ref 10.6–13.6)
RBC # BLD: 4.39 M/UL — SIGNIFICANT CHANGE UP (ref 3.8–5.2)
RBC # FLD: 13.1 % — SIGNIFICANT CHANGE UP (ref 10.3–14.5)
RH IG SCN BLD-IMP: POSITIVE — SIGNIFICANT CHANGE UP
SODIUM SERPL-SCNC: 131 MMOL/L — LOW (ref 135–145)
SPECIMEN SOURCE FLD: SIGNIFICANT CHANGE UP
SPECIMEN SOURCE: SIGNIFICANT CHANGE UP
TSH SERPL-MCNC: 2.94 UIU/ML — SIGNIFICANT CHANGE UP (ref 0.27–4.2)
WBC # BLD: 8.72 K/UL — SIGNIFICANT CHANGE UP (ref 3.8–10.5)
WBC # FLD AUTO: 8.72 K/UL — SIGNIFICANT CHANGE UP (ref 3.8–10.5)

## 2021-12-01 PROCEDURE — 99221 1ST HOSP IP/OBS SF/LOW 40: CPT | Mod: GC,25

## 2021-12-01 PROCEDURE — 71045 X-RAY EXAM CHEST 1 VIEW: CPT | Mod: 26,76

## 2021-12-01 PROCEDURE — 99222 1ST HOSP IP/OBS MODERATE 55: CPT | Mod: GC

## 2021-12-01 PROCEDURE — 32555 ASPIRATE PLEURA W/ IMAGING: CPT | Mod: GC

## 2021-12-01 RX ORDER — SODIUM CHLORIDE 9 MG/ML
1000 INJECTION INTRAMUSCULAR; INTRAVENOUS; SUBCUTANEOUS
Refills: 0 | Status: DISCONTINUED | OUTPATIENT
Start: 2021-12-01 | End: 2021-12-01

## 2021-12-01 RX ADMIN — Medication 88 MICROGRAM(S): at 06:28

## 2021-12-01 RX ADMIN — Medication 4: at 17:12

## 2021-12-01 RX ADMIN — Medication 62.5 MILLIMOLE(S): at 13:42

## 2021-12-01 NOTE — PROCEDURE NOTE - ADDITIONAL PROCEDURE DETAILS
Large left sided simple pleural effusion present on ultrasound; approx 900cc of clear straw colored fluid and some air aspirated; specimens sent to lab; lung sliding present on ultrasound; pending CXR

## 2021-12-01 NOTE — PROGRESS NOTE ADULT - ASSESSMENT
Ms. Collins is an 88 year old female PMHx breast cancer, DM2, hypothyroidism, HLD, HTN, lymphedema, endometrial carcinoma s/p hysterectomy and bilateral oophorectomy, and benign pancreatic tail adenoma presents with malaise for 2 weeks associated with intermittent pressure like chest comfort triggered by exertion. Found to have left sided pleural effusion on CT chest, likely to be malignant in origin. Admitted to medicine for pulm eval and possible tap.

## 2021-12-01 NOTE — CONSULT NOTE ADULT - SUBJECTIVE AND OBJECTIVE BOX
PULMONARY SERVICE INITIAL CONSULT NOTE  -------------------------------------------------------------    HPI:     89yo F minimal-former-smoker, with hx of Breast Ca (w/ likely char mets, on Denosumab and Folvestrant, follows with Dr. Fuenz), hypothyroidism,   Presents from outpatient office on 11/30 for several weeks of fatigue and chest tightness on exertion,   Found with persistent known left-sided pleural effusion     Pulmonary consulted for evaluation for thoracentesis     --          Otherwise, pt denies fevers/chills, HA, dizziness, CP, SOB, cough, palpitations, abd pain, N/V, bowel changes, ext swelling     -------------------------------------------------------------  PAST MEDICAL & SURGICAL HISTORY:  Type 2 diabetes mellitus  DM (diabetes mellitus)    HTN (hypertension)    Breast cancer    Hypothyroid    Endometrial cancer    Other acquired absence of organ  S/P splenectomy    Status post total hysterectomy  S/P hysterectomy    Other acquired absence of organ  S/P cholecystectomy    Status post cataract extraction  S/P cataract extraction        FAMILY HISTORY:  FH: type 2 diabetes (Mother)        SOCIAL HISTORY:  Smoking Status: [ ] Current, [ ] Former, [ ] Never  Pack Years:    MEDICATIONS:  Pulmonary:    Antimicrobials:    Anticoagulants:    Onc:    GI/:    Endocrine:  dextrose 40% Gel 15 Gram(s) Oral once  dextrose 50% Injectable 25 Gram(s) IV Push once  dextrose 50% Injectable 12.5 Gram(s) IV Push once  dextrose 50% Injectable 25 Gram(s) IV Push once  glucagon  Injectable 1 milliGRAM(s) IntraMuscular once  insulin lispro (ADMELOG) corrective regimen sliding scale   SubCutaneous Before meals and at bedtime  levothyroxine 88 MICROGram(s) Oral every 24 hours    Cardiac:    Other Medications:  dextrose 5%. 1000 milliLiter(s) IV Continuous <Continuous>  dextrose 5%. 1000 milliLiter(s) IV Continuous <Continuous>      Allergies    No Known Allergies    Intolerances        Vital Signs Last 24 Hrs  T(C): 36.3 (01 Dec 2021 09:00), Max: 36.7 (30 Nov 2021 21:51)  T(F): 97.3 (01 Dec 2021 09:00), Max: 98.1 (30 Nov 2021 21:51)  HR: 74 (01 Dec 2021 09:00) (73 - 85)  BP: 133/80 (01 Dec 2021 09:00) (121/69 - 142/73)  BP(mean): --  RR: 18 (01 Dec 2021 09:00) (18 - 20)  SpO2: 95% (01 Dec 2021 09:00) (93% - 97%)        -------------------------------------------------------------  PHYSICAL EXAM:    GEN: Comfortable, in NAD  HEENT: NC/AT, PEERLA, MMM  CARDIAC: RRR, Normal S1/S2, No MRGs  PULMONARY: CTA BL, no wheezing/rhonchi/rales - no accessory muscle use   ABDOMEN: Soft, NT/ND   EXT: No LE edema  NEURO: A&O x 3, CN II-XII grossly intact, moves all ext, sensation intact    -------------------------------------------------------------  LABS:        CBC Full  -  ( 01 Dec 2021 06:02 )  WBC Count : 8.72 K/uL  RBC Count : 4.39 M/uL  Hemoglobin : 13.6 g/dL  Hematocrit : 41.6 %  Platelet Count - Automated : 319 K/uL  Mean Cell Volume : 94.8 fl  Mean Cell Hemoglobin : 31.0 pg  Mean Cell Hemoglobin Concentration : 32.7 gm/dL  Auto Neutrophil # : x  Auto Lymphocyte # : x  Auto Monocyte # : x  Auto Eosinophil # : x  Auto Basophil # : x  Auto Neutrophil % : x  Auto Lymphocyte % : x  Auto Monocyte % : x  Auto Eosinophil % : x  Auto Basophil % : x    12-01    131<L>  |  98  |  12  ----------------------------<  129<H>  5.1   |  27  |  0.79    Ca    8.8      01 Dec 2021 06:02  Phos  2.7     12-01  Mg     2.1     12-01    TPro  7.9  /  Alb  4.3  /  TBili  0.3  /  DBili  x   /  AST  19  /  ALT  12  /  AlkPhos  125<H>  11-30    PT/INR - ( 01 Dec 2021 06:02 )   PT: 12.5 sec;   INR: 1.04          PTT - ( 01 Dec 2021 06:02 )  PTT:28.7 sec                  -------------------------------------------------------------  RADIOLOGY & ADDITIONAL STUDIES:   PULMONARY SERVICE INITIAL CONSULT NOTE  -------------------------------------------------------------    HPI:     89yo F minimal-former-smoker, with hx of Breast Ca (w/ likely char mets, on Denosumab and Folvestrant, follows with Dr. Funez), hypothyroidism,   Presents from outpatient office on 11/30 for several weeks of fatigue and chest tightness on exertion,   Found with persistent known left-sided pleural effusion     Pulmonary consulted for evaluation for thoracentesis     --    Pt presented January 2020 to [facility] with similar symptoms and also found with moderate-sized L pleural effusion on CT imaging. Pulmonary was consulted with plans for thoracentesis, however pocket of fluid was not amenable for safe drainage.     She now reports several weeks of fatigue, malaise with chest tightness on exertion that resolves with rest. She does not report shortness of breath, cough, wheezing, fevers/chills. She is able to walk several blocks without assistance. She went to her PCP, Dr. Hargrove, who noted decreased breath sounds to left chest as well as irregular heart rate and was advised to come to ED.     In ED, VSS - 95% O2 on RA  No leukocytosis, stable Hgb, Hyponatremia to 129  Chest CT with stable moderate-sized L pleural effusion, 2 sub-cm nodules in RML, and diffuse osseous lesions    --    Pt seen and examined this PM.          Otherwise, pt denies fevers/chills, HA, dizziness, CP, SOB, cough, palpitations, abd pain, N/V, bowel changes, ext swelling     -------------------------------------------------------------  PAST MEDICAL & SURGICAL HISTORY:  Type 2 diabetes mellitus  DM (diabetes mellitus)    HTN (hypertension)    Breast cancer    Hypothyroid    Endometrial cancer    Other acquired absence of organ  S/P splenectomy    Status post total hysterectomy  S/P hysterectomy    Other acquired absence of organ  S/P cholecystectomy    Status post cataract extraction  S/P cataract extraction        FAMILY HISTORY:  FH: type 2 diabetes (Mother)        SOCIAL HISTORY:  Smoking Status: [ ] Current, [ ] Former, [ ] Never  Pack Years:    MEDICATIONS:  Pulmonary:    Antimicrobials:    Anticoagulants:    Onc:    GI/:    Endocrine:  dextrose 40% Gel 15 Gram(s) Oral once  dextrose 50% Injectable 25 Gram(s) IV Push once  dextrose 50% Injectable 12.5 Gram(s) IV Push once  dextrose 50% Injectable 25 Gram(s) IV Push once  glucagon  Injectable 1 milliGRAM(s) IntraMuscular once  insulin lispro (ADMELOG) corrective regimen sliding scale   SubCutaneous Before meals and at bedtime  levothyroxine 88 MICROGram(s) Oral every 24 hours    Cardiac:    Other Medications:  dextrose 5%. 1000 milliLiter(s) IV Continuous <Continuous>  dextrose 5%. 1000 milliLiter(s) IV Continuous <Continuous>      Allergies    No Known Allergies    Intolerances        Vital Signs Last 24 Hrs  T(C): 36.3 (01 Dec 2021 09:00), Max: 36.7 (30 Nov 2021 21:51)  T(F): 97.3 (01 Dec 2021 09:00), Max: 98.1 (30 Nov 2021 21:51)  HR: 74 (01 Dec 2021 09:00) (73 - 85)  BP: 133/80 (01 Dec 2021 09:00) (121/69 - 142/73)  BP(mean): --  RR: 18 (01 Dec 2021 09:00) (18 - 20)  SpO2: 95% (01 Dec 2021 09:00) (93% - 97%)        -------------------------------------------------------------  PHYSICAL EXAM:    GEN: Comfortable, in NAD  HEENT: NC/AT, PEERLA, MMM  CARDIAC: RRR, Normal S1/S2, No MRGs  PULMONARY: CTA BL, no wheezing/rhonchi/rales - no accessory muscle use   ABDOMEN: Soft, NT/ND   EXT: No LE edema  NEURO: A&O x 3, CN II-XII grossly intact, moves all ext, sensation intact    -------------------------------------------------------------  LABS:        CBC Full  -  ( 01 Dec 2021 06:02 )  WBC Count : 8.72 K/uL  RBC Count : 4.39 M/uL  Hemoglobin : 13.6 g/dL  Hematocrit : 41.6 %  Platelet Count - Automated : 319 K/uL  Mean Cell Volume : 94.8 fl  Mean Cell Hemoglobin : 31.0 pg  Mean Cell Hemoglobin Concentration : 32.7 gm/dL  Auto Neutrophil # : x  Auto Lymphocyte # : x  Auto Monocyte # : x  Auto Eosinophil # : x  Auto Basophil # : x  Auto Neutrophil % : x  Auto Lymphocyte % : x  Auto Monocyte % : x  Auto Eosinophil % : x  Auto Basophil % : x    12-01    131<L>  |  98  |  12  ----------------------------<  129<H>  5.1   |  27  |  0.79    Ca    8.8      01 Dec 2021 06:02  Phos  2.7     12-01  Mg     2.1     12-01    TPro  7.9  /  Alb  4.3  /  TBili  0.3  /  DBili  x   /  AST  19  /  ALT  12  /  AlkPhos  125<H>  11-30    PT/INR - ( 01 Dec 2021 06:02 )   PT: 12.5 sec;   INR: 1.04          PTT - ( 01 Dec 2021 06:02 )  PTT:28.7 sec                  -------------------------------------------------------------  RADIOLOGY & ADDITIONAL STUDIES:   PULMONARY SERVICE INITIAL CONSULT NOTE  -------------------------------------------------------------    HPI:     89yo F minimal-former-smoker, with hx of Breast Ca (w/ likely char mets, on Denosumab and Folvestrant, follows with Dr. Funez), hypothyroidism,   Presents from outpatient office on 11/30 for several weeks of fatigue and chest tightness on exertion,   Found with persistent known left-sided pleural effusion     Pulmonary consulted for evaluation for thoracentesis     --    Pt presented January 2020 to [facility] with similar symptoms and also found with moderate-sized L pleural effusion on CT imaging. Pulmonary was consulted with plans for thoracentesis, however pocket of fluid was not amenable for safe drainage.     She now reports several weeks of fatigue, malaise with chest tightness on exertion that resolves with rest. She does not report shortness of breath, cough, wheezing, fevers/chills. She is able to walk several blocks without assistance. She went to her PCP, Dr. Hargrove, who noted decreased breath sounds to left chest as well as irregular heart rate and was advised to come to ED.     In ED, VSS - 95% O2 on RA  No leukocytosis, stable Hgb, Hyponatremia to 129  Chest CT with stable moderate-sized L pleural effusion, 2 sub-cm nodules in RML, and diffuse osseous lesions    --    Pt seen and examined this PM.   Resting comfortably, not tachypneic,     Bedside U/S performed today:  - Moderate, L-sided simple-appearing pleural effusion with flattening of diaphragm   - No pleural effusion seen on R   - No pathological B-lines seen anteriorly          Otherwise, pt denies fevers/chills, HA, dizziness, CP, SOB, cough, palpitations, abd pain, N/V, bowel changes, ext swelling     -------------------------------------------------------------  PAST MEDICAL & SURGICAL HISTORY:  Type 2 diabetes mellitus  DM (diabetes mellitus)    HTN (hypertension)    Breast cancer    Hypothyroid    Endometrial cancer    Other acquired absence of organ  S/P splenectomy    Status post total hysterectomy  S/P hysterectomy    Other acquired absence of organ  S/P cholecystectomy    Status post cataract extraction  S/P cataract extraction        FAMILY HISTORY:  FH: type 2 diabetes (Mother)        SOCIAL HISTORY:  Smoking Status: [ ] Current, [X ] Former, [ ] Never  Pack Years: Minimal in 20s     MEDICATIONS:  Pulmonary:    Antimicrobials:    Anticoagulants:    Onc:    GI/:    Endocrine:  dextrose 40% Gel 15 Gram(s) Oral once  dextrose 50% Injectable 25 Gram(s) IV Push once  dextrose 50% Injectable 12.5 Gram(s) IV Push once  dextrose 50% Injectable 25 Gram(s) IV Push once  glucagon  Injectable 1 milliGRAM(s) IntraMuscular once  insulin lispro (ADMELOG) corrective regimen sliding scale   SubCutaneous Before meals and at bedtime  levothyroxine 88 MICROGram(s) Oral every 24 hours    Cardiac:    Other Medications:  dextrose 5%. 1000 milliLiter(s) IV Continuous <Continuous>  dextrose 5%. 1000 milliLiter(s) IV Continuous <Continuous>      Allergies    No Known Allergies    Intolerances        Vital Signs Last 24 Hrs  T(C): 36.3 (01 Dec 2021 09:00), Max: 36.7 (30 Nov 2021 21:51)  T(F): 97.3 (01 Dec 2021 09:00), Max: 98.1 (30 Nov 2021 21:51)  HR: 74 (01 Dec 2021 09:00) (73 - 85)  BP: 133/80 (01 Dec 2021 09:00) (121/69 - 142/73)  BP(mean): --  RR: 18 (01 Dec 2021 09:00) (18 - 20)  SpO2: 95% (01 Dec 2021 09:00) (93% - 97%)        -------------------------------------------------------------  PHYSICAL EXAM:    GEN: Comfortable, in NAD  HEENT: NC/AT, PEERLA, MMM  CARDIAC: RRR, Normal S1/S2, No MRGs  PULMONARY: Decreased BS L lower danie-thorax, no wheezing/rhonchi/rales - no accessory muscle use   ABDOMEN: Soft, NT/ND   EXT: No LE edema  NEURO: A&O x 3, CN II-XII grossly intact, moves all ext, sensation intact    -------------------------------------------------------------  LABS:        CBC Full  -  ( 01 Dec 2021 06:02 )  WBC Count : 8.72 K/uL  RBC Count : 4.39 M/uL  Hemoglobin : 13.6 g/dL  Hematocrit : 41.6 %  Platelet Count - Automated : 319 K/uL  Mean Cell Volume : 94.8 fl  Mean Cell Hemoglobin : 31.0 pg  Mean Cell Hemoglobin Concentration : 32.7 gm/dL  Auto Neutrophil # : x  Auto Lymphocyte # : x  Auto Monocyte # : x  Auto Eosinophil # : x  Auto Basophil # : x  Auto Neutrophil % : x  Auto Lymphocyte % : x  Auto Monocyte % : x  Auto Eosinophil % : x  Auto Basophil % : x    12-01    131<L>  |  98  |  12  ----------------------------<  129<H>  5.1   |  27  |  0.79    Ca    8.8      01 Dec 2021 06:02  Phos  2.7     12-01  Mg     2.1     12-01    TPro  7.9  /  Alb  4.3  /  TBili  0.3  /  DBili  x   /  AST  19  /  ALT  12  /  AlkPhos  125<H>  11-30    PT/INR - ( 01 Dec 2021 06:02 )   PT: 12.5 sec;   INR: 1.04          PTT - ( 01 Dec 2021 06:02 )  PTT:28.7 sec                  -------------------------------------------------------------  RADIOLOGY & ADDITIONAL STUDIES:    EXAM:  CT CHEST                          PROCEDURE DATE:  11/30/2021          INTERPRETATION:  CLINICAL INFORMATION: Left pleural effusion    COMPARISON: None.    PROCEDURE:  CT of the Chest was performed without intravenous contrast.  Sagittal andcoronal reformats were performed.    FINDINGS:    LUNGS AND AIRWAYS: Patent central airways.  2 subcentimeter nodules clustered in the right middle lobe, indeterminate.  PLEURA: Mild to moderate left pleural effusion.  MEDIASTINUM AND ANNMARIE: No lymphadenopathy.  VESSELS: Within normal limits.  HEART: Heart size is normal. No pericardial effusion.  CHEST WALL AND LOWER NECK: Highly suspect multiple masses in the left breast with overlying skin thickening and metallic clips in the vicinity. 1 cm subcutaneous nodule right paraxiphoid chest wall.  VISUALIZED UPPER ABDOMEN: Cluster of hepatic cysts. 1 cm nonobstructing left intrarenal calculus. Edema in the left lateral abdominal and chest walls.  BONES: Numerous sclerotic metastasis.    IMPRESSION:  Mild to moderate left pleural effusion which could be malignant.    Diffuse sclerotic osseous metastasis.    Left breast mass and skin thickening. Other incidental findings as above.

## 2021-12-01 NOTE — PROGRESS NOTE ADULT - PROBLEM SELECTOR PLAN 7
Patient with history of hypothyroidism; on synthroid 88mcg PO daily at home  -c/w home synthroid  -TSH this admission wnl

## 2021-12-01 NOTE — PROGRESS NOTE ADULT - PROBLEM SELECTOR PLAN 9
F: s/p 400ccs NS   E: replete mg<2, K<4  N: consistent carbs      VTE PPx: holding pharmacologic ac i/s/o recent thoracentesis   GI PPx: not indicated     Code status: DNR/DNI, MOLST in chart     Dispo: [id number]->[id number]

## 2021-12-01 NOTE — PROGRESS NOTE ADULT - PROBLEM SELECTOR PLAN 4
Patient reports being diagnosed with type 2 diabetes at age 80. 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 Home med: glimepiride 1mg bid  -holding home med  -A1C 7.6%  -c/w ISS   -monitor fingersticks, currently 120's  -if rise significantly will start on basal insulin

## 2021-12-01 NOTE — PROGRESS NOTE ADULT - PROBLEM SELECTOR PLAN 1
Post-thoracentesis CXR showed left upper lobe and left lower lobe pneumothoraces. SpO2 95% on RA. Per pulm, pt placed on NRB w/ improvement to SpO2 98%.  -pulm following, appreciate recs   -keep on NRB o/n into 12/2  -repeat CXR at 9PM, f/u  -repeat CXR in AM

## 2021-12-01 NOTE — PROGRESS NOTE ADULT - PROBLEM SELECTOR PLAN 5
Patient reports being diagnosed with type 2 diabetes at age 80. 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 Home med: glimepiride 1mg bid  -holding home glimepiride  -A1C 7.6%  -c/w ISS   -monitor fingersticks, currently 120's  -if rise significantly will start on basal insulin

## 2021-12-01 NOTE — PROGRESS NOTE ADULT - PROBLEM SELECTOR PLAN 4
Patient reports two year history of breast cancer treated with fulvestrant and Xgeva. Patient most likely presenting with metastatic breast cancer with CT chest showing highly suspect multiple masses in the left breast with overlying skin thickening, left sided pleural effusion, and diffuse sclerotic mets.  -Dr. Funez aware, f/u recs

## 2021-12-01 NOTE — CONSULT NOTE ADULT - ASSESSMENT
87yo F minimal-former-smoker, with hx of Breast Ca (w/ likely char mets, on Denosumab and Folvestrant, follows with Dr. Funez), hypothyroidism,   Presents from outpatient office on 11/30 for several weeks of fatigue and chest tightness on exertion,   Found with persistent known left-sided pleural effusion     Pulmonary consulted for evaluation for thoracentesis     #Pleural Effusion - Chronic, simple-appearing L-sided effusion dating back to at least Jan 2020 in the setting of L-sided breast cancer with likely char met disease; unclear contribution to her current symptoms of fatigue and malaise given the chronicity of the effusion; leading differential is 2/2 malignancy, low concern for parapneumonic/infected pleural space; rarely Denosumab can be associated with pleural effusion in 2-3% of cases   #Pneumothorax - Iatrogenic s/p thoracentesis, HD-stable, not hypoxemic and asymptomatic; may also be entrapped lung in the setting of removal of chronic pleural effusion with low likelihood of full expansion; ~3cm from apex to cupula based on first CXR after thoracentesis on 12/1      - s/p L thoracentesis on 12/1 with ~800cc removed of yellow-colored fluid  - f/u fluid cell count, culture and cytology   - Will continue to monitor pneumothorax; place on NRB and obtain repeat CXR at ~9pm or if symptoms of chest pain, SOB, or hypoxemia develop   - f/u Heme/Onc recs regarding status of her breast cancer  89yo F minimal-former-smoker, with hx of Breast Ca (w/ likely char mets, on Denosumab and Folvestrant, follows with Dr. Funez), hypothyroidism,   Presents from outpatient office on 11/30 for several weeks of fatigue and chest tightness on exertion,   Found with persistent known left-sided pleural effusion     Pulmonary consulted for evaluation for thoracentesis     #Pleural Effusion - Chronic, simple-appearing L-sided effusion dating back to at least Jan 2020 in the setting of L-sided breast cancer with likely char met disease; unclear contribution to her current symptoms of fatigue and malaise given the chronicity of the effusion; leading differential is 2/2 malignancy, low concern for parapneumonic/infected pleural space; rarely Denosumab can be associated with pleural effusion in 2-3% of cases     - f/u fluid cell count, culture and cytology   - f/u Heme/Onc recs regarding status of her breast cancer

## 2021-12-01 NOTE — PROGRESS NOTE ADULT - PROBLEM SELECTOR PLAN 3
Patient reports two year history of breast cancer treated with fulvestrant and Xgeva. Patient most likely presenting with metastatic breast cancer with CT chest showing highly suspect multiple masses in the left breast with overlying skin thickening, left sided pleural effusion, and diffuse sclerotic mets.  -Dr. Funez aware, f/u recs   -pulm consulted for pleural effusion and tap

## 2021-12-01 NOTE — PROGRESS NOTE ADULT - PROBLEM SELECTOR PLAN 2
Patient reports intermittent exertional chest discomfort that is described as "pressure-like" at localized to the center of the sternum. Patient denies personal or family history of cardiac disease. EKG shows NSR with no ischemic changes. Trop negative, BNP negative.  CXR + pleural effusion. Discomfort most likely 2/2 pleural effusion in setting of metastatic breast cancer   -monitor pain   -pulm consulted and possible tap

## 2021-12-01 NOTE — PROGRESS NOTE ADULT - SUBJECTIVE AND OBJECTIVE BOX
OVERNIGHT EVENTS: MOLST in chart    SUBJECTIVE / INTERVAL HPI: Patient seen and examined at bedside. Reports she is feeling fine and her breathing is fine. Denies any SOB. Reports her chest discomfort has improved this morning. Good PO intake. Has no complaints this morning. Patient denying chest pain, SOB, palpitations, cough. Patient denies fever, chills, HA, Dizziness, change in vision/hearing, N/V, abdominal pain, diarrhea, constipation, hematochezia/melena, dysuria, hematuria, new onset weakness/numbness, LE pain and/or swelling.  Remaining ROS negative       PHYSICAL EXAM:    General: pleasant elderly woman resting comfortably in bed in NAD  HEENT: NC/AT; PERRL, anicteric sclera; MMM. small right eye injection.  Neck: supple, no JVD  Cardiovascular: +S1/S2, RRR, no murmurs   Respiratory: decreased breath sounds on left side; no W/R/R. no increased WOB  Gastrointestinal: soft, NT/ND; +BSx4  Extremities: WWP; no edema, clubbing or cyanosis  Vascular: 2+ radial, DP pulses B/L  Neurological: AAOx3; no focal deficits  Psychiatric: pleasant mood and affect  Dermatologic: no appreciable wounds or damage to the skin    VITAL SIGNS:  Vital Signs Last 24 Hrs  T(C): 36.3 (01 Dec 2021 09:00), Max: 36.7 (30 Nov 2021 21:51)  T(F): 97.3 (01 Dec 2021 09:00), Max: 98.1 (30 Nov 2021 21:51)  HR: 74 (01 Dec 2021 09:00) (73 - 85)  BP: 133/80 (01 Dec 2021 09:00) (121/69 - 142/73)  BP(mean): --  RR: 18 (01 Dec 2021 09:00) (18 - 20)  SpO2: 95% (01 Dec 2021 09:00) (93% - 97%)      MEDICATIONS:  MEDICATIONS  (STANDING):  dextrose 40% Gel 15 Gram(s) Oral once  dextrose 5%. 1000 milliLiter(s) (50 mL/Hr) IV Continuous <Continuous>  dextrose 5%. 1000 milliLiter(s) (100 mL/Hr) IV Continuous <Continuous>  dextrose 50% Injectable 25 Gram(s) IV Push once  dextrose 50% Injectable 12.5 Gram(s) IV Push once  dextrose 50% Injectable 25 Gram(s) IV Push once  glucagon  Injectable 1 milliGRAM(s) IntraMuscular once  insulin lispro (ADMELOG) corrective regimen sliding scale   SubCutaneous Before meals and at bedtime  levothyroxine 88 MICROGram(s) Oral every 24 hours  sodium phosphate IVPB 15 milliMole(s) IV Intermittent once    MEDICATIONS  (PRN):      ALLERGIES:  Allergies    No Known Allergies    Intolerances        LABS:                        13.6   8.72  )-----------( 319      ( 01 Dec 2021 06:02 )             41.6     12-01    131<L>  |  98  |  12  ----------------------------<  129<H>  5.1   |  27  |  0.79    Ca    8.8      01 Dec 2021 06:02  Phos  2.7     12-01  Mg     2.1     12-01    TPro  7.9  /  Alb  4.3  /  TBili  0.3  /  DBili  x   /  AST  19  /  ALT  12  /  AlkPhos  125<H>  11-30    PT/INR - ( 01 Dec 2021 06:02 )   PT: 12.5 sec;   INR: 1.04          PTT - ( 01 Dec 2021 06:02 )  PTT:28.7 sec    CAPILLARY BLOOD GLUCOSE      POCT Blood Glucose.: 137 mg/dL (01 Dec 2021 12:29)      RADIOLOGY & ADDITIONAL TESTS: Reviewed. **********Transfer from [facility] to [facility]**********    Hospital Course: Ms. Collins is an 88 year old female PMHx breast cancer, DM2, hypothyroidism, HLD, HTN, lymphedema, endometrial carcinoma s/p hysterectomy and bilateral oophorectomy, and benign pancreatic tail adenoma presents with malaise for 2 weeks associated with intermittent pressure like chest comfort triggered by exertion. Ms. Collins decided to see Dr. Hargrove because she "just didn't feel well". At his office, Dr. Hargrove heard fluid in Ms. Collins's left lung and an irregular heart beat and told her to go the ED. In the ED, vitals stable. Patient found to be hyponatremic at 128. EKG showed NSR, no ischemic changes. CT chest showing moderate left pleural effusion, highly suspect multiple masses in the left breast with overlying skin thickening, diffuse sclerotic mets. CXR with large left pleural effusion, diffuse sclerotic mets. started on home synthroid. started in insulin sliding scale. A1C 7.6%. Pulm consulted for thoracentesis. heme onc made aware patient is here. NS started as patient hyponatremic and initially NPO for tap. Diet restarted and NS discontinued. Na improving today at 131. TSH wnl. s/p L thoracentesis with ~800cc removed of yellow-colored fluid. Repeat CXR showing left-sided pneumothoraces and diffuse oseous sclerotic metastasis. per pulm no need for chest tube currently, ensure patient is on NRB mask overnight and check CXR at 9pm to ensure pneumothoraces have not increased in size. per Dr. Hargrove patient being upgraded to [facility] to have O2 sats monitored in setting of pneumothoraces.     OVERNIGHT EVENTS: MOLST in chart    SUBJECTIVE / INTERVAL HPI: Patient seen and examined at bedside. Reports she is feeling fine and her breathing is fine. Denies any SOB. Reports her chest discomfort has improved this morning. Good PO intake. Has no complaints this morning. Patient denying chest pain, SOB, palpitations, cough. Patient denies fever, chills, HA, Dizziness, change in vision/hearing, N/V, abdominal pain, diarrhea, constipation, hematochezia/melena, dysuria, hematuria, new onset weakness/numbness, LE pain and/or swelling.  Remaining ROS negative       PHYSICAL EXAM:    General: pleasant elderly woman resting comfortably in bed in NAD  HEENT: NC/AT; PERRL, anicteric sclera; MMM. small right eye injection.  Neck: supple, no JVD  Cardiovascular: +S1/S2, RRR, no murmurs   Respiratory: decreased breath sounds on left side; no W/R/R. no increased WOB  Gastrointestinal: soft, NT/ND; +BSx4  Extremities: WWP; no edema, clubbing or cyanosis  Vascular: 2+ radial, DP pulses B/L  Neurological: AAOx3; no focal deficits  Psychiatric: pleasant mood and affect  Dermatologic: no appreciable wounds or damage to the skin    VITAL SIGNS:  Vital Signs Last 24 Hrs  T(C): 36.3 (01 Dec 2021 09:00), Max: 36.7 (30 Nov 2021 21:51)  T(F): 97.3 (01 Dec 2021 09:00), Max: 98.1 (30 Nov 2021 21:51)  HR: 74 (01 Dec 2021 09:00) (73 - 85)  BP: 133/80 (01 Dec 2021 09:00) (121/69 - 142/73)  BP(mean): --  RR: 18 (01 Dec 2021 09:00) (18 - 20)  SpO2: 95% (01 Dec 2021 09:00) (93% - 97%)      MEDICATIONS:  MEDICATIONS  (STANDING):  dextrose 40% Gel 15 Gram(s) Oral once  dextrose 5%. 1000 milliLiter(s) (50 mL/Hr) IV Continuous <Continuous>  dextrose 5%. 1000 milliLiter(s) (100 mL/Hr) IV Continuous <Continuous>  dextrose 50% Injectable 25 Gram(s) IV Push once  dextrose 50% Injectable 12.5 Gram(s) IV Push once  dextrose 50% Injectable 25 Gram(s) IV Push once  glucagon  Injectable 1 milliGRAM(s) IntraMuscular once  insulin lispro (ADMELOG) corrective regimen sliding scale   SubCutaneous Before meals and at bedtime  levothyroxine 88 MICROGram(s) Oral every 24 hours  sodium phosphate IVPB 15 milliMole(s) IV Intermittent once    MEDICATIONS  (PRN):      ALLERGIES:  Allergies    No Known Allergies    Intolerances        LABS:                        13.6   8.72  )-----------( 319      ( 01 Dec 2021 06:02 )             41.6     12-01    131<L>  |  98  |  12  ----------------------------<  129<H>  5.1   |  27  |  0.79    Ca    8.8      01 Dec 2021 06:02  Phos  2.7     12-01  Mg     2.1     12-01    TPro  7.9  /  Alb  4.3  /  TBili  0.3  /  DBili  x   /  AST  19  /  ALT  12  /  AlkPhos  125<H>  11-30    PT/INR - ( 01 Dec 2021 06:02 )   PT: 12.5 sec;   INR: 1.04          PTT - ( 01 Dec 2021 06:02 )  PTT:28.7 sec    CAPILLARY BLOOD GLUCOSE      POCT Blood Glucose.: 137 mg/dL (01 Dec 2021 12:29)      RADIOLOGY & ADDITIONAL TESTS: Reviewed.

## 2021-12-01 NOTE — PROGRESS NOTE ADULT - SUBJECTIVE AND OBJECTIVE BOX
******TRANSFER ACCEPTANCE NOTE FROM [facility] TO [facility]******    HOSPITAL COURSE:  87yo DNR/DNI female PMHx breast cancer (on fulvestrant and Xgeva), DM2 (A1c 7.6 on this admission), hypothyroidism, HLD, HTN, lymphedema, endometrial carcinoma s/p hysterectomy and bilateral oophorectomy, and benign pancreatic tail adenoma presents with malaise for 2 weeks associated with intermittent pressure like chest comfort triggered by exertion. Pt went to Dr. Hargrove because she "just didn't feel well". At his office, Dr. Hargrove heard fluid in pt's left lung and an irregular heart beat and told her to go the ED. In the ED, pt remained HD stable and EKG showed NSR w/o ischemic changes. Admission labs notable for hyponatremia to 128, pt asymptomatic. CXR showed large left pleural effusion, and CT chest confirmed moderate left pleural effusion and also revealed multiple masses in the left breast with overlying skin thickening highly s/f mets along w/ diffuse sclerotic mets. Pulm was consulted for possible thoracentesis, heme/onc (Dr. Funez) made aware patient is here. Briefly started on NS for hyponatremia as pt initially made NPO for tap but d/c'd when diet restarted; Na improved to 131 s/p NS and PO intake. Pt underwent L thoracentesis on 12/1 w/ removal of ~800cc yellow-colored fluid. Post-thora CXR revealed left-sided pneumothoraces and diffuse osseous sclerotic mets. Per pulm, pt to be placed on NRB overnight w/ repeat CXR at 9PM to monitor size of pneumothoraces, no need for chest tube at this time. Decision made by  to upgrade pt to [facility] for tele monitoring of SpO2 i/s/o pneumothoraces.     SUBJECTIVE: Patient seen and examined at bedside. Breathing ok, no SOB or CP. Denies fever, chills, nausea, vomiting, HA, abdominal pain, or changes in urinary or bowel habits.    VITAL SIGNS:  ICU Vital Signs Last 24 Hrs  T(C): 37 (01 Dec 2021 21:04), Max: 37 (01 Dec 2021 21:04)  T(F): 98.6 (01 Dec 2021 21:04), Max: 98.6 (01 Dec 2021 21:04)  HR: 86 (01 Dec 2021 21:04) (73 - 94)  BP: 136/89 (01 Dec 2021 21:04) (121/69 - 136/89)  BP(mean): --  ABP: --  ABP(mean): --  RR: 18 (01 Dec 2021 21:04) (18 - 19)  SpO2: 99% (01 Dec 2021 21:04) (93% - 99%)    CAPILLARY BLOOD GLUCOSE    POCT Blood Glucose.: 117 mg/dL (01 Dec 2021 21:51)      PHYSICAL EXAM:  General: elderly female lying comfortably in bed, NRB in place, no acute distress  HEENT: NCAT, PERRL, EOMI, no scleral icterus, mild R conjunctival injection as noted prior, MMM  Neck: Supple, no JVD  Respiratory: SpO2 99% on NRB, trachea midline, CTAB anteriorly w/ no wheezes, rales, or rhonchi appreciated  Cardiovascular: RRR, normal S1 and S2, no murmurs, rubs, or gallops appreciated  Vascular: 2+ radial and DP pulses  Abdomen: Soft, NT/ND. Bowel sounds present in all four quadrants with no guarding, rebound tenderness, or palpable masses noted  Extremities: Warm and well perfused. No clubbing, cyanosis, or edema noted  Skin: L inguinal ulcers non tender w/o purulence or drainage, examination of breast deferred at this time   Neuro: AAOx3 with no cranial nerve deficits. no obvious focal deficits   Drains/lines: L chest permacath in place, rectal tube in place w/ dark brown nonbloody output    MEDICATIONS:  MEDICATIONS  (STANDING):  dextrose 40% Gel 15 Gram(s) Oral once  dextrose 5%. 1000 milliLiter(s) (50 mL/Hr) IV Continuous <Continuous>  dextrose 5%. 1000 milliLiter(s) (100 mL/Hr) IV Continuous <Continuous>  dextrose 50% Injectable 25 Gram(s) IV Push once  dextrose 50% Injectable 12.5 Gram(s) IV Push once  dextrose 50% Injectable 25 Gram(s) IV Push once  glucagon  Injectable 1 milliGRAM(s) IntraMuscular once  insulin lispro (ADMELOG) corrective regimen sliding scale   SubCutaneous Before meals and at bedtime  levothyroxine 88 MICROGram(s) Oral every 24 hours    MEDICATIONS  (PRN):      ALLERGIES:  Allergies    No Known Allergies    Intolerances        LABS:                        13.6   8.72  )-----------( 319      ( 01 Dec 2021 06:02 )             41.6     12-01    131<L>  |  98  |  12  ----------------------------<  129<H>  5.1   |  27  |  0.79    Ca    8.8      01 Dec 2021 06:02  Phos  2.7     12-01  Mg     2.1     12-01    TPro  7.9  /  Alb  4.3  /  TBili  0.3  /  DBili  x   /  AST  19  /  ALT  12  /  AlkPhos  125<H>  11-30    PT/INR - ( 01 Dec 2021 06:02 )   PT: 12.5 sec;   INR: 1.04          PTT - ( 01 Dec 2021 06:02 )  PTT:28.7 sec      MICROBIOLOGY:    Culture - Body Fluid with Gram Stain (collected 01 Dec 2021 18:09)  Source: Pleural Fl left pleural fluid  Gram Stain (01 Dec 2021 19:22):    No organisms seen    Rare WBC's    RADIOLOGY & ADDITIONAL TESTS:     < from: Xray Chest 1 View- PORTABLE-Urgent (Xray Chest 1 View- PORTABLE-Urgent .) (12.01.21 @ 16:35) >    EXAM:  XR CHEST PORTABLE URGENT 1V                          PROCEDURE DATE:  12/01/2021          INTERPRETATION:  TECHNIQUE: Single portable view of the chest.    COMPARISON:  11/30/2021    CLINICAL HISTORY: s/p thoracentesis    FINDINGS:    Singlefrontal view of the chest demonstrates status post left-sided thoracentesis. Moderate left lower lobe pneumothorax. Tiny left-sided pleural effusion. Moderate left upper lobe pneumothorax. Diffuse osseous metastasis. The cardiomediastinal silhouette is normal. No acute osseous abnormalities. Overlying EKG leads and wires are noted    IMPRESSION: Status post left-sided thoracentesis. Left upper lobe and left lower lobe pneumothoraces. Diffuse osseous sclerotic metastasis.    < end of copied text >

## 2021-12-01 NOTE — PROGRESS NOTE ADULT - PROBLEM SELECTOR PLAN 3
Patient reports intermittent exertional chest discomfort that is described as "pressure-like" at localized to the center of the sternum. Patient denies personal or family history of cardiac disease. EKG shows NSR with no ischemic changes. Trop negative, BNP negative.  CXR + pleural effusion. Discomfort most likely 2/2 pleural effusion in setting of metastatic breast cancer   -monitor pain

## 2021-12-01 NOTE — PROGRESS NOTE ADULT - ASSESSMENT
89yo DNR/DNI female PMHx breast cancer (on fulvestrant and Xgeva), DM2 (A1c 7.6 on this admission), hypothyroidism, HLD, HTN, lymphedema, endometrial carcinoma s/p hysterectomy and bilateral oophorectomy, and benign pancreatic tail adenoma presents with malaise x2 weeks associated with intermittent pressure like chest comfort triggered by exertion. Found to have hyponatremia and left PLEF on CT chest s/f malignant effusion, now s/p L thoracentesis by pulm c/b L pneumothoraces. Initially admitted to [facility], now stepped up to [facility] on 12/1 per Dr. Hargrove for tele monitoring of SpO2 i/s/o pneumothoraces.

## 2021-12-01 NOTE — PROGRESS NOTE ADULT - PROBLEM SELECTOR PLAN 1
CT chest showing moderate left pleural effusion, highly suspect multiple masses in the left breast with overlying skin thickening, and diffuse sclerotic mets. Pleural effusion likely malignant in origin   -pulm consulted for possible tap, f/u recs

## 2021-12-01 NOTE — PROGRESS NOTE ADULT - PROBLEM SELECTOR PLAN 6
Na 128 on admission. Most likely hypotonic hypovolemic hyponatremia 2/2 poor po intake i/s/o malignancy.   -Na improved at 131 today (12/1)   -serum osm low at 273  -f/u urine osm, urine lytes   -s/p ~400ccs NS while NPO   -encourage PO intake  -monitor BMP

## 2021-12-01 NOTE — PROGRESS NOTE ADULT - PROBLEM SELECTOR PLAN 2
CT chest showing moderate left pleural effusion, highly suspect multiple masses in the left breast with overlying skin thickening, and diffuse sclerotic mets. Pleural effusion likely malignant in origin   -s/p L thoracentesis by pulm c/b L pneumothoraces  -management of L pneumothoraces as above  -pulm following, appreciate recs

## 2021-12-01 NOTE — PROGRESS NOTE ADULT - PROBLEM SELECTOR PLAN 6
Patient with history of hypothyroidism; on synthroid 88mcg daily at home  -c/w synthroid 88mcg daily   -TSH this admission wnl

## 2021-12-01 NOTE — PROGRESS NOTE ADULT - SUBJECTIVE AND OBJECTIVE BOX
INTERVAL HPI/OVERNIGHT EVENTS:  Events well known to me; Sent from office because of increasing fatigue and shortness of breath;   CT in Ed noted;       MEDICATIONS  (STANDING):  dextrose 40% Gel 15 Gram(s) Oral once  dextrose 5%. 1000 milliLiter(s) (50 mL/Hr) IV Continuous <Continuous>  dextrose 5%. 1000 milliLiter(s) (100 mL/Hr) IV Continuous <Continuous>  dextrose 50% Injectable 25 Gram(s) IV Push once  dextrose 50% Injectable 12.5 Gram(s) IV Push once  dextrose 50% Injectable 25 Gram(s) IV Push once  glucagon  Injectable 1 milliGRAM(s) IntraMuscular once  insulin lispro (ADMELOG) corrective regimen sliding scale   SubCutaneous Before meals and at bedtime  levothyroxine 88 MICROGram(s) Oral every 24 hours  sodium chloride 0.9%. 1000 milliLiter(s) (80 mL/Hr) IV Continuous <Continuous>  sodium phosphate IVPB 15 milliMole(s) IV Intermittent once    MEDICATIONS  (PRN):      Allergies    No Known Allergies    Intolerances        Vital Signs Last 24 Hrs  T(C): 36.3 (01 Dec 2021 09:00), Max: 36.7 (30 Nov 2021 13:16)  T(F): 97.3 (01 Dec 2021 09:00), Max: 98.1 (30 Nov 2021 21:51)  HR: 74 (01 Dec 2021 09:00) (73 - 85)  BP: 133/80 (01 Dec 2021 09:00) (121/69 - 149/77)  BP(mean): --  RR: 18 (01 Dec 2021 09:00) (16 - 20)  SpO2: 95% (01 Dec 2021 09:00) (93% - 97%)          Constitutional:  Awake    Eyes: BRIDGER    ENMT: Negative    Neck: Supple    Back:  no tenderness     Respiratory:  decreased breath sounds on left    Cardiovascular: S1 S2    Gastrointestinal: soft    Genitourinary:    Extremities: no edema    Vascular:    Neurological:    Skin:    Lymph Nodes:            LABS:                        13.6   8.72  )-----------( 319      ( 01 Dec 2021 06:02 )             41.6     12-01    131<L>  |  98  |  12  ----------------------------<  129<H>  5.1   |  27  |  0.79    Ca    8.8      01 Dec 2021 06:02  Phos  2.7     12-01  Mg     2.1     12-01    TPro  7.9  /  Alb  4.3  /  TBili  0.3  /  DBili  x   /  AST  19  /  ALT  12  /  AlkPhos  125<H>  11-30    PT/INR - ( 01 Dec 2021 06:02 )   PT: 12.5 sec;   INR: 1.04          PTT - ( 01 Dec 2021 06:02 )  PTT:28.7 sec      RADIOLOGY & ADDITIONAL TESTS:

## 2021-12-01 NOTE — PROGRESS NOTE ADULT - PROBLEM SELECTOR PLAN 5
Na 128 on admission. Most likely hypotonic hypovolemic hyponatremia 2/2 poor po intake in the setting of malignancy.   -Na improving today at 131  -serum osm low at 273  -f/u urine osm, urine lytes   -trend BMP   -encourage PO intake  -if worsens will consider starting NS

## 2021-12-02 DIAGNOSIS — J93.9 PNEUMOTHORAX, UNSPECIFIED: ICD-10-CM

## 2021-12-02 LAB
ANION GAP SERPL CALC-SCNC: 9 MMOL/L — SIGNIFICANT CHANGE UP (ref 5–17)
B PERT IGG+IGM PNL SER: SIGNIFICANT CHANGE UP
BASOPHILS # BLD AUTO: 0.06 K/UL — SIGNIFICANT CHANGE UP (ref 0–0.2)
BASOPHILS NFR BLD AUTO: 0.7 % — SIGNIFICANT CHANGE UP (ref 0–2)
BUN SERPL-MCNC: 21 MG/DL — SIGNIFICANT CHANGE UP (ref 7–23)
CALCIUM SERPL-MCNC: 9.1 MG/DL — SIGNIFICANT CHANGE UP (ref 8.4–10.5)
CHLORIDE SERPL-SCNC: 98 MMOL/L — SIGNIFICANT CHANGE UP (ref 96–108)
CO2 SERPL-SCNC: 24 MMOL/L — SIGNIFICANT CHANGE UP (ref 22–31)
COLOR FLD: YELLOW — SIGNIFICANT CHANGE UP
CREAT SERPL-MCNC: 0.92 MG/DL — SIGNIFICANT CHANGE UP (ref 0.5–1.3)
D DIMER BLD IA.RAPID-MCNC: 311 NG/ML DDU — HIGH
EOSINOPHIL # BLD AUTO: 0.12 K/UL — SIGNIFICANT CHANGE UP (ref 0–0.5)
EOSINOPHIL NFR BLD AUTO: 1.4 % — SIGNIFICANT CHANGE UP (ref 0–6)
FLUID INTAKE SUBSTANCE CLASS: SIGNIFICANT CHANGE UP
FLUID SEGMENTED GRANULOCYTES: 5 % — SIGNIFICANT CHANGE UP
GLUCOSE BLDC GLUCOMTR-MCNC: 144 MG/DL — HIGH (ref 70–99)
GLUCOSE BLDC GLUCOMTR-MCNC: 165 MG/DL — HIGH (ref 70–99)
GLUCOSE BLDC GLUCOMTR-MCNC: 166 MG/DL — HIGH (ref 70–99)
GLUCOSE BLDC GLUCOMTR-MCNC: 217 MG/DL — HIGH (ref 70–99)
GLUCOSE SERPL-MCNC: 155 MG/DL — HIGH (ref 70–99)
HCT VFR BLD CALC: 44.5 % — SIGNIFICANT CHANGE UP (ref 34.5–45)
HGB BLD-MCNC: 14.9 G/DL — SIGNIFICANT CHANGE UP (ref 11.5–15.5)
IMM GRANULOCYTES NFR BLD AUTO: 0.5 % — SIGNIFICANT CHANGE UP (ref 0–1.5)
LACTATE SERPL-SCNC: 1.8 MMOL/L — SIGNIFICANT CHANGE UP (ref 0.5–2)
LYMPHOCYTES # BLD AUTO: 1.45 K/UL — SIGNIFICANT CHANGE UP (ref 1–3.3)
LYMPHOCYTES # BLD AUTO: 16.5 % — SIGNIFICANT CHANGE UP (ref 13–44)
LYMPHOCYTES # FLD: 76 % — SIGNIFICANT CHANGE UP
MAGNESIUM SERPL-MCNC: 2.1 MG/DL — SIGNIFICANT CHANGE UP (ref 1.6–2.6)
MCHC RBC-ENTMCNC: 31.3 PG — SIGNIFICANT CHANGE UP (ref 27–34)
MCHC RBC-ENTMCNC: 33.5 GM/DL — SIGNIFICANT CHANGE UP (ref 32–36)
MCV RBC AUTO: 93.5 FL — SIGNIFICANT CHANGE UP (ref 80–100)
MESOTHL CELL # FLD: 1 % — SIGNIFICANT CHANGE UP
MONOCYTES # BLD AUTO: 1.45 K/UL — HIGH (ref 0–0.9)
MONOCYTES NFR BLD AUTO: 16.5 % — HIGH (ref 2–14)
MONOS+MACROS # FLD: 18 % — SIGNIFICANT CHANGE UP
NEUTROPHILS # BLD AUTO: 5.67 K/UL — SIGNIFICANT CHANGE UP (ref 1.8–7.4)
NEUTROPHILS NFR BLD AUTO: 64.4 % — SIGNIFICANT CHANGE UP (ref 43–77)
NIGHT BLUE STAIN TISS: SIGNIFICANT CHANGE UP
NRBC # BLD: 0 /100 WBCS — SIGNIFICANT CHANGE UP (ref 0–0)
PLATELET # BLD AUTO: 325 K/UL — SIGNIFICANT CHANGE UP (ref 150–400)
POTASSIUM SERPL-MCNC: 4.8 MMOL/L — SIGNIFICANT CHANGE UP (ref 3.5–5.3)
POTASSIUM SERPL-SCNC: 4.8 MMOL/L — SIGNIFICANT CHANGE UP (ref 3.5–5.3)
RBC # BLD: 4.76 M/UL — SIGNIFICANT CHANGE UP (ref 3.8–5.2)
RBC # FLD: 13.1 % — SIGNIFICANT CHANGE UP (ref 10.3–14.5)
RCV VOL RI: 0 /UL — SIGNIFICANT CHANGE UP (ref 0–0)
SODIUM SERPL-SCNC: 131 MMOL/L — LOW (ref 135–145)
SPECIMEN SOURCE FLD: SIGNIFICANT CHANGE UP
SPECIMEN SOURCE: SIGNIFICANT CHANGE UP
T4 FREE SERPL-MCNC: 1.37 NG/DL — SIGNIFICANT CHANGE UP (ref 0.93–1.7)
TOTAL NUCLEATED CELL COUNT, BODY FLUID: 457 /UL — SIGNIFICANT CHANGE UP
TRIGL FLD-MCNC: 15 MG/DL — SIGNIFICANT CHANGE UP
TSH SERPL-MCNC: 4.17 UIU/ML — SIGNIFICANT CHANGE UP (ref 0.27–4.2)
TUBE TYPE: SIGNIFICANT CHANGE UP
WBC # BLD: 8.79 K/UL — SIGNIFICANT CHANGE UP (ref 3.8–10.5)
WBC # FLD AUTO: 8.79 K/UL — SIGNIFICANT CHANGE UP (ref 3.8–10.5)

## 2021-12-02 PROCEDURE — 71045 X-RAY EXAM CHEST 1 VIEW: CPT | Mod: 26,76

## 2021-12-02 PROCEDURE — 99233 SBSQ HOSP IP/OBS HIGH 50: CPT | Mod: GC

## 2021-12-02 PROCEDURE — 71275 CT ANGIOGRAPHY CHEST: CPT | Mod: 26

## 2021-12-02 PROCEDURE — 93010 ELECTROCARDIOGRAM REPORT: CPT

## 2021-12-02 PROCEDURE — 71045 X-RAY EXAM CHEST 1 VIEW: CPT | Mod: 26,77

## 2021-12-02 PROCEDURE — 93970 EXTREMITY STUDY: CPT | Mod: 26

## 2021-12-02 RX ADMIN — Medication 88 MICROGRAM(S): at 06:49

## 2021-12-02 RX ADMIN — Medication 4: at 17:26

## 2021-12-02 RX ADMIN — Medication 2: at 12:32

## 2021-12-02 RX ADMIN — Medication 2: at 22:30

## 2021-12-02 NOTE — PROGRESS NOTE ADULT - SUBJECTIVE AND OBJECTIVE BOX
PULMONARY CONSULT SERVICE FOLLOW-UP NOTE  -------------------------------------------------------------------    INTERVAL HPI:    No acute overnight events.   Pt seen and examined at bedside this AM.     On NRB overnight to aid in resorption of pneumothorax, transferred to [facility] for closer monitoring   Serial CXR from last night and this AM with improved L-Pneumothorax; current apex-->cupula size ~2cm (from ~3.5cm last night)  Afebrile, VSS    ****          -------------------------------------------------------------------  MEDICATIONS:    Pulmonary:    Antimicrobials:    Anticoagulants:    Cardiac:      Allergies    No Known Allergies    Intolerances        Vital Signs Last 24 Hrs  T(C): 37.1 (02 Dec 2021 05:41), Max: 37.2 (01 Dec 2021 22:58)  T(F): 98.7 (02 Dec 2021 05:41), Max: 98.9 (01 Dec 2021 22:58)  HR: 82 (02 Dec 2021 04:18) (74 - 94)  BP: 163/79 (02 Dec 2021 04:18) (111/61 - 163/79)  BP(mean): 114 (02 Dec 2021 04:18) (80 - 114)  RR: 16 (02 Dec 2021 04:18) (16 - 18)  SpO2: 100% (02 Dec 2021 04:18) (95% - 100%)    12-01 @ 07:01  -  12-02 @ 07:00  --------------------------------------------------------  IN: 30 mL / OUT: 500 mL / NET: -470 mL          -------------------------------------------------------------------  PHYSICAL EXAM:    GEN: Comfortable, in NAD  HEENT: NC/AT, PEERLA, MMM  CARDIAC: RRR, Normal S1/S2, No MRGs  PULMONARY: CTA BL, no wheezing/rhonchi/rales - no accessory muscle use   ABDOMEN: Soft, NT/ND   EXT: No LE edema  NEURO: A&O x 3, CN II-XII grossly intact, moves all ext, sensation intact    -------------------------------------------------------------------  LABS:        CBC Full  -  ( 02 Dec 2021 06:45 )  WBC Count : 8.79 K/uL  RBC Count : 4.76 M/uL  Hemoglobin : 14.9 g/dL  Hematocrit : 44.5 %  Platelet Count - Automated : 325 K/uL  Mean Cell Volume : 93.5 fl  Mean Cell Hemoglobin : 31.3 pg  Mean Cell Hemoglobin Concentration : 33.5 gm/dL  Auto Neutrophil # : 5.67 K/uL  Auto Lymphocyte # : 1.45 K/uL  Auto Monocyte # : 1.45 K/uL  Auto Eosinophil # : 0.12 K/uL  Auto Basophil # : 0.06 K/uL  Auto Neutrophil % : 64.4 %  Auto Lymphocyte % : 16.5 %  Auto Monocyte % : 16.5 %  Auto Eosinophil % : 1.4 %  Auto Basophil % : 0.7 %    12-02    131<L>  |  98  |  21  ----------------------------<  155<H>  4.8   |  24  |  0.92    Ca    9.1      02 Dec 2021 06:45  Phos  2.7     12-01  Mg     2.1     12-02    TPro  7.9  /  Alb  4.3  /  TBili  0.3  /  DBili  x   /  AST  19  /  ALT  12  /  AlkPhos  125<H>  11-30    PT/INR - ( 01 Dec 2021 06:02 )   PT: 12.5 sec;   INR: 1.04          PTT - ( 01 Dec 2021 06:02 )  PTT:28.7 sec                  -------------------------------------------------------------------  RADIOLOGY & ADDITIONAL STUDIES:    EXAM:  XR CHEST PORTABLE URGENT 1V                          PROCEDURE DATE:  12/01/2021          INTERPRETATION:  TECHNIQUE: Single portable view of the chest.    COMPARISON:  11/30/2021    CLINICAL HISTORY: s/p thoracentesis    FINDINGS:    Singlefrontal view of the chest demonstrates status post left-sided thoracentesis. Moderate left lower lobe pneumothorax. Tiny left-sided pleural effusion. Moderate left upper lobe pneumothorax. Diffuse osseous metastasis. The cardiomediastinal silhouette is normal. No acute osseous abnormalities. Overlying EKG leads and wires are noted    IMPRESSION: Status post left-sided thoracentesis. Left upper lobe and left lower lobe pneumothoraces. Diffuse osseous sclerotic metastasis.     PULMONARY CONSULT SERVICE FOLLOW-UP NOTE  -------------------------------------------------------------------    INTERVAL HPI:    No acute overnight events.   Pt seen and examined at bedside this AM.     On NRB overnight to aid in resorption of pneumothorax, transferred to [facility] for closer monitoring   Serial CXR from last night and this AM with improved L-Pneumothorax; current apex-->cupula size ~2cm (from ~3.5cm last night)  Afebrile - occasional tachycardia to 110s-130s, asymptomatic     Pt anxious and upset about the monitoring devices - no medical complaints; denies SOB, chest pain, wheezing, or cough       -------------------------------------------------------------------  MEDICATIONS:    Pulmonary:    Antimicrobials:    Anticoagulants:    Cardiac:      Allergies    No Known Allergies    Intolerances        Vital Signs Last 24 Hrs  T(C): 37.1 (02 Dec 2021 05:41), Max: 37.2 (01 Dec 2021 22:58)  T(F): 98.7 (02 Dec 2021 05:41), Max: 98.9 (01 Dec 2021 22:58)  HR: 82 (02 Dec 2021 04:18) (74 - 94)  BP: 163/79 (02 Dec 2021 04:18) (111/61 - 163/79)  BP(mean): 114 (02 Dec 2021 04:18) (80 - 114)  RR: 16 (02 Dec 2021 04:18) (16 - 18)  SpO2: 100% (02 Dec 2021 04:18) (95% - 100%)    12-01 @ 07:01  -  12-02 @ 07:00  --------------------------------------------------------  IN: 30 mL / OUT: 500 mL / NET: -470 mL          -------------------------------------------------------------------  PHYSICAL EXAM:    GEN: Comfortable, in NAD  HEENT: NC/AT, PEERLA, MMM  CARDIAC: RRR, Normal S1/S2, No MRGs  PULMONARY: Decreased BS L lower danie-thorax, no wheezing/rhonchi/rales - no accessory muscle use   ABDOMEN: Soft, NT/ND   EXT: No LE edema  NEURO: A&O x 3, CN II-XII grossly intact, moves all ext, sensation intact      -------------------------------------------------------------------  LABS:        CBC Full  -  ( 02 Dec 2021 06:45 )  WBC Count : 8.79 K/uL  RBC Count : 4.76 M/uL  Hemoglobin : 14.9 g/dL  Hematocrit : 44.5 %  Platelet Count - Automated : 325 K/uL  Mean Cell Volume : 93.5 fl  Mean Cell Hemoglobin : 31.3 pg  Mean Cell Hemoglobin Concentration : 33.5 gm/dL  Auto Neutrophil # : 5.67 K/uL  Auto Lymphocyte # : 1.45 K/uL  Auto Monocyte # : 1.45 K/uL  Auto Eosinophil # : 0.12 K/uL  Auto Basophil # : 0.06 K/uL  Auto Neutrophil % : 64.4 %  Auto Lymphocyte % : 16.5 %  Auto Monocyte % : 16.5 %  Auto Eosinophil % : 1.4 %  Auto Basophil % : 0.7 %    12-02    131<L>  |  98  |  21  ----------------------------<  155<H>  4.8   |  24  |  0.92    Ca    9.1      02 Dec 2021 06:45  Phos  2.7     12-01  Mg     2.1     12-02    TPro  7.9  /  Alb  4.3  /  TBili  0.3  /  DBili  x   /  AST  19  /  ALT  12  /  AlkPhos  125<H>  11-30    PT/INR - ( 01 Dec 2021 06:02 )   PT: 12.5 sec;   INR: 1.04          PTT - ( 01 Dec 2021 06:02 )  PTT:28.7 sec                  -------------------------------------------------------------------  RADIOLOGY & ADDITIONAL STUDIES:    EXAM:  XR CHEST PORTABLE URGENT 1V                          PROCEDURE DATE:  12/01/2021          INTERPRETATION:  TECHNIQUE: Single portable view of the chest.    COMPARISON:  11/30/2021    CLINICAL HISTORY: s/p thoracentesis    FINDINGS:    Singlefrontal view of the chest demonstrates status post left-sided thoracentesis. Moderate left lower lobe pneumothorax. Tiny left-sided pleural effusion. Moderate left upper lobe pneumothorax. Diffuse osseous metastasis. The cardiomediastinal silhouette is normal. No acute osseous abnormalities. Overlying EKG leads and wires are noted    IMPRESSION: Status post left-sided thoracentesis. Left upper lobe and left lower lobe pneumothoraces. Diffuse osseous sclerotic metastasis.

## 2021-12-02 NOTE — PROGRESS NOTE ADULT - PROBLEM SELECTOR PLAN 1
Post-thoracentesis CXR showed left upper lobe and left lower lobe pneumothoraces. SpO2 95% on RA. Per pulm, pt placed on NRB w/ improvement to SpO2 98%. CXR stable in AM.   -F/u pulm recs   -Wean from NRB to NC as tolerated   -F/u 2PM CXR

## 2021-12-02 NOTE — PROGRESS NOTE ADULT - ASSESSMENT
87yo F minimal-former-smoker, with hx of Breast Ca (w/ likely char mets, on Denosumab and Folvestrant, follows with Dr. Funez), hypothyroidism,   Presents from outpatient office on 11/30 for several weeks of fatigue and chest tightness on exertion,   Found with persistent known left-sided pleural effusion, s/p thoracentesis with pneumothorax      #Pleural Effusion - Chronic, simple-appearing L-sided effusion dating back to at least Jan 2020 in the setting of L-sided breast cancer with likely char met disease; unclear contribution to her current symptoms of fatigue and malaise given the chronicity of the effusion; leading differential is 2/2 malignancy, low concern for parapneumonic/infected pleural space; rarely Denosumab can be associated with pleural effusion in 2-3% of cases   #Pneumothorax - L-sided, HD-stable, asymptomatic, s/p thoracentesis; may be pneumothorax ex-vacuo in the setting of removal of chronic pleural effusion     - Improving pneumothorax with use of NRB based on serial CXRs  - Would obtain one additional CXR this afternoon   - f/u fluid cell count, culture and cytology   - f/u Heme/Onc recs regarding status of her breast cancer   - Consider Cardiology/EP Consult regarding her intermittent tachycardia (appears to be chronic since it was present ~15 years ago per pt, and likely was present prior to ED admission)

## 2021-12-02 NOTE — PROGRESS NOTE ADULT - SUBJECTIVE AND OBJECTIVE BOX
HOSPITAL COURSE:  89yo DNR/DNI female PMHx breast cancer (on fulvestrant and Xgeva), DM2 (A1c 7.6 on this admission), hypothyroidism, HLD, HTN, lymphedema, endometrial carcinoma s/p hysterectomy and bilateral oophorectomy, and benign pancreatic tail adenoma presents with malaise for 2 weeks associated with intermittent pressure like chest comfort triggered by exertion. Pt went to Dr. Hargrove because she "just didn't feel well". At his office, Dr. Hargrove heard fluid in pt's left lung and an irregular heart beat and told her to go the ED. In the ED, pt remained HD stable and EKG showed NSR w/o ischemic changes. Admission labs notable for hyponatremia to 128, pt asymptomatic. CXR showed large left pleural effusion, and CT chest confirmed moderate left pleural effusion and also revealed multiple masses in the left breast with overlying skin thickening highly s/f mets along w/ diffuse sclerotic mets. Pulm was consulted for possible thoracentesis, heme/onc (Dr. Funez) made aware patient is here. Briefly started on NS for hyponatremia as pt initially made NPO for tap but d/c'd when diet restarted; Na improved to 131 s/p NS and PO intake. Pt underwent L thoracentesis on 12/1 w/ removal of ~800cc yellow-colored fluid. Post-thora CXR revealed left-sided pneumothoraces and diffuse osseous sclerotic mets. Per pulm, pt to be placed on NRB overnight w/ repeat CXR at 9PM to monitor size of pneumothoraces, no need for chest tube at this time. Decision made by  to upgrade pt to [facility] for tele monitoring of SpO2 i/s/o pneumothoraces. Pt on NRB while on telemetry. Repeat CXR overnight stable, showing Pneumothorax nearly 2cm in size. AM CXR stable. Pt with episode of tachycardia, EKG showing sinus tachycardia. Pt stable to be stepped down to [facility].     OVERNIGHT EVENTS: Pt comfortably sleeping on NRB.     SUBJECTIVE / INTERVAL HPI: Patient seen and examined at bedside. Pt in no acute distress. Expressing frustration with NRB but compliant. Pt states that she has not shortness of breath or chest pain. Denies any nausea, vomiting, fevers, chills, abdominal pain, lightheadedness, dizziness.     VITAL SIGNS:  Vital Signs Last 24 Hrs  T(C): 37.1 (02 Dec 2021 05:41), Max: 37.2 (01 Dec 2021 22:58)  T(F): 98.7 (02 Dec 2021 05:41), Max: 98.9 (01 Dec 2021 22:58)  HR: 108 (02 Dec 2021 08:37) (82 - 108)  BP: 165/106 (02 Dec 2021 08:37) (111/61 - 165/106)  BP(mean): 128 (02 Dec 2021 08:37) (80 - 128)  RR: 18 (02 Dec 2021 08:37) (16 - 18)  SpO2: 100% (02 Dec 2021 08:37) (95% - 100%)    PHYSICAL EXAM:    General: NAD, elderly female. On NRB mask. L permacath in place.   HEENT: NC/AT; PERRL, anicteric sclera; MMM  Neck: supple  Cardiovascular: +S1/S2, RRR  Respiratory: CTA B/L; no W/R/R  Gastrointestinal: soft, NT/ND; +BSx4  Extremities: WWP; no edema, clubbing or cyanosis  Vascular: 2+ radial, DP/PT pulses B/L  Neurological: AAOx3    MEDICATIONS:  MEDICATIONS  (STANDING):  dextrose 40% Gel 15 Gram(s) Oral once  dextrose 5%. 1000 milliLiter(s) (50 mL/Hr) IV Continuous <Continuous>  dextrose 5%. 1000 milliLiter(s) (100 mL/Hr) IV Continuous <Continuous>  dextrose 50% Injectable 25 Gram(s) IV Push once  dextrose 50% Injectable 12.5 Gram(s) IV Push once  dextrose 50% Injectable 25 Gram(s) IV Push once  glucagon  Injectable 1 milliGRAM(s) IntraMuscular once  insulin lispro (ADMELOG) corrective regimen sliding scale   SubCutaneous Before meals and at bedtime  levothyroxine 88 MICROGram(s) Oral every 24 hours    MEDICATIONS  (PRN):      ALLERGIES:  Allergies    No Known Allergies    Intolerances        LABS:                        14.9   8.79  )-----------( 325      ( 02 Dec 2021 06:45 )             44.5     12-02    131<L>  |  98  |  21  ----------------------------<  155<H>  4.8   |  24  |  0.92    Ca    9.1      02 Dec 2021 06:45  Phos  2.7     12-01  Mg     2.1     12-02    TPro  7.9  /  Alb  4.3  /  TBili  0.3  /  DBili  x   /  AST  19  /  ALT  12  /  AlkPhos  125<H>  11-30    PT/INR - ( 01 Dec 2021 06:02 )   PT: 12.5 sec;   INR: 1.04          PTT - ( 01 Dec 2021 06:02 )  PTT:28.7 sec    CAPILLARY BLOOD GLUCOSE      POCT Blood Glucose.: 144 mg/dL (02 Dec 2021 06:30)      RADIOLOGY & ADDITIONAL TESTS: Reviewed.

## 2021-12-02 NOTE — PROGRESS NOTE ADULT - PROBLEM SELECTOR PLAN 8
History of endometrial cancer s/p hysterectomy.  -routine f/u as outpatient, follows with Dr. Funez outpatient for oncology

## 2021-12-02 NOTE — DIETITIAN INITIAL EVALUATION ADULT. - ADD RECOMMEND
1. Advance to/Continue with Consistent Carbohydrate diet as appropriate. 2. Monitor %PO intake, monitor diet tolerance. 3. consider ONS should pt meet <75% of EER via PO intake. 4. Bowel regimen per team. 5. Monitor lytes, replete prn. 6. RD to remain available.

## 2021-12-02 NOTE — PROGRESS NOTE ADULT - ASSESSMENT
89yo DNR/DNI female PMHx breast cancer (on fulvestrant and Xgeva), DM2 (A1c 7.6 on this admission), hypothyroidism, HLD, HTN, lymphedema, endometrial carcinoma s/p hysterectomy and bilateral oophorectomy, and benign pancreatic tail adenoma presents with malaise x2 weeks associated with intermittent pressure like chest comfort triggered by exertion. Found to have hyponatremia and left PLEF on CT chest s/f malignant effusion, now s/p L thoracentesis by pulm c/b L pneumothoraces. Initially admitted to [facility], now stepped up to [facility] on 12/1 per Dr. Hargrove for tele monitoring of SpO2 i/s/o pneumothoraces, on NRB overnight with stable CXR, now stable to be transferred to [facility].

## 2021-12-02 NOTE — DIETITIAN INITIAL EVALUATION ADULT. - PROBLEM SELECTOR PLAN 3
Patient reports two year history of breast cancer treated with fulvestrant and Xgeva. Patient most likely presenting with metastatic breast cancer with CT chest showing highly suspect multiple masses in the left breast with overlying skin thickening, left sided pleural effusion, and diffuse sclerotic mets.  -consult Dr. Funez in the AM  -pulm consult for pleural effusion and tap

## 2021-12-02 NOTE — PROGRESS NOTE ADULT - PROBLEM SELECTOR PLAN 4
Patient reports two year history of breast cancer treated with fulvestrant and Xgeva. Patient most likely presenting with metastatic breast cancer with CT chest showing highly suspect multiple masses in the left breast with overlying skin thickening, left sided pleural effusion, and diffuse sclerotic mets.  -F/u oncology recs, pt follows with Dr. Funez   -Consider palliative consult for GOC

## 2021-12-02 NOTE — PROGRESS NOTE ADULT - PROBLEM SELECTOR PLAN 6
Na 128 on admission. Most likely hypotonic hypovolemic hyponatremia 2/2 poor po intake i/s/o malignancy.   -Continue to monitor on BMP  -Serum osm 273   -encourage PO intake

## 2021-12-02 NOTE — PROGRESS NOTE ADULT - PROBLEM SELECTOR PLAN 3
Patient reports intermittent exertional chest discomfort that is described as "pressure-like" at localized to the center of the sternum. Patient denies personal or family history of cardiac disease. EKG shows NSR with no ischemic changes. Trop negative, BNP negative.  CXR + pleural effusion. Discomfort most likely 2/2 pleural effusion in setting of metastatic breast cancer   -Pt now breathing comfortably, denying any chest pain at this time  -Continue to monitor, pain management as needed

## 2021-12-02 NOTE — PROGRESS NOTE ADULT - PROBLEM SELECTOR PLAN 5
Patient reports being diagnosed with type 2 diabetes at age 80. 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 Home med: glimepiride 1mg bid. On home glimepiride. A1C 7.6%.  -Holding home oral medications   -c/w ISS   -monitor FS

## 2021-12-02 NOTE — DIETITIAN INITIAL EVALUATION ADULT. - PROBLEM SELECTOR PLAN 2
Patient reports intermittent exertional chest discomfort that is described as "pressure-like" at localized to the center of the sternum. Patient denies personal or family history of cardiac disease. EKG shows NSR with no ischemic changes. Trop negative, BNP negative.  CXR + pleural effusion. Discomfort most likely 2/2 pleural effusion in setting of metastatic breast cancer   -monitor pain   -pulm consult and possible tap

## 2021-12-02 NOTE — DIETITIAN INITIAL EVALUATION ADULT. - OTHER INFO
88 year old female PMHx breast cancer (treated with fulvestrant and Xgeva), DM2, hypothyroidism, HLD, HTN, lymphedema, endometrial carcinoma s/p hysterectomy and bilateral oophorectomy, and benign pancreatic tail adenoma presents with malaise for 2 weeks associated with intermittent pressure like chest comfort triggered by exertion. Found to have left sided pleural effusion on CT chest, likely to be malignant in origin. Admitted to medicine for pulm. Now s/p Thoracentesis 12/01, 900mL drained.     On assessment, pt was resting comfortably in bed, was currently not eat d/t using NRB mask, but during the assessment, she was switched to NC and advanced to a consistent carbohydrate diet. Pt reported no n/v/d/c, and endorsed a good appetite, was excited to eat breakfast. Last +BM 12/01. Pt denied pain. Skin: Johnson 17. Sx incision L back lap puncture. PTA, pt was following a diabetic diet at home, eating three meals a day. Pt was very knowledgeable about diabetic diets, and endorsed rarely eating fried foods "for health reasons". Reinforced basic concepts w/ patient, she was receptive and understanding. She has noticed recent weight changes 2/2 her feelings of malaise over the last few weeks. As per [facility] records, pt weighed 126 pounds in Sept '21 and was admitted 11/31 at 122 pounds, reflecting a 4 pound, 3.2% weight loss in 2 months (not significant but concerning). Upon NFPE, pt found to have moderate clavicle and temporal wasting. As per ASPEN guidelines, pt meets the criteria for moderate malnutrition AEB NFPE and sudden decrease in PO PTA. RD to follow. Please see nutrition recommendations below. 88 year old female PMHx breast cancer (treated with fulvestrant and Xgeva), DM2, hypothyroidism, HLD, HTN, lymphedema, endometrial carcinoma s/p hysterectomy and bilateral oophorectomy, and benign pancreatic tail adenoma presents with malaise for 2 weeks associated with intermittent pressure like chest discomfort triggered by exertion. Found to have left sided pleural effusion on CT chest, likely to be malignant in origin. Admitted to medicine for pulm. Now s/p Thoracentesis 12/01, 900mL drained.     On assessment, pt was resting comfortably in bed, was currently not eat d/t using NRB mask, but during the assessment she was switched to NC and advanced to a consistent carbohydrate diet. Pt reported no n/v/d/c, and endorsed a good appetite, was excited to eat breakfast. Last +BM 12/01. Pt denied pain. Skin: Johnson 17. Sx incision L back lap puncture. PTA, pt was following a diabetic diet at home, eating three meals a day. Pt was very knowledgeable about diabetic diets, and endorsed rarely eating fried foods "for health reasons". Reinforced basic concepts w/ patient, she was receptive and understanding. She has noticed recent weight changes 2/2 her feelings of malaise over the last few weeks. As per [facility] records, pt weighed 126 pounds in late Sept '21 and was admitted 11/31 at 122 pounds, reflecting a 4 pound, 3.2% weight loss in 2 months (not significant but concerning). Upon NFPE, pt found to have moderate clavicle and temporal wasting. As per ASPEN guidelines, pt meets the criteria for moderate malnutrition AEB NFPE and sudden decrease in PO PTA. RD to follow. Please see nutrition recommendations below.

## 2021-12-02 NOTE — PROGRESS NOTE ADULT - SUBJECTIVE AND OBJECTIVE BOX
INTERVAL HPI/OVERNIGHT EVENTS:  Interim reviewed;  No complaint Chest film from last night improved;  Sats good      MEDICATIONS  (STANDING):  dextrose 40% Gel 15 Gram(s) Oral once  dextrose 5%. 1000 milliLiter(s) (50 mL/Hr) IV Continuous <Continuous>  dextrose 5%. 1000 milliLiter(s) (100 mL/Hr) IV Continuous <Continuous>  dextrose 50% Injectable 25 Gram(s) IV Push once  dextrose 50% Injectable 12.5 Gram(s) IV Push once  dextrose 50% Injectable 25 Gram(s) IV Push once  glucagon  Injectable 1 milliGRAM(s) IntraMuscular once  insulin lispro (ADMELOG) corrective regimen sliding scale   SubCutaneous Before meals and at bedtime  levothyroxine 88 MICROGram(s) Oral every 24 hours    MEDICATIONS  (PRN):      Allergies    No Known Allergies    Intolerances        Vital Signs Last 24 Hrs  T(C): 37.1 (02 Dec 2021 05:41), Max: 37.2 (01 Dec 2021 22:58)  T(F): 98.7 (02 Dec 2021 05:41), Max: 98.9 (01 Dec 2021 22:58)  HR: 108 (02 Dec 2021 08:37) (82 - 108)  BP: 165/106 (02 Dec 2021 08:37) (111/61 - 165/106)  BP(mean): 128 (02 Dec 2021 08:37) (80 - 128)  RR: 18 (02 Dec 2021 08:37) (16 - 18)  SpO2: 100% (02 Dec 2021 08:37) (95% - 100%)          Constitutional:  Awake    Eyes: BRIDGER    ENMT: Negative    Neck: Supple    Back:  no tenderness     Respiratory:  clear    Cardiovascular: S1 S2    Gastrointestinal:  soft    Genitourinary:    Extremities: no edema    Vascular:    Neurological:    Skin:    Lymph Nodes:            12-01 @ 07:01  -  12-02 @ 07:00  --------------------------------------------------------  IN: 30 mL / OUT: 500 mL / NET: -470 mL      LABS:                        14.9   8.79  )-----------( 325      ( 02 Dec 2021 06:45 )             44.5     12-02    131<L>  |  98  |  21  ----------------------------<  155<H>  4.8   |  24  |  0.92    Ca    9.1      02 Dec 2021 06:45  Phos  2.7     12-01  Mg     2.1     12-02    TPro  7.9  /  Alb  4.3  /  TBili  0.3  /  DBili  x   /  AST  19  /  ALT  12  /  AlkPhos  125<H>  11-30    PT/INR - ( 01 Dec 2021 06:02 )   PT: 12.5 sec;   INR: 1.04          PTT - ( 01 Dec 2021 06:02 )  PTT:28.7 sec      RADIOLOGY & ADDITIONAL TESTS:

## 2021-12-02 NOTE — DIETITIAN INITIAL EVALUATION ADULT. - OTHER CALCULATIONS
%INR=521; ABW used to calculate estimated needs d/t pt being between 80 and 120% of IBW. Adjusted for advanced age, cancer treatment, moderate malnutrition. Fluids adjusted per team.

## 2021-12-02 NOTE — PROGRESS NOTE ADULT - PROBLEM SELECTOR PLAN 9
F: s/p 400ccs NS   E: replete mg<2, K<4  N: consistent carbs      VTE PPx: holding pharmacologic ac i/s/o recent thoracentesis   GI PPx: not indicated     Code status: DNR/DNI, MOLST in chart     Dispo: [facility]

## 2021-12-02 NOTE — CONSULT NOTE ADULT - SUBJECTIVE AND OBJECTIVE BOX
RADHA COLLINS  MRN-[id number]      HPI:    Radha Collins is an 88F with metastatic breast cancer (Hormone positive, HER-2 negative, diagnosed 2/2019 started on anastrazole and denosumab, transitioned to fulvestrant 11/2020 for disease progression.  Most recent staging imaging 7/2021 revealing stable disease.    She presented to clinic with TESFAYE, 2 weeks of malaise and chest discomfort.  On exam was found to have crackles in RLL and irregular heart rate leading to hospital admission.      CT Chest upon arrival showed persistence of pleural effusion and thoracentesis was performed, complicated by moderate pneumothorax.    Patient seen in [facility] on [facility] to assist with resolution of pneumothorax. She denies acute complaints at rest.  Denies new focal sites of pain.  Explains that she has been less active at home but continues to be able to accomplish her daily activities such as getting the mail etc.    PAST MEDICAL & SURGICAL HISTORY:  Type 2 diabetes mellitus  DM (diabetes mellitus)    HTN (hypertension)    Hyperthyroidism    Breast cancer    Hypothyroid    Endometrial cancer    Other acquired absence of organ  S/P splenectomy    Status post total hysterectomy  S/P hysterectomy    Other acquired absence of organ  S/P cholecystectomy    Status post cataract extraction  S/P cataract extraction        MEDICATIONS  (STANDING):  dextrose 40% Gel 15 Gram(s) Oral once  dextrose 5%. 1000 milliLiter(s) IV Continuous <Continuous>  dextrose 5%. 1000 milliLiter(s) IV Continuous <Continuous>  dextrose 50% Injectable 25 Gram(s) IV Push once  dextrose 50% Injectable 12.5 Gram(s) IV Push once  dextrose 50% Injectable 25 Gram(s) IV Push once  glucagon  Injectable 1 milliGRAM(s) IntraMuscular once  insulin lispro (ADMELOG) corrective regimen sliding scale   SubCutaneous Before meals and at bedtime  levothyroxine 88 MICROGram(s) Oral every 24 hours      Allergies    No Known Allergies    Intolerances          FAMILY HISTORY:  FH: type 2 diabetes (Mother)        ROS:  The patient denies chest pain or SOB at rest.    No nausea/vomiting/fevers/chills/night sweats.    +fatigue, no headaches/dizziness.   No abdominal pain/diarrhea/constipation.  No melena or hematochezia.    No dysuria/hematuria.  No history of easy bruising/bleeding.  No gingival bleeding or epistaxis.  No leg pain or leg swelling.    ROS is otherwise negative.    Physical exam:    Vital signs  Vital Signs Last 24 Hrs  T(C): 37.1 (12-02-21 @ 05:41), Max: 37.2 (12-01-21 @ 22:58)  T(F): 98.7 (12-02-21 @ 05:41), Max: 98.9 (12-01-21 @ 22:58)  HR: 108 (12-02-21 @ 08:37) (82 - 108)  BP: 165/106 (12-02-21 @ 08:37) (111/61 - 165/106)  BP(mean): 128 (12-02-21 @ 08:37) (80 - 128)  RR: 18 (12-02-21 @ 08:37) (16 - 18)  SpO2: 100% (12-02-21 @ 08:37) (95% - 100%)  HEENT: PERRLA, pink mucosae  No palpable lymphadenopathy  Cardiovascular: Regular rhythm/rate, no murmurs, rubs, gallops  Respiratory: No accessory muscle use, decreased breath sounds on left.  Abdomen: soft, non tender, non distended, no palpable masses, no palpable hepatosplenomegaly  Extremities:  no peripheral edema  Neuro: alert, awake and oriented x 3, no focal abnormalities  Skin: warm, no obvious lesions on visible skin    LABS:  CBC Full  -  ( 02 Dec 2021 06:45 )  WBC Count : 8.79 K/uL  Hemoglobin : 14.9 g/dL  Hematocrit : 44.5 %  Platelet Count - Automated : 325 K/uL  Mean Cell Volume : 93.5 fl  Mean Cell Hemoglobin : 31.3 pg  Mean Cell Hemoglobin Concentration : 33.5 gm/dL  Auto Neutrophil # : 5.67 K/uL  Auto Lymphocyte # : 1.45 K/uL  Auto Monocyte # : 1.45 K/uL  Auto Eosinophil # : 0.12 K/uL  Auto Basophil # : 0.06 K/uL  Auto Neutrophil % : 64.4 %  Auto Lymphocyte % : 16.5 %  Auto Monocyte % : 16.5 %  Auto Eosinophil % : 1.4 %  Auto Basophil % : 0.7 %    02 Dec 2021 06:45    131    |  98     |  21     ----------------------------<  155    4.8     |  24     |  0.92     Ca    9.1        02 Dec 2021 06:45  Phos  2.7       01 Dec 2021 06:02  Mg     2.1       02 Dec 2021 06:45    TPro  7.9    /  Alb  4.3    /  TBili  0.3    /  DBili  x      /  AST  19     /  ALT  12     /  AlkPhos  125    30 Nov 2021 14:15      PERTINENT IMAGING STUDIES: reviewed  CT Chest reviewed.  Evidence of bony lesions, Left effusion, Left breast mass in situ.  Report not compared to prior assessment however relatively stable extent of disease to my assessment.      ASSESSMENT:    88F with metastatic breast cancer admitted for dyspnea, now s/p thoracentesis    PLAN:    #Metastatic Breast Cancer  - With known bony involvement, Left pleural effusion  - S/p thoracentesis, now with pneumothorax  - Disease appears relatively stable on imaging  - Patient worsening TESFAYE may be due to general functional decline in setting of persistent metastatic disease but differential also could acute recent viral illness, less likely cardiopulmonary  - At this time, would recommend PT evaluation once acute issues related to pneumothorax are resolved  - Suggest checking CEA, CA 15-3 with next set of labs             Thank you    Chris Sherman MD for Chrystal Funez MD  [phone]

## 2021-12-02 NOTE — PROGRESS NOTE ADULT - PROBLEM SELECTOR PLAN 2
CT chest showing moderate left pleural effusion, highly suspect multiple masses in the left breast with overlying skin thickening, and diffuse sclerotic mets. Pleural effusion likely malignant in origin. S/p L thoracentesis by pulm c/b L pneumothoraces  -management of L pneumothoraces as above  -pulm following, appreciate recs

## 2021-12-02 NOTE — PROGRESS NOTE ADULT - ATTENDING COMMENTS
87 yo f with metastatic breast cancer with worsening left pleural effusion s/p thoracentesis with post procedure hydro-ptx. patient is stable from a respiratory standpoint however has intermittent tachycardia. discussed with Dr. serrano that her symptoms are unlikely from trapped lung. will continue to monitor. the left pleural fluid is exudative and lymphocytic predominant, cytology pending.

## 2021-12-03 DIAGNOSIS — J94.8 OTHER SPECIFIED PLEURAL CONDITIONS: ICD-10-CM

## 2021-12-03 DIAGNOSIS — N39.0 URINARY TRACT INFECTION, SITE NOT SPECIFIED: ICD-10-CM

## 2021-12-03 LAB
ANION GAP SERPL CALC-SCNC: 14 MMOL/L — SIGNIFICANT CHANGE UP (ref 5–17)
ANISOCYTOSIS BLD QL: SLIGHT — SIGNIFICANT CHANGE UP
APPEARANCE UR: ABNORMAL
APTT BLD: 29.4 SEC — SIGNIFICANT CHANGE UP (ref 27.5–35.5)
BACTERIA # UR AUTO: ABNORMAL /HPF
BASOPHILS # BLD AUTO: 0 K/UL — SIGNIFICANT CHANGE UP (ref 0–0.2)
BASOPHILS NFR BLD AUTO: 0 % — SIGNIFICANT CHANGE UP (ref 0–2)
BILIRUB UR-MCNC: NEGATIVE — SIGNIFICANT CHANGE UP
BUN SERPL-MCNC: 33 MG/DL — HIGH (ref 7–23)
BURR CELLS BLD QL SMEAR: PRESENT — SIGNIFICANT CHANGE UP
CALCIUM SERPL-MCNC: 9 MG/DL — SIGNIFICANT CHANGE UP (ref 8.4–10.5)
CHLORIDE SERPL-SCNC: 101 MMOL/L — SIGNIFICANT CHANGE UP (ref 96–108)
CO2 SERPL-SCNC: 17 MMOL/L — LOW (ref 22–31)
COLOR SPEC: YELLOW — SIGNIFICANT CHANGE UP
COMMENT - URINE: SIGNIFICANT CHANGE UP
CREAT SERPL-MCNC: 0.96 MG/DL — SIGNIFICANT CHANGE UP (ref 0.5–1.3)
DIFF PNL FLD: ABNORMAL
EOSINOPHIL # BLD AUTO: 0 K/UL — SIGNIFICANT CHANGE UP (ref 0–0.5)
EOSINOPHIL NFR BLD AUTO: 0 % — SIGNIFICANT CHANGE UP (ref 0–6)
EPI CELLS # UR: SIGNIFICANT CHANGE UP /HPF (ref 0–5)
GLUCOSE BLDC GLUCOMTR-MCNC: 140 MG/DL — HIGH (ref 70–99)
GLUCOSE BLDC GLUCOMTR-MCNC: 198 MG/DL — HIGH (ref 70–99)
GLUCOSE BLDC GLUCOMTR-MCNC: 264 MG/DL — HIGH (ref 70–99)
GLUCOSE BLDC GLUCOMTR-MCNC: 322 MG/DL — HIGH (ref 70–99)
GLUCOSE SERPL-MCNC: 216 MG/DL — HIGH (ref 70–99)
GLUCOSE UR QL: 250
HCT VFR BLD CALC: 46.9 % — HIGH (ref 34.5–45)
HGB BLD-MCNC: 15 G/DL — SIGNIFICANT CHANGE UP (ref 11.5–15.5)
INR BLD: 1.11 — SIGNIFICANT CHANGE UP (ref 0.88–1.16)
KETONES UR-MCNC: 15 MG/DL
LEUKOCYTE ESTERASE UR-ACNC: ABNORMAL
LYMPHOCYTES # BLD AUTO: 1.4 K/UL — SIGNIFICANT CHANGE UP (ref 1–3.3)
LYMPHOCYTES # BLD AUTO: 12.2 % — LOW (ref 13–44)
MAGNESIUM SERPL-MCNC: 2.1 MG/DL — SIGNIFICANT CHANGE UP (ref 1.6–2.6)
MANUAL SMEAR VERIFICATION: SIGNIFICANT CHANGE UP
MCHC RBC-ENTMCNC: 30.2 PG — SIGNIFICANT CHANGE UP (ref 27–34)
MCHC RBC-ENTMCNC: 32 GM/DL — SIGNIFICANT CHANGE UP (ref 32–36)
MCV RBC AUTO: 94.6 FL — SIGNIFICANT CHANGE UP (ref 80–100)
MICROCYTES BLD QL: SLIGHT — SIGNIFICANT CHANGE UP
MONOCYTES # BLD AUTO: 1.6 K/UL — HIGH (ref 0–0.9)
MONOCYTES NFR BLD AUTO: 13.9 % — SIGNIFICANT CHANGE UP (ref 2–14)
NEUTROPHILS # BLD AUTO: 8.39 K/UL — HIGH (ref 1.8–7.4)
NEUTROPHILS NFR BLD AUTO: 71.3 % — SIGNIFICANT CHANGE UP (ref 43–77)
NEUTS BAND # BLD: 1.7 % — SIGNIFICANT CHANGE UP (ref 0–8)
NITRITE UR-MCNC: POSITIVE
OVALOCYTES BLD QL SMEAR: SLIGHT — SIGNIFICANT CHANGE UP
PH UR: 5.5 — SIGNIFICANT CHANGE UP (ref 5–8)
PHOSPHATE SERPL-MCNC: 3.3 MG/DL — SIGNIFICANT CHANGE UP (ref 2.5–4.5)
PLAT MORPH BLD: ABNORMAL
PLATELET # BLD AUTO: 294 K/UL — SIGNIFICANT CHANGE UP (ref 150–400)
POIKILOCYTOSIS BLD QL AUTO: SIGNIFICANT CHANGE UP
POTASSIUM SERPL-MCNC: 4.5 MMOL/L — SIGNIFICANT CHANGE UP (ref 3.5–5.3)
POTASSIUM SERPL-SCNC: 4.5 MMOL/L — SIGNIFICANT CHANGE UP (ref 3.5–5.3)
PROT UR-MCNC: >=300 MG/DL
PROTHROM AB SERPL-ACNC: 13.3 SEC — SIGNIFICANT CHANGE UP (ref 10.6–13.6)
RBC # BLD: 4.96 M/UL — SIGNIFICANT CHANGE UP (ref 3.8–5.2)
RBC # FLD: 13.3 % — SIGNIFICANT CHANGE UP (ref 10.3–14.5)
RBC BLD AUTO: ABNORMAL
RBC CASTS # UR COMP ASSIST: > 10 /HPF
SODIUM SERPL-SCNC: 132 MMOL/L — LOW (ref 135–145)
SP GR SPEC: >=1.03 — SIGNIFICANT CHANGE UP (ref 1–1.03)
UROBILINOGEN FLD QL: 1 E.U./DL — SIGNIFICANT CHANGE UP
VARIANT LYMPHS # BLD: 0.9 % — SIGNIFICANT CHANGE UP (ref 0–6)
WBC # BLD: 11.49 K/UL — HIGH (ref 3.8–10.5)
WBC # FLD AUTO: 11.49 K/UL — HIGH (ref 3.8–10.5)
WBC UR QL: ABNORMAL /HPF

## 2021-12-03 PROCEDURE — 71045 X-RAY EXAM CHEST 1 VIEW: CPT | Mod: 26,77

## 2021-12-03 PROCEDURE — 99233 SBSQ HOSP IP/OBS HIGH 50: CPT | Mod: GC

## 2021-12-03 PROCEDURE — 71045 X-RAY EXAM CHEST 1 VIEW: CPT | Mod: 26

## 2021-12-03 PROCEDURE — 32557 INSERT CATH PLEURA W/ IMAGE: CPT | Mod: LT

## 2021-12-03 PROCEDURE — 99223 1ST HOSP IP/OBS HIGH 75: CPT

## 2021-12-03 PROCEDURE — 99447 NTRPROF PH1/NTRNET/EHR 11-20: CPT

## 2021-12-03 RX ORDER — CEFTRIAXONE 500 MG/1
1000 INJECTION, POWDER, FOR SOLUTION INTRAMUSCULAR; INTRAVENOUS EVERY 24 HOURS
Refills: 0 | Status: COMPLETED | OUTPATIENT
Start: 2021-12-03 | End: 2021-12-05

## 2021-12-03 RX ADMIN — Medication 88 MICROGRAM(S): at 06:44

## 2021-12-03 RX ADMIN — Medication 6: at 12:25

## 2021-12-03 RX ADMIN — Medication 2: at 06:44

## 2021-12-03 RX ADMIN — CEFTRIAXONE 100 MILLIGRAM(S): 500 INJECTION, POWDER, FOR SOLUTION INTRAMUSCULAR; INTRAVENOUS at 12:17

## 2021-12-03 RX ADMIN — Medication 8: at 21:54

## 2021-12-03 NOTE — PROGRESS NOTE ADULT - SUBJECTIVE AND OBJECTIVE BOX
RADHA COLLINS  MRN-[id number]      Interval History:    Continues to feel well at rest.  Mentions today that malaise over past two weeks has associated with urinary discomfort.  Denies fevers, nausea, vomiting, rash.  Continues to have persistent pneumothorax, demonstrated on CTA overnight.    HPI:    Radha Collins is an 88F with metastatic breast cancer (Hormone positive, HER-2 negative, diagnosed 2/2019 started on anastrazole and denosumab, transitioned to fulvestrant 11/2020 for disease progression.  Most recent staging imaging 7/2021 revealing stable disease.    She presented to clinic with TESFAYE, 2 weeks of malaise and chest discomfort.  On exam was found to have crackles in RLL and irregular heart rate leading to hospital admission.      CT Chest upon arrival showed persistence of pleural effusion and thoracentesis was performed, complicated by moderate pneumothorax.    Patient seen in [facility] to assist with resolution of pneumothorax. She denies acute complaints at rest.  Denies new focal sites of pain.  Explains that she has been less active at home but continues to be able to accomplish her daily activities such as getting the mail etc.    PAST MEDICAL & SURGICAL HISTORY:  Type 2 diabetes mellitus  DM (diabetes mellitus)    HTN (hypertension)    Hyperthyroidism    Breast cancer    Hypothyroid    Endometrial cancer    Other acquired absence of organ  S/P splenectomy    Status post total hysterectomy  S/P hysterectomy    Other acquired absence of organ  S/P cholecystectomy    Status post cataract extraction  S/P cataract extraction        MEDICATIONS  (STANDING):  dextrose 40% Gel 15 Gram(s) Oral once  dextrose 5%. 1000 milliLiter(s) IV Continuous <Continuous>  dextrose 5%. 1000 milliLiter(s) IV Continuous <Continuous>  dextrose 50% Injectable 25 Gram(s) IV Push once  dextrose 50% Injectable 12.5 Gram(s) IV Push once  dextrose 50% Injectable 25 Gram(s) IV Push once  glucagon  Injectable 1 milliGRAM(s) IntraMuscular once  insulin lispro (ADMELOG) corrective regimen sliding scale   SubCutaneous Before meals and at bedtime  levothyroxine 88 MICROGram(s) Oral every 24 hours      Allergies    No Known Allergies    Intolerances          FAMILY HISTORY:  FH: type 2 diabetes (Mother)        ROS:  The patient denies chest pain or SOB at rest.    No nausea/vomiting/fevers/chills/night sweats.    +fatigue, no headaches/dizziness.   No abdominal pain/diarrhea/constipation.  No melena or hematochezia.    No dysuria/hematuria.  No history of easy bruising/bleeding.  No gingival bleeding or epistaxis.  No leg pain or leg swelling.    ROS is otherwise negative.    Physical exam:    Vital signs  Vital Signs Last 24 Hrs  T(C): 37.1 (03 Dec 2021 05:18), Max: 38 (02 Dec 2021 16:48)  T(F): 98.8 (03 Dec 2021 05:18), Max: 100.4 (02 Dec 2021 16:48)  HR: 122 (03 Dec 2021 05:18) (98 - 160)  BP: 111/63 (03 Dec 2021 05:18) (111/63 - 165/106)  BP(mean): 82 (03 Dec 2021 05:18) (81 - 128)  RR: 22 (03 Dec 2021 05:18) (18 - 22)  SpO2: 98% (03 Dec 2021 05:18) (96% - 100%)    HEENT: PERRLA, pink mucosae  No palpable lymphadenopathy  Cardiovascular: Regular rhythm/rate, no murmurs, rubs, gallops  Respiratory: No accessory muscle use, decreased breath sounds on left.  Abdomen: soft, non tender, non distended, no palpable masses, no palpable hepatosplenomegaly  Extremities:  no peripheral edema  Neuro: alert, awake and oriented x 3, no focal abnormalities  Skin: warm, no obvious lesions on visible skin    LABS:                          15.0   11.49 )-----------( 294      ( 03 Dec 2021 05:57 )             46.9         CBC Full  -  ( 03 Dec 2021 05:57 )  WBC Count : 11.49 K/uL  RBC Count : 4.96 M/uL  Hemoglobin : 15.0 g/dL  Hematocrit : 46.9 %  Platelet Count - Automated : 294 K/uL  Mean Cell Volume : 94.6 fl  Mean Cell Hemoglobin : 30.2 pg  Mean Cell Hemoglobin Concentration : 32.0 gm/dL  Auto Neutrophil # : 8.39 K/uL  Auto Lymphocyte # : 1.40 K/uL  Auto Monocyte # : 1.60 K/uL  Auto Eosinophil # : 0.00 K/uL  Auto Basophil # : 0.00 K/uL  Auto Neutrophil % : 71.3 %  Auto Lymphocyte % : 12.2 %  Auto Monocyte % : 13.9 %  Auto Eosinophil % : 0.0 %  Auto Basophil % : 0.0 %        12-03    132<L>  |  101  |  33<H>  ----------------------------<  216<H>  4.5   |  17<L>  |  0.96    Ca    9.0      03 Dec 2021 05:57  Phos  3.3     12-03  Mg     2.1     12-03          PT/INR - ( 03 Dec 2021 05:57 )   PT: 13.3 sec;   INR: 1.11          PTT - ( 03 Dec 2021 05:57 )  PTT:29.4 sec    Urinalysis Basic - ( 03 Dec 2021 06:50 )    Color: Yellow / Appearance: Turbid / SG: >=1.030 / pH: x  Gluc: x / Ketone: 15 mg/dL  / Bili: Negative / Urobili: 1.0 E.U./dL   Blood: x / Protein: >=300 mg/dL / Nitrite: POSITIVE   Leuk Esterase: Trace / RBC: > 10 /HPF / WBC Many /HPF   Sq Epi: x / Non Sq Epi: 0-5 /HPF / Bacteria: Many /HPF          PERTINENT IMAGING STUDIES: reviewed  CTA 12/2/2021 -  Preliminary read shows persistent effusion, persistent pneumothorax, no evidence of PE      ASSESSMENT:    88F with metastatic breast cancer admitted for dyspnea, now s/p thoracentesis    PLAN:    #Metastatic Breast Cancer  - With known bony involvement, Left pleural effusion  - S/p thoracentesis, now with pneumothorax  - Disease appears relatively stable on imaging  - Patient due for next dose of fulvestrant 12/8/2021.  If she is still admitted, I will inquire to whether can be delivered while admitted.      #Positive UA  - Positive for nitrites, LE, bacteria, culture pending  - Unclear if contaminated sample, but would consider treatment for UTI as may explain 2 weeks of malaise.        Thank you    Chris Sherman MD for Chrystal Funez MD  [phone]

## 2021-12-03 NOTE — PROGRESS NOTE ADULT - PROBLEM SELECTOR PLAN 9
F: s/p 400ccs NS   E: replete mg<2, K<4  N: consistent carbs      VTE PPx: holding pharmacologic ac i/s/o recent thoracentesis   GI PPx: not indicated     Code status: DNR/DNI, MOLST in chart     Dispo: [facility] History of endometrial cancer s/p hysterectomy.  -routine f/u as outpatient, follows with Dr. Funez outpatient for oncology

## 2021-12-03 NOTE — PROGRESS NOTE ADULT - PROBLEM SELECTOR PLAN 2
CT chest showing moderate left pleural effusion, highly suspect multiple masses in the left breast with overlying skin thickening, and diffuse sclerotic mets. Pleural effusion likely malignant in origin. S/p L thoracentesis by pulm c/b L pneumothoraces  -management of L pneumothoraces as above  -pulm following, appreciate recs Pt consistently tachycardic with rectal temp 100.4 F. Urinalysis with concern for UTI.   -Start Cefriaxone 1g daily for UTI coverage

## 2021-12-03 NOTE — CONSULT NOTE ADULT - ATTENDING COMMENTS
Agree with documentation with following additions:    Patient has likely a pneumothorax secondary to fluid evacuation and trapped lung. Recommend pigtail catheter to drain remaining fluid. Send for cytology studies. Will follow along as patient may need a Pleur-X catheter.     Bharathi Funes MD  Thoracic Surgery
89 yo F with breast cancer and possible bony mets presented with fatigue and chest tightness. CT showed a left pleural effusion which has been there since 2020. we discussed thoracentesis for diagnostic purposes, its unlikely that this is what is causing her symptoms. explained the possibilty of PTX ex vacuo given chronicity of the effusion.

## 2021-12-03 NOTE — PROGRESS NOTE ADULT - ASSESSMENT
89yo DNR/DNI female PMHx breast cancer (on fulvestrant and Xgeva), DM2 (A1c 7.6 on this admission), hypothyroidism, HLD, HTN, lymphedema, endometrial carcinoma s/p hysterectomy and bilateral oophorectomy, and benign pancreatic tail adenoma presents with malaise x2 weeks associated with intermittent pressure like chest comfort triggered by exertion. Found to have hyponatremia and left PLEF on CT chest s/f malignant effusion, now s/p L thoracentesis by pulm c/b L pneumothoraces. Initially admitted to [facility], now stepped up to [facility] on 12/1 per Dr. Hargrove for tele monitoring of SpO2 i/s/o pneumothoraces, on NRB overnight with stable CXR, now stable to be transferred to [facility].

## 2021-12-03 NOTE — CONSULT NOTE ADULT - ASSESSMENT
Assessment: 87yo F minimal-former-smoker, DM2 (A1c 7.6 on this admission), hypothyroidism, HLD, HTN, lymphedema, endometrial carcinoma s/p hysterectomy and bilateral oophorectomy, and benign pancreatic tail adenoma with hx of Breast Ca (w/ likely char mets, on Denosumab and Folvestrant, follows with Dr. Funez), presents from outpatient office on 11/30 for several weeks of fatigue and chest tightness on exertion. Found with persistent known left-sided pleural effusion, s/p thoracentesis bedside c/b pneumothorax. IR consulted for left chest tube placement. Case reviewed with Dr. Vasquez, plan for procedure with local anesthesia as pt ate breakfast this morning.       Recommendations: Please ensure Covid swab is up to date (within last 72 hours).      Communicated with: Dr. Cleaning primary team

## 2021-12-03 NOTE — PROGRESS NOTE ADULT - SUBJECTIVE AND OBJECTIVE BOX
INTERVAL HPI/OVERNIGHT EVENTS:  All notes read; Discussed with CTS and pulmonary;  Will get Chest tube today;  UTI noted      MEDICATIONS  (STANDING):  cefTRIAXone   IVPB 1000 milliGRAM(s) IV Intermittent every 24 hours  dextrose 40% Gel 15 Gram(s) Oral once  dextrose 5%. 1000 milliLiter(s) (50 mL/Hr) IV Continuous <Continuous>  dextrose 5%. 1000 milliLiter(s) (100 mL/Hr) IV Continuous <Continuous>  dextrose 50% Injectable 25 Gram(s) IV Push once  dextrose 50% Injectable 12.5 Gram(s) IV Push once  dextrose 50% Injectable 25 Gram(s) IV Push once  glucagon  Injectable 1 milliGRAM(s) IntraMuscular once  insulin lispro (ADMELOG) corrective regimen sliding scale   SubCutaneous Before meals and at bedtime  levothyroxine 88 MICROGram(s) Oral every 24 hours    MEDICATIONS  (PRN):      Allergies    No Known Allergies    Intolerances        Vital Signs Last 24 Hrs  T(C): 37.1 (03 Dec 2021 05:18), Max: 38 (02 Dec 2021 16:48)  T(F): 98.8 (03 Dec 2021 05:18), Max: 100.4 (02 Dec 2021 16:48)  HR: 116 (03 Dec 2021 09:00) (98 - 160)  BP: 118/66 (03 Dec 2021 09:00) (111/63 - 161/85)  BP(mean): 86 (03 Dec 2021 09:00) (81 - 114)  RR: 17 (03 Dec 2021 09:00) (17 - 22)  SpO2: 98% (03 Dec 2021 09:00) (96% - 99%)          Constitutional:  Awake    Eyes: BRIDGER    ENMT: Negative    Neck: Supple    Back:  no tenderness     Respiratory:  decreased breath sounds on left    Cardiovascular: S1 S2    Gastrointestinal: soft    Genitourinary:    Extremities: no edema    Vascular:    Neurological:    Skin:    Lymph Nodes:            12-02 @ 07:01  -  12-03 @ 07:00  --------------------------------------------------------  IN: 100 mL / OUT: 500 mL / NET: -400 mL      LABS:                        15.0   11.49 )-----------( 294      ( 03 Dec 2021 05:57 )             46.9     12-03    132<L>  |  101  |  33<H>  ----------------------------<  216<H>  4.5   |  17<L>  |  0.96    Ca    9.0      03 Dec 2021 05:57  Phos  3.3     12-03  Mg     2.1     12-03      PT/INR - ( 03 Dec 2021 05:57 )   PT: 13.3 sec;   INR: 1.11          PTT - ( 03 Dec 2021 05:57 )  PTT:29.4 sec  Urinalysis Basic - ( 03 Dec 2021 06:50 )    Color: Yellow / Appearance: Turbid / SG: >=1.030 / pH: x  Gluc: x / Ketone: 15 mg/dL  / Bili: Negative / Urobili: 1.0 E.U./dL   Blood: x / Protein: >=300 mg/dL / Nitrite: POSITIVE   Leuk Esterase: Trace / RBC: > 10 /HPF / WBC Many /HPF   Sq Epi: x / Non Sq Epi: 0-5 /HPF / Bacteria: Many /HPF        RADIOLOGY & ADDITIONAL TESTS:

## 2021-12-03 NOTE — PROGRESS NOTE ADULT - PROBLEM SELECTOR PLAN 1
Post-thoracentesis CXR showed left upper lobe and left lower lobe pneumothoraces. SpO2 95% on RA. Per pulm, pt placed on NRB w/ improvement to SpO2 98%. CXR stable in AM.   -F/u pulm recs   -Wean from NRB to NC as tolerated   -F/u 2PM CXR Post-thoracentesis CXR showed left upper lobe and left lower lobe pneumothoraces. SpO2 95% on RA. Per pulm, pt placed on NRB w/ improvement to SpO2 98%. CXR stable in AM.   -F/u pulm recs   -Now off NRB, on NC   -Plan for chest tube with IR today given continued tachycardia and negative PE

## 2021-12-03 NOTE — PROGRESS NOTE ADULT - PROBLEM SELECTOR PLAN 5
Patient reports being diagnosed with type 2 diabetes at age 80. 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 Home med: glimepiride 1mg bid. On home glimepiride. A1C 7.6%.  -Holding home oral medications   -c/w ISS   -monitor FS Patient reports two year history of breast cancer treated with fulvestrant and Xgeva. Patient most likely presenting with metastatic breast cancer with CT chest showing highly suspect multiple masses in the left breast with overlying skin thickening, left sided pleural effusion, and diffuse sclerotic mets.  -F/u oncology recs, pt follows with Dr. Funez   -Consider palliative consult for GOC  -Patient due for next dose of fulvestrant 12/8/2021.

## 2021-12-03 NOTE — PROGRESS NOTE ADULT - ASSESSMENT
89yo F minimal-former-smoker, with hx of Breast Ca (w/ likely char mets, on Denosumab and Folvestrant, follows with Dr. Funez), hypothyroidism,   Presents from outpatient office on 11/30 for several weeks of fatigue and chest tightness on exertion,   Found with persistent known left-sided pleural effusion, s/p thoracentesis with pneumothorax likely ex-vacuo       #Pleural Effusion - Chronic, simple-appearing L-sided effusion dating back to at least Jan 2020 in the setting of L-sided breast cancer with likely char met disease; unclear contribution to her current symptoms of fatigue and malaise given the chronicity of the effusion; leading differential is 2/2 malignancy, it is exudative and lymphocytic-predominant; rarely Denosumab can be associated with pleural effusion in 2-3% of cases   #Hydro-Pneumothorax - L-sided, HD-stable, asymptomatic, s/p thoracentesis; may be pneumothorax ex-vacuo in the setting of removal of chronic pleural effusion  #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 - L-sided, in the setting of likely pneumothorax ex-vacuo as above; given chronicity of pleural effusion, presence of thickened pleura surrounding non-expandable lung, presence of hydro-pneumothorax, and likely loculated pneumothorax (with separate apical and basal sites)     - Now planned for IR-guided chest tube placement for pneumothorax (would highly favor basal site given increased size as compared to apical)  - f/u fluid cell cytology   - f/u Heme/Onc recs regarding status of her breast cancer   - UTI tx as per primary team   - Would obtain Cardiology/EP Consult regarding her intermittent tachycardia (she reports an 'ablation' ~15 years ago

## 2021-12-03 NOTE — PROGRESS NOTE ADULT - PROBLEM SELECTOR PLAN 3
Patient reports intermittent exertional chest discomfort that is described as "pressure-like" at localized to the center of the sternum. Patient denies personal or family history of cardiac disease. EKG shows NSR with no ischemic changes. Trop negative, BNP negative.  CXR + pleural effusion. Discomfort most likely 2/2 pleural effusion in setting of metastatic breast cancer   -Pt now breathing comfortably, denying any chest pain at this time  -Continue to monitor, pain management as needed CT chest showing moderate left pleural effusion, highly suspect multiple masses in the left breast with overlying skin thickening, and diffuse sclerotic mets. Pleural effusion likely malignant in origin. S/p L thoracentesis by pulm c/b L pneumothoraces  -management of L pneumothoraces as above  -pulm following, appreciate recs

## 2021-12-03 NOTE — PROGRESS NOTE ADULT - SUBJECTIVE AND OBJECTIVE BOX
PULMONARY CONSULT SERVICE FOLLOW-UP NOTE  -------------------------------------------------------------------    INTERVAL HPI:    No acute overnight events.   Pt seen and examined at bedside this AM.     Tmax 100.4, HR intermittently into 120s-130s-, BP stable, not hypoxemic   Chest CTA yesterday with no PE, + L hydropneumothorax w/ no mediastinal shifting   Still reports no complaints, no SOB, chest pain, palpitations      -------------------------------------------------------------------  MEDICATIONS:    Pulmonary:    Antimicrobials:    Anticoagulants:    Cardiac:      Allergies    No Known Allergies    Intolerances        Vital Signs Last 24 Hrs  T(C): 37.1 (03 Dec 2021 05:18), Max: 38 (02 Dec 2021 16:48)  T(F): 98.8 (03 Dec 2021 05:18), Max: 100.4 (02 Dec 2021 16:48)  HR: 122 (03 Dec 2021 05:18) (98 - 160)  BP: 111/63 (03 Dec 2021 05:18) (111/63 - 165/106)  BP(mean): 82 (03 Dec 2021 05:18) (81 - 128)  RR: 22 (03 Dec 2021 05:18) (18 - 22)  SpO2: 98% (03 Dec 2021 05:18) (96% - 100%)    12-02 @ 07:01  -  12-03 @ 07:00  --------------------------------------------------------  IN: 100 mL / OUT: 500 mL / NET: -400 mL          -------------------------------------------------------------------  PHYSICAL EXAM:    GEN: Comfortable, in NAD  HEENT: NC/AT, PEERLA, MMM  CARDIAC: RRR, Normal S1/S2, No MRGs  PULMONARY: Decreased BS L lower danie-thorax, no wheezing/rhonchi/rales - no accessory muscle use   ABDOMEN: Soft, NT/ND   EXT: No LE edema  NEURO: A&O x 3, CN II-XII grossly intact, moves all ext, sensation intact    -------------------------------------------------------------------  LABS:        CBC Full  -  ( 03 Dec 2021 05:57 )  WBC Count : 11.49 K/uL  RBC Count : 4.96 M/uL  Hemoglobin : 15.0 g/dL  Hematocrit : 46.9 %  Platelet Count - Automated : 294 K/uL  Mean Cell Volume : 94.6 fl  Mean Cell Hemoglobin : 30.2 pg  Mean Cell Hemoglobin Concentration : 32.0 gm/dL  Auto Neutrophil # : x  Auto Lymphocyte # : x  Auto Monocyte # : x  Auto Eosinophil # : x  Auto Basophil # : x  Auto Neutrophil % : x  Auto Lymphocyte % : x  Auto Monocyte % : x  Auto Eosinophil % : x  Auto Basophil % : x    12-03    132<L>  |  101  |  33<H>  ----------------------------<  216<H>  4.5   |  17<L>  |  0.96    Ca    9.0      03 Dec 2021 05:57  Phos  3.3     12-03  Mg     2.1     12-03      PT/INR - ( 03 Dec 2021 05:57 )   PT: 13.3 sec;   INR: 1.11          PTT - ( 03 Dec 2021 05:57 )  PTT:29.4 sec      Urinalysis Basic - ( 03 Dec 2021 06:50 )    Color: Yellow / Appearance: Turbid / SG: >=1.030 / pH: x  Gluc: x / Ketone: 15 mg/dL  / Bili: Negative / Urobili: 1.0 E.U./dL   Blood: x / Protein: >=300 mg/dL / Nitrite: POSITIVE   Leuk Esterase: Trace / RBC: > 10 /HPF / WBC Many /HPF   Sq Epi: x / Non Sq Epi: 0-5 /HPF / Bacteria: Many /HPF                -------------------------------------------------------------------  RADIOLOGY & ADDITIONAL STUDIES:   PULMONARY CONSULT SERVICE FOLLOW-UP NOTE  -------------------------------------------------------------------    INTERVAL HPI:    No acute overnight events.   Pt seen and examined at bedside this AM.     Tmax 100.4, HR intermittently into 120s-130s-, BP stable, not hypoxemic   U/A grossly positive   Chest CTA yesterday with no PE, + L hydropneumothorax w/ no mediastinal shifting   Still reports no complaints, no SOB, chest pain, palpitations      -------------------------------------------------------------------  MEDICATIONS:    Pulmonary:    Antimicrobials:    Anticoagulants:    Cardiac:      Allergies    No Known Allergies    Intolerances        Vital Signs Last 24 Hrs  T(C): 37.1 (03 Dec 2021 05:18), Max: 38 (02 Dec 2021 16:48)  T(F): 98.8 (03 Dec 2021 05:18), Max: 100.4 (02 Dec 2021 16:48)  HR: 122 (03 Dec 2021 05:18) (98 - 160)  BP: 111/63 (03 Dec 2021 05:18) (111/63 - 165/106)  BP(mean): 82 (03 Dec 2021 05:18) (81 - 128)  RR: 22 (03 Dec 2021 05:18) (18 - 22)  SpO2: 98% (03 Dec 2021 05:18) (96% - 100%)    12-02 @ 07:01  -  12-03 @ 07:00  --------------------------------------------------------  IN: 100 mL / OUT: 500 mL / NET: -400 mL          -------------------------------------------------------------------  PHYSICAL EXAM:    GEN: Comfortable, in NAD  HEENT: NC/AT, PEERLA, MMM  CARDIAC: RRR, Normal S1/S2, No MRGs  PULMONARY: Decreased BS L lower danie-thorax, no wheezing/rhonchi/rales - no accessory muscle use   ABDOMEN: Soft, NT/ND   EXT: No LE edema  NEURO: A&O x 3, CN II-XII grossly intact, moves all ext, sensation intact    -------------------------------------------------------------------  LABS:        CBC Full  -  ( 03 Dec 2021 05:57 )  WBC Count : 11.49 K/uL  RBC Count : 4.96 M/uL  Hemoglobin : 15.0 g/dL  Hematocrit : 46.9 %  Platelet Count - Automated : 294 K/uL  Mean Cell Volume : 94.6 fl  Mean Cell Hemoglobin : 30.2 pg  Mean Cell Hemoglobin Concentration : 32.0 gm/dL  Auto Neutrophil # : x  Auto Lymphocyte # : x  Auto Monocyte # : x  Auto Eosinophil # : x  Auto Basophil # : x  Auto Neutrophil % : x  Auto Lymphocyte % : x  Auto Monocyte % : x  Auto Eosinophil % : x  Auto Basophil % : x    12-03    132<L>  |  101  |  33<H>  ----------------------------<  216<H>  4.5   |  17<L>  |  0.96    Ca    9.0      03 Dec 2021 05:57  Phos  3.3     12-03  Mg     2.1     12-03      PT/INR - ( 03 Dec 2021 05:57 )   PT: 13.3 sec;   INR: 1.11          PTT - ( 03 Dec 2021 05:57 )  PTT:29.4 sec      Urinalysis Basic - ( 03 Dec 2021 06:50 )    Color: Yellow / Appearance: Turbid / SG: >=1.030 / pH: x  Gluc: x / Ketone: 15 mg/dL  / Bili: Negative / Urobili: 1.0 E.U./dL   Blood: x / Protein: >=300 mg/dL / Nitrite: POSITIVE   Leuk Esterase: Trace / RBC: > 10 /HPF / WBC Many /HPF   Sq Epi: x / Non Sq Epi: 0-5 /HPF / Bacteria: Many /HPF                -------------------------------------------------------------------  RADIOLOGY & ADDITIONAL STUDIES:    ******PRELIMINARY REPORT******    ******PRELIMINARY REPORT******            [facility]                          PROCEDURE DATE:  12/02/2021    ******PRELIMINARY REPORT******    ******PRELIMINARY REPORT******              INTERPRETATION:  Addendum created by Abdulkadir Wade MD on 12/3/2021 12:24 AM Eastern Time ([place] & [place]):  THIS REPORT CONTAINS FINDINGS THAT MAY BE CRITICAL TO PATIENT CARE. The study  was personally discussed on the telephone with Dr. Araujo on 12/3/2021 12:24 AM EST. The results  were understood and acknowledged.    Initial Report created on 12/3/2021 12:08 AM Eastern Time ([place] & [place]):  PROCEDURE INFORMATION:    Exam: CTA Chest With Contrast  Exam date and time: 12/2/2021 9:31 PM  Age: 88 years old    Clinical indication: Tachypnea    TECHNIQUE:  Imaging protocol: Computed tomographic angiography of the chest with contrast.  3D rendering (Not supervised by radiologist): MIP and/or 3D reconstructed images were created  by the technologist.    COMPARISON:  CT CHEST 11/30/2021 4:30 PM    FINDINGS:  Pulmonary arteries: No filling defects from the main pulmonary artery to the proximal segmental  arterial branches to suggest a pulmonary embolism. Evaluation of the distal branches is limited due to  motion artifact.  Aorta: Atherosclerosis within the aorta and at the great vessel origins. No aneurysm.  Lungs: Compressive atelectasis within the left lower lobe. No focal consolidation.  Pleural spaces: Interval development moderate left pneumothorax. Persistent large left pleural  effusion.  Heart: No cardiomegaly. No pericardial effusion.  Lymph nodes: No lymphadenopathy by CT size criteria.  Bones/joints: Similar to prior multifocal sclerosis within the visualized osseous structures concerning  for osseous metastasis.  Soft tissues: Asymmetric soft tissue density with focal calcification within the left breast similar to  prior prior.    IMPRESSION:  1. No filling defects from the main pulmonary artery to the proximal segmental arterial branches to  suggest a pulmonary embolism. Evaluation of the distal branches is limited due to motion artifact.  2. Interval development moderate left pneumothorax. Persistent large left pleural effusion.    Thank you for allowing us to participate in the care of your patient.

## 2021-12-03 NOTE — PROGRESS NOTE ADULT - SUBJECTIVE AND OBJECTIVE BOX
OVERNIGHT EVENTS: CT PE with no PE noted. Dopplers with no DVT noted.     SUBJECTIVE / INTERVAL HPI: Patient seen and examined at bedside. In no acute distress. Pt denying any chest pain, palpitations, shortness of breath, nausea, vomiting, fevers, chills, abdominal pain.     VITAL SIGNS:  Vital Signs Last 24 Hrs  T(C): 36.2 (03 Dec 2021 10:30), Max: 38 (02 Dec 2021 16:48)  T(F): 97.1 (03 Dec 2021 10:30), Max: 100.4 (02 Dec 2021 16:48)  HR: 116 (03 Dec 2021 09:00) (98 - 160)  BP: 118/66 (03 Dec 2021 09:00) (111/63 - 161/85)  BP(mean): 86 (03 Dec 2021 09:00) (81 - 114)  RR: 17 (03 Dec 2021 09:00) (17 - 22)  SpO2: 98% (03 Dec 2021 09:00) (96% - 99%)    PHYSICAL EXAM:    General: NAD.   HEENT: NC/AT; PERRL, anicteric sclera; MMM  Neck: supple  Cardiovascular: +S1/S2, tachycardic, regular rhythm.   Respiratory: Decreased breath sounds L lower lung fields, otherwise CTA B/L; no W/R/R  Gastrointestinal: soft, NT/ND; +BSx4  Extremities: WWP; no edema, clubbing or cyanosis  Vascular: 2+ radial, DP/PT pulses B/L  Neurological: AAOx3    MEDICATIONS:  MEDICATIONS  (STANDING):  cefTRIAXone   IVPB 1000 milliGRAM(s) IV Intermittent every 24 hours  dextrose 40% Gel 15 Gram(s) Oral once  dextrose 5%. 1000 milliLiter(s) (50 mL/Hr) IV Continuous <Continuous>  dextrose 5%. 1000 milliLiter(s) (100 mL/Hr) IV Continuous <Continuous>  dextrose 50% Injectable 25 Gram(s) IV Push once  dextrose 50% Injectable 12.5 Gram(s) IV Push once  dextrose 50% Injectable 25 Gram(s) IV Push once  glucagon  Injectable 1 milliGRAM(s) IntraMuscular once  insulin lispro (ADMELOG) corrective regimen sliding scale   SubCutaneous Before meals and at bedtime  levothyroxine 88 MICROGram(s) Oral every 24 hours    MEDICATIONS  (PRN):      ALLERGIES:  Allergies    No Known Allergies    Intolerances        LABS:                        15.0   11.49 )-----------( 294      ( 03 Dec 2021 05:57 )             46.9     12-03    132<L>  |  101  |  33<H>  ----------------------------<  216<H>  4.5   |  17<L>  |  0.96    Ca    9.0      03 Dec 2021 05:57  Phos  3.3     12-03  Mg     2.1     12-03      PT/INR - ( 03 Dec 2021 05:57 )   PT: 13.3 sec;   INR: 1.11          PTT - ( 03 Dec 2021 05:57 )  PTT:29.4 sec  Urinalysis Basic - ( 03 Dec 2021 06:50 )    Color: Yellow / Appearance: Turbid / SG: >=1.030 / pH: x  Gluc: x / Ketone: 15 mg/dL  / Bili: Negative / Urobili: 1.0 E.U./dL   Blood: x / Protein: >=300 mg/dL / Nitrite: POSITIVE   Leuk Esterase: Trace / RBC: > 10 /HPF / WBC Many /HPF   Sq Epi: x / Non Sq Epi: 0-5 /HPF / Bacteria: Many /HPF      CAPILLARY BLOOD GLUCOSE      POCT Blood Glucose.: 264 mg/dL (03 Dec 2021 12:16)      RADIOLOGY & ADDITIONAL TESTS: Reviewed.

## 2021-12-03 NOTE — PROGRESS NOTE ADULT - PROBLEM SELECTOR PLAN 7
Patient with history of hypothyroidism; on synthroid 88mcg PO daily at home  -c/w home synthroid  -TSH this admission wnl Na 128 on admission. Most likely hypotonic hypovolemic hyponatremia 2/2 poor po intake i/s/o malignancy.   -Continue to monitor on BMP  -Serum osm 273   -encourage PO intake

## 2021-12-03 NOTE — CONSULT NOTE ADULT - SUBJECTIVE AND OBJECTIVE BOX
89yo F minimal-former-smoker, DM2 (A1c 7.6 on this admission), hypothyroidism, HLD, HTN, lymphedema, endometrial carcinoma s/p hysterectomy and bilateral oophorectomy, and benign pancreatic tail adenoma with hx of Breast Ca (w/ likely char mets, on Denosumab and Folvestrant, follows with Dr. Funez), presents from outpatient office on 11/30 for several weeks of fatigue and chest tightness on exertion. Found with persistent known left-sided pleural effusion, s/p thoracentesis bedside c/b pneumothorax. IR consulted for left chest tube placement.     Clinical History: PLEURAL EFFUSION    advanced illness    Yes    FH: type 2 diabetes (Mother)    Handoff    MEWS Score    Type 2 diabetes mellitus    HTN (hypertension)    Hyperthyroidism    Breast cancer    Hypothyroid    Endometrial cancer    Pleural effusion    Breast cancer    Hypothyroid    Pleural effusion    Chest discomfort    Diabetes    Endometrial cancer    Hyponatremia    Nutrition, metabolism, and development symptoms    Pneumothorax, left    Pneumothorax    Hydropneumothorax    Other acquired absence of organ    Status post total hysterectomy    Other acquired absence of organ    Status post cataract extraction    PALPITATIONS    90+    [email or web address]        Meds:cefTRIAXone   IVPB 1000 milliGRAM(s) IV Intermittent every 24 hours  dextrose 40% Gel 15 Gram(s) Oral once  dextrose 5%. 1000 milliLiter(s) IV Continuous <Continuous>  dextrose 5%. 1000 milliLiter(s) IV Continuous <Continuous>  dextrose 50% Injectable 25 Gram(s) IV Push once  dextrose 50% Injectable 12.5 Gram(s) IV Push once  dextrose 50% Injectable 25 Gram(s) IV Push once  glucagon  Injectable 1 milliGRAM(s) IntraMuscular once  insulin lispro (ADMELOG) corrective regimen sliding scale   SubCutaneous Before meals and at bedtime  levothyroxine 88 MICROGram(s) Oral every 24 hours      Allergies:No Known Allergies        Labs:                           15.0   11.49 )-----------( 294      ( 03 Dec 2021 05:57 )             46.9     PT/INR - ( 03 Dec 2021 05:57 )   PT: 13.3 sec;   INR: 1.11          PTT - ( 03 Dec 2021 05:57 )  PTT:29.4 sec  12-03    132<L>  |  101  |  33<H>  ----------------------------<  216<H>  4.5   |  17<L>  |  0.96    Ca    9.0      03 Dec 2021 05:57  Phos  3.3     12-03  Mg     2.1     12-03            Imaging Findings: large left pneumothorax

## 2021-12-03 NOTE — PROGRESS NOTE ADULT - PROBLEM SELECTOR PLAN 4
Patient reports two year history of breast cancer treated with fulvestrant and Xgeva. Patient most likely presenting with metastatic breast cancer with CT chest showing highly suspect multiple masses in the left breast with overlying skin thickening, left sided pleural effusion, and diffuse sclerotic mets.  -F/u oncology recs, pt follows with Dr. Funez   -Consider palliative consult for GOC Patient reports intermittent exertional chest discomfort that is described as "pressure-like" at localized to the center of the sternum. Patient denies personal or family history of cardiac disease. EKG shows NSR with no ischemic changes. Trop negative, BNP negative.  CXR + pleural effusion. Discomfort most likely 2/2 pleural effusion in setting of metastatic breast cancer   -Pt now breathing comfortably on NC, denying any chest pain at this time  -Continue to monitor, pain management as needed

## 2021-12-03 NOTE — CONSULT NOTE ADULT - SUBJECTIVE AND OBJECTIVE BOX
Surgeon: Dr. Funes    Requesting Physician: Dr. Hargrove    HISTORY OF PRESENT ILLNESS:  88y Female     PAST MEDICAL & SURGICAL HISTORY:  Type 2 diabetes mellitus  DM (diabetes mellitus)    HTN (hypertension)    Breast cancer    Hypothyroid    Endometrial cancer    Other acquired absence of organ  S/P splenectomy    Status post total hysterectomy  S/P hysterectomy    Other acquired absence of organ  S/P cholecystectomy    Status post cataract extraction  S/P cataract extraction        MEDICATIONS  (STANDING):  cefTRIAXone   IVPB 1000 milliGRAM(s) IV Intermittent every 24 hours  dextrose 40% Gel 15 Gram(s) Oral once  dextrose 5%. 1000 milliLiter(s) (50 mL/Hr) IV Continuous <Continuous>  dextrose 5%. 1000 milliLiter(s) (100 mL/Hr) IV Continuous <Continuous>  dextrose 50% Injectable 25 Gram(s) IV Push once  dextrose 50% Injectable 12.5 Gram(s) IV Push once  dextrose 50% Injectable 25 Gram(s) IV Push once  glucagon  Injectable 1 milliGRAM(s) IntraMuscular once  insulin lispro (ADMELOG) corrective regimen sliding scale   SubCutaneous Before meals and at bedtime  levothyroxine 88 MICROGram(s) Oral every 24 hours    MEDICATIONS  (PRN):      Allergies    No Known Allergies    Intolerances        SOCIAL HISTORY:  Smoker:  YES / NO        PACK YEARS:                         WHEN QUIT?  ETOH use:  YES / NO               FREQUENCY / QUANTITY:  Ilicit Drug use:  YES / NO  Occupation:  Assisted device use (Cane / Walker):  Live with:    FAMILY HISTORY:  FH: type 2 diabetes (Mother)        Review of Systems (Need 10):  CONSTITUTIONAL: Denies fevers / chills, sweats, fatigue, weight loss, weight gain                                       NEURO:  Denies parathesias, seizures, syncope, confusion                                                                                  EYES:  Denies blurry vision, discharge, pain, loss of vision                                                                                    ENMT:  Denies difficulty hearing, vertigo, dysphagia, epistaxis, recent dental work                                       CV:  Denies chest pain, palpitations, TESFAYE, orthopnea                                                                                           RESPIRATORY:  Denies wWheezing, SOB, cough / sputum, hemoptysis                                                               GI:  Denies nausea, vomiting, diarrhea, constipation, melena                                                                          : Denies hematuria, dysuria, urgency, incontinence                                                                                          MUSKULOSKELETAL:  Denies arthritis, joint swelling, muscle weakness                                                             SKIN/BREAST:  Denies rash, itching, hair loss, masses                                                                                              PSYCH:  Denies depression, anxiety, suicidal ideation                                                                                                HEME/LYMPH:  Denies bruises easily, enlarged lymph nodes, tender lymph nodes                                          ENDOCRINE:  Denies cold intolerance, heat intolerance, polydipsia                                                                      Vital Signs Last 24 Hrs  T(C): 36.4 (03 Dec 2021 14:46), Max: 38 (02 Dec 2021 16:48)  T(F): 97.5 (03 Dec 2021 14:46), Max: 100.4 (02 Dec 2021 16:48)  HR: 116 (03 Dec 2021 09:00) (98 - 160)  BP: 118/66 (03 Dec 2021 09:00) (111/63 - 134/65)  BP(mean): 86 (03 Dec 2021 09:00) (81 - 93)  RR: 17 (03 Dec 2021 09:00) (17 - 22)  SpO2: 98% (03 Dec 2021 09:00) (96% - 98%)    Physical Exam (Need 8)  CONSTITUTIONAL:                                                                          WNL  NEURO:                                                                                             WNL                      EYES:                                                                                                  WNL  ENMT:                                                                                                WNL  CV:                                                                                                      WNL  RESPIRATORY:                                                                                  WNL  GI:                                                                                                       WNL  : AVILA + / -                                                                                 WNL  MUSKULOSKELETAL:                                                                       WNL  SKIN / BREAST:                                                                                 WNL                                                          LABS:                        15.0   11.49 )-----------( 294      ( 03 Dec 2021 05:57 )             46.9     12-03    132<L>  |  101  |  33<H>  ----------------------------<  216<H>  4.5   |  17<L>  |  0.96    Ca    9.0      03 Dec 2021 05:57  Phos  3.3     12-03  Mg     2.1     12-03      PT/INR - ( 03 Dec 2021 05:57 )   PT: 13.3 sec;   INR: 1.11          PTT - ( 03 Dec 2021 05:57 )  PTT:29.4 sec  Urinalysis Basic - ( 03 Dec 2021 06:50 )    Color: Yellow / Appearance: Turbid / SG: >=1.030 / pH: x  Gluc: x / Ketone: 15 mg/dL  / Bili: Negative / Urobili: 1.0 E.U./dL   Blood: x / Protein: >=300 mg/dL / Nitrite: POSITIVE   Leuk Esterase: Trace / RBC: > 10 /HPF / WBC Many /HPF   Sq Epi: x / Non Sq Epi: 0-5 /HPF / Bacteria: Many /HPF              RADIOLOGY & ADDITIONAL STUDIES:  CAROTID U/S:    CXR:    CT Scan:    EKG:    TTE / DAVID:    Cardiac Cath: Surgeon: Dr. Funes    Requesting Physician: Dr. Hargrove    HISTORY OF PRESENT ILLNESS:  89yo DNR/DNI female PMHx breast cancer (on fulvestrant and Xgeva), DM2 (A1c 7.6 on this admission), hypothyroidism, HLD, HTN, lymphedema, endometrial carcinoma s/p hysterectomy and bilateral oophorectomy, and benign pancreatic tail adenoma who presented to [facility] ED 11/30 complaining of malaise and intermittent chest pressure triggered by PNA. Workup revealed hyponatremia and left pleural effusion on CT scan concerning for malignant effusion. She underwent left thoracentesis with pulmonology which was complicated by pneumothorax. Thoracic surgery (Dr. Funes) consulted for PTX.    PAST MEDICAL & SURGICAL HISTORY:  Type 2 diabetes mellitus  DM (diabetes mellitus)    HTN (hypertension)    Breast cancer    Hypothyroid    Endometrial cancer    Other acquired absence of organ  S/P splenectomy    Status post total hysterectomy  S/P hysterectomy    Other acquired absence of organ  S/P cholecystectomy    Status post cataract extraction  S/P cataract extraction        MEDICATIONS  (STANDING):  cefTRIAXone   IVPB 1000 milliGRAM(s) IV Intermittent every 24 hours  dextrose 40% Gel 15 Gram(s) Oral once  dextrose 5%. 1000 milliLiter(s) (50 mL/Hr) IV Continuous <Continuous>  dextrose 5%. 1000 milliLiter(s) (100 mL/Hr) IV Continuous <Continuous>  dextrose 50% Injectable 25 Gram(s) IV Push once  dextrose 50% Injectable 12.5 Gram(s) IV Push once  dextrose 50% Injectable 25 Gram(s) IV Push once  glucagon  Injectable 1 milliGRAM(s) IntraMuscular once  insulin lispro (ADMELOG) corrective regimen sliding scale   SubCutaneous Before meals and at bedtime  levothyroxine 88 MICROGram(s) Oral every 24 hours    MEDICATIONS  (PRN):      Allergies    No Known Allergies    Intolerances      SOCIAL HISTORY:  Smoker:   NO   ETOH use:  NO    Ilicit Drug use:  NO  Occupation: N/A  Assisted device use (Cane / Walker): Cane    FAMILY HISTORY:  FH: type 2 diabetes (Mother)        Review of Systems:  CONSTITUTIONAL: Denies fevers / chills, sweats, fatigue, weight loss, weight gain                                       NEURO:  Denies parathesias, seizures, syncope, confusion                                                                                  EYES:  Denies blurry vision, discharge, pain, loss of vision                                                                                    ENMT:  Denies difficulty hearing, vertigo, dysphagia, epistaxis, recent dental work                                       CV:  Denies chest pain, palpitations, TESFAYE, orthopnea                                                                                           RESPIRATORY:  Denies wheezing, SOB, cough / sputum, hemoptysis                                                               GI:  Denies nausea, vomiting, diarrhea, constipation, melena                                                                          : Denies hematuria, dysuria, urgency, incontinence                                                                                          MUSKULOSKELETAL:  Denies arthritis, joint swelling, muscle weakness                                                             SKIN/BREAST:  Denies rash, itching, hair loss, masses                                                                                              PSYCH:  Denies depression, anxiety, suicidal ideation                                                                                                HEME/LYMPH:  Denies bruises easily, enlarged lymph nodes, tender lymph nodes                                          ENDOCRINE:  Denies cold intolerance, heat intolerance, polydipsia                                                                      Vital Signs Last 24 Hrs  T(C): 36.4 (03 Dec 2021 14:46), Max: 38 (02 Dec 2021 16:48)  T(F): 97.5 (03 Dec 2021 14:46), Max: 100.4 (02 Dec 2021 16:48)  HR: 116 (03 Dec 2021 09:00) (98 - 160)  BP: 118/66 (03 Dec 2021 09:00) (111/63 - 134/65)  BP(mean): 86 (03 Dec 2021 09:00) (81 - 93)  RR: 17 (03 Dec 2021 09:00) (17 - 22)  SpO2: 98% (03 Dec 2021 09:00) (96% - 98%)    Physical Exam  CONSTITUTIONAL: well appearing, NAD, laying comfortably in bed   NEURO: A&OX3, no focal deficits noted                    EYES: PERRLA  ENMT: Neck supple   CV:  RRR, no murmurs, rubs, gallops   RESPIRATORY: CTA bilateral posterior lung fields, no wheezes, rales, rhonchi   GI: +Bs, NT/ND  : No lyons  MUSKULOSKELETAL: No peripheral edema or calf tenderness   SKIN / BREAST:  No rashes noted                                                           LABS:                        15.0   11.49 )-----------( 294      ( 03 Dec 2021 05:57 )             46.9     12-03    132<L>  |  101  |  33<H>  ----------------------------<  216<H>  4.5   |  17<L>  |  0.96    Ca    9.0      03 Dec 2021 05:57  Phos  3.3     12-03  Mg     2.1     12-03      PT/INR - ( 03 Dec 2021 05:57 )   PT: 13.3 sec;   INR: 1.11          PTT - ( 03 Dec 2021 05:57 )  PTT:29.4 sec  Urinalysis Basic - ( 03 Dec 2021 06:50 )    Color: Yellow / Appearance: Turbid / SG: >=1.030 / pH: x  Gluc: x / Ketone: 15 mg/dL  / Bili: Negative / Urobili: 1.0 E.U./dL   Blood: x / Protein: >=300 mg/dL / Nitrite: POSITIVE   Leuk Esterase: Trace / RBC: > 10 /HPF / WBC Many /HPF   Sq Epi: x / Non Sq Epi: 0-5 /HPF / Bacteria: Many /HPF      RADIOLOGY & ADDITIONAL STUDIES:    CXR:  x< from: Xray Chest 1 View-PORTABLE IMMEDIATE (Xray Chest 1 View-PORTABLE IMMEDIATE .) (12.03.21 @ 14:57) >  Singlefrontal view of the chest demonstrates new left lower lobe pigtail catheter. Small left upper lobe and small left lower lobe pneumothoraces, unchanged. Diffuse osseous metastasis. The cardiomediastinal silhouette is enlarged. No acute osseous abnormalities. Overlying EKG leads and wires are noted    IMPRESSION: New left lower lobe chest tube. Stable left-sided pneumothoraces.    < end of copied text >      CT Scan:  < from: CT Angio Chest PE Protocol w/ IV Cont (12.02.21 @ 21:40) >  IMPRESSION:  1. No filling defects from the main pulmonary artery to the proximal segmental arterial branches to  suggest a pulmonary embolism. Evaluation of the distal branches is limited due to motion artifact.  2. Interval development moderate left pneumothorax. Persistent large left pleural effusion.    < end of copied text >

## 2021-12-03 NOTE — CONSULT NOTE ADULT - ASSESSMENT
Assesment:  87yo DNR/DNI female PMHx breast cancer (on fulvestrant and Xgeva), DM2 (A1c 7.6 on this admission), hypothyroidism, HLD, HTN, lymphedema, endometrial carcinoma s/p hysterectomy and bilateral oophorectomy, and benign pancreatic tail adenoma who presented to [facility] ED 11/30 complaining of malaise and intermittent chest pressure triggered by PNA. Workup revealed hyponatremia and left pleural effusion on CT scan concerning for malignant effusion. She underwent left thoracentesis with pulmonology which was complicated by pneumothorax. Thoracic surgery (Dr. Funes) consulted for PTX.    Plan:  Problem 1: Left PTX  - Case discussed and images reviewed with Dr. 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 given chronicity of pleural effusion   - Agree with pulm management of CT  - Will continue to follow along     Problem 2: UTI  - Patient with positive UA and low grade temp this admission  - Continue ceftriaxone per primary team     Problem 3: Breast cancer   - Suspect pleural effusion in setting of malignancy  - Management per heme/onc, primary team     Problem 4: Diabetes   - Agree with MISS during admission  - Management per primary team    I have reviewed clinical labs tests and reports, radiology tests and reports, as well as old patient medical records, and discussed with the refering physician.     Assesment:  89yo DNR/DNI female PMHx breast cancer (on fulvestrant and Xgeva), DM2 (A1c 7.6 on this admission), hypothyroidism, HLD, HTN, lymphedema, endometrial carcinoma s/p hysterectomy and bilateral oophorectomy, and benign pancreatic tail adenoma who presented to [facility] ED 11/30 complaining of malaise and intermittent chest pressure triggered by PNA. Workup revealed hyponatremia and left pleural effusion on CT scan concerning for malignant effusion. She underwent left thoracentesis with pulmonology which was complicated by pneumothorax. Thoracic surgery (Dr. Funes) consulted for PTX.    Plan:  Problem 1: Left PTX  - Case discussed and images reviewed with Dr. 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 given chronicity of pleural effusion   - Recommend keeping CT on suction this weekend  - Please obtain daily CXR with CT in place  - May consider PleurX placement  - Will follow for cytology results     Problem 2: UTI  - Patient with positive UA and low grade temp this admission  - Continue ceftriaxone per primary team     Problem 3: Breast cancer   - Suspect pleural effusion in setting of malignancy  - Management per heme/onc, primary team     Problem 4: Diabetes   - Agree with MISS during admission  - Management per primary team    I have reviewed clinical labs tests and reports, radiology tests and reports, as well as old patient medical records, and discussed with the refering physician.

## 2021-12-03 NOTE — PROGRESS NOTE ADULT - PROBLEM SELECTOR PLAN 8
History of endometrial cancer s/p hysterectomy.  -routine f/u as outpatient, follows with Dr. Funez outpatient for oncology Patient with history of hypothyroidism; on synthroid 88mcg PO daily at home  -c/w home synthroid  -TSH this admission wnl

## 2021-12-03 NOTE — PROGRESS NOTE ADULT - PROBLEM SELECTOR PLAN 6
Na 128 on admission. Most likely hypotonic hypovolemic hyponatremia 2/2 poor po intake i/s/o malignancy.   -Continue to monitor on BMP  -Serum osm 273   -encourage PO intake Patient reports being diagnosed with type 2 diabetes at age 80. 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 Home med: glimepiride 1mg bid. On home glimepiride. A1C 7.6%.  -Holding home oral medications   -c/w ISS   -monitor FS

## 2021-12-03 NOTE — PROGRESS NOTE ADULT - ATTENDING COMMENTS
89 yo f with metastatic breast cancer with worsening left pleural effusion s/p thoracentesis with post procedure hydro-ptx. last night worsening tachycardia, apical chest tube attempted by Dr. Kellogg last night but small ptx. plan for chest tube by IR today. discussed patient's case with Dr. Hargrove. left pleural fluid cytology pending. 87 yo f with metastatic breast cancer with worsening left pleural effusion s/p thoracentesis with post procedure hydro-ptx. last night worsening tachycardia, apical chest tube attempted by Dr. Kellogg last night but small ptx. plan for chest tube by IR today. given trapped lung, I don't expect the lung to fully re-expand, if that's the case, may need pleurx in the future of symtpomatic. discussed patient's case with Dr. Hargrove. left pleural fluid cytology pending.

## 2021-12-04 LAB
ANION GAP SERPL CALC-SCNC: 11 MMOL/L — SIGNIFICANT CHANGE UP (ref 5–17)
ANISOCYTOSIS BLD QL: SLIGHT — SIGNIFICANT CHANGE UP
BASOPHILS # BLD AUTO: 0.17 K/UL — SIGNIFICANT CHANGE UP (ref 0–0.2)
BASOPHILS NFR BLD AUTO: 1.8 % — SIGNIFICANT CHANGE UP (ref 0–2)
BUN SERPL-MCNC: 40 MG/DL — HIGH (ref 7–23)
BURR CELLS BLD QL SMEAR: PRESENT — SIGNIFICANT CHANGE UP
CALCIUM SERPL-MCNC: 8.8 MG/DL — SIGNIFICANT CHANGE UP (ref 8.4–10.5)
CHLORIDE SERPL-SCNC: 100 MMOL/L — SIGNIFICANT CHANGE UP (ref 96–108)
CO2 SERPL-SCNC: 22 MMOL/L — SIGNIFICANT CHANGE UP (ref 22–31)
CREAT SERPL-MCNC: 1.11 MG/DL — SIGNIFICANT CHANGE UP (ref 0.5–1.3)
CULTURE RESULTS: SIGNIFICANT CHANGE UP
EOSINOPHIL # BLD AUTO: 0.58 K/UL — HIGH (ref 0–0.5)
EOSINOPHIL NFR BLD AUTO: 6.1 % — HIGH (ref 0–6)
GIANT PLATELETS BLD QL SMEAR: PRESENT — SIGNIFICANT CHANGE UP
GLUCOSE BLDC GLUCOMTR-MCNC: 152 MG/DL — HIGH (ref 70–99)
GLUCOSE BLDC GLUCOMTR-MCNC: 248 MG/DL — HIGH (ref 70–99)
GLUCOSE BLDC GLUCOMTR-MCNC: 313 MG/DL — HIGH (ref 70–99)
GLUCOSE BLDC GLUCOMTR-MCNC: 318 MG/DL — HIGH (ref 70–99)
GLUCOSE SERPL-MCNC: 186 MG/DL — HIGH (ref 70–99)
HCT VFR BLD CALC: 46.2 % — HIGH (ref 34.5–45)
HGB BLD-MCNC: 15.2 G/DL — SIGNIFICANT CHANGE UP (ref 11.5–15.5)
LYMPHOCYTES # BLD AUTO: 0.58 K/UL — LOW (ref 1–3.3)
LYMPHOCYTES # BLD AUTO: 6.1 % — LOW (ref 13–44)
MAGNESIUM SERPL-MCNC: 2.4 MG/DL — SIGNIFICANT CHANGE UP (ref 1.6–2.6)
MANUAL SMEAR VERIFICATION: SIGNIFICANT CHANGE UP
MCHC RBC-ENTMCNC: 31.3 PG — SIGNIFICANT CHANGE UP (ref 27–34)
MCHC RBC-ENTMCNC: 32.9 GM/DL — SIGNIFICANT CHANGE UP (ref 32–36)
MCV RBC AUTO: 95.3 FL — SIGNIFICANT CHANGE UP (ref 80–100)
MONOCYTES # BLD AUTO: 1.65 K/UL — HIGH (ref 0–0.9)
MONOCYTES NFR BLD AUTO: 17.5 % — HIGH (ref 2–14)
MRSA PCR RESULT.: NEGATIVE — SIGNIFICANT CHANGE UP
NEUTROPHILS # BLD AUTO: 6.39 K/UL — SIGNIFICANT CHANGE UP (ref 1.8–7.4)
NEUTROPHILS NFR BLD AUTO: 50.9 % — SIGNIFICANT CHANGE UP (ref 43–77)
NEUTS BAND # BLD: 16.7 % — HIGH (ref 0–8)
NRBC # BLD: 1 /100 — HIGH (ref 0–0)
NRBC # BLD: SIGNIFICANT CHANGE UP /100 WBCS (ref 0–0)
OVALOCYTES BLD QL SMEAR: SLIGHT — SIGNIFICANT CHANGE UP
PHOSPHATE SERPL-MCNC: 2.8 MG/DL — SIGNIFICANT CHANGE UP (ref 2.5–4.5)
PLAT MORPH BLD: ABNORMAL
PLATELET # BLD AUTO: 284 K/UL — SIGNIFICANT CHANGE UP (ref 150–400)
POIKILOCYTOSIS BLD QL AUTO: SLIGHT — SIGNIFICANT CHANGE UP
POTASSIUM SERPL-MCNC: 4.9 MMOL/L — SIGNIFICANT CHANGE UP (ref 3.5–5.3)
POTASSIUM SERPL-SCNC: 4.9 MMOL/L — SIGNIFICANT CHANGE UP (ref 3.5–5.3)
RBC # BLD: 4.85 M/UL — SIGNIFICANT CHANGE UP (ref 3.8–5.2)
RBC # FLD: 13.4 % — SIGNIFICANT CHANGE UP (ref 10.3–14.5)
RBC BLD AUTO: ABNORMAL
S AUREUS DNA NOSE QL NAA+PROBE: NEGATIVE — SIGNIFICANT CHANGE UP
SCHISTOCYTES BLD QL AUTO: SLIGHT — SIGNIFICANT CHANGE UP
SMUDGE CELLS # BLD: PRESENT — SIGNIFICANT CHANGE UP
SODIUM SERPL-SCNC: 133 MMOL/L — LOW (ref 135–145)
SPECIMEN SOURCE: SIGNIFICANT CHANGE UP
SPHEROCYTES BLD QL SMEAR: SLIGHT — SIGNIFICANT CHANGE UP
VARIANT LYMPHS # BLD: 0.9 % — SIGNIFICANT CHANGE UP (ref 0–6)
WBC # BLD: 9.45 K/UL — SIGNIFICANT CHANGE UP (ref 3.8–10.5)
WBC # FLD AUTO: 9.45 K/UL — SIGNIFICANT CHANGE UP (ref 3.8–10.5)

## 2021-12-04 PROCEDURE — 71045 X-RAY EXAM CHEST 1 VIEW: CPT | Mod: 26

## 2021-12-04 PROCEDURE — 99233 SBSQ HOSP IP/OBS HIGH 50: CPT | Mod: GC

## 2021-12-04 RX ORDER — VANCOMYCIN HCL 1 G
1000 VIAL (EA) INTRAVENOUS ONCE
Refills: 0 | Status: COMPLETED | OUTPATIENT
Start: 2021-12-04 | End: 2021-12-04

## 2021-12-04 RX ORDER — INSULIN LISPRO 100/ML
3 VIAL (ML) SUBCUTANEOUS
Refills: 0 | Status: DISCONTINUED | OUTPATIENT
Start: 2021-12-04 | End: 2021-12-07

## 2021-12-04 RX ADMIN — CEFTRIAXONE 100 MILLIGRAM(S): 500 INJECTION, POWDER, FOR SOLUTION INTRAMUSCULAR; INTRAVENOUS at 07:19

## 2021-12-04 RX ADMIN — Medication 4: at 22:26

## 2021-12-04 RX ADMIN — Medication 88 MICROGRAM(S): at 06:30

## 2021-12-04 RX ADMIN — Medication 2: at 06:31

## 2021-12-04 RX ADMIN — Medication 250 MILLIGRAM(S): at 16:54

## 2021-12-04 RX ADMIN — Medication 8: at 11:57

## 2021-12-04 RX ADMIN — Medication 8: at 16:54

## 2021-12-04 RX ADMIN — Medication 3 UNIT(S): at 17:36

## 2021-12-04 NOTE — PROGRESS NOTE ADULT - SUBJECTIVE AND OBJECTIVE BOX
OVERNIGHT EVENTS:     SUBJECTIVE / INTERVAL HPI: Patient seen and examined at bedside.     VITAL SIGNS:  Vital Signs Last 24 Hrs  T(C): 36.3 (04 Dec 2021 11:02), Max: 36.4 (03 Dec 2021 14:46)  T(F): 97.4 (04 Dec 2021 11:02), Max: 97.5 (03 Dec 2021 14:46)  HR: 82 (04 Dec 2021 09:28) (68 - 88)  BP: 129/70 (04 Dec 2021 09:28) (87/54 - 131/60)  BP(mean): 92 (04 Dec 2021 09:28) (66 - 92)  RR: 18 (04 Dec 2021 09:28) (16 - 19)  SpO2: 100% (04 Dec 2021 09:28) (93% - 100%)    PHYSICAL EXAM:    General: Well developed, well nourished, no acute distress  HEENT: NC/AT; PERRL, anicteric sclera; MMM  Neck: supple  Cardiovascular: +S1/S2, RRR, no murmurs, rubs, gallops  Respiratory: CTA B/L; no W/R/R  Gastrointestinal: soft, NT/ND; +BSx4  Extremities: WWP; no edema, clubbing or cyanosis  Vascular: 2+ radial, DP/PT pulses B/L  Neurological: AAOx3; no focal deficits    MEDICATIONS:  MEDICATIONS  (STANDING):  cefTRIAXone   IVPB 1000 milliGRAM(s) IV Intermittent every 24 hours  dextrose 40% Gel 15 Gram(s) Oral once  dextrose 5%. 1000 milliLiter(s) (50 mL/Hr) IV Continuous <Continuous>  dextrose 5%. 1000 milliLiter(s) (100 mL/Hr) IV Continuous <Continuous>  dextrose 50% Injectable 25 Gram(s) IV Push once  dextrose 50% Injectable 12.5 Gram(s) IV Push once  dextrose 50% Injectable 25 Gram(s) IV Push once  glucagon  Injectable 1 milliGRAM(s) IntraMuscular once  insulin lispro (ADMELOG) corrective regimen sliding scale   SubCutaneous Before meals and at bedtime  levothyroxine 88 MICROGram(s) Oral every 24 hours    MEDICATIONS  (PRN):      ALLERGIES:  Allergies    No Known Allergies    Intolerances        LABS:                        15.2   9.45  )-----------( 284      ( 04 Dec 2021 06:16 )             46.2     12-04    133<L>  |  100  |  40<H>  ----------------------------<  186<H>  4.9   |  22  |  1.11    Ca    8.8      04 Dec 2021 06:16  Phos  2.8     12-04  Mg     2.4     12-04      PT/INR - ( 03 Dec 2021 05:57 )   PT: 13.3 sec;   INR: 1.11          PTT - ( 03 Dec 2021 05:57 )  PTT:29.4 sec  Urinalysis Basic - ( 03 Dec 2021 06:50 )    Color: Yellow / Appearance: Turbid / SG: >=1.030 / pH: x  Gluc: x / Ketone: 15 mg/dL  / Bili: Negative / Urobili: 1.0 E.U./dL   Blood: x / Protein: >=300 mg/dL / Nitrite: POSITIVE   Leuk Esterase: Trace / RBC: > 10 /HPF / WBC Many /HPF   Sq Epi: x / Non Sq Epi: 0-5 /HPF / Bacteria: Many /HPF      CAPILLARY BLOOD GLUCOSE      POCT Blood Glucose.: 318 mg/dL (04 Dec 2021 11:24)      RADIOLOGY & ADDITIONAL TESTS: Reviewed. OVERNIGHT EVENTS: AM CXR unchanged from yesterday post-chest tube. Patient voided 200cc in cammode, 100cc in primafit, BS 550cc and straight cath gave 800cc. No other overnight event.    SUBJECTIVE / INTERVAL HPI: Patient seen and examined at bedside. Patient notes that the chest tube site was initially painful last night but the pain has resolved. No complaints. Denies headache, dizziness, fever, chills, chest pain, abd pain, LE pain.    VITAL SIGNS:  Vital Signs Last 24 Hrs  T(C): 36.3 (04 Dec 2021 11:02), Max: 36.4 (03 Dec 2021 14:46)  T(F): 97.4 (04 Dec 2021 11:02), Max: 97.5 (03 Dec 2021 14:46)  HR: 82 (04 Dec 2021 09:28) (68 - 88)  BP: 129/70 (04 Dec 2021 09:28) (87/54 - 131/60)  BP(mean): 92 (04 Dec 2021 09:28) (66 - 92)  RR: 18 (04 Dec 2021 09:28) (16 - 19)  SpO2: 100% (04 Dec 2021 09:28) (93% - 100%)    PHYSICAL EXAM:    General: NAD lying in bed and appears comfortable and not in distress, chest tube c/d/i  HEENT: NC/AT; PERRL, anicteric sclera; MMM, neck supple  Cardiovascular: +S1/S2, tachycardic, regular rhythm.   Respiratory: Decreased breath sounds L lower lung fields, otherwise CTA B/L; no W/R/R  Gastrointestinal: soft, NT/ND; +BSx4  Extremities: WWP; no edema, clubbing or cyanosis  Vascular: 2+ radial, DP/PT pulses B/L  Neurological: AAOx3      MEDICATIONS:  MEDICATIONS  (STANDING):  cefTRIAXone   IVPB 1000 milliGRAM(s) IV Intermittent every 24 hours  dextrose 40% Gel 15 Gram(s) Oral once  dextrose 5%. 1000 milliLiter(s) (50 mL/Hr) IV Continuous <Continuous>  dextrose 5%. 1000 milliLiter(s) (100 mL/Hr) IV Continuous <Continuous>  dextrose 50% Injectable 25 Gram(s) IV Push once  dextrose 50% Injectable 12.5 Gram(s) IV Push once  dextrose 50% Injectable 25 Gram(s) IV Push once  glucagon  Injectable 1 milliGRAM(s) IntraMuscular once  insulin lispro (ADMELOG) corrective regimen sliding scale   SubCutaneous Before meals and at bedtime  levothyroxine 88 MICROGram(s) Oral every 24 hours    MEDICATIONS  (PRN):      ALLERGIES:  Allergies    No Known Allergies    Intolerances        LABS:                        15.2   9.45  )-----------( 284      ( 04 Dec 2021 06:16 )             46.2     12-04    133<L>  |  100  |  40<H>  ----------------------------<  186<H>  4.9   |  22  |  1.11    Ca    8.8      04 Dec 2021 06:16  Phos  2.8     12-04  Mg     2.4     12-04      PT/INR - ( 03 Dec 2021 05:57 )   PT: 13.3 sec;   INR: 1.11          PTT - ( 03 Dec 2021 05:57 )  PTT:29.4 sec  Urinalysis Basic - ( 03 Dec 2021 06:50 )    Color: Yellow / Appearance: Turbid / SG: >=1.030 / pH: x  Gluc: x / Ketone: 15 mg/dL  / Bili: Negative / Urobili: 1.0 E.U./dL   Blood: x / Protein: >=300 mg/dL / Nitrite: POSITIVE   Leuk Esterase: Trace / RBC: > 10 /HPF / WBC Many /HPF   Sq Epi: x / Non Sq Epi: 0-5 /HPF / Bacteria: Many /HPF      CAPILLARY BLOOD GLUCOSE      POCT Blood Glucose.: 318 mg/dL (04 Dec 2021 11:24)      RADIOLOGY & ADDITIONAL TESTS: Reviewed.

## 2021-12-04 NOTE — PROGRESS NOTE ADULT - ASSESSMENT
89yo DNR/DNI female PMHx breast cancer (on fulvestrant and Xgeva), DM2 (A1c 7.6 on this admission), hypothyroidism, HLD, HTN, lymphedema, endometrial carcinoma s/p hysterectomy and bilateral oophorectomy, and benign pancreatic tail adenoma presents with malaise x2 weeks associated with intermittent pressure like chest comfort triggered by exertion. Found to have hyponatremia and left PLEF on CT chest s/f malignant effusion, now s/p L thoracentesis by pulm c/b L pneumothoraces. Initially admitted to [facility], now stepped up to [facility] on 12/1 per Dr. Hargrove for tele monitoring of SpO2 i/s/o pneumothoraces, on NRB overnight with stable CXR, now stable to be transferred to [facility].

## 2021-12-04 NOTE — PROGRESS NOTE ADULT - PROBLEM SELECTOR PLAN 5
Patient reports two year history of breast cancer treated with fulvestrant and Xgeva. Patient most likely presenting with metastatic breast cancer with CT chest showing highly suspect multiple masses in the left breast with overlying skin thickening, left sided pleural effusion, and diffuse sclerotic mets.  -F/u oncology recs, pt follows with Dr. Funez   -Consider palliative consult for GOC  -Patient due for next dose of fulvestrant 12/8/2021.

## 2021-12-04 NOTE — PROGRESS NOTE ADULT - PROBLEM SELECTOR PLAN 2
Pt consistently tachycardic with rectal temp 100.4 F. Urinalysis with concern for UTI.   -c/w Cefriaxone 1g daily for UTI coverage  -One dose vancomycin 1g given bandemia  -f/u MRSA swab Pt consistently tachycardic with rectal temp 100.4 F. Urinalysis with concern for UTI.   -c/w Cefriaxone 1g daily for UTI coverage  -One dose vancomycin 1g given bandemia  -f/u MRSA swab  -Urine culture contaminated, f/u repeat UCx

## 2021-12-04 NOTE — PROGRESS NOTE ADULT - PROBLEM SELECTOR PLAN 1
Post-thoracentesis CXR showed left upper lobe and left lower lobe pneumothoraces. SpO2 95% on RA. Per pulm, pt placed on NRB w/ improvement to SpO2 98%. CXR stable in AM. PTX likely in setting of trapped lung given chronicity of pleural effusion  -F/u pulm recs   -Now off NRB, on NC   -s/p chest tube with IR with improved tachycardia. Dressings c/d/i. IR recommend continuing chest tube for 24-48 hours and will re-assess tomorrow. Output 290cc past 24 hours  -CT surgery following, may consider PleurX placement

## 2021-12-04 NOTE — PROGRESS NOTE ADULT - PROBLEM SELECTOR PLAN 4
Patient reports intermittent exertional chest discomfort that is described as "pressure-like" at localized to the center of the sternum. Patient denies personal or family history of cardiac disease. EKG shows NSR with no ischemic changes. Trop negative, BNP negative.  CXR + pleural effusion. Discomfort most likely 2/2 pleural effusion in setting of metastatic breast cancer   -Pt now breathing comfortably on NC, denying any chest pain at this time  -Continue to monitor, pain management as needed

## 2021-12-04 NOTE — PROGRESS NOTE ADULT - ATTENDING COMMENTS
Mild hyponatremia with left pleural effusion 2/2 clearly trapped lung. Had a thoracentesis which exposed the trapped pathology; this pleural space will reaccumulate. Developed an arrythmia post thoracentesis but this is not necessarily associated. Chest tube placed by IR and is to be managed by IR. High suspicion for malignancy.

## 2021-12-04 NOTE — PROGRESS NOTE ADULT - SUBJECTIVE AND OBJECTIVE BOX
89yo F minimal-former-smoker, DM2 (A1c 7.6 on this admission), hypothyroidism, HLD, HTN, lymphedema, endometrial carcinoma s/p hysterectomy and bilateral oophorectomy, and benign pancreatic tail adenoma with hx of Breast Ca (w/ likely char mets, on Denosumab and Folvestrant, follows with Dr. Funez), presents from outpatient office on 11/30 for several weeks of fatigue and chest tightness on exertion. Found with persistent known left-sided pleural effusion, s/p thoracentesis bedside c/b pneumothorax. S/p left chest tube placement by IR.    Pt comfortable. Dressings c/d/i. Chest tube valve found to be in the off position. I talked to the nurse and she said it was unintentionally put into the off position, and she turned the valve on. Output 290 cc/24 hr.    IR will follow. Continue monitoring output.

## 2021-12-05 LAB
ALBUMIN SERPL ELPH-MCNC: 2.8 G/DL — LOW (ref 3.3–5)
ALP SERPL-CCNC: 114 U/L — SIGNIFICANT CHANGE UP (ref 40–120)
ALT FLD-CCNC: 12 U/L — SIGNIFICANT CHANGE UP (ref 10–45)
ANION GAP SERPL CALC-SCNC: 9 MMOL/L — SIGNIFICANT CHANGE UP (ref 5–17)
AST SERPL-CCNC: 18 U/L — SIGNIFICANT CHANGE UP (ref 10–40)
BASOPHILS # BLD AUTO: 0.07 K/UL — SIGNIFICANT CHANGE UP (ref 0–0.2)
BASOPHILS NFR BLD AUTO: 0.6 % — SIGNIFICANT CHANGE UP (ref 0–2)
BILIRUB SERPL-MCNC: <0.2 MG/DL — SIGNIFICANT CHANGE UP (ref 0.2–1.2)
BUN SERPL-MCNC: 35 MG/DL — HIGH (ref 7–23)
CALCIUM SERPL-MCNC: 9.1 MG/DL — SIGNIFICANT CHANGE UP (ref 8.4–10.5)
CHLORIDE SERPL-SCNC: 100 MMOL/L — SIGNIFICANT CHANGE UP (ref 96–108)
CO2 SERPL-SCNC: 22 MMOL/L — SIGNIFICANT CHANGE UP (ref 22–31)
CREAT SERPL-MCNC: 0.89 MG/DL — SIGNIFICANT CHANGE UP (ref 0.5–1.3)
EOSINOPHIL # BLD AUTO: 0.37 K/UL — SIGNIFICANT CHANGE UP (ref 0–0.5)
EOSINOPHIL NFR BLD AUTO: 3.4 % — SIGNIFICANT CHANGE UP (ref 0–6)
GLUCOSE BLDC GLUCOMTR-MCNC: 194 MG/DL — HIGH (ref 70–99)
GLUCOSE BLDC GLUCOMTR-MCNC: 205 MG/DL — HIGH (ref 70–99)
GLUCOSE BLDC GLUCOMTR-MCNC: 230 MG/DL — HIGH (ref 70–99)
GLUCOSE BLDC GLUCOMTR-MCNC: 312 MG/DL — HIGH (ref 70–99)
GLUCOSE SERPL-MCNC: 268 MG/DL — HIGH (ref 70–99)
HCT VFR BLD CALC: 43.9 % — SIGNIFICANT CHANGE UP (ref 34.5–45)
HGB BLD-MCNC: 14.1 G/DL — SIGNIFICANT CHANGE UP (ref 11.5–15.5)
IMM GRANULOCYTES NFR BLD AUTO: 0.7 % — SIGNIFICANT CHANGE UP (ref 0–1.5)
LYMPHOCYTES # BLD AUTO: 0.92 K/UL — LOW (ref 1–3.3)
LYMPHOCYTES # BLD AUTO: 8.3 % — LOW (ref 13–44)
MAGNESIUM SERPL-MCNC: 2.3 MG/DL — SIGNIFICANT CHANGE UP (ref 1.6–2.6)
MCHC RBC-ENTMCNC: 30.3 PG — SIGNIFICANT CHANGE UP (ref 27–34)
MCHC RBC-ENTMCNC: 32.1 GM/DL — SIGNIFICANT CHANGE UP (ref 32–36)
MCV RBC AUTO: 94.4 FL — SIGNIFICANT CHANGE UP (ref 80–100)
MONOCYTES # BLD AUTO: 1.24 K/UL — HIGH (ref 0–0.9)
MONOCYTES NFR BLD AUTO: 11.3 % — SIGNIFICANT CHANGE UP (ref 2–14)
NEUTROPHILS # BLD AUTO: 8.34 K/UL — HIGH (ref 1.8–7.4)
NEUTROPHILS NFR BLD AUTO: 75.7 % — SIGNIFICANT CHANGE UP (ref 43–77)
NRBC # BLD: 0 /100 WBCS — SIGNIFICANT CHANGE UP (ref 0–0)
PHOSPHATE SERPL-MCNC: 2.8 MG/DL — SIGNIFICANT CHANGE UP (ref 2.5–4.5)
PLATELET # BLD AUTO: 285 K/UL — SIGNIFICANT CHANGE UP (ref 150–400)
POTASSIUM SERPL-MCNC: 5.2 MMOL/L — SIGNIFICANT CHANGE UP (ref 3.5–5.3)
POTASSIUM SERPL-SCNC: 5.2 MMOL/L — SIGNIFICANT CHANGE UP (ref 3.5–5.3)
PROT SERPL-MCNC: 6.5 G/DL — SIGNIFICANT CHANGE UP (ref 6–8.3)
RBC # BLD: 4.65 M/UL — SIGNIFICANT CHANGE UP (ref 3.8–5.2)
RBC # FLD: 13.6 % — SIGNIFICANT CHANGE UP (ref 10.3–14.5)
SODIUM SERPL-SCNC: 131 MMOL/L — LOW (ref 135–145)
WBC # BLD: 11.02 K/UL — HIGH (ref 3.8–10.5)
WBC # FLD AUTO: 11.02 K/UL — HIGH (ref 3.8–10.5)

## 2021-12-05 PROCEDURE — 99233 SBSQ HOSP IP/OBS HIGH 50: CPT | Mod: GC

## 2021-12-05 PROCEDURE — 93010 ELECTROCARDIOGRAM REPORT: CPT

## 2021-12-05 PROCEDURE — 71045 X-RAY EXAM CHEST 1 VIEW: CPT | Mod: 26

## 2021-12-05 RX ADMIN — Medication 4: at 21:42

## 2021-12-05 RX ADMIN — Medication 4: at 06:12

## 2021-12-05 RX ADMIN — Medication 3 UNIT(S): at 09:21

## 2021-12-05 RX ADMIN — Medication 2: at 17:41

## 2021-12-05 RX ADMIN — CEFTRIAXONE 100 MILLIGRAM(S): 500 INJECTION, POWDER, FOR SOLUTION INTRAMUSCULAR; INTRAVENOUS at 07:22

## 2021-12-05 RX ADMIN — Medication 3 UNIT(S): at 17:43

## 2021-12-05 RX ADMIN — Medication 88 MICROGRAM(S): at 06:02

## 2021-12-05 RX ADMIN — Medication 8: at 12:56

## 2021-12-05 RX ADMIN — Medication 3 UNIT(S): at 12:57

## 2021-12-05 NOTE — PROGRESS NOTE ADULT - PROBLEM SELECTOR PLAN 3
CT chest showing moderate left pleural effusion, highly suspect multiple masses in the left breast with overlying skin thickening, and diffuse sclerotic mets. Pleural effusion likely malignant in origin. S/p L thoracentesis by pulm c/b L pneumothoraces  -management of L pneumothoraces as above  -pulm following, appreciate recs  -S/p chest tube placement by IR   -CT surgery following for potential PleurX

## 2021-12-05 NOTE — PROGRESS NOTE ADULT - SUBJECTIVE AND OBJECTIVE BOX
OVERNIGHT EVENTS:     SUBJECTIVE / INTERVAL HPI: Patient seen and examined at bedside.     VITAL SIGNS:  Vital Signs Last 24 Hrs  T(C): 36.6 (05 Dec 2021 10:17), Max: 36.8 (04 Dec 2021 22:20)  T(F): 97.8 (05 Dec 2021 10:17), Max: 98.3 (04 Dec 2021 22:20)  HR: 92 (05 Dec 2021 12:01) (80 - 118)  BP: 114/53 (05 Dec 2021 12:01) (114/53 - 145/75)  BP(mean): 77 (05 Dec 2021 12:01) (77 - 102)  RR: 18 (05 Dec 2021 12:01) (17 - 18)  SpO2: 94% (05 Dec 2021 12:01) (94% - 95%)    PHYSICAL EXAM:    General: WDWN  HEENT: NC/AT; PERRL, anicteric sclera; MMM  Neck: supple  Cardiovascular: +S1/S2, RRR  Respiratory: CTA B/L; no W/R/R  Gastrointestinal: soft, NT/ND; +BSx4  Extremities: WWP; no edema, clubbing or cyanosis  Vascular: 2+ radial, DP/PT pulses B/L  Neurological: AAOx3; no focal deficits    MEDICATIONS:  MEDICATIONS  (STANDING):  dextrose 40% Gel 15 Gram(s) Oral once  dextrose 5%. 1000 milliLiter(s) (50 mL/Hr) IV Continuous <Continuous>  dextrose 5%. 1000 milliLiter(s) (100 mL/Hr) IV Continuous <Continuous>  dextrose 50% Injectable 25 Gram(s) IV Push once  dextrose 50% Injectable 12.5 Gram(s) IV Push once  dextrose 50% Injectable 25 Gram(s) IV Push once  glucagon  Injectable 1 milliGRAM(s) IntraMuscular once  insulin lispro (ADMELOG) corrective regimen sliding scale   SubCutaneous Before meals and at bedtime  insulin lispro Injectable (ADMELOG) 3 Unit(s) SubCutaneous three times a day before meals  levothyroxine 88 MICROGram(s) Oral every 24 hours    MEDICATIONS  (PRN):      ALLERGIES:  Allergies    No Known Allergies    Intolerances        LABS:                        14.1   11.02 )-----------( 285      ( 05 Dec 2021 06:59 )             43.9     12-05    131<L>  |  100  |  35<H>  ----------------------------<  268<H>  5.2   |  22  |  0.89    Ca    9.1      05 Dec 2021 06:59  Phos  2.8     12-05  Mg     2.3     12-05    TPro  6.5  /  Alb  2.8<L>  /  TBili  <0.2  /  DBili  x   /  AST  18  /  ALT  12  /  AlkPhos  114  12-05        CAPILLARY BLOOD GLUCOSE      POCT Blood Glucose.: 312 mg/dL (05 Dec 2021 12:03)      RADIOLOGY & ADDITIONAL TESTS: Reviewed. OVERNIGHT EVENTS: BS >300 overnight, lyons placed. LIANNE     SUBJECTIVE / INTERVAL HPI: Patient seen and examined at bedside. Pt in LIANNE. Denies mild pain from procedural site. Pt is otherwise well, denying any SOB, chest pain, nausea, vomiting, fevers, chills, abdominal pain.      VITAL SIGNS:  Vital Signs Last 24 Hrs  T(C): 36.6 (05 Dec 2021 10:17), Max: 36.8 (04 Dec 2021 22:20)  T(F): 97.8 (05 Dec 2021 10:17), Max: 98.3 (04 Dec 2021 22:20)  HR: 92 (05 Dec 2021 12:01) (80 - 118)  BP: 114/53 (05 Dec 2021 12:01) (114/53 - 145/75)  BP(mean): 77 (05 Dec 2021 12:01) (77 - 102)  RR: 18 (05 Dec 2021 12:01) (17 - 18)  SpO2: 94% (05 Dec 2021 12:01) (94% - 95%)    PHYSICAL EXAM:    General: NAD. Elderly female.   HEENT: NC/AT; PERRL, anicteric sclera; MMM  Neck: supple  Cardiovascular: +S1/S2, regular rhythm, tachycardia.   Respiratory: Decreased L lung base sounds, otherwise CTA. Chest tube in place, dressing c/d/i   Gastrointestinal: soft, NT/ND; +BSx4  Extremities: WWP; no edema, clubbing or cyanosis  Vascular: 2+ radial, DP/PT pulses B/L  Neurological: AAOx3    MEDICATIONS:  MEDICATIONS  (STANDING):  dextrose 40% Gel 15 Gram(s) Oral once  dextrose 5%. 1000 milliLiter(s) (50 mL/Hr) IV Continuous <Continuous>  dextrose 5%. 1000 milliLiter(s) (100 mL/Hr) IV Continuous <Continuous>  dextrose 50% Injectable 25 Gram(s) IV Push once  dextrose 50% Injectable 12.5 Gram(s) IV Push once  dextrose 50% Injectable 25 Gram(s) IV Push once  glucagon  Injectable 1 milliGRAM(s) IntraMuscular once  insulin lispro (ADMELOG) corrective regimen sliding scale   SubCutaneous Before meals and at bedtime  insulin lispro Injectable (ADMELOG) 3 Unit(s) SubCutaneous three times a day before meals  levothyroxine 88 MICROGram(s) Oral every 24 hours    MEDICATIONS  (PRN):      ALLERGIES:  Allergies    No Known Allergies    Intolerances        LABS:                        14.1   11.02 )-----------( 285      ( 05 Dec 2021 06:59 )             43.9     12-05    131<L>  |  100  |  35<H>  ----------------------------<  268<H>  5.2   |  22  |  0.89    Ca    9.1      05 Dec 2021 06:59  Phos  2.8     12-05  Mg     2.3     12-05    TPro  6.5  /  Alb  2.8<L>  /  TBili  <0.2  /  DBili  x   /  AST  18  /  ALT  12  /  AlkPhos  114  12-05        CAPILLARY BLOOD GLUCOSE      POCT Blood Glucose.: 312 mg/dL (05 Dec 2021 12:03)      RADIOLOGY & ADDITIONAL TESTS: Reviewed.

## 2021-12-05 NOTE — PROGRESS NOTE ADULT - PROBLEM SELECTOR PLAN 2
Pt consistently tachycardic with rectal temp 100.4 F. Urinalysis with concern for UTI.   -S/p Ceftriaxone course   -MRSA negative, now off abx   -Continue to monitor for symptoms and signs of infection

## 2021-12-05 NOTE — PROGRESS NOTE ADULT - PROBLEM SELECTOR PLAN 1
Post-thoracentesis CXR showed left upper lobe and left lower lobe pneumothoraces. SpO2 95% on RA. Per pulm, pt placed on NRB w/ improvement to SpO2 98%. CXR stable in AM. PTX likely in setting of trapped lung given chronicity of pleural effusion  -F/u pulm recs   -Now off NRB, on NC, saturating well   -s/p chest tube with IR with improved tachycardia. Dressings c/d/i. IR recommend continuing chest tube for 24-48 hours and will re-assess output   -CT surgery following, may consider PleurX placement, f/u recs

## 2021-12-05 NOTE — PROGRESS NOTE ADULT - ASSESSMENT
87yo DNR/DNI female PMHx breast cancer (on fulvestrant and Xgeva), DM2 (A1c 7.6 on this admission), hypothyroidism, HLD, HTN, lymphedema, endometrial carcinoma s/p hysterectomy and bilateral oophorectomy, and benign pancreatic tail adenoma presents with malaise x2 weeks associated with intermittent pressure like chest comfort triggered by exertion. Found to have hyponatremia and left PLEF on CT chest s/f malignant effusion, now s/p L thoracentesis by pulm c/b L pneumothoraces. Initially admitted to [facility], now stepped up to [facility] on 12/1 per Dr. Hargrove for tele monitoring of SpO2 i/s/o pneumothoraces, on NRB overnight with stable CXR, now stable to be transferred to [facility].

## 2021-12-06 LAB
ANION GAP SERPL CALC-SCNC: 11 MMOL/L — SIGNIFICANT CHANGE UP (ref 5–17)
ANION GAP SERPL CALC-SCNC: 8 MMOL/L — SIGNIFICANT CHANGE UP (ref 5–17)
BASOPHILS # BLD AUTO: 0.09 K/UL — SIGNIFICANT CHANGE UP (ref 0–0.2)
BASOPHILS NFR BLD AUTO: 0.7 % — SIGNIFICANT CHANGE UP (ref 0–2)
BUN SERPL-MCNC: 24 MG/DL — HIGH (ref 7–23)
BUN SERPL-MCNC: 26 MG/DL — HIGH (ref 7–23)
CALCIUM SERPL-MCNC: 8.9 MG/DL — SIGNIFICANT CHANGE UP (ref 8.4–10.5)
CALCIUM SERPL-MCNC: 9 MG/DL — SIGNIFICANT CHANGE UP (ref 8.4–10.5)
CHLORIDE SERPL-SCNC: 99 MMOL/L — SIGNIFICANT CHANGE UP (ref 96–108)
CHLORIDE SERPL-SCNC: 99 MMOL/L — SIGNIFICANT CHANGE UP (ref 96–108)
CHOLEST FLD-MCNC: 90 MG/DL — SIGNIFICANT CHANGE UP
CO2 SERPL-SCNC: 21 MMOL/L — LOW (ref 22–31)
CO2 SERPL-SCNC: 23 MMOL/L — SIGNIFICANT CHANGE UP (ref 22–31)
CREAT SERPL-MCNC: 0.67 MG/DL — SIGNIFICANT CHANGE UP (ref 0.5–1.3)
CREAT SERPL-MCNC: 0.93 MG/DL — SIGNIFICANT CHANGE UP (ref 0.5–1.3)
CULTURE RESULTS: NO GROWTH — SIGNIFICANT CHANGE UP
CULTURE RESULTS: NO GROWTH — SIGNIFICANT CHANGE UP
EOSINOPHIL # BLD AUTO: 0.29 K/UL — SIGNIFICANT CHANGE UP (ref 0–0.5)
EOSINOPHIL NFR BLD AUTO: 2.2 % — SIGNIFICANT CHANGE UP (ref 0–6)
GLUCOSE BLDC GLUCOMTR-MCNC: 157 MG/DL — HIGH (ref 70–99)
GLUCOSE BLDC GLUCOMTR-MCNC: 184 MG/DL — HIGH (ref 70–99)
GLUCOSE BLDC GLUCOMTR-MCNC: 191 MG/DL — HIGH (ref 70–99)
GLUCOSE BLDC GLUCOMTR-MCNC: 194 MG/DL — HIGH (ref 70–99)
GLUCOSE BLDC GLUCOMTR-MCNC: 318 MG/DL — HIGH (ref 70–99)
GLUCOSE SERPL-MCNC: 212 MG/DL — HIGH (ref 70–99)
GLUCOSE SERPL-MCNC: 298 MG/DL — HIGH (ref 70–99)
HCT VFR BLD CALC: 42.2 % — SIGNIFICANT CHANGE UP (ref 34.5–45)
HGB BLD-MCNC: 13.8 G/DL — SIGNIFICANT CHANGE UP (ref 11.5–15.5)
IMM GRANULOCYTES NFR BLD AUTO: 1.8 % — HIGH (ref 0–1.5)
LYMPHOCYTES # BLD AUTO: 1.19 K/UL — SIGNIFICANT CHANGE UP (ref 1–3.3)
LYMPHOCYTES # BLD AUTO: 9.1 % — LOW (ref 13–44)
MAGNESIUM SERPL-MCNC: 2 MG/DL — SIGNIFICANT CHANGE UP (ref 1.6–2.6)
MCHC RBC-ENTMCNC: 31 PG — SIGNIFICANT CHANGE UP (ref 27–34)
MCHC RBC-ENTMCNC: 32.7 GM/DL — SIGNIFICANT CHANGE UP (ref 32–36)
MCV RBC AUTO: 94.8 FL — SIGNIFICANT CHANGE UP (ref 80–100)
MONOCYTES # BLD AUTO: 1.59 K/UL — HIGH (ref 0–0.9)
MONOCYTES NFR BLD AUTO: 12.1 % — SIGNIFICANT CHANGE UP (ref 2–14)
NEUTROPHILS # BLD AUTO: 9.71 K/UL — HIGH (ref 1.8–7.4)
NEUTROPHILS NFR BLD AUTO: 74.1 % — SIGNIFICANT CHANGE UP (ref 43–77)
NON-GYNECOLOGICAL CYTOLOGY STUDY: SIGNIFICANT CHANGE UP
NRBC # BLD: 0 /100 WBCS — SIGNIFICANT CHANGE UP (ref 0–0)
PHOSPHATE SERPL-MCNC: 2.7 MG/DL — SIGNIFICANT CHANGE UP (ref 2.5–4.5)
PHOSPHATE SERPL-MCNC: 2.8 MG/DL — SIGNIFICANT CHANGE UP (ref 2.5–4.5)
PLATELET # BLD AUTO: 329 K/UL — SIGNIFICANT CHANGE UP (ref 150–400)
POTASSIUM SERPL-MCNC: 4.2 MMOL/L — SIGNIFICANT CHANGE UP (ref 3.5–5.3)
POTASSIUM SERPL-MCNC: 6 MMOL/L — HIGH (ref 3.5–5.3)
POTASSIUM SERPL-SCNC: 4.2 MMOL/L — SIGNIFICANT CHANGE UP (ref 3.5–5.3)
POTASSIUM SERPL-SCNC: 6 MMOL/L — HIGH (ref 3.5–5.3)
RBC # BLD: 4.45 M/UL — SIGNIFICANT CHANGE UP (ref 3.8–5.2)
RBC # FLD: 13.5 % — SIGNIFICANT CHANGE UP (ref 10.3–14.5)
SODIUM SERPL-SCNC: 130 MMOL/L — LOW (ref 135–145)
SODIUM SERPL-SCNC: 131 MMOL/L — LOW (ref 135–145)
SPECIMEN SOURCE: SIGNIFICANT CHANGE UP
SPECIMEN SOURCE: SIGNIFICANT CHANGE UP
WBC # BLD: 13.1 K/UL — HIGH (ref 3.8–10.5)
WBC # FLD AUTO: 13.1 K/UL — HIGH (ref 3.8–10.5)

## 2021-12-06 PROCEDURE — 99232 SBSQ HOSP IP/OBS MODERATE 35: CPT

## 2021-12-06 PROCEDURE — 99232 SBSQ HOSP IP/OBS MODERATE 35: CPT | Mod: GC

## 2021-12-06 PROCEDURE — 71045 X-RAY EXAM CHEST 1 VIEW: CPT | Mod: 26

## 2021-12-06 RX ORDER — DEXTROSE 50 % IN WATER 50 %
50 SYRINGE (ML) INTRAVENOUS ONCE
Refills: 0 | Status: COMPLETED | OUTPATIENT
Start: 2021-12-06 | End: 2021-12-06

## 2021-12-06 RX ORDER — SODIUM ZIRCONIUM CYCLOSILICATE 10 G/10G
10 POWDER, FOR SUSPENSION ORAL ONCE
Refills: 0 | Status: COMPLETED | OUTPATIENT
Start: 2021-12-06 | End: 2021-12-06

## 2021-12-06 RX ORDER — INSULIN HUMAN 100 [IU]/ML
10 INJECTION, SOLUTION SUBCUTANEOUS ONCE
Refills: 0 | Status: COMPLETED | OUTPATIENT
Start: 2021-12-06 | End: 2021-12-06

## 2021-12-06 RX ADMIN — SODIUM ZIRCONIUM CYCLOSILICATE 10 GRAM(S): 10 POWDER, FOR SUSPENSION ORAL at 09:25

## 2021-12-06 RX ADMIN — Medication 3 UNIT(S): at 18:04

## 2021-12-06 RX ADMIN — Medication 2: at 18:04

## 2021-12-06 RX ADMIN — Medication 2: at 21:17

## 2021-12-06 RX ADMIN — Medication 8: at 11:32

## 2021-12-06 RX ADMIN — INSULIN HUMAN 10 UNIT(S): 100 INJECTION, SOLUTION SUBCUTANEOUS at 08:57

## 2021-12-06 RX ADMIN — Medication 88 MICROGRAM(S): at 06:06

## 2021-12-06 RX ADMIN — Medication 3 UNIT(S): at 09:56

## 2021-12-06 RX ADMIN — Medication 50 MILLILITER(S): at 08:58

## 2021-12-06 RX ADMIN — Medication 2: at 06:06

## 2021-12-06 RX ADMIN — Medication 3 UNIT(S): at 11:32

## 2021-12-06 NOTE — PROGRESS NOTE ADULT - SUBJECTIVE AND OBJECTIVE BOX
OVERNIGHT EVENTS: LIANNE.     SUBJECTIVE / INTERVAL HPI: Patient seen and examined at bedside. Pt in no acute distress. Mild pain from chest tube site with movement. Otherwise no SOB, CP, nausea, vomiting, fevers, chills, abdominal pain, lightheadedness.     VITAL SIGNS:  Vital Signs Last 24 Hrs  T(C): 36.4 (06 Dec 2021 10:31), Max: 37.1 (05 Dec 2021 22:29)  T(F): 97.6 (06 Dec 2021 10:31), Max: 98.8 (05 Dec 2021 22:29)  HR: 92 (06 Dec 2021 08:40) (76 - 92)  BP: 128/74 (06 Dec 2021 08:40) (114/53 - 155/79)  BP(mean): 93 (06 Dec 2021 08:40) (77 - 105)  RR: 18 (06 Dec 2021 08:40) (16 - 18)  SpO2: 96% (06 Dec 2021 08:40) (94% - 96%)    PHYSICAL EXAM:    General: NAD. Chest tube in place, c/d/i.  HEENT: NC/AT; PERRL, anicteric sclera; MMM  Neck: supple  Cardiovascular: +S1/S2, RRR  Respiratory: CTA B/L; no W/R/R. Decreased breath sounds left lower lobe.   Gastrointestinal: soft, NT/ND; +BSx4  Extremities: WWP; no edema, clubbing or cyanosis  Vascular: 2+ radial, DP/PT pulses B/L  Neurological: AAOx3    MEDICATIONS:  MEDICATIONS  (STANDING):  dextrose 40% Gel 15 Gram(s) Oral once  dextrose 5%. 1000 milliLiter(s) (50 mL/Hr) IV Continuous <Continuous>  dextrose 5%. 1000 milliLiter(s) (100 mL/Hr) IV Continuous <Continuous>  dextrose 50% Injectable 25 Gram(s) IV Push once  dextrose 50% Injectable 12.5 Gram(s) IV Push once  dextrose 50% Injectable 25 Gram(s) IV Push once  glucagon  Injectable 1 milliGRAM(s) IntraMuscular once  insulin lispro (ADMELOG) corrective regimen sliding scale   SubCutaneous Before meals and at bedtime  insulin lispro Injectable (ADMELOG) 3 Unit(s) SubCutaneous three times a day before meals  levothyroxine 88 MICROGram(s) Oral every 24 hours    MEDICATIONS  (PRN):      ALLERGIES:  Allergies    No Known Allergies    Intolerances        LABS:                        13.8   13.10 )-----------( 329      ( 06 Dec 2021 07:06 )             42.2     12-06    130<L>  |  99  |  26<H>  ----------------------------<  212<H>  6.0<H>   |  23  |  0.93    Ca    9.0      06 Dec 2021 07:06  Phos  2.8     12-06  Mg     2.0     12-06    TPro  6.5  /  Alb  2.8<L>  /  TBili  <0.2  /  DBili  x   /  AST  18  /  ALT  12  /  AlkPhos  114  12-05        CAPILLARY BLOOD GLUCOSE      POCT Blood Glucose.: 318 mg/dL (06 Dec 2021 11:19)      RADIOLOGY & ADDITIONAL TESTS: Reviewed. Hospital Course: 87yo DNR/DNI female PMHx breast cancer (on fulvestrant and Xgeva), DM2 (A1c 7.6 on this admission), hypothyroidism, HLD, HTN, lymphedema, endometrial carcinoma s/p hysterectomy and bilateral oophorectomy, and benign pancreatic tail adenoma presents with malaise for 2 weeks associated with intermittent pressure like chest comfort triggered by exertion. Pt went to Dr. Hargrove because she "just didn't feel well". At his office, Dr. Hargrove heard fluid in pt's left lung and an irregular heart beat and told her to go the ED. In the ED, pt remained HD stable and EKG showed NSR w/o ischemic changes. Admission labs notable for hyponatremia to 128, pt asymptomatic. CXR showed large left pleural effusion, and CT chest confirmed moderate left pleural effusion and also revealed multiple masses in the left breast with overlying skin thickening highly s/f mets along w/ diffuse sclerotic mets. Pulm was consulted for possible thoracentesis, heme/onc (Dr. Funez) made aware patient is here. Briefly started on NS for hyponatremia as pt initially made NPO for tap but d/c'd when diet restarted; Na improved to 131 s/p NS and PO intake. Pt underwent L thoracentesis on 12/1 w/ removal of ~800cc yellow-colored fluid. Post-thora CXR revealed left-sided pneumothoraces and diffuse osseous sclerotic mets. Per pulm, pt to be placed on NRB overnight w/ repeat CXR at 9PM to monitor size of pneumothoraces, no need for chest tube at this time. Decision made by  to upgrade pt to [facility] for tele monitoring of SpO2 i/s/o pneumothoraces. Pt on NRB while on telemetry. Repeat CXR overnight stable, showing Pneumothorax nearly 2cm in size. AM CXR stable. Pt with episode of tachycardia, EKG showing sinus tachycardia. Chest tube placed by IR. CT surgery following for potential PleurX palcement. Chest tube to be clamped by IR to assess fluid accumulation. Cytology of fluid pending. Pt stable to be stepped down to [facility].     OVERNIGHT EVENTS: LIANNE.     SUBJECTIVE / INTERVAL HPI: Patient seen and examined at bedside. Pt in no acute distress. Mild pain from chest tube site with movement. Otherwise no SOB, CP, nausea, vomiting, fevers, chills, abdominal pain, lightheadedness.     VITAL SIGNS:  Vital Signs Last 24 Hrs  T(C): 36.4 (06 Dec 2021 10:31), Max: 37.1 (05 Dec 2021 22:29)  T(F): 97.6 (06 Dec 2021 10:31), Max: 98.8 (05 Dec 2021 22:29)  HR: 92 (06 Dec 2021 08:40) (76 - 92)  BP: 128/74 (06 Dec 2021 08:40) (114/53 - 155/79)  BP(mean): 93 (06 Dec 2021 08:40) (77 - 105)  RR: 18 (06 Dec 2021 08:40) (16 - 18)  SpO2: 96% (06 Dec 2021 08:40) (94% - 96%)    PHYSICAL EXAM:    General: NAD. Chest tube in place, c/d/i.  HEENT: NC/AT; PERRL, anicteric sclera; MMM  Neck: supple  Cardiovascular: +S1/S2, RRR  Respiratory: CTA B/L; no W/R/R. Decreased breath sounds left lower lobe.   Gastrointestinal: soft, NT/ND; +BSx4  Extremities: WWP; no edema, clubbing or cyanosis  Vascular: 2+ radial, DP/PT pulses B/L  Neurological: AAOx3    MEDICATIONS:  MEDICATIONS  (STANDING):  dextrose 40% Gel 15 Gram(s) Oral once  dextrose 5%. 1000 milliLiter(s) (50 mL/Hr) IV Continuous <Continuous>  dextrose 5%. 1000 milliLiter(s) (100 mL/Hr) IV Continuous <Continuous>  dextrose 50% Injectable 25 Gram(s) IV Push once  dextrose 50% Injectable 12.5 Gram(s) IV Push once  dextrose 50% Injectable 25 Gram(s) IV Push once  glucagon  Injectable 1 milliGRAM(s) IntraMuscular once  insulin lispro (ADMELOG) corrective regimen sliding scale   SubCutaneous Before meals and at bedtime  insulin lispro Injectable (ADMELOG) 3 Unit(s) SubCutaneous three times a day before meals  levothyroxine 88 MICROGram(s) Oral every 24 hours    MEDICATIONS  (PRN):      ALLERGIES:  Allergies    No Known Allergies    Intolerances        LABS:                        13.8   13.10 )-----------( 329      ( 06 Dec 2021 07:06 )             42.2     12-06    130<L>  |  99  |  26<H>  ----------------------------<  212<H>  6.0<H>   |  23  |  0.93    Ca    9.0      06 Dec 2021 07:06  Phos  2.8     12-06  Mg     2.0     12-06    TPro  6.5  /  Alb  2.8<L>  /  TBili  <0.2  /  DBili  x   /  AST  18  /  ALT  12  /  AlkPhos  114  12-05        CAPILLARY BLOOD GLUCOSE      POCT Blood Glucose.: 318 mg/dL (06 Dec 2021 11:19)      RADIOLOGY & ADDITIONAL TESTS: Reviewed.

## 2021-12-06 NOTE — PROGRESS NOTE ADULT - SUBJECTIVE AND OBJECTIVE BOX
PULMONARY CONSULT SERVICE FOLLOW-UP NOTE  -------------------------------------------------------------------    INTERVAL HPI:    No acute overnight events.   Pt seen and examined at bedside this AM.     Afebrile, VSS - no O2 requirements   Continues to feel well with no complaints. No SOB, cough, chest pain  L Chest tube remains on suction, no air-leak, no tidaling, 0cc drainage over past 24 hrs    Bedside U/S today:  - Trace L simple-appearing pleural effusion, lung sliding + at base    -------------------------------------------------------------------  MEDICATIONS:    Pulmonary:    Antimicrobials:    Anticoagulants:    Cardiac:      Allergies    No Known Allergies    Intolerances        Vital Signs Last 24 Hrs  T(C): 36.4 (06 Dec 2021 10:31), Max: 37.1 (05 Dec 2021 22:29)  T(F): 97.6 (06 Dec 2021 10:31), Max: 98.8 (05 Dec 2021 22:29)  HR: 92 (06 Dec 2021 08:40) (76 - 92)  BP: 128/74 (06 Dec 2021 08:40) (114/53 - 155/79)  BP(mean): 93 (06 Dec 2021 08:40) (77 - 105)  RR: 18 (06 Dec 2021 08:40) (16 - 18)  SpO2: 96% (06 Dec 2021 08:40) (94% - 96%)    12-05 @ 07:01  -  12-06 @ 07:00  --------------------------------------------------------  IN: 0 mL / OUT: 625 mL / NET: -625 mL          -------------------------------------------------------------------  PHYSICAL EXAM:    GEN: Comfortable, in NAD  HEENT: NC/AT, PEERLA, MMM  CARDIAC: RRR, Normal S1/S2, No MRGs  PULMONARY: CTA BL; no wheezing/rhonchi/rales - no accessory muscle use - L chest tube in place (on suction, site c/d/i)   ABDOMEN: Soft, NT/ND   EXT: No LE edema  NEURO: A&O x 3, CN II-XII grossly intact, moves all ext, sensation intact    -------------------------------------------------------------------  LABS:        CBC Full  -  ( 06 Dec 2021 07:06 )  WBC Count : 13.10 K/uL  RBC Count : 4.45 M/uL  Hemoglobin : 13.8 g/dL  Hematocrit : 42.2 %  Platelet Count - Automated : 329 K/uL  Mean Cell Volume : 94.8 fl  Mean Cell Hemoglobin : 31.0 pg  Mean Cell Hemoglobin Concentration : 32.7 gm/dL  Auto Neutrophil # : 9.71 K/uL  Auto Lymphocyte # : 1.19 K/uL  Auto Monocyte # : 1.59 K/uL  Auto Eosinophil # : 0.29 K/uL  Auto Basophil # : 0.09 K/uL  Auto Neutrophil % : 74.1 %  Auto Lymphocyte % : 9.1 %  Auto Monocyte % : 12.1 %  Auto Eosinophil % : 2.2 %  Auto Basophil % : 0.7 %    12-06    130<L>  |  99  |  26<H>  ----------------------------<  212<H>  6.0<H>   |  23  |  0.93    Ca    9.0      06 Dec 2021 07:06  Phos  2.8     12-06  Mg     2.0     12-06    TPro  6.5  /  Alb  2.8<L>  /  TBili  <0.2  /  DBili  x   /  AST  18  /  ALT  12  /  AlkPhos  114  12-05                      -------------------------------------------------------------------  RADIOLOGY & ADDITIONAL STUDIES:      EXAM:  XR CHEST PORTABLE ROUTINE 1V                          PROCEDURE DATE:  12/06/2021          INTERPRETATION:  Clinical history/reason for exam: Pneumothorax.    Frontal chest.    COMPARISON: December 5, 2021.    Findings/  impression: Left pigtail catheter in place. Left apical tiny pneumothorax. Left basilar opacity/pleural effusion. Heart size within normal limits, thoracic aortic calcification. Surgical clips overlying the left subdiaphragmatic region.. Stable bony structures, levoscoliosis. Osteoblastic diffuse metastatic disease.    --- End of Report ---

## 2021-12-06 NOTE — PROGRESS NOTE ADULT - SUBJECTIVE AND OBJECTIVE BOX
RADHA COLLINS  MRN-[id number]      Interval History:    Continues to feel well at rest.  Has completed 3 day course of ceftriaxone.  Chest tube to be clamped today.    HPI:    Radha Collins is an 88F with metastatic breast cancer (Hormone positive, HER-2 negative, diagnosed 2/2019 started on anastrazole and denosumab, transitioned to fulvestrant 11/2020 for disease progression.  Most recent staging imaging 7/2021 revealing stable disease.    She presented to clinic with TESFAYE, 2 weeks of malaise and chest discomfort.  On exam was found to have crackles in RLL and irregular heart rate leading to hospital admission.      CT Chest upon arrival showed persistence of pleural effusion and thoracentesis was performed, complicated by moderate pneumothorax.    Patient seen in [facility] to assist with resolution of pneumothorax. She denies acute complaints at rest.  Denies new focal sites of pain.  Explains that she has been less active at home but continues to be able to accomplish her daily activities such as getting the mail etc.    PAST MEDICAL & SURGICAL HISTORY:  Type 2 diabetes mellitus  DM (diabetes mellitus)    HTN (hypertension)    Hyperthyroidism    Breast cancer    Hypothyroid    Endometrial cancer    Other acquired absence of organ  S/P splenectomy    Status post total hysterectomy  S/P hysterectomy    Other acquired absence of organ  S/P cholecystectomy    Status post cataract extraction  S/P cataract extraction        MEDICATIONS  (STANDING):  dextrose 40% Gel 15 Gram(s) Oral once  dextrose 5%. 1000 milliLiter(s) IV Continuous <Continuous>  dextrose 5%. 1000 milliLiter(s) IV Continuous <Continuous>  dextrose 50% Injectable 25 Gram(s) IV Push once  dextrose 50% Injectable 12.5 Gram(s) IV Push once  dextrose 50% Injectable 25 Gram(s) IV Push once  glucagon  Injectable 1 milliGRAM(s) IntraMuscular once  insulin lispro (ADMELOG) corrective regimen sliding scale   SubCutaneous Before meals and at bedtime  levothyroxine 88 MICROGram(s) Oral every 24 hours      Allergies    No Known Allergies    Intolerances          FAMILY HISTORY:  FH: type 2 diabetes (Mother)        ROS:  The patient denies chest pain or SOB at rest.    No nausea/vomiting/fevers/chills/night sweats.    +fatigue, no headaches/dizziness.   No abdominal pain/diarrhea/constipation.  No melena or hematochezia.    No dysuria/hematuria.  No history of easy bruising/bleeding.  No gingival bleeding or epistaxis.  No leg pain or leg swelling.    ROS is otherwise negative.    Physical exam:    Vital signs  Vital Signs Last 24 Hrs  T(C): 37 (06 Dec 2021 06:31), Max: 37.1 (05 Dec 2021 22:29)  T(F): 98.6 (06 Dec 2021 06:31), Max: 98.8 (05 Dec 2021 22:29)  HR: 92 (06 Dec 2021 03:50) (76 - 106)  BP: 143/66 (06 Dec 2021 03:50) (114/53 - 155/79)  BP(mean): 95 (06 Dec 2021 03:50) (77 - 105)  RR: 16 (06 Dec 2021 03:50) (16 - 18)  SpO2: 95% (06 Dec 2021 03:50) (94% - 96%)    HEENT: PERRLA, pink mucosae  No palpable lymphadenopathy  Cardiovascular: Regular rhythm/rate, no murmurs, rubs, gallops  Respiratory: No accessory muscle use, decreased breath sounds on left.  Abdomen: soft, non tender, non distended, no palpable masses, no palpable hepatosplenomegaly  Extremities:  no peripheral edema  Neuro: alert, awake and oriented x 3, no focal abnormalities  Skin: warm, no obvious lesions on visible skin    LABS:                                  13.8   13.10 )-----------( 329      ( 06 Dec 2021 07:06 )             42.2         CBC Full  -  ( 06 Dec 2021 07:06 )  WBC Count : 13.10 K/uL  RBC Count : 4.45 M/uL  Hemoglobin : 13.8 g/dL  Hematocrit : 42.2 %  Platelet Count - Automated : 329 K/uL  Mean Cell Volume : 94.8 fl  Mean Cell Hemoglobin : 31.0 pg  Mean Cell Hemoglobin Concentration : 32.7 gm/dL  Auto Neutrophil # : 9.71 K/uL  Auto Lymphocyte # : 1.19 K/uL  Auto Monocyte # : 1.59 K/uL  Auto Eosinophil # : 0.29 K/uL  Auto Basophil # : 0.09 K/uL  Auto Neutrophil % : 74.1 %  Auto Lymphocyte % : 9.1 %  Auto Monocyte % : 12.1 %  Auto Eosinophil % : 2.2 %  Auto Basophil % : 0.7 %        12-06    130<L>  |  99  |  26<H>  ----------------------------<  212<H>  6.0<H>   |  23  |  0.93    Ca    9.0      06 Dec 2021 07:06  Phos  2.8     12-06  Mg     2.0     12-06    TPro  6.5  /  Alb  2.8<L>  /  TBili  <0.2  /  DBili  x   /  AST  18  /  ALT  12  /  AlkPhos  114  12-05                PERTINENT IMAGING STUDIES: reviewed  CTA 12/2/2021 -  Preliminary read shows persistent effusion, persistent pneumothorax, no evidence of PE      ASSESSMENT:    88F with metastatic breast cancer admitted for dyspnea, now s/p thoracentesis    PLAN:    #Metastatic Breast Cancer  - With known bony involvement, Left pleural effusion  - S/p thoracentesis, now with pneumothorax  - Disease appears relatively stable on imaging  - Patient due for next dose of fulvestrant 12/8/2021.  Depending on patient's anticipated length of stay, we can see to it being administered here  - Patient's pleural effusion has been present and relatively stable for nearly a year now, and has not required repeat thoracenteses     #Positive UA  - Positive for nitrites, LE, bacteria, culture pending  - Urine culture contaminated no organism isolated  - Received 3-day course of ceftriaxone  - WBC rising, may be reactive but continue to monitor for evidence of infection           Thank you    Chris Sherman MD for Chrystal Funez MD  [phone] RADHA COLLINS  MRN-[id number]      Interval History:    Continues to feel well at rest.  Has completed 3 day course of ceftriaxone.  Chest tube to be clamped today.    HPI:    Radha Collins is an 88F with metastatic breast cancer (Hormone positive, HER-2 negative, diagnosed 2/2019 started on anastrazole and denosumab, transitioned to fulvestrant 11/2020 for disease progression.  Most recent staging imaging 7/2021 revealing stable disease.    She presented to clinic with TESFAYE, 2 weeks of malaise and chest discomfort.  On exam was found to have crackles in RLL and irregular heart rate leading to hospital admission.      CT Chest upon arrival showed persistence of pleural effusion and thoracentesis was performed, complicated by moderate pneumothorax.    Patient seen in [facility] to assist with resolution of pneumothorax. She denies acute complaints at rest.  Denies new focal sites of pain.  Explains that she has been less active at home but continues to be able to accomplish her daily activities such as getting the mail etc.    PAST MEDICAL & SURGICAL HISTORY:  Type 2 diabetes mellitus  DM (diabetes mellitus)    HTN (hypertension)    Hyperthyroidism    Breast cancer    Hypothyroid    Endometrial cancer    Other acquired absence of organ  S/P splenectomy    Status post total hysterectomy  S/P hysterectomy    Other acquired absence of organ  S/P cholecystectomy    Status post cataract extraction  S/P cataract extraction        MEDICATIONS  (STANDING):  dextrose 40% Gel 15 Gram(s) Oral once  dextrose 5%. 1000 milliLiter(s) IV Continuous <Continuous>  dextrose 5%. 1000 milliLiter(s) IV Continuous <Continuous>  dextrose 50% Injectable 25 Gram(s) IV Push once  dextrose 50% Injectable 12.5 Gram(s) IV Push once  dextrose 50% Injectable 25 Gram(s) IV Push once  glucagon  Injectable 1 milliGRAM(s) IntraMuscular once  insulin lispro (ADMELOG) corrective regimen sliding scale   SubCutaneous Before meals and at bedtime  levothyroxine 88 MICROGram(s) Oral every 24 hours      Allergies    No Known Allergies    Intolerances          FAMILY HISTORY:  FH: type 2 diabetes (Mother)        ROS:  The patient denies chest pain or SOB at rest.    No nausea/vomiting/fevers/chills/night sweats.    +fatigue, no headaches/dizziness.   No abdominal pain/diarrhea/constipation.  No melena or hematochezia.    No dysuria/hematuria.  No history of easy bruising/bleeding.  No gingival bleeding or epistaxis.  No leg pain or leg swelling.    ROS is otherwise negative.    Physical exam:    Vital signs  Vital Signs Last 24 Hrs  T(C): 37 (06 Dec 2021 06:31), Max: 37.1 (05 Dec 2021 22:29)  T(F): 98.6 (06 Dec 2021 06:31), Max: 98.8 (05 Dec 2021 22:29)  HR: 92 (06 Dec 2021 03:50) (76 - 106)  BP: 143/66 (06 Dec 2021 03:50) (114/53 - 155/79)  BP(mean): 95 (06 Dec 2021 03:50) (77 - 105)  RR: 16 (06 Dec 2021 03:50) (16 - 18)  SpO2: 95% (06 Dec 2021 03:50) (94% - 96%)    HEENT: PERRLA, pink mucosae  No palpable lymphadenopathy  Cardiovascular: Regular rhythm/rate, no murmurs, rubs, gallops  Respiratory: No accessory muscle use, decreased breath sounds on left.  Abdomen: soft, non tender, non distended, no palpable masses, no palpable hepatosplenomegaly  Extremities:  no peripheral edema  Neuro: alert, awake and oriented x 3, no focal abnormalities  Skin: warm, no obvious lesions on visible skin    LABS:                                  13.8   13.10 )-----------( 329      ( 06 Dec 2021 07:06 )             42.2         CBC Full  -  ( 06 Dec 2021 07:06 )  WBC Count : 13.10 K/uL  RBC Count : 4.45 M/uL  Hemoglobin : 13.8 g/dL  Hematocrit : 42.2 %  Platelet Count - Automated : 329 K/uL  Mean Cell Volume : 94.8 fl  Mean Cell Hemoglobin : 31.0 pg  Mean Cell Hemoglobin Concentration : 32.7 gm/dL  Auto Neutrophil # : 9.71 K/uL  Auto Lymphocyte # : 1.19 K/uL  Auto Monocyte # : 1.59 K/uL  Auto Eosinophil # : 0.29 K/uL  Auto Basophil # : 0.09 K/uL  Auto Neutrophil % : 74.1 %  Auto Lymphocyte % : 9.1 %  Auto Monocyte % : 12.1 %  Auto Eosinophil % : 2.2 %  Auto Basophil % : 0.7 %        12-06    130<L>  |  99  |  26<H>  ----------------------------<  212<H>  6.0<H>   |  23  |  0.93    Ca    9.0      06 Dec 2021 07:06  Phos  2.8     12-06  Mg     2.0     12-06    TPro  6.5  /  Alb  2.8<L>  /  TBili  <0.2  /  DBili  x   /  AST  18  /  ALT  12  /  AlkPhos  114  12-05                PERTINENT IMAGING STUDIES: reviewed  CTA 12/2/2021 -  Preliminary read shows persistent effusion, persistent pneumothorax, no evidence of PE      ASSESSMENT:    88F with metastatic breast cancer admitted for dyspnea, now s/p thoracentesis    PLAN:    #Metastatic Breast Cancer  - With known bony involvement, Left pleural effusion  - S/p thoracentesis, now with pneumothorax  - Disease appears relatively stable on imaging  - Patient due for next dose of fulvestrant 12/8/2021.  If she is still admitted, we will delay the next injection by one week.  - Patient's pleural effusion has been present and relatively stable since her initial diagnosis, and has not required repeat thoracenteses     #Positive UA  - Positive for nitrites, LE, bacteria, culture pending  - Urine culture contaminated no organism isolated  - Received 3-day course of ceftriaxone  - WBC rising, neutrophil pre-dominant may be reactive but continue to monitor for evidence of infection           Thank you    Chris Sherman MD for Chrystal Funez MD  [phone]

## 2021-12-06 NOTE — PROGRESS NOTE ADULT - SUBJECTIVE AND OBJECTIVE BOX
INTERVAL HPI/OVERNIGHT EVENTS:  Interim reviewed;   Chest film noted; Tiny pneumo;  Discussed with ; May need pleurx  No shortness of breath    MEDICATIONS  (STANDING):  dextrose 40% Gel 15 Gram(s) Oral once  dextrose 5%. 1000 milliLiter(s) (50 mL/Hr) IV Continuous <Continuous>  dextrose 5%. 1000 milliLiter(s) (100 mL/Hr) IV Continuous <Continuous>  dextrose 50% Injectable 25 Gram(s) IV Push once  dextrose 50% Injectable 12.5 Gram(s) IV Push once  dextrose 50% Injectable 25 Gram(s) IV Push once  glucagon  Injectable 1 milliGRAM(s) IntraMuscular once  insulin lispro (ADMELOG) corrective regimen sliding scale   SubCutaneous Before meals and at bedtime  insulin lispro Injectable (ADMELOG) 3 Unit(s) SubCutaneous three times a day before meals  levothyroxine 88 MICROGram(s) Oral every 24 hours    MEDICATIONS  (PRN):      Allergies    No Known Allergies    Intolerances        Vital Signs Last 24 Hrs  T(C): 36.4 (06 Dec 2021 10:31), Max: 37.1 (05 Dec 2021 22:29)  T(F): 97.6 (06 Dec 2021 10:31), Max: 98.8 (05 Dec 2021 22:29)  HR: 92 (06 Dec 2021 08:40) (76 - 92)  BP: 128/74 (06 Dec 2021 08:40) (114/53 - 155/79)  BP(mean): 93 (06 Dec 2021 08:40) (77 - 105)  RR: 18 (06 Dec 2021 08:40) (16 - 18)  SpO2: 96% (06 Dec 2021 08:40) (94% - 96%)          Constitutional: Awake    Eyes: BRIDGER    ENMT: Negative    Neck: Supple    Back:  no tenderness     Respiratory:  clear    Cardiovascular: S1 S2    Gastrointestinal: soft    Genitourinary:    Extremities: no edema    Vascular:    Neurological:    Skin:    Lymph Nodes:            12-05 @ 07:01  -  12-06 @ 07:00  --------------------------------------------------------  IN: 0 mL / OUT: 625 mL / NET: -625 mL      LABS:                        13.8   13.10 )-----------( 329      ( 06 Dec 2021 07:06 )             42.2     12-06    130<L>  |  99  |  26<H>  ----------------------------<  212<H>  6.0<H>   |  23  |  0.93    Ca    9.0      06 Dec 2021 07:06  Phos  2.8     12-06  Mg     2.0     12-06    TPro  6.5  /  Alb  2.8<L>  /  TBili  <0.2  /  DBili  x   /  AST  18  /  ALT  12  /  AlkPhos  114  12-05          RADIOLOGY & ADDITIONAL TESTS:

## 2021-12-06 NOTE — PROGRESS NOTE ADULT - ASSESSMENT
87yo DNR/DNI female PMHx breast cancer (on fulvestrant and Xgeva), DM2 (A1c 7.6 on this admission), hypothyroidism, HLD, HTN, lymphedema, endometrial carcinoma s/p hysterectomy and bilateral oophorectomy, and benign pancreatic tail adenoma presents with malaise x2 weeks associated with intermittent pressure like chest comfort triggered by exertion. Found to have hyponatremia and left PLEF on CT chest s/f malignant effusion, now s/p L thoracentesis by pulm c/b L pneumothoraces. Initially admitted to [facility], now stepped up to [facility] 12/1 per Dr. Hargrove for tele monitoring of SpO2 i/s/o pneumothoraces, on [facility] overnight with stable CXR, now stable to be transferred to [facility].

## 2021-12-06 NOTE — PROGRESS NOTE ADULT - ATTENDING COMMENTS
Trapped lung s/p thoracentesis now with nonfunctional left CTube ( not tidaling, not draining).  Patient with minimal improvement of symptom after thoracentesis, asymptomatic with hydroptx and now after CT placement.  Ctube management as per Primary team and IR.   No need for repeat fluid removal, would stay away from IPC as patient has significant chest wall tumor involvement.  Please, call us with further questions.

## 2021-12-06 NOTE — PROGRESS NOTE ADULT - SUBJECTIVE AND OBJECTIVE BOX
THORACIC SURGERY ATTENDING PROGRESS NOTE    Admission HPI:  88 year old female PMHx breast cancer, DM2, hypothyroidism, HLD, HTN, lymphedema, endometrial carcinoma s/p hysterectomy and bilateral oophorectomy, and benign pancreatic tail adenoma presents with malaise for 2 weeks associated with intermittent pressure like chest comfort triggered by exertion. Ms. Collins decided to see Dr. Hargrove because she "just didn't feel well". At his office, Dr. Hargrove heard fluid in Ms. Collins's left lung and an irregular heart beat and told her to go the the ED.    (30 Nov 2021 19:43)      SUBJECTIVE ASSESSMENT:  Patient seen and examined this morning. No issues, no shortness of breath. Overall doing well.     Pleural drain examined, no air leak. Serosanguineous output in atrium. No fluid in tubing.     VITAL SIGNS AND I/O  Vital Signs Last 24 Hrs  T(C): 37 (06 Dec 2021 06:31), Max: 37.1 (05 Dec 2021 22:29)  T(F): 98.6 (06 Dec 2021 06:31), Max: 98.8 (05 Dec 2021 22:29)  HR: 92 (06 Dec 2021 03:50) (76 - 106)  BP: 143/66 (06 Dec 2021 03:50) (114/53 - 155/79)  BP(mean): 95 (06 Dec 2021 03:50) (77 - 105)  RR: 16 (06 Dec 2021 03:50) (16 - 18)  SpO2: 95% (06 Dec 2021 03:50) (94% - 96%)  I&O's Detail    05 Dec 2021 07:01  -  06 Dec 2021 07:00  --------------------------------------------------------  IN:  Total IN: 0 mL    OUT:    Chest Tube (mL): 0 mL    Indwelling Catheter - Urethral (mL): 625 mL  Total OUT: 625 mL    Total NET: -625 mL          PHYSICAL EXAM:  General: OOB to chair, no acute distress.  Neurological: AAOx3, no AMS or focal deficits.   Cardiovascular: RRR, S1/S2, no m/r/g.   Respiratory: No acute distress.  CTA b/l, no w/r/r.   Gastrointestinal: NBS, non-TTP  Extremities: Warm and well perfused, no calf ttp or edema b/l.   Vascular: Pulses 2+ throughout  Incision Sites: C/D/I    LABS:                        13.8   13.10 )-----------( 329      ( 06 Dec 2021 07:06 )             42.2           12-05    131<L>  |  100  |  35<H>  ----------------------------<  268<H>  5.2   |  22  |  0.89    Ca    9.1      05 Dec 2021 06:59  Phos  2.8     12-05  Mg     2.3     12-05    TPro  6.5  /  Alb  2.8<L>  /  TBili  <0.2  /  DBili  x   /  AST  18  /  ALT  12  /  AlkPhos  114  12-05          MEDICATIONS  (STANDING):  dextrose 40% Gel 15 Gram(s) Oral once  dextrose 5%. 1000 milliLiter(s) (50 mL/Hr) IV Continuous <Continuous>  dextrose 5%. 1000 milliLiter(s) (100 mL/Hr) IV Continuous <Continuous>  dextrose 50% Injectable 25 Gram(s) IV Push once  dextrose 50% Injectable 12.5 Gram(s) IV Push once  dextrose 50% Injectable 25 Gram(s) IV Push once  glucagon  Injectable 1 milliGRAM(s) IntraMuscular once  insulin lispro (ADMELOG) corrective regimen sliding scale   SubCutaneous Before meals and at bedtime  insulin lispro Injectable (ADMELOG) 3 Unit(s) SubCutaneous three times a day before meals  levothyroxine 88 MICROGram(s) Oral every 24 hours    MEDICATIONS  (PRN):      ALL RADIOGRAPHIC STUDIES AND LAB RESULTS HAVE BEEN REVIEWED

## 2021-12-06 NOTE — PROGRESS NOTE ADULT - PROBLEM SELECTOR PLAN 4
Patient reports intermittent exertional chest discomfort that is described as "pressure-like" at localized to the center of the sternum. Patient denies personal or family history of cardiac disease. EKG shows NSR with no ischemic changes. Trop negative, BNP negative.  CXR + pleural effusion. Discomfort most likely 2/2 pleural effusion in setting of metastatic breast cancer   -Pt now breathing comfortably on RA, denying any chest pain at this time  -Continue to monitor, pain management as needed

## 2021-12-06 NOTE — PROGRESS NOTE ADULT - ASSESSMENT
ASSESSMENT AND PLAN:  88 year old female with a pleural effusion status post drainage. Pigtail in place with persistent pneumothorax. Drainage has resolved. Pleural fluid cytology is pending. At this point the patient has a component of trapped lung and malignant effusion has not been ruled out. No studies available for Light's criteria.    Recommendations:  -Follow up cytology results.   -Resend studies if needed.  -Clamp tube and assess fluid reaccumulation; if fluid is malignant, will need Pleur-X catheter.    Will continue to follow.      Bharathi Funes MD  Thoracic Surgery

## 2021-12-06 NOTE — PROGRESS NOTE ADULT - PROBLEM SELECTOR PLAN 3
CT chest showing moderate left pleural effusion, highly suspect multiple masses in the left breast with overlying skin thickening, and diffuse sclerotic mets. Pleural effusion likely malignant in origin. S/p L thoracentesis by pulm c/b L pneumothoraces  -management of L pneumothoraces as above  -pulm following, appreciate recs  -S/p chest tube placement by IR, management by IR with clamping trial today

## 2021-12-06 NOTE — PROGRESS NOTE ADULT - PROBLEM SELECTOR PROBLEM 3
[facility] 6 Month Well Child Check    ASSESSMENT & PLAN  Jacob Irby is a 7 m.o. who has normal growth and normal development.    Diagnoses and all orders for this visit:    Encounter for routine child health examination without abnormal findings    Other orders  -     DTaP HepB IPV combined vaccine IM  -     HiB PRP-T conjugate vaccine 4 dose IM  -     Pneumococcal conjugate vaccine 13-valent 6wks-17yrs; >50yrs  -     Rotavirus vaccine pentavalent 3 dose oral  -     Pediatric Development Testing    -Normal growth and development.  The met with the NICU developmental team from [facility].  Follow-up recommended at 12 months.  He has a slight cough today but no fever and I think it would be okay for him to proceed with catching up on his immunizations.  Six-month immunizations administered and counseled on all components.  They will return sometime in the next 2-3 months    Return to clinic at 9 months or sooner as needed    IMMUNIZATIONS  Immunizations were reviewed and orders were placed as appropriate. and I have discussed the risks and benefits of all of the vaccine components with the patient/parents.  All questions have been answered.    ANTICIPATORY GUIDANCE  I have reviewed age appropriate anticipatory guidance.  Social:  Bedtime Routine and Sibling Rivalry  Parenting:  Boredom  Nutrition:  Advancement of Solid Foods and Table Foods  Play and Communication:  Switching Toys and Responds to Speech/Babbling  Health:  Review Fevers and Increasing Viral Infections  Safety:  Safe Toys and Childproof Home    HEALTH HISTORY  Do you have any concerns that you'd like to discuss today?: No concerns      Health Maintenance: He tolerated his immunizations well last time. He has been healthy lately. He has had a cough for a few days now with a stuffy nose. His brother, Lamonte, is still taking antibiotics. He is rolling, but not crawling yet. He is trying to crawl. He can sit by himself. [facility] and [facility] to [facility] appointment; they didn't have any concerns about him, but they tested him for age appropriate behaviors. They were told to follow up when he turned 1 year old.     Review of Systems:  His parents deny any concerns about his skin, any fevers, pulling at his ears, or irritability. All other systems are negative.     Accompanied by Parents    Refills needed? No    Do you have any forms that need to be filled out? No        Do you have any significant health concerns in your family history?: Yes: listed  Family History   Problem Relation Age of Onset     Arthritis Maternal Grandmother      Arthritis Maternal Grandfather      No Medical Problems Brother      Hypertension Mother      Since your last visit, have there been any major changes in your family, such as a move, job change, separation, divorce, or death in the family?: No  Has a lack of transportation kept you from medical appointments?: No    Who lives in your home?:  Parents and sibling  Social History     Social History Narrative     Do you have any concerns about losing your housing?: No  Is your housing safe and comfortable?: Yes  Who provides care for your child?:  at home  How much screen time does your child have each day (phone, TV, laptop, tablet, computer)?: 0    Maternal depression screening: Doing well    Feeding/Nutrition:  Does your child eat: Formula: Similac   6 oz every 2 hours  Is your child eating or drinking anything other than breast milk or formula?: No. He seems interested in other foods at this time, but he is just getting formula.   Do you give your child vitamins?: no  Have you been worried that you don't have enough food?: No    Sleep:  How many times does your child wake in the night?: 2-3 times   What time does your child go to bed?: 9 pm   What time does your child wake up?: 5-6 am   How many naps does your child take during the day?: 2     Elimination:  Do you have any concerns with  "your child's bowels or bladder (peeing, pooping, constipation?):  No    TB Risk Assessment:  The patient and/or parent/guardian answer positive to:  patient and/or parent/guardian answer 'no' to all screening TB questions    Dental  When was the last time your child saw the dentist?: Patient has not been seen by a dentist yet   Not indicated. Teeth have not yet erupted.    DEVELOPMENT  Do parents have any concerns regarding development?  No  Do parents have any concerns regarding hearing?  No  Do parents have any concerns regarding vision?  No  Developmental Tool Used: PEDS:  Pass    Patient Active Problem List   Diagnosis     LGA (large for gestational age) infant     38 weeks gestation of pregnancy     Respiratory distress     Vacuum extractor delivery, delivered     HIE (hypoxic-ischemic encephalopathy)     pulmonary artery stenosis asx        MEASUREMENTS    Length: 26\" (66 cm) (6 %, Z= -1.59, Source: WHO (Boys, 0-2 years))  Weight: 21 lb 4 oz (9.639 kg) (90 %, Z= 1.31, Source: WHO (Boys, 0-2 years))  OFC: 45.5 cm (17.91\") (87 %, Z= 1.13, Source: WHO (Boys, 0-2 years))    PHYSICAL EXAM  Nursing note and vitals reviewed.  Constitutional: He appears well-developed and well-nourished.   HEENT: Head: Normocephalic. Anterior fontanelle is flat.    Right Ear: Tympanic membrane, external ear and canal normal.    Left Ear: Tympanic membrane, external ear and canal normal.    Nose: Nose normal. Clear rhinorrhea.    Mouth/Throat: Mucous membranes are moist. Oropharynx is clear.    Eyes: Conjunctivae and lids are normal. Pupils are equal, round, and reactive to light. Red reflex is present bilaterally.  Neck: Neck supple. No tenderness is present.   Cardiovascular: Normal rate and regular rhythm. No murmur heard.  Pulses: Femoral pulses are 2+ bilaterally.   Pulmonary/Chest: slight cough. Effort normal and breath sounds normal. There is normal air entry.   Abdominal: Soft. Bowel sounds are normal. There is no " hepatosplenomegaly. No umbilical or inguinal hernia.    Genitourinary: Testes normal and penis normal.   Musculoskeletal: Normal range of motion. Normal tone and strength. No abnormalities are seen. Spine without abnormality. Hips are stable.   Neurological: He is alert. He has normal reflexes.   Skin: No rashes.      The visit lasted a total of 17 minutes face to face with the patient. Over 50% of the time was spent counseling and educating the patient about health maintenance and anticipatory guidance.    I, Kadie Werner, am scribing for and in the presence of Dr. Knutson.  I, Dr. Treasure Knutson DO , personally performed the services described in this documentation as scribed by Kadie Werner in my presence, and it is both accurate and complete.     Pleural effusion

## 2021-12-06 NOTE — PROGRESS NOTE ADULT - PROBLEM SELECTOR PLAN 1
Post-thoracentesis CXR showed left upper lobe and left lower lobe pneumothoraces. SpO2 95% on RA. Per pulm, pt placed on NRB w/ improvement to SpO2 98%. CXR stable in AM. PTX likely in setting of trapped lung given chronicity of pleural effusion  -F/u pulm recs   -s/p chest tube with IR with improved tachycardia. Dressings c/d/i. Management of chest tube per IR, clamping trial today   -CT surgery following, f/u recs

## 2021-12-06 NOTE — PROGRESS NOTE ADULT - PROBLEM SELECTOR PLAN 5
Patient reports two year history of breast cancer treated with fulvestrant and Xgeva. Patient most likely presenting with metastatic breast cancer with CT chest showing highly suspect multiple masses in the left breast with overlying skin thickening, left sided pleural effusion, and diffuse sclerotic mets.  -F/u oncology recs, pt follows with Dr. Funez   -Consider palliative consult for GOC  -Patient due for next dose of fulvestrant 12/8/2021, therapy per Heme/onc

## 2021-12-07 LAB
ANION GAP SERPL CALC-SCNC: 9 MMOL/L — SIGNIFICANT CHANGE UP (ref 5–17)
ANISOCYTOSIS BLD QL: SLIGHT — SIGNIFICANT CHANGE UP
BASOPHILS # BLD AUTO: 0 K/UL — SIGNIFICANT CHANGE UP (ref 0–0.2)
BASOPHILS NFR BLD AUTO: 0 % — SIGNIFICANT CHANGE UP (ref 0–2)
BUN SERPL-MCNC: 19 MG/DL — SIGNIFICANT CHANGE UP (ref 7–23)
CALCIUM SERPL-MCNC: 8.7 MG/DL — SIGNIFICANT CHANGE UP (ref 8.4–10.5)
CHLORIDE SERPL-SCNC: 99 MMOL/L — SIGNIFICANT CHANGE UP (ref 96–108)
CO2 SERPL-SCNC: 24 MMOL/L — SIGNIFICANT CHANGE UP (ref 22–31)
CREAT SERPL-MCNC: 0.7 MG/DL — SIGNIFICANT CHANGE UP (ref 0.5–1.3)
CULTURE RESULTS: SIGNIFICANT CHANGE UP
EOSINOPHIL # BLD AUTO: 0.58 K/UL — HIGH (ref 0–0.5)
EOSINOPHIL NFR BLD AUTO: 4.1 % — SIGNIFICANT CHANGE UP (ref 0–6)
GIANT PLATELETS BLD QL SMEAR: PRESENT — SIGNIFICANT CHANGE UP
GLUCOSE BLDC GLUCOMTR-MCNC: 185 MG/DL — HIGH (ref 70–99)
GLUCOSE BLDC GLUCOMTR-MCNC: 194 MG/DL — HIGH (ref 70–99)
GLUCOSE BLDC GLUCOMTR-MCNC: 230 MG/DL — HIGH (ref 70–99)
GLUCOSE BLDC GLUCOMTR-MCNC: 249 MG/DL — HIGH (ref 70–99)
GLUCOSE SERPL-MCNC: 247 MG/DL — HIGH (ref 70–99)
HCT VFR BLD CALC: 40.8 % — SIGNIFICANT CHANGE UP (ref 34.5–45)
HGB BLD-MCNC: 13.5 G/DL — SIGNIFICANT CHANGE UP (ref 11.5–15.5)
LYMPHOCYTES # BLD AUTO: 1.3 K/UL — SIGNIFICANT CHANGE UP (ref 1–3.3)
LYMPHOCYTES # BLD AUTO: 9.2 % — LOW (ref 13–44)
MACROCYTES BLD QL: SLIGHT — SIGNIFICANT CHANGE UP
MAGNESIUM SERPL-MCNC: 1.9 MG/DL — SIGNIFICANT CHANGE UP (ref 1.6–2.6)
MANUAL SMEAR VERIFICATION: SIGNIFICANT CHANGE UP
MCHC RBC-ENTMCNC: 31 PG — SIGNIFICANT CHANGE UP (ref 27–34)
MCHC RBC-ENTMCNC: 33.1 GM/DL — SIGNIFICANT CHANGE UP (ref 32–36)
MCV RBC AUTO: 93.6 FL — SIGNIFICANT CHANGE UP (ref 80–100)
MONOCYTES # BLD AUTO: 1.73 K/UL — HIGH (ref 0–0.9)
MONOCYTES NFR BLD AUTO: 12.2 % — SIGNIFICANT CHANGE UP (ref 2–14)
NEUTROPHILS # BLD AUTO: 10.55 K/UL — HIGH (ref 1.8–7.4)
NEUTROPHILS NFR BLD AUTO: 73.5 % — SIGNIFICANT CHANGE UP (ref 43–77)
NEUTS BAND # BLD: 1 % — SIGNIFICANT CHANGE UP (ref 0–8)
OVALOCYTES BLD QL SMEAR: SLIGHT — SIGNIFICANT CHANGE UP
PHOSPHATE SERPL-MCNC: 2.7 MG/DL — SIGNIFICANT CHANGE UP (ref 2.5–4.5)
PLAT MORPH BLD: ABNORMAL
PLATELET # BLD AUTO: 295 K/UL — SIGNIFICANT CHANGE UP (ref 150–400)
POIKILOCYTOSIS BLD QL AUTO: SLIGHT — SIGNIFICANT CHANGE UP
POLYCHROMASIA BLD QL SMEAR: SLIGHT — SIGNIFICANT CHANGE UP
POTASSIUM SERPL-MCNC: 4.9 MMOL/L — SIGNIFICANT CHANGE UP (ref 3.5–5.3)
POTASSIUM SERPL-SCNC: 4.9 MMOL/L — SIGNIFICANT CHANGE UP (ref 3.5–5.3)
RBC # BLD: 4.36 M/UL — SIGNIFICANT CHANGE UP (ref 3.8–5.2)
RBC # FLD: 13.4 % — SIGNIFICANT CHANGE UP (ref 10.3–14.5)
RBC BLD AUTO: ABNORMAL
SARS-COV-2 RNA SPEC QL NAA+PROBE: SIGNIFICANT CHANGE UP
SMUDGE CELLS # BLD: PRESENT — SIGNIFICANT CHANGE UP
SODIUM SERPL-SCNC: 132 MMOL/L — LOW (ref 135–145)
SPECIMEN SOURCE: SIGNIFICANT CHANGE UP
WBC # BLD: 14.16 K/UL — HIGH (ref 3.8–10.5)
WBC # FLD AUTO: 14.16 K/UL — HIGH (ref 3.8–10.5)

## 2021-12-07 PROCEDURE — 99232 SBSQ HOSP IP/OBS MODERATE 35: CPT | Mod: GC

## 2021-12-07 PROCEDURE — 71045 X-RAY EXAM CHEST 1 VIEW: CPT | Mod: 26

## 2021-12-07 RX ORDER — POLYETHYLENE GLYCOL 3350 17 G/17G
17 POWDER, FOR SOLUTION ORAL AT BEDTIME
Refills: 0 | Status: DISCONTINUED | OUTPATIENT
Start: 2021-12-07 | End: 2021-12-10

## 2021-12-07 RX ORDER — ENOXAPARIN SODIUM 100 MG/ML
40 INJECTION SUBCUTANEOUS EVERY 24 HOURS
Refills: 0 | Status: DISCONTINUED | OUTPATIENT
Start: 2021-12-07 | End: 2021-12-09

## 2021-12-07 RX ORDER — SENNA PLUS 8.6 MG/1
2 TABLET ORAL AT BEDTIME
Refills: 0 | Status: DISCONTINUED | OUTPATIENT
Start: 2021-12-07 | End: 2021-12-10

## 2021-12-07 RX ORDER — INSULIN GLARGINE 100 [IU]/ML
7 INJECTION, SOLUTION SUBCUTANEOUS AT BEDTIME
Refills: 0 | Status: DISCONTINUED | OUTPATIENT
Start: 2021-12-07 | End: 2021-12-10

## 2021-12-07 RX ORDER — INSULIN LISPRO 100/ML
5 VIAL (ML) SUBCUTANEOUS
Refills: 0 | Status: DISCONTINUED | OUTPATIENT
Start: 2021-12-07 | End: 2021-12-10

## 2021-12-07 RX ADMIN — Medication 2: at 22:16

## 2021-12-07 RX ADMIN — INSULIN GLARGINE 7 UNIT(S): 100 INJECTION, SOLUTION SUBCUTANEOUS at 22:17

## 2021-12-07 RX ADMIN — POLYETHYLENE GLYCOL 3350 17 GRAM(S): 17 POWDER, FOR SOLUTION ORAL at 22:17

## 2021-12-07 RX ADMIN — Medication 4: at 08:55

## 2021-12-07 RX ADMIN — Medication 5 UNIT(S): at 17:40

## 2021-12-07 RX ADMIN — Medication 3 UNIT(S): at 12:39

## 2021-12-07 RX ADMIN — Medication 3 UNIT(S): at 08:56

## 2021-12-07 RX ADMIN — ENOXAPARIN SODIUM 40 MILLIGRAM(S): 100 INJECTION SUBCUTANEOUS at 17:07

## 2021-12-07 RX ADMIN — Medication 4: at 12:38

## 2021-12-07 RX ADMIN — Medication 88 MICROGRAM(S): at 06:48

## 2021-12-07 RX ADMIN — Medication 2: at 17:39

## 2021-12-07 RX ADMIN — SENNA PLUS 2 TABLET(S): 8.6 TABLET ORAL at 22:17

## 2021-12-07 NOTE — PROGRESS NOTE ADULT - SUBJECTIVE AND OBJECTIVE BOX
INTERVAL HPI/OVERNIGHT EVENTS:  Interim reviewed; Hopefully Chest Tube is removed today;  Fluid is negative for malignant cells  Glucoses increased; Will need both basilar and pre meal insulin;        MEDICATIONS  (STANDING):  dextrose 40% Gel 15 Gram(s) Oral once  dextrose 5%. 1000 milliLiter(s) (50 mL/Hr) IV Continuous <Continuous>  dextrose 5%. 1000 milliLiter(s) (100 mL/Hr) IV Continuous <Continuous>  dextrose 50% Injectable 25 Gram(s) IV Push once  dextrose 50% Injectable 12.5 Gram(s) IV Push once  dextrose 50% Injectable 25 Gram(s) IV Push once  glucagon  Injectable 1 milliGRAM(s) IntraMuscular once  insulin lispro (ADMELOG) corrective regimen sliding scale   SubCutaneous Before meals and at bedtime  insulin lispro Injectable (ADMELOG) 3 Unit(s) SubCutaneous three times a day before meals  levothyroxine 88 MICROGram(s) Oral every 24 hours    MEDICATIONS  (PRN):      Allergies    No Known Allergies    Intolerances        Vital Signs Last 24 Hrs  T(C): 36.9 (07 Dec 2021 05:57), Max: 37.5 (06 Dec 2021 21:58)  T(F): 98.4 (07 Dec 2021 05:57), Max: 99.5 (06 Dec 2021 21:58)  HR: 103 (07 Dec 2021 05:57) (84 - 103)  BP: 128/72 (07 Dec 2021 05:57) (128/72 - 141/63)  BP(mean): 88 (06 Dec 2021 20:55) (88 - 91)  RR: 18 (07 Dec 2021 05:57) (16 - 18)  SpO2: 95% (07 Dec 2021 05:57) (94% - 96%)          Constitutional: Awake    Eyes: BRIDGER    ENMT: Negative    Neck: Supple    Back:  no tenderness     Respiratory:  clear    Cardiovascular: S1 S2    Gastrointestinal:  soft    Genitourinary:    Extremities:  no edema    Vascular:    Neurological:    Skin:    Lymph Nodes:            12-06 @ 07:01  -  12-07 @ 07:00  --------------------------------------------------------  IN: 0 mL / OUT: 900 mL / NET: -900 mL      LABS:                        13.5   14.16 )-----------( 295      ( 07 Dec 2021 07:53 )             40.8     12-07    132<L>  |  99  |  19  ----------------------------<  247<H>  4.9   |  24  |  0.70    Ca    8.7      07 Dec 2021 07:53  Phos  2.7     12-07  Mg     1.9     12-07            RADIOLOGY & ADDITIONAL TESTS:

## 2021-12-07 NOTE — PROGRESS NOTE ADULT - ASSESSMENT
89yo DNR/DNI female PMHx breast cancer (on fulvestrant and Xgeva), DM2 (A1c 7.6 on this admission), hypothyroidism, HLD, HTN, lymphedema, endometrial carcinoma s/p hysterectomy and bilateral oophorectomy, and benign pancreatic tail adenoma presents with malaise x2 weeks associated with intermittent pressure like chest comfort triggered by exertion. Found to have hyponatremia and left PLEF on CT chest s/f malignant effusion, now s/p L thoracentesis by pulm c/b L pneumothoraces. Initially admitted to [facility], now stepped up to [facility] on 12/1 per Dr. Hargrove for tele monitoring of SpO2 i/s/o pneumothoraces, on NRB overnight with stable CXR, now stable to be transferred to [facility].    87yo DNR/DNI female PMHx breast cancer (on fulvestrant and Xgeva), DM2 (A1c 7.6 on this admission), hypothyroidism, HLD, HTN, lymphedema, endometrial carcinoma s/p hysterectomy and bilateral oophorectomy, and benign pancreatic tail adenoma presents with malaise x2 weeks associated with intermittent pressure like chest discomfort triggered by exertion. Found to have hyponatremia and left PLEF on CT chest c/f malignant effusion, now s/p L thoracentesis by pulm c/b L pneumothoraces.

## 2021-12-07 NOTE — PROGRESS NOTE ADULT - SUBJECTIVE AND OBJECTIVE BOX
Overnight Transfer from  to [facility]  Hospital Course: 87yo DNR/DNI female PMHx breast cancer (on fulvestrant and Xgeva), DM2 (A1c 7.6 on this admission), hypothyroidism, HLD, HTN, lymphedema, endometrial carcinoma s/p hysterectomy and bilateral oophorectomy, and benign pancreatic tail adenoma presents with malaise for 2 weeks associated with intermittent pressure like chest comfort triggered by exertion. Pt went to Dr. Hargrove because she "just didn't feel well". At his office, Dr. Hargrove heard fluid in pt's left lung and an irregular heart beat and told her to go the ED. In the ED, pt remained HD stable and EKG showed NSR w/o ischemic changes. Admission labs notable for hyponatremia to 128, pt asymptomatic. CXR showed large left pleural effusion, and CT chest confirmed moderate left pleural effusion and also revealed multiple masses in the left breast with overlying skin thickening highly s/f mets along w/ diffuse sclerotic mets. Pulm was consulted for possible thoracentesis, heme/onc (Dr. Funez) made aware patient is here. Briefly started on NS for hyponatremia as pt initially made NPO for tap but d/c'd when diet restarted; Na improved to 131 s/p NS and PO intake. Pt underwent L thoracentesis on 12/1 w/ removal of ~800cc yellow-colored fluid. Post-thora CXR revealed left-sided pneumothoraces and diffuse osseous sclerotic mets. Per pulm, pt to be placed on NRB overnight w/ repeat CXR at 9PM to monitor size of pneumothoraces, no need for chest tube at this time. Decision made by  to upgrade pt to [place] for tele monitoring of SpO2 i/s/o pneumothoraces. Pt on NRB while on telemetry. Repeat CXR overnight stable, showing Pneumothorax nearly 2cm in size. AM CXR stable. Pt with episode of tachycardia, EKG showing sinus tachycardia. Chest tube placed by IR. CT surgery following for potential PleurX palcement. Chest tube to be clamped by IR to assess fluid accumulation. Cytology of fluid pending. Pt stable to be stepped down to [facility].       FRANCESREBECAA, 88y, Female  MRN-[id number]  Patient is a 88y old  Female who presents with a chief complaint of pleural effusion (06 Dec 2021 11:28)      OVERNIGHT EVENTS: naeo     SUBJECTIVE: Patient seen and examined at bedside. Reports feeling fine. Denies fevers, chills, HA, chest pain, sob, nausea, vomiting, abdominal pain, diarrhea, constipation, dysuria.     12 Point ROS Negative unless noted otherwise above.  -------------------------------------------------------------------------------  VITAL SIGNS:  Vital Signs Last 24 Hrs  T(C): 36.8 (06 Dec 2021 22:25), Max: 37.5 (06 Dec 2021 21:58)  T(F): 98.3 (06 Dec 2021 22:25), Max: 99.5 (06 Dec 2021 21:58)  HR: 84 (06 Dec 2021 22:25) (84 - 92)  BP: 137/73 (06 Dec 2021 22:25) (128/74 - 143/66)  BP(mean): 88 (06 Dec 2021 20:55) (88 - 95)  RR: 16 (06 Dec 2021 22:25) (16 - 18)  SpO2: 95% (06 Dec 2021 22:25) (94% - 96%)  I&O's Summary    05 Dec 2021 07:01  -  06 Dec 2021 07:00  --------------------------------------------------------  IN: 0 mL / OUT: 625 mL / NET: -625 mL    06 Dec 2021 07:01  -  07 Dec 2021 01:00  --------------------------------------------------------  IN: 0 mL / OUT: 500 mL / NET: -500 mL        PHYSICAL EXAM:    General: elderly frail woman lying in bed in nad, breathing comfortably   HEENT: NC/AT  Neck: supple, trachea midline  Cardiovascular: RRR, +S1/S2; NO M/R/G  Respiratory: Decreased breath sounds left lower and middle lobe, no W/R/R  Gastrointestinal: soft, NT/ND; +BSx4  Extremities: WWP; no edema or cyanosis  Vascular: 2+ radial, DP/PT pulses B/L  Neurological: AAOx3; no focal deficits    ALLERGIES:  Allergies    No Known Allergies    Intolerances        MEDICATIONS:  MEDICATIONS  (STANDING):  dextrose 40% Gel 15 Gram(s) Oral once  dextrose 5%. 1000 milliLiter(s) (50 mL/Hr) IV Continuous <Continuous>  dextrose 5%. 1000 milliLiter(s) (100 mL/Hr) IV Continuous <Continuous>  dextrose 50% Injectable 25 Gram(s) IV Push once  dextrose 50% Injectable 12.5 Gram(s) IV Push once  dextrose 50% Injectable 25 Gram(s) IV Push once  glucagon  Injectable 1 milliGRAM(s) IntraMuscular once  insulin lispro (ADMELOG) corrective regimen sliding scale   SubCutaneous Before meals and at bedtime  insulin lispro Injectable (ADMELOG) 3 Unit(s) SubCutaneous three times a day before meals  levothyroxine 88 MICROGram(s) Oral every 24 hours    MEDICATIONS  (PRN):      -------------------------------------------------------------------------------  LABS:                        13.8   13.10 )-----------( 329      ( 06 Dec 2021 07:06 )             42.2     12-06    131<L>  |  99  |  24<H>  ----------------------------<  298<H>  4.2   |  21<L>  |  0.67    Ca    8.9      06 Dec 2021 12:54  Phos  2.7     12-06  Mg     2.0     12-06    TPro  6.5  /  Alb  2.8<L>  /  TBili  <0.2  /  DBili  x   /  AST  18  /  ALT  12  /  AlkPhos  114  12-05    LIVER FUNCTIONS - ( 05 Dec 2021 06:59 )  Alb: 2.8 g/dL / Pro: 6.5 g/dL / ALK PHOS: 114 U/L / ALT: 12 U/L / AST: 18 U/L / GGT: x               CAPILLARY BLOOD GLUCOSE      POCT Blood Glucose.: 191 mg/dL (06 Dec 2021 21:12)      Culture - Blood (collected 04 Dec 2021 21:26)  Source: .Blood Blood  Preliminary Report (06 Dec 2021 22:00):    No growth at 2 days.    Culture - Urine (collected 04 Dec 2021 21:14)  Source: Clean Catch Clean Catch (Midstream)  Final Report (06 Dec 2021 09:15):    No growth      COVID-19 PCR: Negative (30 Nov 2021 14:15)      RADIOLOGY & ADDITIONAL TESTS: Reviewed.     Overnight Transfer from  to [facility]  Hospital Course: 87yo DNR/DNI female PMHx breast cancer (on fulvestrant and Xgeva), DM2 (A1c 7.6 on this admission), hypothyroidism, HLD, HTN, lymphedema, endometrial carcinoma s/p hysterectomy and bilateral oophorectomy, and benign pancreatic tail adenoma presents with malaise for 2 weeks associated with intermittent pressure like chest discomfort triggered by exertion. Pt went to Dr. Hargrove because she "just didn't feel well". At his office, Dr. Hargrove heard fluid in pt's left lung and an irregular heart beat and told her to go the ED. In the ED, pt remained HD stable and EKG showed NSR w/o ischemic changes. Admission labs notable for hyponatremia to 128, pt asymptomatic. CXR showed large left pleural effusion, and CT chest confirmed moderate left pleural effusion and also revealed multiple masses in the left breast with overlying skin thickening highly s/f mets along w/ diffuse sclerotic mets. Pulm was consulted for possible thoracentesis, heme/onc (Dr. Funez) made aware patient is here. Briefly started on NS for hyponatremia as pt initially made NPO for tap but d/c'd when diet restarted; Na improved to 131 s/p NS and PO intake. Pt underwent L thoracentesis on 12/1 w/ removal of ~800cc yellow-colored fluid. Post-thora CXR revealed left-sided pneumothoraces and diffuse osseous sclerotic mets. Per pulm, pt to be placed on NRB overnight w/ repeat CXR at 9PM to monitor size of pneumothoraces, no need for chest tube at this time. Decision made by  to upgrade pt to [place] for tele monitoring of SpO2 i/s/o pneumothoraces. Pt on NRB while on telemetry. Repeat CXR overnight stable, showing Pneumothorax nearly 2cm in size. AM CXR stable. Pt with episode of tachycardia, EKG showing sinus tachycardia. Chest tube placed by IR. CT surgery following for potential PleurX palcement. Chest tube to be clamped by IR to assess fluid accumulation. Cytology of fluid pending. Pt stable to be stepped down to [facility].       FRANCESREBECAA, 88y, Female  MRN-[id number]  Patient is a 88y old  Female who presents with a chief complaint of pleural effusion (06 Dec 2021 11:28)      OVERNIGHT EVENTS: naeo     SUBJECTIVE: Patient seen and examined at bedside. Reports feeling fine. Denies fevers, chills, HA, chest pain, sob, nausea, vomiting, abdominal pain, diarrhea, constipation, dysuria.     12 Point ROS Negative unless noted otherwise above.  -------------------------------------------------------------------------------  VITAL SIGNS:  Vital Signs Last 24 Hrs  T(C): 36.8 (06 Dec 2021 22:25), Max: 37.5 (06 Dec 2021 21:58)  T(F): 98.3 (06 Dec 2021 22:25), Max: 99.5 (06 Dec 2021 21:58)  HR: 84 (06 Dec 2021 22:25) (84 - 92)  BP: 137/73 (06 Dec 2021 22:25) (128/74 - 143/66)  BP(mean): 88 (06 Dec 2021 20:55) (88 - 95)  RR: 16 (06 Dec 2021 22:25) (16 - 18)  SpO2: 95% (06 Dec 2021 22:25) (94% - 96%)  I&O's Summary    05 Dec 2021 07:01  -  06 Dec 2021 07:00  --------------------------------------------------------  IN: 0 mL / OUT: 625 mL / NET: -625 mL    06 Dec 2021 07:01  -  07 Dec 2021 01:00  --------------------------------------------------------  IN: 0 mL / OUT: 500 mL / NET: -500 mL        PHYSICAL EXAM:    General: elderly frail woman lying in bed in nad, breathing comfortably   HEENT: NC/AT  Neck: supple, trachea midline  Cardiovascular: RRR, +S1/S2; NO M/R/G  Respiratory: Decreased breath sounds left lower and middle lobe, no W/R/R  Gastrointestinal: soft, NT/ND; +BSx4  Extremities: WWP; no edema or cyanosis  Vascular: 2+ radial, DP/PT pulses B/L  Neurological: AAOx3; no focal deficits    ALLERGIES:  Allergies    No Known Allergies    Intolerances        MEDICATIONS:  MEDICATIONS  (STANDING):  dextrose 40% Gel 15 Gram(s) Oral once  dextrose 5%. 1000 milliLiter(s) (50 mL/Hr) IV Continuous <Continuous>  dextrose 5%. 1000 milliLiter(s) (100 mL/Hr) IV Continuous <Continuous>  dextrose 50% Injectable 25 Gram(s) IV Push once  dextrose 50% Injectable 12.5 Gram(s) IV Push once  dextrose 50% Injectable 25 Gram(s) IV Push once  glucagon  Injectable 1 milliGRAM(s) IntraMuscular once  insulin lispro (ADMELOG) corrective regimen sliding scale   SubCutaneous Before meals and at bedtime  insulin lispro Injectable (ADMELOG) 3 Unit(s) SubCutaneous three times a day before meals  levothyroxine 88 MICROGram(s) Oral every 24 hours    MEDICATIONS  (PRN):      -------------------------------------------------------------------------------  LABS:                        13.8   13.10 )-----------( 329      ( 06 Dec 2021 07:06 )             42.2     12-06    131<L>  |  99  |  24<H>  ----------------------------<  298<H>  4.2   |  21<L>  |  0.67    Ca    8.9      06 Dec 2021 12:54  Phos  2.7     12-06  Mg     2.0     12-06    TPro  6.5  /  Alb  2.8<L>  /  TBili  <0.2  /  DBili  x   /  AST  18  /  ALT  12  /  AlkPhos  114  12-05    LIVER FUNCTIONS - ( 05 Dec 2021 06:59 )  Alb: 2.8 g/dL / Pro: 6.5 g/dL / ALK PHOS: 114 U/L / ALT: 12 U/L / AST: 18 U/L / GGT: x               CAPILLARY BLOOD GLUCOSE      POCT Blood Glucose.: 191 mg/dL (06 Dec 2021 21:12)      Culture - Blood (collected 04 Dec 2021 21:26)  Source: .Blood Blood  Preliminary Report (06 Dec 2021 22:00):    No growth at 2 days.    Culture - Urine (collected 04 Dec 2021 21:14)  Source: Clean Catch Clean Catch (Midstream)  Final Report (06 Dec 2021 09:15):    No growth      COVID-19 PCR: Negative (30 Nov 2021 14:15)      RADIOLOGY & ADDITIONAL TESTS: Reviewed.

## 2021-12-07 NOTE — PHYSICAL THERAPY INITIAL EVALUATION ADULT - ADDITIONAL COMMENTS
Pt currently resides alone in elevator apartment, [address] building but pt prefers using back entrance w/ ramp. Primarily amb independently but has SC to use if feeling "weak". Denies HHA/caregiver, has neighbor and friend who come assist as needed. Has shower chair, no grab bars in home.

## 2021-12-07 NOTE — PROGRESS NOTE ADULT - PROBLEM SELECTOR PLAN 2
Pt consistently tachycardic with rectal temp 100.4 F. Urinalysis with concern for UTI.   -S/p Ceftriaxone course   -MRSA negative, now off abx   -Continue to monitor for symptoms and signs of infection S/p Ceftriaxone course  WBC now rising, but no other signs of infection    Plan:  -continue to monitor WBC

## 2021-12-07 NOTE — PROGRESS NOTE ADULT - PROBLEM SELECTOR PLAN 6
Patient reports being diagnosed with type 2 diabetes at age 80. 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 Home med: glimepiride 1mg bid. On home glimepiride. A1C 7.6%.  -Holding home oral medications   -c/w ISS   -monitor FS Patient reports being diagnosed with type 2 diabetes at age 80. 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 On home glimepiride 1mg bid. A1C 7.6%.  -Holding home oral medications   -c/w ISS   -monitor FS

## 2021-12-07 NOTE — PROGRESS NOTE ADULT - PROBLEM SELECTOR PLAN 3
CT chest showing moderate left pleural effusion, highly suspect multiple masses in the left breast with overlying skin thickening, and diffuse sclerotic mets. Pleural effusion likely malignant in origin. S/p L thoracentesis by pulm c/b L pneumothoraces  -management of L pneumothoraces as above  -pulm following, appreciate recs  -S/p chest tube placement by IR, management by IR with clamping trial today Patient reports two year history of breast cancer treated with fulvestrant and Xgeva. Patient most likely presenting with metastatic breast cancer with CT chest showing highly suspect multiple masses in the left breast with overlying skin thickening, left sided pleural effusion, and diffuse sclerotic mets.  Pt of Dr. Funez    Plan:  -Consider palliative consult for GOC  -Patient due for next dose of fulvestrant 12/8/2021, therapy per Heme/onc

## 2021-12-07 NOTE — PROGRESS NOTE ADULT - PROBLEM SELECTOR PLAN 4
Patient reports intermittent exertional chest discomfort that is described as "pressure-like" at localized to the center of the sternum. Patient denies personal or family history of cardiac disease. EKG shows NSR with no ischemic changes. Trop negative, BNP negative.  CXR + pleural effusion. Discomfort most likely 2/2 pleural effusion in setting of metastatic breast cancer   -Pt now breathing comfortably on RA, denying any chest pain at this time  -Continue to monitor, pain management as needed Patient reports being diagnosed with type 2 diabetes at age 80. 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   Home med: glimepiride 1mg bid. On home glimepiride. A1C 7.6%.    Plan:  -7 Lantus, 5 Lispro TID, adjust as needed

## 2021-12-07 NOTE — PROGRESS NOTE ADULT - PROBLEM SELECTOR PLAN 4
Patient reports intermittent exertional chest discomfort that is described as "pressure-like" at localized to the center of the sternum. Patient denies personal or family history of cardiac disease. EKG shows NSR with no ischemic changes. Trop negative, BNP negative.  CXR + pleural effusion. Discomfort most likely 2/2 pleural effusion in setting of metastatic breast cancer   -Pt now breathing comfortably on RA, denying any chest pain at this time  -Continue to monitor, pain management as needed Pt was tachycardic with rectal temp 100.4 F. Urinalysis with concern for UTI. Now afebrile with normal heart rate.  -S/p Ceftriaxone course   -MRSA negative, now off abx   -Continue to monitor for symptoms and signs of infection Pt was tachycardic with rectal temp 100.4 F. Urinalysis with concern for UTI. Now afebrile with normal heart rate.  -S/p Ceftriaxone course 12/3-12/5, now off abx   -Continue to monitor for symptoms and signs of infection

## 2021-12-07 NOTE — PROGRESS NOTE ADULT - PROBLEM SELECTOR PLAN 1
Post-thoracentesis CXR showed left upper lobe and left lower lobe pneumothoraces. SpO2 95% on RA. Per pulm, pt placed on NRB w/ improvement to SpO2 98%. CXR stable in AM. PTX likely in setting of trapped lung given chronicity of pleural effusion  -F/u pulm recs   -s/p chest tube with IR with improved tachycardia. Dressings c/d/i. Management of chest tube per IR, clamping trial today   -CT surgery following, f/u recs CT chest showing moderate left pleural effusion, suspected to be chronic and malignant in origin. Cytology from pleural effusion negative for malignant cells.   S/p L thoracentesis by pulm c/b L pneumothoraces, per pulm likely i/s/o trapped lung given chronicity of pleural effusion  -S/p chest tube placement by IR (12/3)  Chest tube (placed 12/3) clamped over night of 12/6 and CXR on 12/7 showed unchanged small pleural effusion and small pneumothorax    Plan:  -Unclamped chest tube 12/7 at 2pm, plan to eval fluid output overnight and possibly remove in AM  -f/u CT surgery and pulm recs

## 2021-12-07 NOTE — PROGRESS NOTE ADULT - PROBLEM SELECTOR PLAN 7
Na 128 on admission. Most likely hypotonic hypovolemic hyponatremia 2/2 poor po intake i/s/o malignancy.   -Continue to monitor on BMP  -Serum osm 273   -encourage PO intake Na 128 on admission. Most likely hypotonic hypovolemic hyponatremia 2/2 poor po intake i/s/o malignancy.   -Continue to monitor BMP  -Serum osm 273   -encourage PO intake

## 2021-12-07 NOTE — PROGRESS NOTE ADULT - PROBLEM SELECTOR PLAN 8
Patient with history of hypothyroidism; on synthroid 88mcg PO daily at home  -c/w home synthroid  -TSH this admission wnl F: None currently  E: replete mg<2, K<4  N: consistent carbs      VTE PPx: holding pharmacologic ac i/s/o recent thoracentesis   GI PPx: not indicated     Code status: DNR/DNI, MOLST in chart

## 2021-12-07 NOTE — PROGRESS NOTE ADULT - PROBLEM SELECTOR PLAN 5
Patient reports two year history of breast cancer treated with fulvestrant and Xgeva. Patient most likely presenting with metastatic breast cancer with CT chest showing highly suspect multiple masses in the left breast with overlying skin thickening, left sided pleural effusion, and diffuse sclerotic mets.  -F/u oncology recs, pt follows with Dr. Funez   -Consider palliative consult for GOC  -Patient due for next dose of fulvestrant 12/8/2021, therapy per Heme/onc Most likely hypotonic hypovolemic hyponatremia 2/2 poor po intake i/s/o malignancy.   Now improving    Plan:  -encourage PO intake

## 2021-12-07 NOTE — PROGRESS NOTE ADULT - PROBLEM SELECTOR PLAN 2
Pt consistently tachycardic with rectal temp 100.4 F. Urinalysis with concern for UTI.   -S/p Ceftriaxone course   -MRSA negative, now off abx   -Continue to monitor for symptoms and signs of infection CT chest showing moderate left pleural effusion, highly suspect multiple masses in the left breast with overlying skin thickening, and diffuse sclerotic mets. Pleural effusion likely malignant in origin. S/p L thoracentesis by pulm c/b L pneumothoraces  -management of L pneumothoraces as above  -pulm following, appreciate recs  -S/p chest tube placement by IR, management by IR with clamping trial today

## 2021-12-07 NOTE — PHYSICAL THERAPY INITIAL EVALUATION ADULT - PERTINENT HX OF CURRENT PROBLEM, REHAB EVAL
88 year old female PMHx breast cancer, DM2, hypothyroidism, HLD, HTN, lymphedema, endometrial carcinoma s/p hysterectomy and bilateral oophorectomy, and benign pancreatic tail adenoma presents with malaise for 2 weeks associated with intermittent pressure like chest comfort triggered by exertion. Ms. Collins decided to see Dr. Hargrove because she "just didn't feel well". At his office, Dr. Hargrove heard fluid in Ms. Collins's left lung and an irregular heart beat and told her to go the the ED.

## 2021-12-07 NOTE — PROGRESS NOTE ADULT - SUBJECTIVE AND OBJECTIVE BOX
***INCOMPLETE****  OVERNIGHT EVENTS:    SUBJECTIVE / INTERVAL HPI: Patient seen and examined at bedside.     ROS: negative unless otherwise stated above.    VITAL SIGNS:  Vital Signs Last 24 Hrs  T(C): 36.9 (07 Dec 2021 05:57), Max: 37.5 (06 Dec 2021 21:58)  T(F): 98.4 (07 Dec 2021 05:57), Max: 99.5 (06 Dec 2021 21:58)  HR: 103 (07 Dec 2021 05:57) (84 - 103)  BP: 128/72 (07 Dec 2021 05:57) (128/72 - 141/63)  BP(mean): 88 (06 Dec 2021 20:55) (88 - 93)  RR: 18 (07 Dec 2021 05:57) (16 - 18)  SpO2: 95% (07 Dec 2021 05:57) (94% - 96%)    PHYSICAL EXAM:    General: In no apparent distress  HEENT: NC/AT; PERRL, anicteric sclera; MMM  Neck: supple  Cardiovascular: +S1/S2; RRR  Respiratory: CTA B/L; no W/R/R  Gastrointestinal: soft, NT/ND; +BSx4  Extremities: WWP; no edema, clubbing or cyanosis  Vascular: 2+ radial, DP/PT pulses B/L  Neurological: AAOx3; no focal deficits    MEDICATIONS:  MEDICATIONS  (STANDING):  dextrose 40% Gel 15 Gram(s) Oral once  dextrose 5%. 1000 milliLiter(s) (50 mL/Hr) IV Continuous <Continuous>  dextrose 5%. 1000 milliLiter(s) (100 mL/Hr) IV Continuous <Continuous>  dextrose 50% Injectable 25 Gram(s) IV Push once  dextrose 50% Injectable 12.5 Gram(s) IV Push once  dextrose 50% Injectable 25 Gram(s) IV Push once  glucagon  Injectable 1 milliGRAM(s) IntraMuscular once  insulin lispro (ADMELOG) corrective regimen sliding scale   SubCutaneous Before meals and at bedtime  insulin lispro Injectable (ADMELOG) 3 Unit(s) SubCutaneous three times a day before meals  levothyroxine 88 MICROGram(s) Oral every 24 hours    MEDICATIONS  (PRN):      ALLERGIES:  Allergies    No Known Allergies    Intolerances        LABS:                        13.8   13.10 )-----------( 329      ( 06 Dec 2021 07:06 )             42.2     12-06    131<L>  |  99  |  24<H>  ----------------------------<  298<H>  4.2   |  21<L>  |  0.67    Ca    8.9      06 Dec 2021 12:54  Phos  2.7     12-06  Mg     2.0     12-06          CAPILLARY BLOOD GLUCOSE      POCT Blood Glucose.: 191 mg/dL (06 Dec 2021 21:12)      RADIOLOGY & ADDITIONAL TESTS: Reviewed. OVERNIGHT EVENTS: No acute events overnight.    SUBJECTIVE / INTERVAL HPI: Patient seen and examined at bedside. Pt feels overall ok, wants to get out of bed and work with PT. No pain. No n/v. Some constipation, last BM 2 days ago.    ROS: negative unless otherwise stated above.    VITAL SIGNS:  Vital Signs Last 24 Hrs  T(C): 36.9 (07 Dec 2021 05:57), Max: 37.5 (06 Dec 2021 21:58)  T(F): 98.4 (07 Dec 2021 05:57), Max: 99.5 (06 Dec 2021 21:58)  HR: 103 (07 Dec 2021 05:57) (84 - 103)  BP: 128/72 (07 Dec 2021 05:57) (128/72 - 141/63)  BP(mean): 88 (06 Dec 2021 20:55) (88 - 93)  RR: 18 (07 Dec 2021 05:57) (16 - 18)  SpO2: 95% (07 Dec 2021 05:57) (94% - 96%)    PHYSICAL EXAM:  General: In no apparent distress  HEENT: NC/AT; PERRL, anicteric sclera; MMM  Neck: supple  Cardiovascular: +S1/S2; RRR  Respiratory: CTA B/L; no W/R/R  Gastrointestinal: soft, NT/ND; +BSx4  Extremities: WWP; no edema, clubbing or cyanosis  Vascular: 2+ radial, DP/PT pulses B/L  Neurological: AAOx3; no focal deficits  Erythematous rash on left thorax surrounding chest tube (per patient, this rash has been stable since breast cancer diagnosis and is unrelated to chest tube)  Chest tube in place on left side.  Rosales in place.    MEDICATIONS:  MEDICATIONS  (STANDING):  dextrose 40% Gel 15 Gram(s) Oral once  dextrose 5%. 1000 milliLiter(s) (50 mL/Hr) IV Continuous <Continuous>  dextrose 5%. 1000 milliLiter(s) (100 mL/Hr) IV Continuous <Continuous>  dextrose 50% Injectable 25 Gram(s) IV Push once  dextrose 50% Injectable 12.5 Gram(s) IV Push once  dextrose 50% Injectable 25 Gram(s) IV Push once  glucagon  Injectable 1 milliGRAM(s) IntraMuscular once  insulin lispro (ADMELOG) corrective regimen sliding scale   SubCutaneous Before meals and at bedtime  insulin lispro Injectable (ADMELOG) 3 Unit(s) SubCutaneous three times a day before meals  levothyroxine 88 MICROGram(s) Oral every 24 hours    MEDICATIONS  (PRN):      ALLERGIES:  Allergies    No Known Allergies    Intolerances        LABS:                        13.8   13.10 )-----------( 329      ( 06 Dec 2021 07:06 )             42.2     12-06    131<L>  |  99  |  24<H>  ----------------------------<  298<H>  4.2   |  21<L>  |  0.67    Ca    8.9      06 Dec 2021 12:54  Phos  2.7     12-06  Mg     2.0     12-06          CAPILLARY BLOOD GLUCOSE      POCT Blood Glucose.: 191 mg/dL (06 Dec 2021 21:12)      RADIOLOGY & ADDITIONAL TESTS: Reviewed.

## 2021-12-07 NOTE — PROGRESS NOTE ADULT - PROBLEM SELECTOR PLAN 7
Na 128 on admission. Most likely hypotonic hypovolemic hyponatremia 2/2 poor po intake i/s/o malignancy.   -Continue to monitor on BMP  -Serum osm 273   -encourage PO intake History of endometrial cancer s/p hysterectomy.  -routine f/u as outpatient, follows with Dr. Funez outpatient for oncology

## 2021-12-07 NOTE — PROGRESS NOTE ADULT - ASSESSMENT
87yo DNR/DNI female PMHx breast cancer (on fulvestrant and Xgeva), DM2 (A1c 7.6 on this admission), hypothyroidism, HLD, HTN, lymphedema, endometrial carcinoma s/p hysterectomy and bilateral oophorectomy, and benign pancreatic tail adenoma presents with malaise x2 weeks associated with intermittent pressure like chest comfort triggered by exertion. Found to have hyponatremia and left PLEF on CT chest s/f malignant effusion, now s/p L thoracentesis by pulm c/b L pneumothoraces. Initially admitted to [facility], now stepped up to [facility] on 12/1 per Dr. Hargrove for tele monitoring of SpO2 i/s/o pneumothoraces, on NRB overnight with stable CXR, now stable to be transferred to [facility].

## 2021-12-07 NOTE — PROGRESS NOTE ADULT - PROBLEM SELECTOR PLAN 3
CT chest showing moderate left pleural effusion, highly suspect multiple masses in the left breast with overlying skin thickening, and diffuse sclerotic mets. Pleural effusion likely malignant in origin. S/p L thoracentesis by pulm c/b L pneumothoraces  -management of L pneumothoraces as above  -pulm following, appreciate recs  -S/p chest tube placement by IR, management by IR with clamping trial today Patient reports intermittent exertional chest discomfort that is described as "pressure-like" at localized to the center of the sternum. Patient denies personal or family history of cardiac disease. EKG shows NSR with no ischemic changes. Trop negative, BNP negative.  CXR + pleural effusion. Discomfort most likely 2/2 pleural effusion in setting of metastatic breast cancer   -Pt now breathing comfortably on RA, denying any chest pain at this time  -Continue to monitor, pain management as needed

## 2021-12-07 NOTE — PHYSICAL THERAPY INITIAL EVALUATION ADULT - MANUAL MUSCLE TESTING RESULTS, REHAB EVAL
Grossly 4/5 throughout BUE in shoulder flex/ext and elbow flex/ext movements. Grossly good bilateral  strength. Grossly 3/5 in BLE throughout all motions due to ability to perform antigravity movements.

## 2021-12-08 LAB
ALBUMIN SERPL ELPH-MCNC: 2.5 G/DL — LOW (ref 3.3–5)
ALP SERPL-CCNC: 118 U/L — SIGNIFICANT CHANGE UP (ref 40–120)
ALT FLD-CCNC: 9 U/L — LOW (ref 10–45)
ANION GAP SERPL CALC-SCNC: 9 MMOL/L — SIGNIFICANT CHANGE UP (ref 5–17)
AST SERPL-CCNC: 20 U/L — SIGNIFICANT CHANGE UP (ref 10–40)
BASOPHILS # BLD AUTO: 0.12 K/UL — SIGNIFICANT CHANGE UP (ref 0–0.2)
BASOPHILS NFR BLD AUTO: 0.9 % — SIGNIFICANT CHANGE UP (ref 0–2)
BILIRUB SERPL-MCNC: 0.3 MG/DL — SIGNIFICANT CHANGE UP (ref 0.2–1.2)
BUN SERPL-MCNC: 18 MG/DL — SIGNIFICANT CHANGE UP (ref 7–23)
CALCIUM SERPL-MCNC: 8.8 MG/DL — SIGNIFICANT CHANGE UP (ref 8.4–10.5)
CHLORIDE SERPL-SCNC: 99 MMOL/L — SIGNIFICANT CHANGE UP (ref 96–108)
CO2 SERPL-SCNC: 24 MMOL/L — SIGNIFICANT CHANGE UP (ref 22–31)
CREAT SERPL-MCNC: 0.71 MG/DL — SIGNIFICANT CHANGE UP (ref 0.5–1.3)
EOSINOPHIL # BLD AUTO: 0.26 K/UL — SIGNIFICANT CHANGE UP (ref 0–0.5)
EOSINOPHIL NFR BLD AUTO: 1.9 % — SIGNIFICANT CHANGE UP (ref 0–6)
GLUCOSE BLDC GLUCOMTR-MCNC: 157 MG/DL — HIGH (ref 70–99)
GLUCOSE BLDC GLUCOMTR-MCNC: 168 MG/DL — HIGH (ref 70–99)
GLUCOSE BLDC GLUCOMTR-MCNC: 290 MG/DL — HIGH (ref 70–99)
GLUCOSE BLDC GLUCOMTR-MCNC: 82 MG/DL — SIGNIFICANT CHANGE UP (ref 70–99)
GLUCOSE SERPL-MCNC: 186 MG/DL — HIGH (ref 70–99)
HCT VFR BLD CALC: 39.2 % — SIGNIFICANT CHANGE UP (ref 34.5–45)
HGB BLD-MCNC: 12.8 G/DL — SIGNIFICANT CHANGE UP (ref 11.5–15.5)
IMM GRANULOCYTES NFR BLD AUTO: 6.1 % — HIGH (ref 0–1.5)
LYMPHOCYTES # BLD AUTO: 1.41 K/UL — SIGNIFICANT CHANGE UP (ref 1–3.3)
LYMPHOCYTES # BLD AUTO: 10.4 % — LOW (ref 13–44)
MCHC RBC-ENTMCNC: 30.5 PG — SIGNIFICANT CHANGE UP (ref 27–34)
MCHC RBC-ENTMCNC: 32.7 GM/DL — SIGNIFICANT CHANGE UP (ref 32–36)
MCV RBC AUTO: 93.6 FL — SIGNIFICANT CHANGE UP (ref 80–100)
MONOCYTES # BLD AUTO: 2.16 K/UL — HIGH (ref 0–0.9)
MONOCYTES NFR BLD AUTO: 16 % — HIGH (ref 2–14)
NEUTROPHILS # BLD AUTO: 8.73 K/UL — HIGH (ref 1.8–7.4)
NEUTROPHILS NFR BLD AUTO: 64.7 % — SIGNIFICANT CHANGE UP (ref 43–77)
NRBC # BLD: 0 /100 WBCS — SIGNIFICANT CHANGE UP (ref 0–0)
PLATELET # BLD AUTO: 311 K/UL — SIGNIFICANT CHANGE UP (ref 150–400)
POTASSIUM SERPL-MCNC: 4.9 MMOL/L — SIGNIFICANT CHANGE UP (ref 3.5–5.3)
POTASSIUM SERPL-SCNC: 4.9 MMOL/L — SIGNIFICANT CHANGE UP (ref 3.5–5.3)
PROT SERPL-MCNC: 5.9 G/DL — LOW (ref 6–8.3)
RBC # BLD: 4.19 M/UL — SIGNIFICANT CHANGE UP (ref 3.8–5.2)
RBC # FLD: 13.5 % — SIGNIFICANT CHANGE UP (ref 10.3–14.5)
SODIUM SERPL-SCNC: 132 MMOL/L — LOW (ref 135–145)
WBC # BLD: 13.51 K/UL — HIGH (ref 3.8–10.5)
WBC # FLD AUTO: 13.51 K/UL — HIGH (ref 3.8–10.5)

## 2021-12-08 PROCEDURE — 99232 SBSQ HOSP IP/OBS MODERATE 35: CPT | Mod: GC

## 2021-12-08 PROCEDURE — 71250 CT THORAX DX C-: CPT | Mod: 26

## 2021-12-08 PROCEDURE — 99232 SBSQ HOSP IP/OBS MODERATE 35: CPT

## 2021-12-08 PROCEDURE — 71045 X-RAY EXAM CHEST 1 VIEW: CPT | Mod: 26

## 2021-12-08 RX ADMIN — Medication 5 UNIT(S): at 18:15

## 2021-12-08 RX ADMIN — Medication 2: at 21:30

## 2021-12-08 RX ADMIN — POLYETHYLENE GLYCOL 3350 17 GRAM(S): 17 POWDER, FOR SOLUTION ORAL at 21:29

## 2021-12-08 RX ADMIN — Medication 5 UNIT(S): at 09:03

## 2021-12-08 RX ADMIN — SENNA PLUS 2 TABLET(S): 8.6 TABLET ORAL at 21:29

## 2021-12-08 RX ADMIN — ENOXAPARIN SODIUM 40 MILLIGRAM(S): 100 INJECTION SUBCUTANEOUS at 17:26

## 2021-12-08 RX ADMIN — Medication 2: at 09:04

## 2021-12-08 RX ADMIN — Medication 5 UNIT(S): at 12:20

## 2021-12-08 RX ADMIN — Medication 88 MICROGRAM(S): at 06:37

## 2021-12-08 RX ADMIN — INSULIN GLARGINE 7 UNIT(S): 100 INJECTION, SOLUTION SUBCUTANEOUS at 21:29

## 2021-12-08 RX ADMIN — Medication 6: at 12:20

## 2021-12-08 NOTE — PROGRESS NOTE ADULT - SUBJECTIVE AND OBJECTIVE BOX
Patient discussed on morning rounds with Dr. Funes    Operation / Date: Management of pleural effusion/pneumothorax    SUBJECTIVE ASSESSMENT:  88y Female seen and examined at bedside.  Patient with no complaints.  Denies chest pain, shortness of breath, nausea, vomiting.        Vital Signs Last 24 Hrs  T(C): 37.4 (08 Dec 2021 05:38), Max: 37.4 (08 Dec 2021 05:38)  T(F): 99.4 (08 Dec 2021 05:38), Max: 99.4 (08 Dec 2021 05:38)  HR: 91 (08 Dec 2021 05:38) (91 - 98)  BP: 144/80 (08 Dec 2021 05:38) (111/68 - 144/80)  BP(mean): 95 (07 Dec 2021 20:17) (95 - 95)  RR: 18 (08 Dec 2021 05:38) (18 - 18)  SpO2: 95% (08 Dec 2021 05:38) (95% - 96%)  I&O's Detail    08 Dec 2021 07:01  -  08 Dec 2021 11:10  --------------------------------------------------------  IN:  Total IN: 0 mL    OUT:    Indwelling Catheter - Urethral (mL): 600 mL  Total OUT: 600 mL    Total NET: -600 mL          CHEST TUBE:  Yes. AIR LEAKS: No. Suction   JESUS DRAIN:  No.  EPICARDIAL WIRES: No.  TIE DOWNS: No.  AVILA: No.    PHYSICAL EXAM:    General: OOB to chair, no acute distress.  Neurological: AAOx3, no AMS or focal deficits.   Cardiovascular: RRR, S1/S2, no m/r/g.   Respiratory: No acute distress.  CTA b/l, no w/r/r.   Gastrointestinal: NBS, non-TTP  Extremities: Warm and well perfused, no calf ttp or edema b/l.   Vascular: Pulses 2+ throughout  Incision Sites: C/D/I    LABS:                        12.8   13.51 )-----------( 311      ( 08 Dec 2021 07:58 )             39.2       COUMADIN:  No. REASON: .        12-08    132<L>  |  99  |  18  ----------------------------<  186<H>  4.9   |  24  |  0.71    Ca    8.8      08 Dec 2021 07:58  Phos  2.7     12-07  Mg     1.9     12-07    TPro  5.9<L>  /  Alb  2.5<L>  /  TBili  0.3  /  DBili  x   /  AST  20  /  ALT  9<L>  /  AlkPhos  118  12-08          MEDICATIONS  (STANDING):  dextrose 40% Gel 15 Gram(s) Oral once  dextrose 5%. 1000 milliLiter(s) (50 mL/Hr) IV Continuous <Continuous>  dextrose 5%. 1000 milliLiter(s) (100 mL/Hr) IV Continuous <Continuous>  dextrose 50% Injectable 25 Gram(s) IV Push once  dextrose 50% Injectable 12.5 Gram(s) IV Push once  dextrose 50% Injectable 25 Gram(s) IV Push once  enoxaparin Injectable 40 milliGRAM(s) SubCutaneous every 24 hours  glucagon  Injectable 1 milliGRAM(s) IntraMuscular once  insulin glargine Injectable (LANTUS) 7 Unit(s) SubCutaneous at bedtime  insulin lispro (ADMELOG) corrective regimen sliding scale   SubCutaneous Before meals and at bedtime  insulin lispro Injectable (ADMELOG) 5 Unit(s) SubCutaneous three times a day before meals  levothyroxine 88 MICROGram(s) Oral every 24 hours  polyethylene glycol 3350 17 Gram(s) Oral at bedtime  senna 2 Tablet(s) Oral at bedtime    MEDICATIONS  (PRN):        RADIOLOGY & ADDITIONAL TESTS:    < from: Xray Chest 1 View- PORTABLE-Routine (Xray Chest 1 View- PORTABLE-Routine in AM.) (12.07.21 @ 06:59) >  Left chest pigtail catheter again seen. Questionable trace left apical pneumothorax. Stable small left pleural effusion. Right lung is clear. Surgical clips in the left upper quadrant. Osseous metastatic disease.    < end of copied text >

## 2021-12-08 NOTE — PROGRESS NOTE ADULT - SUBJECTIVE AND OBJECTIVE BOX
INTERVAL HPI/OVERNIGHT EVENTS:  No complaint; Chest tube no output;  Rosales still in place; Will discontinue      MEDICATIONS  (STANDING):  dextrose 40% Gel 15 Gram(s) Oral once  dextrose 5%. 1000 milliLiter(s) (50 mL/Hr) IV Continuous <Continuous>  dextrose 5%. 1000 milliLiter(s) (100 mL/Hr) IV Continuous <Continuous>  dextrose 50% Injectable 25 Gram(s) IV Push once  dextrose 50% Injectable 12.5 Gram(s) IV Push once  dextrose 50% Injectable 25 Gram(s) IV Push once  enoxaparin Injectable 40 milliGRAM(s) SubCutaneous every 24 hours  glucagon  Injectable 1 milliGRAM(s) IntraMuscular once  insulin glargine Injectable (LANTUS) 7 Unit(s) SubCutaneous at bedtime  insulin lispro (ADMELOG) corrective regimen sliding scale   SubCutaneous Before meals and at bedtime  insulin lispro Injectable (ADMELOG) 5 Unit(s) SubCutaneous three times a day before meals  levothyroxine 88 MICROGram(s) Oral every 24 hours  polyethylene glycol 3350 17 Gram(s) Oral at bedtime  senna 2 Tablet(s) Oral at bedtime    MEDICATIONS  (PRN):      Allergies    No Known Allergies    Intolerances        Vital Signs Last 24 Hrs  T(C): 37.4 (08 Dec 2021 05:38), Max: 37.4 (08 Dec 2021 05:38)  T(F): 99.4 (08 Dec 2021 05:38), Max: 99.4 (08 Dec 2021 05:38)  HR: 91 (08 Dec 2021 05:38) (91 - 98)  BP: 144/80 (08 Dec 2021 05:38) (111/68 - 144/80)  BP(mean): 95 (07 Dec 2021 20:17) (95 - 95)  RR: 18 (08 Dec 2021 05:38) (18 - 18)  SpO2: 95% (08 Dec 2021 05:38) (95% - 96%)          Constitutional:  Awake    Eyes: BRIDGER    ENMT: Negative    Neck: Supple    Back:  no tenderness     Respiratory:  clear    Cardiovascular: S1 S2    Gastrointestinal: soft    Genitourinary:    Extremities:  no edema     Vascular:    Neurological:    Skin:    Lymph Nodes:            LABS:                        12.8   13.51 )-----------( 311      ( 08 Dec 2021 07:58 )             39.2     12-08    132<L>  |  99  |  18  ----------------------------<  186<H>  4.9   |  24  |  0.71    Ca    8.8      08 Dec 2021 07:58  Phos  2.7     12-07  Mg     1.9     12-07    TPro  5.9<L>  /  Alb  2.5<L>  /  TBili  0.3  /  DBili  x   /  AST  20  /  ALT  9<L>  /  AlkPhos  118  12-08          RADIOLOGY & ADDITIONAL TESTS:

## 2021-12-08 NOTE — PROGRESS NOTE ADULT - PROBLEM SELECTOR PLAN 6
Patient with history of hypothyroidism; on synthroid 88mcg PO daily at home  -TSH this admission wnl  -c/w home synthroid

## 2021-12-08 NOTE — PROGRESS NOTE ADULT - PROBLEM SELECTOR PLAN 8
F: None currently  E: replete mg<2, K<4  N: consistent carbs      VTE PPx: holding pharmacologic ac i/s/o recent thoracentesis   GI PPx: not indicated     Code status: DNR/DNI, MOLST in chart F: None currently  E: replete mg<2, K<4  N: consistent carbs      VTE PPx: Lovenox 40    Code status: DNR/DNI, MOLST in chart

## 2021-12-08 NOTE — PROGRESS NOTE ADULT - ASSESSMENT
87yo DNR/DNI female PMHx breast cancer (on fulvestrant and Xgeva), DM2 (A1c 7.6 on this admission), hypothyroidism, HLD, HTN, lymphedema, endometrial carcinoma s/p hysterectomy and bilateral oophorectomy, and benign pancreatic tail adenoma who presented to [facility] ED 11/30 complaining of malaise and intermittent chest pressure triggered by PNA. Workup revealed hyponatremia and left pleural effusion on CT scan concerning for malignant effusion. She underwent left thoracentesis with pulmonology which was complicated by pneumothorax. Thoracic surgery (Dr. Funes) consulted for PTX.    Plan:  Problem 1: Left PTX  - Case discussed and images reviewed with Dr. 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 given chronicity of pleural effusion   - Please obtain daily CXR with CT in place  - May consider PleurX placement  - Cytology negative  - Please obtain CT chest without contrast for evaluation of pleural effusion and pneumothorax    Problem 2: UTI  - Patient with positive UA and low grade temp this admission  - Continue ceftriaxone per primary team     Problem 3: Breast cancer   - Suspect pleural effusion in setting of malignancy  - Management per heme/onc, primary team     Problem 4: Diabetes   - Agree with MISS during admission  - Management per primary team    I have reviewed clinical labs tests and reports, radiology tests and reports, as well as old patient medical records, and discussed with the referring physician.

## 2021-12-08 NOTE — PROGRESS NOTE ADULT - PROBLEM SELECTOR PLAN 4
Patient reports being diagnosed with type 2 diabetes at age 80. 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   Home med: glimepiride 1mg bid. On home glimepiride. A1C 7.6%.    Plan:  -7 Lantus, 5 Lispro TID, adjust as needed

## 2021-12-08 NOTE — PROGRESS NOTE ADULT - SUBJECTIVE AND OBJECTIVE BOX
OVERNIGHT EVENTS:    SUBJECTIVE / INTERVAL HPI: Patient seen and examined at bedside.     ROS: negative unless otherwise stated above.    VITAL SIGNS:  Vital Signs Last 24 Hrs  T(C): 37.4 (08 Dec 2021 05:38), Max: 37.4 (08 Dec 2021 05:38)  T(F): 99.4 (08 Dec 2021 05:38), Max: 99.4 (08 Dec 2021 05:38)  HR: 91 (08 Dec 2021 05:38) (91 - 98)  BP: 144/80 (08 Dec 2021 05:38) (111/68 - 144/80)  BP(mean): 95 (07 Dec 2021 20:17) (95 - 95)  RR: 18 (08 Dec 2021 05:38) (18 - 18)  SpO2: 95% (08 Dec 2021 05:38) (95% - 96%)    PHYSICAL EXAM:    General: In no apparent distress  HEENT: NC/AT; PERRL, anicteric sclera; MMM  Neck: supple  Cardiovascular: +S1/S2; RRR  Respiratory: CTA B/L; no W/R/R  Gastrointestinal: soft, NT/ND; +BSx4  Extremities: WWP; no edema, clubbing or cyanosis  Vascular: 2+ radial, DP/PT pulses B/L  Neurological: AAOx3; no focal deficits    MEDICATIONS:  MEDICATIONS  (STANDING):  dextrose 40% Gel 15 Gram(s) Oral once  dextrose 5%. 1000 milliLiter(s) (50 mL/Hr) IV Continuous <Continuous>  dextrose 5%. 1000 milliLiter(s) (100 mL/Hr) IV Continuous <Continuous>  dextrose 50% Injectable 25 Gram(s) IV Push once  dextrose 50% Injectable 12.5 Gram(s) IV Push once  dextrose 50% Injectable 25 Gram(s) IV Push once  enoxaparin Injectable 40 milliGRAM(s) SubCutaneous every 24 hours  glucagon  Injectable 1 milliGRAM(s) IntraMuscular once  insulin glargine Injectable (LANTUS) 7 Unit(s) SubCutaneous at bedtime  insulin lispro (ADMELOG) corrective regimen sliding scale   SubCutaneous Before meals and at bedtime  insulin lispro Injectable (ADMELOG) 5 Unit(s) SubCutaneous three times a day before meals  levothyroxine 88 MICROGram(s) Oral every 24 hours  polyethylene glycol 3350 17 Gram(s) Oral at bedtime  senna 2 Tablet(s) Oral at bedtime    MEDICATIONS  (PRN):      ALLERGIES:  Allergies    No Known Allergies    Intolerances        LABS:                        13.5   14.16 )-----------( 295      ( 07 Dec 2021 07:53 )             40.8     12-07    132<L>  |  99  |  19  ----------------------------<  247<H>  4.9   |  24  |  0.70    Ca    8.7      07 Dec 2021 07:53  Phos  2.7     12-07  Mg     1.9     12-07          CAPILLARY BLOOD GLUCOSE      POCT Blood Glucose.: 185 mg/dL (07 Dec 2021 22:10)      RADIOLOGY & ADDITIONAL TESTS: Reviewed. OVERNIGHT EVENTS: No acute events overnight.    SUBJECTIVE / INTERVAL HPI: Patient seen and examined at bedside. Pt has no complaints this AM.     ROS: negative unless otherwise stated above.    VITAL SIGNS:  Vital Signs Last 24 Hrs  T(C): 37.4 (08 Dec 2021 05:38), Max: 37.4 (08 Dec 2021 05:38)  T(F): 99.4 (08 Dec 2021 05:38), Max: 99.4 (08 Dec 2021 05:38)  HR: 91 (08 Dec 2021 05:38) (91 - 98)  BP: 144/80 (08 Dec 2021 05:38) (111/68 - 144/80)  BP(mean): 95 (07 Dec 2021 20:17) (95 - 95)  RR: 18 (08 Dec 2021 05:38) (18 - 18)  SpO2: 95% (08 Dec 2021 05:38) (95% - 96%)    PHYSICAL EXAM:  General: In no apparent distress  HEENT: NC/AT; PERRL, anicteric sclera; MMM  Neck: supple  Cardiovascular: +S1/S2; RRR  Respiratory: CTA B/L; no W/R/R  Gastrointestinal: soft, NT/ND; +BSx4  Extremities: WWP; no edema, clubbing or cyanosis  Vascular: 2+ radial, DP/PT pulses B/L  Neurological: AAOx3; no focal deficits  Chest tube in place on left side. Not draining anything for the past 48 hours.  Stable erythematous rash on left side around chest tube insertion site - per patient, it has been present since breast cancer diagnosis and does not change or itch.    MEDICATIONS:  MEDICATIONS  (STANDING):  dextrose 40% Gel 15 Gram(s) Oral once  dextrose 5%. 1000 milliLiter(s) (50 mL/Hr) IV Continuous <Continuous>  dextrose 5%. 1000 milliLiter(s) (100 mL/Hr) IV Continuous <Continuous>  dextrose 50% Injectable 25 Gram(s) IV Push once  dextrose 50% Injectable 12.5 Gram(s) IV Push once  dextrose 50% Injectable 25 Gram(s) IV Push once  enoxaparin Injectable 40 milliGRAM(s) SubCutaneous every 24 hours  glucagon  Injectable 1 milliGRAM(s) IntraMuscular once  insulin glargine Injectable (LANTUS) 7 Unit(s) SubCutaneous at bedtime  insulin lispro (ADMELOG) corrective regimen sliding scale   SubCutaneous Before meals and at bedtime  insulin lispro Injectable (ADMELOG) 5 Unit(s) SubCutaneous three times a day before meals  levothyroxine 88 MICROGram(s) Oral every 24 hours  polyethylene glycol 3350 17 Gram(s) Oral at bedtime  senna 2 Tablet(s) Oral at bedtime    MEDICATIONS  (PRN):      ALLERGIES:  Allergies    No Known Allergies    Intolerances        LABS:                        13.5   14.16 )-----------( 295      ( 07 Dec 2021 07:53 )             40.8     12-07    132<L>  |  99  |  19  ----------------------------<  247<H>  4.9   |  24  |  0.70    Ca    8.7      07 Dec 2021 07:53  Phos  2.7     12-07  Mg     1.9     12-07          CAPILLARY BLOOD GLUCOSE      POCT Blood Glucose.: 185 mg/dL (07 Dec 2021 22:10)      RADIOLOGY & ADDITIONAL TESTS: Reviewed.

## 2021-12-08 NOTE — PROGRESS NOTE ADULT - PROBLEM SELECTOR PLAN 1
CT chest showing moderate left pleural effusion, suspected to be chronic and malignant in origin. Cytology from pleural effusion negative for malignant cells.   S/p L thoracentesis by pulm c/b L pneumothoraces, per pulm likely i/s/o trapped lung given chronicity of pleural effusion  -S/p chest tube placement by IR (12/3)  Chest tube (placed 12/3) clamped over night of 12/6 and CXR on 12/7 showed unchanged small pleural effusion and small pneumothorax    Plan:  -Unclamped chest tube 12/7 at 2pm, plan to eval fluid output overnight and possibly remove in AM  -f/u CT surgery and pulm recs CT chest showing moderate left pleural effusion, suspected to be chronic and malignant in origin. Cytology from pleural effusion negative for malignant cells.   S/p L thoracentesis by pulm c/b L pneumothoraces, per pulm likely i/s/o trapped lung given chronicity of pleural effusion  -S/p chest tube placement by IR (12/3)  Chest tube (placed 12/3) clamped over night of 12/6 and CXR on 12/7 showed unchanged small pleural effusion and small pneumothorax    Plan:  -Unclamped chest tube 12/7 at 2pm - no drainage since then  -f/u chest CT  -f/u CT surgery and pulm recs

## 2021-12-08 NOTE — PROGRESS NOTE ADULT - ASSESSMENT
87yo DNR/DNI female PMHx breast cancer (on fulvestrant and Xgeva), DM2 (A1c 7.6 on this admission), hypothyroidism, HLD, HTN, lymphedema, endometrial carcinoma s/p hysterectomy and bilateral oophorectomy, and benign pancreatic tail adenoma presents with malaise x2 weeks associated with intermittent pressure like chest discomfort triggered by exertion. Found to have hyponatremia and left PLEF on CT chest c/f malignant effusion, now s/p L thoracentesis by pulm c/b L pneumothoraces.

## 2021-12-08 NOTE — PROGRESS NOTE ADULT - PROBLEM SELECTOR PLAN 2
S/p Ceftriaxone course  WBC now rising, but no other signs of infection    Plan:  -continue to monitor WBC

## 2021-12-08 NOTE — PROGRESS NOTE ADULT - PROBLEM SELECTOR PLAN 5
Most likely hypotonic hypovolemic hyponatremia 2/2 poor po intake i/s/o malignancy.   Now improving    Plan:  -encourage PO intake room air

## 2021-12-08 NOTE — PROGRESS NOTE ADULT - PROBLEM SELECTOR PLAN 3
Patient reports two year history of breast cancer treated with fulvestrant and Xgeva. Patient most likely presenting with metastatic breast cancer with CT chest showing highly suspect multiple masses in the left breast with overlying skin thickening, left sided pleural effusion, and diffuse sclerotic mets.  Pt of Dr. Funez    Plan:  -Consider palliative consult for GOC  -Patient due for next dose of fulvestrant 12/8/2021, therapy per Heme/onc

## 2021-12-09 ENCOUNTER — TRANSCRIPTION ENCOUNTER (OUTPATIENT)
Age: 86
End: 2021-12-09

## 2021-12-09 LAB
ALBUMIN SERPL ELPH-MCNC: 2.8 G/DL — LOW (ref 3.3–5)
ALP SERPL-CCNC: 113 U/L — SIGNIFICANT CHANGE UP (ref 40–120)
ALT FLD-CCNC: 11 U/L — SIGNIFICANT CHANGE UP (ref 10–45)
ANION GAP SERPL CALC-SCNC: 7 MMOL/L — SIGNIFICANT CHANGE UP (ref 5–17)
AST SERPL-CCNC: 17 U/L — SIGNIFICANT CHANGE UP (ref 10–40)
BASOPHILS # BLD AUTO: 0.1 K/UL — SIGNIFICANT CHANGE UP (ref 0–0.2)
BASOPHILS NFR BLD AUTO: 0.9 % — SIGNIFICANT CHANGE UP (ref 0–2)
BILIRUB SERPL-MCNC: 0.3 MG/DL — SIGNIFICANT CHANGE UP (ref 0.2–1.2)
BUN SERPL-MCNC: 14 MG/DL — SIGNIFICANT CHANGE UP (ref 7–23)
CALCIUM SERPL-MCNC: 8.8 MG/DL — SIGNIFICANT CHANGE UP (ref 8.4–10.5)
CHLORIDE SERPL-SCNC: 96 MMOL/L — SIGNIFICANT CHANGE UP (ref 96–108)
CO2 SERPL-SCNC: 27 MMOL/L — SIGNIFICANT CHANGE UP (ref 22–31)
CREAT SERPL-MCNC: 0.65 MG/DL — SIGNIFICANT CHANGE UP (ref 0.5–1.3)
CULTURE RESULTS: SIGNIFICANT CHANGE UP
EOSINOPHIL # BLD AUTO: 0.31 K/UL — SIGNIFICANT CHANGE UP (ref 0–0.5)
EOSINOPHIL NFR BLD AUTO: 2.7 % — SIGNIFICANT CHANGE UP (ref 0–6)
GLUCOSE BLDC GLUCOMTR-MCNC: 156 MG/DL — HIGH (ref 70–99)
GLUCOSE BLDC GLUCOMTR-MCNC: 191 MG/DL — HIGH (ref 70–99)
GLUCOSE BLDC GLUCOMTR-MCNC: 299 MG/DL — HIGH (ref 70–99)
GLUCOSE BLDC GLUCOMTR-MCNC: 78 MG/DL — SIGNIFICANT CHANGE UP (ref 70–99)
GLUCOSE SERPL-MCNC: 178 MG/DL — HIGH (ref 70–99)
HCT VFR BLD CALC: 41.9 % — SIGNIFICANT CHANGE UP (ref 34.5–45)
HGB BLD-MCNC: 13.5 G/DL — SIGNIFICANT CHANGE UP (ref 11.5–15.5)
IMM GRANULOCYTES NFR BLD AUTO: 8.3 % — HIGH (ref 0–1.5)
LYMPHOCYTES # BLD AUTO: 1.25 K/UL — SIGNIFICANT CHANGE UP (ref 1–3.3)
LYMPHOCYTES # BLD AUTO: 10.8 % — LOW (ref 13–44)
MAGNESIUM SERPL-MCNC: 1.9 MG/DL — SIGNIFICANT CHANGE UP (ref 1.6–2.6)
MCHC RBC-ENTMCNC: 30.3 PG — SIGNIFICANT CHANGE UP (ref 27–34)
MCHC RBC-ENTMCNC: 32.2 GM/DL — SIGNIFICANT CHANGE UP (ref 32–36)
MCV RBC AUTO: 93.9 FL — SIGNIFICANT CHANGE UP (ref 80–100)
MONOCYTES # BLD AUTO: 1.69 K/UL — HIGH (ref 0–0.9)
MONOCYTES NFR BLD AUTO: 14.6 % — HIGH (ref 2–14)
NEUTROPHILS # BLD AUTO: 7.26 K/UL — SIGNIFICANT CHANGE UP (ref 1.8–7.4)
NEUTROPHILS NFR BLD AUTO: 62.7 % — SIGNIFICANT CHANGE UP (ref 43–77)
NRBC # BLD: 0 /100 WBCS — SIGNIFICANT CHANGE UP (ref 0–0)
PHOSPHATE SERPL-MCNC: 3.2 MG/DL — SIGNIFICANT CHANGE UP (ref 2.5–4.5)
PLATELET # BLD AUTO: 323 K/UL — SIGNIFICANT CHANGE UP (ref 150–400)
POTASSIUM SERPL-MCNC: 4.7 MMOL/L — SIGNIFICANT CHANGE UP (ref 3.5–5.3)
POTASSIUM SERPL-SCNC: 4.7 MMOL/L — SIGNIFICANT CHANGE UP (ref 3.5–5.3)
PROT SERPL-MCNC: 6.7 G/DL — SIGNIFICANT CHANGE UP (ref 6–8.3)
RBC # BLD: 4.46 M/UL — SIGNIFICANT CHANGE UP (ref 3.8–5.2)
RBC # FLD: 13.5 % — SIGNIFICANT CHANGE UP (ref 10.3–14.5)
SODIUM SERPL-SCNC: 130 MMOL/L — LOW (ref 135–145)
SPECIMEN SOURCE: SIGNIFICANT CHANGE UP
WBC # BLD: 11.57 K/UL — HIGH (ref 3.8–10.5)
WBC # FLD AUTO: 11.57 K/UL — HIGH (ref 3.8–10.5)

## 2021-12-09 PROCEDURE — 71045 X-RAY EXAM CHEST 1 VIEW: CPT | Mod: 26

## 2021-12-09 PROCEDURE — 99232 SBSQ HOSP IP/OBS MODERATE 35: CPT

## 2021-12-09 PROCEDURE — 99233 SBSQ HOSP IP/OBS HIGH 50: CPT | Mod: GC

## 2021-12-09 RX ADMIN — Medication 5 UNIT(S): at 08:44

## 2021-12-09 RX ADMIN — Medication 6: at 12:40

## 2021-12-09 RX ADMIN — Medication 5 UNIT(S): at 17:46

## 2021-12-09 RX ADMIN — Medication 88 MICROGRAM(S): at 06:13

## 2021-12-09 RX ADMIN — Medication 2: at 08:43

## 2021-12-09 RX ADMIN — Medication 2: at 22:26

## 2021-12-09 RX ADMIN — INSULIN GLARGINE 7 UNIT(S): 100 INJECTION, SOLUTION SUBCUTANEOUS at 22:26

## 2021-12-09 NOTE — DISCHARGE NOTE PROVIDER - CARE PROVIDER_API CALL
Ania Hargrove)  Critical Care Medicine; Internal Medicine  [address], [address]  [place] [address]  Phone: [phone]  Fax: [phone]  Follow Up Time:     Chrystal Funez)  Hematology; Medical Oncology  [address], [facility] [address]  [place] [address]  Phone: [phone]  Fax: [phone]  Follow Up Time:    Ania Hargrove)  Critical Care Medicine; Internal Medicine  [address], [address]  [place] [address]  Phone: [phone]  Fax: [phone]  Follow Up Time:     Chrystal Funez)  Hematology; Medical Oncology  [address], [facility]  [place] [address]  Phone: [phone]  Fax: [phone]  Scheduled Appointment: 12/15/2021 11:00 AM

## 2021-12-09 NOTE — PROGRESS NOTE ADULT - ASSESSMENT
87yo DNR/DNI female PMHx breast cancer (on fulvestrant and Xgeva), DM2 (A1c 7.6 on this admission), hypothyroidism, HLD, HTN, lymphedema, endometrial carcinoma s/p hysterectomy and bilateral oophorectomy, and benign pancreatic tail adenoma presents with malaise x2 weeks associated with intermittent pressure like chest discomfort triggered by exertion. Found to have hyponatremia and left PLEF on CT chest c/f malignant effusion, now s/p L thoracentesis by pulm c/b L pneumothoraces, s/p left pigtail and now awaiting possible drainage of left posterior collection of fluid.

## 2021-12-09 NOTE — PROGRESS NOTE ADULT - PROBLEM SELECTOR PLAN 3
Patient reports two year history of breast cancer treated with fulvestrant and Xgeva. Patient most likely presenting with metastatic breast cancer with CT chest showing highly suspect multiple masses in the left breast with overlying skin thickening, left sided pleural effusion, and diffuse sclerotic mets.  Pt of Dr. Funez    Plan:  -Consider palliative consult for GOC  -Patient due for next dose of fulvestrant 12/8/2021, therapy per Heme/onc Patient reports two year history of breast cancer treated with fulvestrant and Xgeva. Patient most likely presenting with metastatic breast cancer with CT chest showing highly suspect multiple masses in the left breast with overlying skin thickening, left sided pleural effusion, and diffuse sclerotic mets.  Pt of Dr. Funez    Plan:  -Consider palliative consult for GOC  -As per heme/onc Dr. Sherman, patient will receive her next fulvestrant injection next week following discharge as this continues to be an effective therapy for her malignancy

## 2021-12-09 NOTE — PROGRESS NOTE ADULT - SUBJECTIVE AND OBJECTIVE BOX
Patient discussed on morning rounds with Dr. Funes    Operation / Date: Management of pleural effusion/pneumothorax    SUBJECTIVE ASSESSMENT:  88y Female seen and examined at bedside.  Patient with no complaints.  Denies chest pain, shortness of breath, nausea, vomiting.          Vital Signs Last 24 Hrs  T(C): 36.7 (09 Dec 2021 05:05), Max: 36.7 (09 Dec 2021 05:05)  T(F): 98.1 (09 Dec 2021 05:05), Max: 98.1 (09 Dec 2021 05:05)  HR: 79 (09 Dec 2021 05:05) (79 - 87)  BP: 139/70 (09 Dec 2021 05:05) (130/71 - 139/70)  BP(mean): --  RR: 16 (09 Dec 2021 05:05) (16 - 18)  SpO2: 97% (09 Dec 2021 05:05) (96% - 97%)  I&O's Detail    08 Dec 2021 07:01  -  09 Dec 2021 07:00  --------------------------------------------------------  IN:  Total IN: 0 mL    OUT:    Indwelling Catheter - Urethral (mL): 600 mL  Total OUT: 600 mL    Total NET: -600 mL      09 Dec 2021 07:01  -  09 Dec 2021 11:35  --------------------------------------------------------  IN:    Oral Fluid: 300 mL  Total IN: 300 mL    OUT:  Total OUT: 0 mL    Total NET: 300 mL          CHEST TUBE:  Yes AIR LEAKS: No. H2O SEAL.   JESUS DRAIN:  No.  EPICARDIAL WIRES: No.  TIE DOWNS: No.  AVILA: No.    PHYSICAL EXAM:    General: OOB to chair, no acute distress.  Neurological: AAOx3, no AMS or focal deficits.   Cardiovascular: RRR, S1/S2, no m/r/g.   Respiratory: No acute distress.  CTA b/l, no w/r/r.   Gastrointestinal: NBS, non-TTP  Extremities: Warm and well perfused, no calf ttp or edema b/l.   Vascular: Pulses 2+ throughout  Incision Sites: C/D/I    LABS:                        13.5   11.57 )-----------( 323      ( 09 Dec 2021 09:01 )             41.9       COUMADIN:  No. REASON: .        12-09    130<L>  |  96  |  14  ----------------------------<  178<H>  4.7   |  27  |  0.65    Ca    8.8      09 Dec 2021 09:01  Phos  3.2     12-09  Mg     1.9     12-09    TPro  6.7  /  Alb  2.8<L>  /  TBili  0.3  /  DBili  x   /  AST  17  /  ALT  11  /  AlkPhos  113  12-09          MEDICATIONS  (STANDING):  dextrose 40% Gel 15 Gram(s) Oral once  dextrose 5%. 1000 milliLiter(s) (50 mL/Hr) IV Continuous <Continuous>  dextrose 5%. 1000 milliLiter(s) (100 mL/Hr) IV Continuous <Continuous>  dextrose 50% Injectable 25 Gram(s) IV Push once  dextrose 50% Injectable 25 Gram(s) IV Push once  dextrose 50% Injectable 12.5 Gram(s) IV Push once  glucagon  Injectable 1 milliGRAM(s) IntraMuscular once  insulin glargine Injectable (LANTUS) 7 Unit(s) SubCutaneous at bedtime  insulin lispro (ADMELOG) corrective regimen sliding scale   SubCutaneous Before meals and at bedtime  insulin lispro Injectable (ADMELOG) 5 Unit(s) SubCutaneous three times a day before meals  levothyroxine 88 MICROGram(s) Oral every 24 hours  polyethylene glycol 3350 17 Gram(s) Oral at bedtime  senna 2 Tablet(s) Oral at bedtime    MEDICATIONS  (PRN):        RADIOLOGY & ADDITIONAL TESTS:    < from: CT Chest No Cont (12.08.21 @ 16:56) >   Since December 2, 2021, slightly decreased right hydropneumothorax   post chest tube placement, decreased small residual anterior air   component and slightly decreased moderate dependent basal predominant   fluid component.  2.  Unchanged subareolar left breast mass and diffuse osseous metastases.    < end of copied text >

## 2021-12-09 NOTE — DISCHARGE NOTE PROVIDER - NSDCMRMEDTOKEN_GEN_ALL_CORE_FT
acetaminophen 325 mg oral tablet: 2 tab(s) orally every 6 hours, As needed, Mild Pain (1 - 3)  glimepiride 1 mg oral tablet: 1 milligram(s) orally 2 times a day  mirtazapine 7.5 mg oral tablet: 1 tab(s) orally once a day (at bedtime)  Synthroid 88 mcg (0.088 mg) oral tablet: 1 tab(s) orally once a day  Vitamin D3 1000 intl units (25 mcg) oral tablet: 1 tab(s) orally once a day

## 2021-12-09 NOTE — PROGRESS NOTE ADULT - SUBJECTIVE AND OBJECTIVE BOX
INTERVAL HPI/OVERNIGHT EVENTS:  Spoke with Dr. Funes and will have Chest Tube removed today;  No operation needed      MEDICATIONS  (STANDING):  dextrose 40% Gel 15 Gram(s) Oral once  dextrose 5%. 1000 milliLiter(s) (50 mL/Hr) IV Continuous <Continuous>  dextrose 5%. 1000 milliLiter(s) (100 mL/Hr) IV Continuous <Continuous>  dextrose 50% Injectable 25 Gram(s) IV Push once  dextrose 50% Injectable 25 Gram(s) IV Push once  dextrose 50% Injectable 12.5 Gram(s) IV Push once  enoxaparin Injectable 40 milliGRAM(s) SubCutaneous every 24 hours  glucagon  Injectable 1 milliGRAM(s) IntraMuscular once  insulin glargine Injectable (LANTUS) 7 Unit(s) SubCutaneous at bedtime  insulin lispro (ADMELOG) corrective regimen sliding scale   SubCutaneous Before meals and at bedtime  insulin lispro Injectable (ADMELOG) 5 Unit(s) SubCutaneous three times a day before meals  levothyroxine 88 MICROGram(s) Oral every 24 hours  polyethylene glycol 3350 17 Gram(s) Oral at bedtime  senna 2 Tablet(s) Oral at bedtime    MEDICATIONS  (PRN):      Allergies    No Known Allergies    Intolerances        Vital Signs Last 24 Hrs  T(C): 36.7 (09 Dec 2021 05:05), Max: 36.7 (09 Dec 2021 05:05)  T(F): 98.1 (09 Dec 2021 05:05), Max: 98.1 (09 Dec 2021 05:05)  HR: 79 (09 Dec 2021 05:05) (79 - 87)  BP: 139/70 (09 Dec 2021 05:05) (130/71 - 139/70)  BP(mean): --  RR: 16 (09 Dec 2021 05:05) (16 - 18)  SpO2: 97% (09 Dec 2021 05:05) (96% - 97%)          Constitutional:    Eyes: BRIDGER    ENMT: Negative    Neck: Supple    Back:  no tenderness     Respiratory:  clear    Cardiovascular: S1 S2    Gastrointestinal: soft    Genitourinary:    Extremities:  no edema    Vascular:    Neurological:    Skin:    Lymph Nodes:            12-08 @ 07:01  -  12-09 @ 07:00  --------------------------------------------------------  IN: 0 mL / OUT: 600 mL / NET: -600 mL      LABS:                        13.5   11.57 )-----------( 323      ( 09 Dec 2021 09:01 )             41.9     12-09    130<L>  |  96  |  14  ----------------------------<  178<H>  4.7   |  27  |  0.65    Ca    8.8      09 Dec 2021 09:01  Phos  3.2     12-09  Mg     1.9     12-09    TPro  6.7  /  Alb  2.8<L>  /  TBili  0.3  /  DBili  x   /  AST  17  /  ALT  11  /  AlkPhos  113  12-09          RADIOLOGY & ADDITIONAL TESTS:

## 2021-12-09 NOTE — PROGRESS NOTE ADULT - SUBJECTIVE AND OBJECTIVE BOX
RADHA COLLINS  MRN-[id number]      Interval History:    Continues to feel well, ambulated well with PT, for no skilled needs post-discharge.  Chest tube remains in place, clamped.    HPI:    Radha Collins is an 88F with metastatic breast cancer (Hormone positive, HER-2 negative, diagnosed 2/2019 started on anastrazole and denosumab, transitioned to fulvestrant 11/2020 for disease progression.  Most recent staging imaging 7/2021 revealing stable disease.    She presented to clinic with TESFAYE, 2 weeks of malaise and chest discomfort.  On exam was found to have crackles in RLL and irregular heart rate leading to hospital admission.      CT Chest upon arrival showed persistence of pleural effusion and thoracentesis was performed, complicated by moderate pneumothorax.    Patient seen in [facility] to assist with resolution of pneumothorax. She denies acute complaints at rest.  Denies new focal sites of pain.  Explains that she has been less active at home but continues to be able to accomplish her daily activities such as getting the mail etc.    PAST MEDICAL & SURGICAL HISTORY:  Type 2 diabetes mellitus  DM (diabetes mellitus)    HTN (hypertension)    Hyperthyroidism    Breast cancer    Hypothyroid    Endometrial cancer    Other acquired absence of organ  S/P splenectomy    Status post total hysterectomy  S/P hysterectomy    Other acquired absence of organ  S/P cholecystectomy    Status post cataract extraction  S/P cataract extraction        MEDICATIONS  (STANDING):  dextrose 40% Gel 15 Gram(s) Oral once  dextrose 5%. 1000 milliLiter(s) IV Continuous <Continuous>  dextrose 5%. 1000 milliLiter(s) IV Continuous <Continuous>  dextrose 50% Injectable 25 Gram(s) IV Push once  dextrose 50% Injectable 12.5 Gram(s) IV Push once  dextrose 50% Injectable 25 Gram(s) IV Push once  glucagon  Injectable 1 milliGRAM(s) IntraMuscular once  insulin lispro (ADMELOG) corrective regimen sliding scale   SubCutaneous Before meals and at bedtime  levothyroxine 88 MICROGram(s) Oral every 24 hours      Allergies    No Known Allergies    Intolerances          FAMILY HISTORY:  FH: type 2 diabetes (Mother)        ROS:  The patient denies chest pain or SOB at rest.    No nausea/vomiting/fevers/chills/night sweats.    Denies fatigue  No abdominal pain/diarrhea/constipation.  No melena or hematochezia.    No dysuria/hematuria.  No history of easy bruising/bleeding.  No gingival bleeding or epistaxis.  No leg pain or leg swelling.    ROS is otherwise negative.    Physical exam:    Vital signs  Vital Signs Last 24 Hrs  T(C): 36.7 (09 Dec 2021 05:05), Max: 36.7 (09 Dec 2021 05:05)  T(F): 98.1 (09 Dec 2021 05:05), Max: 98.1 (09 Dec 2021 05:05)  HR: 79 (09 Dec 2021 05:05) (79 - 87)  BP: 139/70 (09 Dec 2021 05:05) (130/71 - 139/70)  BP(mean): --  RR: 16 (09 Dec 2021 05:05) (16 - 18)  SpO2: 97% (09 Dec 2021 05:05) (96% - 97%)    HEENT: PERRLA, pink mucosae  No palpable lymphadenopathy  Cardiovascular: Regular rhythm/rate, no murmurs, rubs, gallops  Respiratory: No accessory muscle use, decreased breath sounds on left.  Abdomen: soft, non tender, non distended, no palpable masses, no palpable hepatosplenomegaly  Extremities:  no peripheral edema  Neuro: alert, awake and oriented x 3, no focal abnormalities  Skin: warm, chest tube inserted in Left side.     LABS:                                     12.8   13.51 )-----------( 311      ( 08 Dec 2021 07:58 )             39.2         CBC Full  -  ( 08 Dec 2021 07:58 )  WBC Count : 13.51 K/uL  RBC Count : 4.19 M/uL  Hemoglobin : 12.8 g/dL  Hematocrit : 39.2 %  Platelet Count - Automated : 311 K/uL  Mean Cell Volume : 93.6 fl  Mean Cell Hemoglobin : 30.5 pg  Mean Cell Hemoglobin Concentration : 32.7 gm/dL  Auto Neutrophil # : 8.73 K/uL  Auto Lymphocyte # : 1.41 K/uL  Auto Monocyte # : 2.16 K/uL  Auto Eosinophil # : 0.26 K/uL  Auto Basophil # : 0.12 K/uL  Auto Neutrophil % : 64.7 %  Auto Lymphocyte % : 10.4 %  Auto Monocyte % : 16.0 %  Auto Eosinophil % : 1.9 %  Auto Basophil % : 0.9 %        12-08    132<L>  |  99  |  18  ----------------------------<  186<H>  4.9   |  24  |  0.71    Ca    8.8      08 Dec 2021 07:58    TPro  5.9<L>  /  Alb  2.5<L>  /  TBili  0.3  /  DBili  x   /  AST  20  /  ALT  9<L>  /  AlkPhos  118  12-08                  PERTINENT IMAGING STUDIES: reviewed  CT chest 12/8/2021 - Stable/improving pneumothorax.  Stabl CA      ASSESSMENT:    88F with metastatic breast cancer admitted for dyspnea, now s/p thoracentesis, chest tube    PLAN:    #Metastatic Breast Cancer  - With known bony involvement, Left pleural effusion  - S/p thoracentesis, c/b pneumothorax likely in setting of trapped lung  - Disease appears relatively stable on imaging  - She will receive her next fulvestrant injection next week following discharge as this continues to be an effective therapy for her malignancy  - Patient's pleural effusion has been present and relatively stable since her initial diagnosis, and has not required repeat thoracenteses  - Chest tube removal pending     #Positive UA  - Positive for nitrites, LE, bacteria, culture pending  - Urine culture contaminated no organism isolated  - Received 3-day course of ceftriaxone  - WBC rising, neutrophil pre-dominant may be reactive, no fevers or signs/symptoms of infection at this time          Thank you    Chris Sherman MD for Chrystal Funez MD  [phone]

## 2021-12-09 NOTE — PROGRESS NOTE ADULT - SUBJECTIVE AND OBJECTIVE BOX
OVERNIGHT EVENTS: 9PM BS 220cc, voiding freely. No other overnight event.    SUBJECTIVE / INTERVAL HPI: Patient seen and examined at bedside. Patient wondering when she can have chest tube removed otherwise no other complaint or question. Denies headache, dizziness, fever, chills, chest pain, SOB, abd pain, LE pain. or any new weakness.    VITAL SIGNS:  Vital Signs Last 24 Hrs  T(C): 36.7 (09 Dec 2021 05:05), Max: 36.7 (09 Dec 2021 05:05)  T(F): 98.1 (09 Dec 2021 05:05), Max: 98.1 (09 Dec 2021 05:05)  HR: 79 (09 Dec 2021 05:05) (79 - 87)  BP: 139/70 (09 Dec 2021 05:05) (130/71 - 139/70)  BP(mean): --  RR: 16 (09 Dec 2021 05:05) (16 - 18)  SpO2: 97% (09 Dec 2021 05:05) (96% - 97%)    PHYSICAL EXAM:    General: Elderly female lying in bed in no acute distress  HEENT: NC/AT; PERRL, anicteric sclera; dry MM, neck supple  Cardiovascular: +S1/S2, RRR, no murmurs, rubs, gallops  Respiratory: CTA B/L; no W/R/R  Gastrointestinal: soft, NT/ND; +BSx4  Extremities: WWP; no edema, clubbing or cyanosis  Skin: Chest tube left mid axillary region, c/d/i, nontender to palpation and not draining, numerous but stable erythematous rash on left chest and axillary region and back which patient notes is associated with known breast caricnoma  Vascular: 2+ radial, DP/PT pulses B/L  Neurological: AAOx3; no focal deficits    MEDICATIONS:  MEDICATIONS  (STANDING):  dextrose 40% Gel 15 Gram(s) Oral once  dextrose 5%. 1000 milliLiter(s) (50 mL/Hr) IV Continuous <Continuous>  dextrose 5%. 1000 milliLiter(s) (100 mL/Hr) IV Continuous <Continuous>  dextrose 50% Injectable 25 Gram(s) IV Push once  dextrose 50% Injectable 25 Gram(s) IV Push once  dextrose 50% Injectable 12.5 Gram(s) IV Push once  glucagon  Injectable 1 milliGRAM(s) IntraMuscular once  insulin glargine Injectable (LANTUS) 7 Unit(s) SubCutaneous at bedtime  insulin lispro (ADMELOG) corrective regimen sliding scale   SubCutaneous Before meals and at bedtime  insulin lispro Injectable (ADMELOG) 5 Unit(s) SubCutaneous three times a day before meals  levothyroxine 88 MICROGram(s) Oral every 24 hours  polyethylene glycol 3350 17 Gram(s) Oral at bedtime  senna 2 Tablet(s) Oral at bedtime    MEDICATIONS  (PRN):      ALLERGIES:  Allergies    No Known Allergies    Intolerances        LABS:                        13.5   11.57 )-----------( 323      ( 09 Dec 2021 09:01 )             41.9     12-09    130<L>  |  96  |  14  ----------------------------<  178<H>  4.7   |  27  |  0.65    Ca    8.8      09 Dec 2021 09:01  Phos  3.2     12-09  Mg     1.9     12-09    TPro  6.7  /  Alb  2.8<L>  /  TBili  0.3  /  DBili  x   /  AST  17  /  ALT  11  /  AlkPhos  113  12-09        CAPILLARY BLOOD GLUCOSE      POCT Blood Glucose.: 299 mg/dL (09 Dec 2021 11:42)      RADIOLOGY & ADDITIONAL TESTS: Reviewed.

## 2021-12-09 NOTE — DISCHARGE NOTE PROVIDER - PROVIDER TOKENS
PROVIDER:[TOKEN:[[id number]]],PROVIDER:[TOKEN:[[id number]]] PROVIDER:[TOKEN:[[id number]]],PROVIDER:[TOKEN:[[id number]],SCHEDULEDAPPT:[12/15/2021],SCHEDULEDAPPTTIME:[11:00 AM]]

## 2021-12-09 NOTE — DISCHARGE NOTE PROVIDER - NSDCCPCAREPLAN_GEN_ALL_CORE_FT
PRINCIPAL DISCHARGE DIAGNOSIS  Diagnosis: Pleural effusion  Assessment and Plan of Treatment: You were advised to come to the hospital by Dr. Ania Hargrove who noticed fluid in your lung and imaging did confirm that you had a moderate amount of fluid in your left lung in addition to having a low sodium level. A procedure called thoracentesis was performed to drain fluid. Chest x-ray that was performed during your hospitalization showed pneumothorax which is a condition where air leaks into the space between the lungs and your chest wall. Fluid that was drained from your chest was negative for malignant cells. During your hospitalization, your heart rate was noted to be elevated and chest imaging ruled out severe pathology such as a pulmonary embolism which is a blood clot in your lungs. A left chest tube was placed to relieve some trapped air and the chest tube was removed 12/9/2021. You noted to not have any respiratory difficulty. Please follow up with Dr. Ania Hargrove for continued evaluation and management.  If you develop shortness of breath, difficulty breathing, or persistent chest discomfort, please return to the emergency room for evaluation and management.      SECONDARY DISCHARGE DIAGNOSES  Diagnosis: Hyponatremia  Assessment and Plan of Treatment: During your admission, it was noted that your sodium level was below normal. A likely reason for your low sodium is due to not eating enough food. Please follow up with Dr. Ania Hargrove for monitoring of your sodium level.  Hyponatremia occurs when the concentration of sodium in your blood is abnormally low. Sodium is an electrolyte, and it helps regulate the amount of water that's in and around your cells.  In hyponatremia, one or more factors — ranging from an underlying medical condition to drinking too much water — cause the sodium in your body to become diluted. When this happens, your body's water levels rise, and your cells begin to swell. This swelling can cause many health problems, from mild to life-threatening.  Sodium plays a key role in your body. It helps maintain normal blood pressure, supports the work of your nerves and muscles, and regulates your body's fluid balance.  A normal blood sodium level is between 135 and 145 milliequivalents per liter (mEq/L). Hyponatremia occurs when the sodium in your blood falls below 135 mEq/L.   Many possible conditions and lifestyle factors can lead to hyponatremia, including medications, Heart, kidney and liver problems, hormonal changes, Drinking too much water, Chronic, severe vomiting or diarrhea and other causes of dehydration, or Syndrome of inappropriate anti-diuretic hormone (SIADH).  Hyponatremia treatment is aimed at resolving the underlying condition. Depending on the cause of hyponatremia, you may simply need to cut back on how much you drink. In other cases of hyponatremia, you may need intravenous electrolyte solutions and medications.  Seek emergency care for anyone who develops severe signs and symptoms of hyponatremia, such as nausea and vomiting, confusion, seizures, or lost consciousness. Call 911 and return to the emergency room if you have chest pain, difficulty breathing, high fevers, worsening of your symptoms, feel unwell or have nausea and vomiting.      Diagnosis: Breast cancer  Assessment and Plan of Treatment: For continued evaluation and management of your breast cancer, please follow up with Dr. Funez. You will receive your next fulvestrant injection next week following your discharge. If you have any concerns related to your breast cancer, please reach out to the office of  Dr. Funez and your questions and concerns will be addressed.

## 2021-12-09 NOTE — PROGRESS NOTE ADULT - PROBLEM SELECTOR PLAN 1
CT chest showing moderate left pleural effusion, suspected to be chronic and malignant in origin. Cytology from pleural effusion negative for malignant cells.   S/p L thoracentesis by pulm c/b L pneumothoraces, per pulm likely i/s/o trapped lung given chronicity of pleural effusion  -S/p chest tube placement by IR (12/3)  Chest tube (placed 12/3) clamped over night of 12/6 and CXR on 12/7 showed unchanged small pleural effusion and small pneumothorax  CT chest (12/8): Slightly decreased right hydropneumothorax post chest tube placement, decreased small residual anterior air component and slightly decreased moderate dependent basal predominant fluid component.    Plan:  -CT surgery recs possible IR drainage of posterior collection fo fluid that is not being drained by left pigtail, f/u IR recs  -f/u left pigtail removal  -f/u CT surgery and pulm recs

## 2021-12-09 NOTE — DISCHARGE NOTE PROVIDER - HOSPITAL COURSE
#Discharge: do not delete    89yo DNR/DNI female PMHx breast cancer (on fulvestrant and Xgeva), DM2 (A1c 7.6 on this admission), hypothyroidism, HLD, HTN, lymphedema, endometrial carcinoma s/p hysterectomy and bilateral oophorectomy, and benign pancreatic tail adenoma presents with malaise for 2 weeks associated with intermittent pressure like chest discomfort triggered by exertion. Pt went to Dr. Hargrove because she "just didn't feel well". At his office, Dr. Hargrove heard fluid in pt's left lung and an irregular heart beat and told her to go the ED. In the ED, pt remained HD stable and EKG showed NSR w/o ischemic changes. Admission labs notable for hyponatremia to 128, pt asymptomatic. CXR showed large left pleural effusion, and CT chest confirmed moderate left pleural effusion and also revealed multiple masses in the left breast with overlying skin thickening highly s/f mets along w/ diffuse sclerotic mets. Briefly started on NS for hyponatremia as pt initially made NPO for tap but d/c'd when diet restarted; Na improved to 131 s/p NS and PO intake. Pt underwent L thoracentesis on 12/1 w/ removal of ~800cc yellow-colored fluid. Post-thora CXR revealed left-sided pneumothoraces and diffuse osseous sclerotic mets. Decision made to upgrade pt to [facility] for tele monitoring of SpO2 i/s/o pneumothoraces. Pt on NRB while on telemetry. Repeat CXR overnight stable, showing Pneumothorax nearly 2cm in size. AM CXR stable. EKG showing sinus tachycardia. CT angio negative for PE and LE doppler negative. Left chest tube placed by IR 12/3 and removed 12/9. BS with urine retention, lyons placed and over course hospitalization had TOV with improvement and able to urinate freely. Cytopathology negative for malignant cells. CXR showed no reaccumulation of pleural effusion. CT chest (12/8) showed posterior collection that was not draining by pigtail, IR thought more risk than benefit to attempt drainage and decision made not to pursue drainage. Patient feeling well with respiratory difficulty. Patient to follow up with Dr. Ania Hargrove outpatient.    Problem List/Main Diagnoses (system-based):   #Pleural effusion  CT showed moderate left pleural effusion s/p L thoracentesis complicated by pneumothorax likely in setting of trapped lung given chronicity of pleural effusion s/p chest tube placement by IR (12/3) and removal 12/9. CT chest (12/8): Slightly decreased right hydropneumothorax post chest tube placement, decreased small residual anterior air component and slightly decreased moderate dependent basal predominant fluid component  -f/u outpatient with Dr. Hargrove    #UTI - RESOLVED  s/p course of ceftriaxone  -No dysuria    #Breast cancer  Patient reports two year history of breast cancer treated with fulvestrant and Xgeva. Patient most likely presenting with metastatic breast cancer with CT chest showing highly suspect multiple masses in the left breast with overlying skin thickening, left sided pleural effusion, and diffuse sclerotic mets.  Pt of Dr. Funez  -As per heme/onc Dr. Sherman, patient will receive her next fulvestrant injection next week following discharge as this continues to be an effective therapy for her malignancy.    #Diabetes  Patient reports being diagnosed with type 2 diabetes at age 80. 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   Home med: glimepiride 1mg bid. On home glimepiride. A1C 7.6%    #Hyponatremia  Most likely hypotonic hypovolemic hyponatremia 2/2 poor po intake i/s/o malignancy  -Monitor sodium outpatient and encourage PO intake    #Hypothyroid  Patient with history of hypothyroidism; on synthroid 88mcg PO daily at home  -TSH this admission wnl  -c/w home synthroid    #Endometrial cancer  History of endometrial cancer s/p hysterectomy.  -routine f/u as outpatient, follows with Dr. Funez outpatient for oncology    Inpatient treatment course: s/p thoracentesis, s/p left chest tube, Ceftriaxone  New medications: None  Labs to be followed outpatient: Sodium, WBC  Exam to be followed outpatient: Follow up outpatient with Dr. Ania Hargrove

## 2021-12-09 NOTE — PROGRESS NOTE ADULT - PROBLEM SELECTOR PLAN 5
Most likely hypotonic hypovolemic hyponatremia 2/2 poor po intake i/s/o malignancy.   Now improving    Plan:  -encourage PO intake

## 2021-12-09 NOTE — PROGRESS NOTE ADULT - PROBLEM SELECTOR PLAN 8
F: None currently  E: replete mg<2, K<4  N: consistent carbs      VTE PPx: Lovenox 40    Code status: DNR/DNI, MOLST in chart

## 2021-12-09 NOTE — PROGRESS NOTE ADULT - ASSESSMENT
89yo DNR/DNI female PMHx breast cancer (on fulvestrant and Xgeva), DM2 (A1c 7.6 on this admission), hypothyroidism, HLD, HTN, lymphedema, endometrial carcinoma s/p hysterectomy and bilateral oophorectomy, and benign pancreatic tail adenoma who presented to [facility] ED 11/30 complaining of malaise and intermittent chest pressure triggered by PNA. Workup revealed hyponatremia and left pleural effusion on CT scan concerning for malignant effusion. She underwent left thoracentesis with pulmonology which was complicated by pneumothorax. Thoracic surgery (Dr. Funes) consulted for PTX.    Plan:  Problem 1: Left PTX  - Case discussed and images reviewed with Dr. Funes  - Patient is s/p thoracentesis with pulm c/b left PTX  - Now s/p L sided IR pigtail 12/3  - Cytology negative  - CT chest without contrast with resolution of pneumothorax, there is posterior collection the pigtail is not connecting with, recommending removed pigtail and consider IR drainage of posterior collection.    Problem 2: UTI  - Patient with positive UA and low grade temp this admission  - Continue ceftriaxone per primary team     Problem 3: Breast cancer   - Management per heme/onc, primary team     Problem 4: Diabetes   - Agree with MISS during admission  - Management per primary team    I have reviewed clinical labs tests and reports, radiology tests and reports, as well as old patient medical records, and discussed with the referring physician.

## 2021-12-09 NOTE — CHART NOTE - NSCHARTNOTEFT_GEN_A_CORE
87yo F minimal-former-smoker, DM2 (A1c 7.6 on this admission), hypothyroidism, HLD, HTN, lymphedema, endometrial carcinoma s/p hysterectomy and bilateral oophorectomy, and benign pancreatic tail adenoma with hx of Breast Ca (w/ likely char mets, on Denosumab and Folvestrant, follows with Dr. Funez), presents from outpatient office on 11/30 for several weeks of fatigue and chest tightness on exertion. Found with persistent known left-sided pleural effusion, s/p thoracentesis bedside c/b pneumothorax. S/p left chest tube placement by IR on 12/3.    L Chest tube 0cc/24hr    Drain was unclamped yesterday. Chest x-ray looks stable. Awaiting confirmation from CT surgery/primary team on removing chest tube.     IR will continue to follow.
89yo F minimal-former-smoker, DM2 (A1c 7.6 on this admission), hypothyroidism, HLD, HTN, lymphedema, endometrial carcinoma s/p hysterectomy and bilateral oophorectomy, and benign pancreatic tail adenoma with hx of Breast Ca (w/ likely char mets, on Denosumab and Folvestrant, follows with Dr. Funez), presents from outpatient office on 11/30 for several weeks of fatigue and chest tightness on exertion. Found with persistent known left-sided pleural effusion, s/p thoracentesis bedside c/b pneumothorax. S/p left chest tube placement by IR on 12/3.    Chest tube removed at bedside today without any complications. Will obtain follow up x-ray.    Given patient's lack of symptoms, IR does not recommend placing another drain in at this time for the remaining pleural fluid unless it is felt to be clinically warranted by the primary team.
89yo F minimal-former-smoker, DM2 (A1c 7.6 on this admission), hypothyroidism, HLD, HTN, lymphedema, endometrial carcinoma s/p hysterectomy and bilateral oophorectomy, and benign pancreatic tail adenoma with hx of Breast Ca (w/ likely char mets, on Denosumab and Folvestrant, follows with Dr. Funez), presents from outpatient office on 11/30 for several weeks of fatigue and chest tightness on exertion. Found with persistent known left-sided pleural effusion, s/p thoracentesis bedside c/b pneumothorax. S/p left chest tube placement by IR on 12/3.    L Chest tube 0cc/24hr, patient resting comfortably.     IR will continue to follow.
CXR s/p L thoracentesis at 4pm with pneumothorax. Pt asymptomatic and VSS on room air. Discussed with patient and pulmonary and primary attending notified. Pt placed on NRB (for resorption) and plan to move pt to SDU for closer monitoring. Chest tube deferred given likely pneumothorax is pneumothorax ex vacuo given chronic pleural effusion, pt is asymptomatic, and VSS. Plan was discussed with primary team.   Repeat CXR at 9pm with stable PTX (also discussed with radiology and attending).     -Plan to repeat CXR in AM and keep on NRB overnight.  -Plan discussed with primary team.
89yo F minimal-former-smoker, DM2 (A1c 7.6 on this admission), hypothyroidism, HLD, HTN, lymphedema, endometrial carcinoma s/p hysterectomy and bilateral oophorectomy, and benign pancreatic tail adenoma with hx of Breast Ca (w/ likely char mets, on Denosumab and Folvestrant, follows with Dr. Funez), presents from outpatient office on 11/30 for several weeks of fatigue and chest tightness on exertion. Found with persistent known left-sided pleural effusion, s/p thoracentesis bedside c/b pneumothorax. S/p left chest tube placement by IR on 12/3.    L Chest tube 0cc/24hr, patient resting comfortably, patient has had chest tube clamped with only a small amount of fluid proximal to the clamp.    Case was discussed with the IR attending. Plan to unclamp the tube overnight to evaluate for fluid output.     IR will continue to follow.
Admitting Diagnosis:   Patient is a 88y old  Female who presents with a chief complaint of pleural effusion (09 Dec 2021 08:22)    PAST MEDICAL & SURGICAL HISTORY:  Type 2 diabetes mellitus  DM (diabetes mellitus)    HTN (hypertension)    Breast cancer    Hypothyroid    Endometrial cancer    Other acquired absence of organ  S/P splenectomy    Status post total hysterectomy  S/P hysterectomy    Other acquired absence of organ  S/P cholecystectomy    Status post cataract extraction  S/P cataract extraction    Current Nutrition Order:  consistent carbohydrate     PO Intake: Good (%) [ x  ]  Fair (50-75%) [   ] Poor (<25%) [   ]    GI Issues:   Pt denies N/V/D/C at present    Pain:  No pain noted    Skin Integrity:  +chest tube     Labs:       130<L>  |  96  |  14  ----------------------------<  178<H>  4.7   |  27  |  0.65    Ca    8.8      09 Dec 2021 09:01  Phos  3.2       Mg     1.9         TPro  6.7  /  Alb  2.8<L>  /  TBili  0.3  /  DBili  x   /  AST  17  /  ALT  11  /  AlkPhos  113      CAPILLARY BLOOD GLUCOSE    POCT Blood Glucose.: 156 mg/dL (09 Dec 2021 08:34)  POCT Blood Glucose.: 157 mg/dL (08 Dec 2021 21:19)  POCT Blood Glucose.: 82 mg/dL (08 Dec 2021 17:44)  POCT Blood Glucose.: 290 mg/dL (08 Dec 2021 12:08)    Medications:  MEDICATIONS  (STANDING):  dextrose 40% Gel 15 Gram(s) Oral once  dextrose 5%. 1000 milliLiter(s) (50 mL/Hr) IV Continuous <Continuous>  dextrose 5%. 1000 milliLiter(s) (100 mL/Hr) IV Continuous <Continuous>  dextrose 50% Injectable 25 Gram(s) IV Push once  dextrose 50% Injectable 12.5 Gram(s) IV Push once  dextrose 50% Injectable 25 Gram(s) IV Push once  enoxaparin Injectable 40 milliGRAM(s) SubCutaneous every 24 hours  glucagon  Injectable 1 milliGRAM(s) IntraMuscular once  insulin glargine Injectable (LANTUS) 7 Unit(s) SubCutaneous at bedtime  insulin lispro (ADMELOG) corrective regimen sliding scale   SubCutaneous Before meals and at bedtime  insulin lispro Injectable (ADMELOG) 5 Unit(s) SubCutaneous three times a day before meals  levothyroxine 88 MICROGram(s) Oral every 24 hours  polyethylene glycol 3350 17 Gram(s) Oral at bedtime  senna 2 Tablet(s) Oral at bedtime    MEDICATIONS  (PRN):    Anthropometric Measurements:    Weight Assessment:  · Height for BMI (FEET)	5 Feet  · Height for BMI (INCHES)	3 Inch(s)  · Height for BMI (CENTIMETERS)	160.02 Centimeter(s)  · Weight for BMI (lbs)	122 lb  · Weight for BMI (kg)	55.3 kg  · Body Mass Index	21.5    Weight Change: No new wts    Estimated energy needs:   %AKP=043; ABW used to calculate estimated needs d/t pt being between 80 and 120% of IBW. Adjusted for advanced age, cancer treatment, moderate malnutrition. Fluids adjusted per team.  25-30kcal/k-1659kcal  1.2-1.4g/k-77gprotein  30-35ml/k-1935ml fluid    Subjective:   87yo DNR/DNI female PMHx breast cancer (on fulvestrant and Xgeva), DM2 (A1c 7.6 on this admission), hypothyroidism, HLD, HTN, lymphedema, endometrial carcinoma s/p hysterectomy and bilateral oophorectomy, and benign pancreatic tail adenoma presents with malaise x2 weeks associated with intermittent pressure like chest discomfort triggered by exertion. Found to have hyponatremia and left PLEF on CT chest c/f malignant effusion, now s/p L thoracentesis by pulm c/b L pneumothoraces.     Pt seen in room, resting in bed. PTA, pt was following a diabetic diet at home, eating three meals a day. Pt was very knowledgeable about diabetic diets. Reinforced basic concepts w/ patient, she was receptive and understanding. Pt currently on consistent carbohydrate diet, discussed diet with pt. Pt reports good appetite during hospital stay however frustrated by  issues. RD communicated issues to  team. Please see full recs below. Will continue to follow per RD protocol.     Previous Nutrition Diagnosis: Moderate PCM RT decreased in PO intake 2/2 general feeling of malaise AEB likely meeting <75% EER PTA, NFPE findings    Active [x  ]  Resolved [   ]    If resolved, new PES:     Goal: Pt to meet >75% EER, show no further s/sx PCM    Recommendations:  1. Continue consistent carbohydrate diet  2. Trend wts  3. Reinforce education prn    Education: Reinforced basic concepts w/ patient, she was receptive and understanding.    Risk Level: High [   ] Moderate [ x ] Low [   ]

## 2021-12-09 NOTE — PROGRESS NOTE ADULT - PROBLEM SELECTOR PLAN 2
S/p Ceftriaxone course  WBC now rising, but no other signs of infection    Plan:  -continue to monitor WBC S/p Ceftriaxone course  WBC now rising, but no other signs of infection    Plan:  -continue to monitor WBC  -WBC improving 12/9

## 2021-12-09 NOTE — DISCHARGE NOTE PROVIDER - NSDCFUADDAPPT_GEN_ALL_CORE_FT
The office of Dr. Ania Hargrove will reach out to you to schedule an appointment as soon as possible. If you do not hear back within 1 week of discharge, please call the office to schedule an appointment. The office of Dr. Ania Hargrove will reach out to you to schedule an appointment as soon as possible. If you do not hear back within 1 week of discharge, please call the office to schedule an appointment.    Please follow up with your hematologist/oncologist Dr. Chrystal Funez on December 15, 2021 at 11:00AM for continued evaluation and management of your breast cancer.

## 2021-12-10 ENCOUNTER — TRANSCRIPTION ENCOUNTER (OUTPATIENT)
Age: 86
End: 2021-12-10

## 2021-12-10 VITALS
SYSTOLIC BLOOD PRESSURE: 131 MMHG | OXYGEN SATURATION: 98 % | RESPIRATION RATE: 18 BRPM | DIASTOLIC BLOOD PRESSURE: 76 MMHG | TEMPERATURE: 98 F | HEART RATE: 78 BPM

## 2021-12-10 LAB
ALBUMIN SERPL ELPH-MCNC: 2.9 G/DL — LOW (ref 3.3–5)
ALP SERPL-CCNC: 109 U/L — SIGNIFICANT CHANGE UP (ref 40–120)
ALT FLD-CCNC: 12 U/L — SIGNIFICANT CHANGE UP (ref 10–45)
ANION GAP SERPL CALC-SCNC: 7 MMOL/L — SIGNIFICANT CHANGE UP (ref 5–17)
AST SERPL-CCNC: 17 U/L — SIGNIFICANT CHANGE UP (ref 10–40)
BASOPHILS # BLD AUTO: 0.11 K/UL — SIGNIFICANT CHANGE UP (ref 0–0.2)
BASOPHILS NFR BLD AUTO: 0.9 % — SIGNIFICANT CHANGE UP (ref 0–2)
BILIRUB SERPL-MCNC: 0.2 MG/DL — SIGNIFICANT CHANGE UP (ref 0.2–1.2)
BUN SERPL-MCNC: 15 MG/DL — SIGNIFICANT CHANGE UP (ref 7–23)
CALCIUM SERPL-MCNC: 8.8 MG/DL — SIGNIFICANT CHANGE UP (ref 8.4–10.5)
CHLORIDE SERPL-SCNC: 98 MMOL/L — SIGNIFICANT CHANGE UP (ref 96–108)
CO2 SERPL-SCNC: 28 MMOL/L — SIGNIFICANT CHANGE UP (ref 22–31)
CREAT SERPL-MCNC: 0.64 MG/DL — SIGNIFICANT CHANGE UP (ref 0.5–1.3)
EOSINOPHIL # BLD AUTO: 0.25 K/UL — SIGNIFICANT CHANGE UP (ref 0–0.5)
EOSINOPHIL NFR BLD AUTO: 2.1 % — SIGNIFICANT CHANGE UP (ref 0–6)
GLUCOSE BLDC GLUCOMTR-MCNC: 117 MG/DL — HIGH (ref 70–99)
GLUCOSE SERPL-MCNC: 176 MG/DL — HIGH (ref 70–99)
HCT VFR BLD CALC: 43.1 % — SIGNIFICANT CHANGE UP (ref 34.5–45)
HGB BLD-MCNC: 13.7 G/DL — SIGNIFICANT CHANGE UP (ref 11.5–15.5)
IMM GRANULOCYTES NFR BLD AUTO: 6.6 % — HIGH (ref 0–1.5)
LYMPHOCYTES # BLD AUTO: 1.17 K/UL — SIGNIFICANT CHANGE UP (ref 1–3.3)
LYMPHOCYTES # BLD AUTO: 10 % — LOW (ref 13–44)
MAGNESIUM SERPL-MCNC: 2 MG/DL — SIGNIFICANT CHANGE UP (ref 1.6–2.6)
MCHC RBC-ENTMCNC: 29.9 PG — SIGNIFICANT CHANGE UP (ref 27–34)
MCHC RBC-ENTMCNC: 31.8 GM/DL — LOW (ref 32–36)
MCV RBC AUTO: 94.1 FL — SIGNIFICANT CHANGE UP (ref 80–100)
MONOCYTES # BLD AUTO: 1.38 K/UL — HIGH (ref 0–0.9)
MONOCYTES NFR BLD AUTO: 11.8 % — SIGNIFICANT CHANGE UP (ref 2–14)
NEUTROPHILS # BLD AUTO: 8.04 K/UL — HIGH (ref 1.8–7.4)
NEUTROPHILS NFR BLD AUTO: 68.6 % — SIGNIFICANT CHANGE UP (ref 43–77)
NRBC # BLD: 0 /100 WBCS — SIGNIFICANT CHANGE UP (ref 0–0)
PHOSPHATE SERPL-MCNC: 3.5 MG/DL — SIGNIFICANT CHANGE UP (ref 2.5–4.5)
PLATELET # BLD AUTO: 330 K/UL — SIGNIFICANT CHANGE UP (ref 150–400)
POTASSIUM SERPL-MCNC: 4.9 MMOL/L — SIGNIFICANT CHANGE UP (ref 3.5–5.3)
POTASSIUM SERPL-SCNC: 4.9 MMOL/L — SIGNIFICANT CHANGE UP (ref 3.5–5.3)
PROT SERPL-MCNC: 6.6 G/DL — SIGNIFICANT CHANGE UP (ref 6–8.3)
RBC # BLD: 4.58 M/UL — SIGNIFICANT CHANGE UP (ref 3.8–5.2)
RBC # FLD: 13.3 % — SIGNIFICANT CHANGE UP (ref 10.3–14.5)
SODIUM SERPL-SCNC: 133 MMOL/L — LOW (ref 135–145)
WBC # BLD: 11.72 K/UL — HIGH (ref 3.8–10.5)
WBC # FLD AUTO: 11.72 K/UL — HIGH (ref 3.8–10.5)

## 2021-12-10 PROCEDURE — 99232 SBSQ HOSP IP/OBS MODERATE 35: CPT | Mod: GC

## 2021-12-10 RX ADMIN — Medication 5 UNIT(S): at 08:50

## 2021-12-10 RX ADMIN — Medication 88 MICROGRAM(S): at 06:29

## 2021-12-10 NOTE — PROGRESS NOTE ADULT - SUBJECTIVE AND OBJECTIVE BOX
RADHA COLLINS  MRN-[id number]      Interval History:    Chest tube removed, patient breathing, ambulating well, no complaints voiced.  For discharge today.    HPI:    Radha Collins is an 88F with metastatic breast cancer (Hormone positive, HER-2 negative, diagnosed 2/2019 started on anastrazole and denosumab, transitioned to fulvestrant 11/2020 for disease progression.  Most recent staging imaging 7/2021 revealing stable disease.    She presented to clinic with TESFAYE, 2 weeks of malaise and chest discomfort.  On exam was found to have crackles in RLL and irregular heart rate leading to hospital admission.      CT Chest upon arrival showed persistence of pleural effusion and thoracentesis was performed, complicated by moderate pneumothorax.    Patient seen in [facility] on [facility] to assist with resolution of pneumothorax. She denies acute complaints at rest.  Denies new focal sites of pain.  Explains that she has been less active at home but continues to be able to accomplish her daily activities such as getting the mail etc.    PAST MEDICAL & SURGICAL HISTORY:  Type 2 diabetes mellitus  DM (diabetes mellitus)    HTN (hypertension)    Hyperthyroidism    Breast cancer    Hypothyroid    Endometrial cancer    Other acquired absence of organ  S/P splenectomy    Status post total hysterectomy  S/P hysterectomy    Other acquired absence of organ  S/P cholecystectomy    Status post cataract extraction  S/P cataract extraction        MEDICATIONS  (STANDING):  dextrose 40% Gel 15 Gram(s) Oral once  dextrose 5%. 1000 milliLiter(s) IV Continuous <Continuous>  dextrose 5%. 1000 milliLiter(s) IV Continuous <Continuous>  dextrose 50% Injectable 25 Gram(s) IV Push once  dextrose 50% Injectable 12.5 Gram(s) IV Push once  dextrose 50% Injectable 25 Gram(s) IV Push once  glucagon  Injectable 1 milliGRAM(s) IntraMuscular once  insulin lispro (ADMELOG) corrective regimen sliding scale   SubCutaneous Before meals and at bedtime  levothyroxine 88 MICROGram(s) Oral every 24 hours      Allergies    No Known Allergies    Intolerances          FAMILY HISTORY:  FH: type 2 diabetes (Mother)        ROS:  The patient denies chest pain or SOB at rest.    No nausea/vomiting/fevers/chills/night sweats.    Denies fatigue  No abdominal pain/diarrhea/constipation.  No melena or hematochezia.    No dysuria/hematuria.  No history of easy bruising/bleeding.  No gingival bleeding or epistaxis.  No leg pain or leg swelling.    ROS is otherwise negative.    Physical exam:    Vital signs  Vital Signs Last 24 Hrs  T(C): 36.5 (10 Dec 2021 05:48), Max: 36.7 (09 Dec 2021 21:09)  T(F): 97.7 (10 Dec 2021 05:48), Max: 98.1 (09 Dec 2021 21:09)  HR: 78 (10 Dec 2021 05:48) (77 - 86)  BP: 131/76 (10 Dec 2021 05:48) (118/78 - 131/76)  BP(mean): 91 (09 Dec 2021 12:33) (91 - 91)  RR: 18 (10 Dec 2021 05:48) (18 - 18)  SpO2: 98% (10 Dec 2021 05:48) (96% - 98%)    HEENT: sclera clear, EOMI  Respiratory: No accessory muscle use, no visible dyspnea  Abdomen: non-distended.  Extremities:  no peripheral edema  Neuro: alert, awake and oriented x 3, no focal abnormalities  Skin: Chest tube removed    LABS:                                  13.5   11.57 )-----------( 323      ( 09 Dec 2021 09:01 )             41.9         CBC Full  -  ( 09 Dec 2021 09:01 )  WBC Count : 11.57 K/uL  RBC Count : 4.46 M/uL  Hemoglobin : 13.5 g/dL  Hematocrit : 41.9 %  Platelet Count - Automated : 323 K/uL  Mean Cell Volume : 93.9 fl  Mean Cell Hemoglobin : 30.3 pg  Mean Cell Hemoglobin Concentration : 32.2 gm/dL  Auto Neutrophil # : 7.26 K/uL  Auto Lymphocyte # : 1.25 K/uL  Auto Monocyte # : 1.69 K/uL  Auto Eosinophil # : 0.31 K/uL  Auto Basophil # : 0.10 K/uL  Auto Neutrophil % : 62.7 %  Auto Lymphocyte % : 10.8 %  Auto Monocyte % : 14.6 %  Auto Eosinophil % : 2.7 %  Auto Basophil % : 0.9 %        12-09    130<L>  |  96  |  14  ----------------------------<  178<H>  4.7   |  27  |  0.65    Ca    8.8      09 Dec 2021 09:01  Phos  3.2     12-09  Mg     1.9     12-09    TPro  6.7  /  Alb  2.8<L>  /  TBili  0.3  /  DBili  x   /  AST  17  /  ALT  11  /  AlkPhos  113  12-09              PERTINENT IMAGING STUDIES: reviewed  CT chest 12/8/2021 - Stable/improving pneumothorax.  Stabl CA      ASSESSMENT:    88F with metastatic breast cancer admitted for dyspnea, now s/p thoracentesis, chest tube    PLAN:    #Metastatic Breast Cancer  - With known bony involvement, Left pleural effusion  - S/p thoracentesis, c/b pneumothorax likely in setting of trapped lung  - Disease appears relatively stable on imaging  - Next fulvestrant next week (appreciate primary team scheduling 12/15/2021)    #Positive UA  - Positive for nitrites, LE, bacteria, culture contaminated  - Received 3-day course of ceftriaxone  - WBC elevated but downtrending as of 12/9  - No signs/symptoms of infection, vital signs stable          Thank you    Chris Sherman MD for Chrystal Funez MD  [phone]

## 2021-12-10 NOTE — PROGRESS NOTE ADULT - NUTRITIONAL ASSESSMENT
This patient has been assessed with a concern for Malnutrition and has been determined to have a diagnosis/diagnoses of Moderate protein-calorie malnutrition.    This patient is being managed with:   Diet Consistent Carbohydrate/No Snacks-  Entered: Dec  3 2021  4:26PM    
This patient has been assessed with a concern for Malnutrition and has been determined to have a diagnosis/diagnoses of Moderate protein-calorie malnutrition.    This patient is being managed with:   Diet Consistent Carbohydrate/No Snacks-  Entered: Dec  3 2021  4:26PM    
This patient has been assessed with a concern for Malnutrition and has been determined to have a diagnosis/diagnoses of Moderate protein-calorie malnutrition.    This patient is being managed with:   Diet Consistent Carbohydrate/No Snacks-  Entered: Dec  9 2021 11:29AM    
This patient has been assessed with a concern for Malnutrition and has been determined to have a diagnosis/diagnoses of Moderate protein-calorie malnutrition.    This patient is being managed with:   Diet NPO-  Except Medications  Entered: Dec  9 2021 11:26AM    
This patient has been assessed with a concern for Malnutrition and has been determined to have a diagnosis/diagnoses of Moderate protein-calorie malnutrition.    This patient is being managed with:   Diet Consistent Carbohydrate/No Snacks-  Entered: Dec  3 2021  4:26PM    
This patient has been assessed with a concern for Malnutrition and has been determined to have a diagnosis/diagnoses of Moderate protein-calorie malnutrition.    This patient is being managed with:   Diet Consistent Carbohydrate/No Snacks-  Entered: Dec  3 2021  4:26PM    
This patient has been assessed with a concern for Malnutrition and has been determined to have a diagnosis/diagnoses of Moderate protein-calorie malnutrition.    This patient is being managed with:   Diet NPO-  Except Medications  Entered: Dec  3 2021  8:47AM    
This patient has been assessed with a concern for Malnutrition and has been determined to have a diagnosis/diagnoses of Moderate protein-calorie malnutrition.    This patient is being managed with:   Diet Consistent Carbohydrate/No Snacks-  Entered: Dec  3 2021  4:26PM    
This patient has been assessed with a concern for Malnutrition and has been determined to have a diagnosis/diagnoses of Moderate protein-calorie malnutrition.    This patient is being managed with:   Diet Consistent Carbohydrate/No Snacks-  Entered: Dec  9 2021 11:29AM    
This patient has been assessed with a concern for Malnutrition and has been determined to have a diagnosis/diagnoses of Moderate protein-calorie malnutrition.    This patient is being managed with:   Diet Consistent Carbohydrate/No Snacks-  Entered: Dec  3 2021  4:26PM    
This patient has been assessed with a concern for Malnutrition and has been determined to have a diagnosis/diagnoses of Moderate protein-calorie malnutrition.    This patient is being managed with:   Diet NPO-  Except Medications  Entered: Dec  3 2021  8:47AM    
This patient has been assessed with a concern for Malnutrition and has been determined to have a diagnosis/diagnoses of Moderate protein-calorie malnutrition.    This patient is being managed with:   Diet Consistent Carbohydrate/No Snacks-  Entered: Dec  3 2021  4:26PM    

## 2021-12-10 NOTE — DISCHARGE NOTE NURSING/CASE MANAGEMENT/SOCIAL WORK - NSDCFUADDAPPT_GEN_ALL_CORE_FT
The office of Dr. Ania Hargrove will reach out to you to schedule an appointment as soon as possible. If you do not hear back within 1 week of discharge, please call the office to schedule an appointment.    Please follow up with your hematologist/oncologist Dr. Chrystal Funez on December 15, 2021 at 11:00AM for continued evaluation and management of your breast cancer.

## 2021-12-10 NOTE — PROGRESS NOTE ADULT - TIME BILLING
Patient seen and examined;  Home today  Outpatient appointments being arranged
Patient seen and examined;   Remove Left chest tube today  Question attempt to drain posterior fluid;  Hope to send home tomorrow
Allow patient to eat  Pulmonary to see  Dr Lam to see   Gets monthly injections at oncology
Arrhythmias persist and likely related to pneumo on left;  Chest tube today;  Agree with Ceftriaxone for UTI
Stable exam;  Discontinue chest tube  Discontinue Rosales  Glucoses improved;
As outlined above;  Chest tube removal  Increase insulin
case complexity as detailed above.
case complexity as detailed above.
Patient seen and examined  Okay to transfer to floor;  Repeat omar this afternoon   Oncology follow up;
Patient seen and examined;  Tube to be clamped and observe for reaccumulation of fluid

## 2021-12-10 NOTE — DISCHARGE NOTE NURSING/CASE MANAGEMENT/SOCIAL WORK - NSDCPEFALRISK_GEN_ALL_CORE
For information on Fall & Injury Prevention, visit: [email or web address] OR  [email or web address] OR  https://www.cdc.gov/steadi/patient.html

## 2021-12-10 NOTE — PROGRESS NOTE ADULT - PROVIDER SPECIALTY LIST ADULT
Heme/Onc
Internal Medicine
Internal Medicine
Pulmonology
Thoracic Surgery
Heme/Onc
Heme/Onc
Internal Medicine
Intervent Radiology
Thoracic Surgery
Heme/Onc
Pulmonology
Pulmonology
Internal Medicine
Thoracic Surgery
Internal Medicine

## 2021-12-10 NOTE — DISCHARGE NOTE NURSING/CASE MANAGEMENT/SOCIAL WORK - PATIENT PORTAL LINK FT
You can access the FollowMyHealth Patient Portal offered by [facility] by registering at the following website: [email or web address]. By joining [facility]’s FollowMyHealth portal, you will also be able to view your health information using other applications (apps) compatible with our system.

## 2021-12-10 NOTE — PROGRESS NOTE ADULT - REASON FOR ADMISSION
pleural effusion

## 2021-12-13 ENCOUNTER — RX RENEWAL (OUTPATIENT)
Age: 86
End: 2021-12-13

## 2021-12-14 DIAGNOSIS — N39.0 URINARY TRACT INFECTION, SITE NOT SPECIFIED: ICD-10-CM

## 2021-12-14 RX ORDER — CIPROFLOXACIN HYDROCHLORIDE 500 MG/1
500 TABLET, FILM COATED ORAL TWICE DAILY
Qty: 10 | Refills: 1 | Status: ACTIVE | COMMUNITY
Start: 2021-12-14 | End: 1900-01-01

## 2021-12-15 DIAGNOSIS — N39.0 URINARY TRACT INFECTION, SITE NOT SPECIFIED: ICD-10-CM

## 2021-12-15 DIAGNOSIS — C50.912 MALIGNANT NEOPLASM OF UNSPECIFIED SITE OF LEFT FEMALE BREAST: ICD-10-CM

## 2021-12-15 DIAGNOSIS — R00.0 TACHYCARDIA, UNSPECIFIED: ICD-10-CM

## 2021-12-15 DIAGNOSIS — Z87.891 PERSONAL HISTORY OF NICOTINE DEPENDENCE: ICD-10-CM

## 2021-12-15 DIAGNOSIS — E44.0 MODERATE PROTEIN-CALORIE MALNUTRITION: ICD-10-CM

## 2021-12-15 DIAGNOSIS — I10 ESSENTIAL (PRIMARY) HYPERTENSION: ICD-10-CM

## 2021-12-15 DIAGNOSIS — E03.9 HYPOTHYROIDISM, UNSPECIFIED: ICD-10-CM
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
## 2021-12-15 DIAGNOSIS — J95.811 POSTPROCEDURAL PNEUMOTHORAX: ICD-10-CM

## 2021-12-15 DIAGNOSIS — E78.5 HYPERLIPIDEMIA, UNSPECIFIED: ICD-10-CM

## 2021-12-15 DIAGNOSIS — E11.9 TYPE 2 DIABETES MELLITUS WITHOUT COMPLICATIONS: ICD-10-CM

## 2021-12-15 DIAGNOSIS — Z90.722 ACQUIRED ABSENCE OF OVARIES, BILATERAL: ICD-10-CM

## 2021-12-15 DIAGNOSIS — C79.51 SECONDARY MALIGNANT NEOPLASM OF BONE: ICD-10-CM
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
## 2021-12-15 DIAGNOSIS — J90 PLEURAL EFFUSION, NOT ELSEWHERE CLASSIFIED: ICD-10-CM

## 2021-12-15 DIAGNOSIS — R07.89 OTHER CHEST PAIN: ICD-10-CM

## 2021-12-15 DIAGNOSIS — Z90.710 ACQUIRED ABSENCE OF BOTH CERVIX AND UTERUS: ICD-10-CM

## 2021-12-15 DIAGNOSIS — E87.1 HYPO-OSMOLALITY AND HYPONATREMIA: ICD-10-CM

## 2021-12-15 DIAGNOSIS — R33.9 RETENTION OF URINE, UNSPECIFIED: ICD-10-CM

## 2021-12-30 PROCEDURE — 84157 ASSAY OF PROTEIN OTHER: CPT

## 2021-12-30 PROCEDURE — 93005 ELECTROCARDIOGRAM TRACING: CPT

## 2021-12-30 PROCEDURE — 83930 ASSAY OF BLOOD OSMOLALITY: CPT

## 2021-12-30 PROCEDURE — 82570 ASSAY OF URINE CREATININE: CPT

## 2021-12-30 PROCEDURE — 97116 GAIT TRAINING THERAPY: CPT

## 2021-12-30 PROCEDURE — 87102 FUNGUS ISOLATION CULTURE: CPT

## 2021-12-30 PROCEDURE — C1729: CPT

## 2021-12-30 PROCEDURE — 88341 IMHCHEM/IMCYTCHM EA ADD ANTB: CPT

## 2021-12-30 PROCEDURE — 84439 ASSAY OF FREE THYROXINE: CPT

## 2021-12-30 PROCEDURE — 83615 LACTATE (LD) (LDH) ENZYME: CPT

## 2021-12-30 PROCEDURE — 82042 OTHER SOURCE ALBUMIN QUAN EA: CPT

## 2021-12-30 PROCEDURE — 84484 ASSAY OF TROPONIN QUANT: CPT

## 2021-12-30 PROCEDURE — 86901 BLOOD TYPING SEROLOGIC RH(D): CPT

## 2021-12-30 PROCEDURE — 87206 SMEAR FLUORESCENT/ACID STAI: CPT

## 2021-12-30 PROCEDURE — 81001 URINALYSIS AUTO W/SCOPE: CPT

## 2021-12-30 PROCEDURE — 85025 COMPLETE CBC W/AUTO DIFF WBC: CPT

## 2021-12-30 PROCEDURE — 71250 CT THORAX DX C-: CPT

## 2021-12-30 PROCEDURE — 83605 ASSAY OF LACTIC ACID: CPT

## 2021-12-30 PROCEDURE — 83735 ASSAY OF MAGNESIUM: CPT

## 2021-12-30 PROCEDURE — 85730 THROMBOPLASTIN TIME PARTIAL: CPT

## 2021-12-30 PROCEDURE — 97161 PT EVAL LOW COMPLEX 20 MIN: CPT

## 2021-12-30 PROCEDURE — 87015 SPECIMEN INFECT AGNT CONCNTJ: CPT

## 2021-12-30 PROCEDURE — 85027 COMPLETE CBC AUTOMATED: CPT

## 2021-12-30 PROCEDURE — 87205 SMEAR GRAM STAIN: CPT

## 2021-12-30 PROCEDURE — 87640 STAPH A DNA AMP PROBE: CPT

## 2021-12-30 PROCEDURE — 32557 INSERT CATH PLEURA W/ IMAGE: CPT

## 2021-12-30 PROCEDURE — 87075 CULTR BACTERIA EXCEPT BLOOD: CPT

## 2021-12-30 PROCEDURE — 80048 BASIC METABOLIC PNL TOTAL CA: CPT

## 2021-12-30 PROCEDURE — U0005: CPT

## 2021-12-30 PROCEDURE — U0003: CPT

## 2021-12-30 PROCEDURE — 82945 GLUCOSE OTHER FLUID: CPT

## 2021-12-30 PROCEDURE — 71045 X-RAY EXAM CHEST 1 VIEW: CPT

## 2021-12-30 PROCEDURE — 84478 ASSAY OF TRIGLYCERIDES: CPT

## 2021-12-30 PROCEDURE — 87040 BLOOD CULTURE FOR BACTERIA: CPT

## 2021-12-30 PROCEDURE — 36415 COLL VENOUS BLD VENIPUNCTURE: CPT

## 2021-12-30 PROCEDURE — 85379 FIBRIN DEGRADATION QUANT: CPT

## 2021-12-30 PROCEDURE — 93970 EXTREMITY STUDY: CPT

## 2021-12-30 PROCEDURE — 87641 MR-STAPH DNA AMP PROBE: CPT

## 2021-12-30 PROCEDURE — 87070 CULTURE OTHR SPECIMN AEROBIC: CPT

## 2021-12-30 PROCEDURE — 89051 BODY FLUID CELL COUNT: CPT

## 2021-12-30 PROCEDURE — 87086 URINE CULTURE/COLONY COUNT: CPT

## 2021-12-30 PROCEDURE — 83986 ASSAY PH BODY FLUID NOS: CPT

## 2021-12-30 PROCEDURE — 88112 CYTOPATH CELL ENHANCE TECH: CPT

## 2021-12-30 PROCEDURE — 82962 GLUCOSE BLOOD TEST: CPT

## 2021-12-30 PROCEDURE — 87635 SARS-COV-2 COVID-19 AMP PRB: CPT

## 2021-12-30 PROCEDURE — C1769: CPT

## 2021-12-30 PROCEDURE — 85610 PROTHROMBIN TIME: CPT

## 2021-12-30 PROCEDURE — 80053 COMPREHEN METABOLIC PANEL: CPT

## 2021-12-30 PROCEDURE — 83036 HEMOGLOBIN GLYCOSYLATED A1C: CPT

## 2021-12-30 PROCEDURE — 84311 SPECTROPHOTOMETRY: CPT

## 2021-12-30 PROCEDURE — 84100 ASSAY OF PHOSPHORUS: CPT

## 2021-12-30 PROCEDURE — G1004: CPT

## 2021-12-30 PROCEDURE — 71275 CT ANGIOGRAPHY CHEST: CPT

## 2021-12-30 PROCEDURE — 83880 ASSAY OF NATRIURETIC PEPTIDE: CPT

## 2021-12-30 PROCEDURE — 86850 RBC ANTIBODY SCREEN: CPT

## 2021-12-30 PROCEDURE — 99285 EMERGENCY DEPT VISIT HI MDM: CPT

## 2021-12-30 PROCEDURE — 88305 TISSUE EXAM BY PATHOLOGIST: CPT

## 2021-12-30 PROCEDURE — 86900 BLOOD TYPING SEROLOGIC ABO: CPT

## 2021-12-30 PROCEDURE — 87116 MYCOBACTERIA CULTURE: CPT

## 2021-12-30 PROCEDURE — 84443 ASSAY THYROID STIM HORMONE: CPT

## 2022-01-01 LAB
CULTURE RESULTS: SIGNIFICANT CHANGE UP
SPECIMEN SOURCE: SIGNIFICANT CHANGE UP

## 2022-01-22 LAB
CULTURE RESULTS: SIGNIFICANT CHANGE UP
SPECIMEN SOURCE: SIGNIFICANT CHANGE UP

## 2022-03-09 ENCOUNTER — RX RENEWAL (OUTPATIENT)
Age: 87
End: 2022-03-09

## 2022-05-09 NOTE — PATIENT PROFILE ADULT - NSPRESCRALCSIXMORE_GEN_A_NUR
5/9/22: Patient admitted for pneumonia and complains of generalized weakness. B12 labs were checked and are WNL. Weight is stable. Appetite is fair; PO 60%. No nutritional intervention at this time. Will monitor per policy.    Never

## 2022-05-14 ENCOUNTER — INPATIENT (INPATIENT)
Facility: HOSPITAL | Age: 87
LOS: 4 days | Discharge: HOME | DRG: 187 | End: 2022-05-19
Attending: INTERNAL MEDICINE | Admitting: INTERNAL MEDICINE
Payer: MEDICARE

## 2022-05-14 VITALS
OXYGEN SATURATION: 96 % | HEART RATE: 106 BPM | WEIGHT: 125 LBS | DIASTOLIC BLOOD PRESSURE: 78 MMHG | RESPIRATION RATE: 22 BRPM | HEIGHT: 63 IN | TEMPERATURE: 98 F | SYSTOLIC BLOOD PRESSURE: 130 MMHG

## 2022-05-14 DIAGNOSIS — Z29.9 ENCOUNTER FOR PROPHYLACTIC MEASURES, UNSPECIFIED: ICD-10-CM

## 2022-05-14 DIAGNOSIS — E03.9 HYPOTHYROIDISM, UNSPECIFIED: ICD-10-CM

## 2022-05-14 DIAGNOSIS — R06.02 SHORTNESS OF BREATH: ICD-10-CM

## 2022-05-14 DIAGNOSIS — E11.9 TYPE 2 DIABETES MELLITUS WITHOUT COMPLICATIONS: ICD-10-CM

## 2022-05-14 DIAGNOSIS — C50.919 MALIGNANT NEOPLASM OF UNSPECIFIED SITE OF UNSPECIFIED FEMALE BREAST: ICD-10-CM

## 2022-05-14 LAB
ALBUMIN SERPL ELPH-MCNC: 3.7 G/DL — SIGNIFICANT CHANGE UP (ref 3.3–5)
ALP SERPL-CCNC: 235 U/L — HIGH (ref 40–120)
ALT FLD-CCNC: 39 U/L — SIGNIFICANT CHANGE UP (ref 10–45)
ANION GAP SERPL CALC-SCNC: 12 MMOL/L — SIGNIFICANT CHANGE UP (ref 5–17)
APTT BLD: 29.8 SEC — SIGNIFICANT CHANGE UP (ref 27.5–35.5)
AST SERPL-CCNC: 41 U/L — HIGH (ref 10–40)
BASOPHILS # BLD AUTO: 0.05 K/UL — SIGNIFICANT CHANGE UP (ref 0–0.2)
BASOPHILS NFR BLD AUTO: 0.7 % — SIGNIFICANT CHANGE UP (ref 0–2)
BILIRUB SERPL-MCNC: 0.4 MG/DL — SIGNIFICANT CHANGE UP (ref 0.2–1.2)
BUN SERPL-MCNC: 22 MG/DL — SIGNIFICANT CHANGE UP (ref 7–23)
CALCIUM SERPL-MCNC: 9 MG/DL — SIGNIFICANT CHANGE UP (ref 8.4–10.5)
CHLORIDE SERPL-SCNC: 96 MMOL/L — SIGNIFICANT CHANGE UP (ref 96–108)
CK SERPL-CCNC: 211 U/L — HIGH (ref 25–170)
CO2 SERPL-SCNC: 23 MMOL/L — SIGNIFICANT CHANGE UP (ref 22–31)
CREAT SERPL-MCNC: 0.73 MG/DL — SIGNIFICANT CHANGE UP (ref 0.5–1.3)
EGFR: 79 ML/MIN/1.73M2 — SIGNIFICANT CHANGE UP
EOSINOPHIL # BLD AUTO: 0.07 K/UL — SIGNIFICANT CHANGE UP (ref 0–0.5)
EOSINOPHIL NFR BLD AUTO: 0.9 % — SIGNIFICANT CHANGE UP (ref 0–6)
GLUCOSE BLDC GLUCOMTR-MCNC: 175 MG/DL — HIGH (ref 70–99)
GLUCOSE SERPL-MCNC: 233 MG/DL — HIGH (ref 70–99)
HCT VFR BLD CALC: 39.9 % — SIGNIFICANT CHANGE UP (ref 34.5–45)
HGB BLD-MCNC: 13.9 G/DL — SIGNIFICANT CHANGE UP (ref 11.5–15.5)
IMM GRANULOCYTES NFR BLD AUTO: 0.5 % — SIGNIFICANT CHANGE UP (ref 0–1.5)
INR BLD: 1.07 — SIGNIFICANT CHANGE UP (ref 0.88–1.16)
LYMPHOCYTES # BLD AUTO: 1.07 K/UL — SIGNIFICANT CHANGE UP (ref 1–3.3)
LYMPHOCYTES # BLD AUTO: 14.4 % — SIGNIFICANT CHANGE UP (ref 13–44)
MCHC RBC-ENTMCNC: 32.6 PG — SIGNIFICANT CHANGE UP (ref 27–34)
MCHC RBC-ENTMCNC: 34.8 GM/DL — SIGNIFICANT CHANGE UP (ref 32–36)
MCV RBC AUTO: 93.4 FL — SIGNIFICANT CHANGE UP (ref 80–100)
MONOCYTES # BLD AUTO: 1.18 K/UL — HIGH (ref 0–0.9)
MONOCYTES NFR BLD AUTO: 15.8 % — HIGH (ref 2–14)
NEUTROPHILS # BLD AUTO: 5.04 K/UL — SIGNIFICANT CHANGE UP (ref 1.8–7.4)
NEUTROPHILS NFR BLD AUTO: 67.7 % — SIGNIFICANT CHANGE UP (ref 43–77)
NRBC # BLD: 0 /100 WBCS — SIGNIFICANT CHANGE UP (ref 0–0)
NT-PROBNP SERPL-SCNC: 100 PG/ML — SIGNIFICANT CHANGE UP (ref 0–300)
PLATELET # BLD AUTO: 328 K/UL — SIGNIFICANT CHANGE UP (ref 150–400)
POTASSIUM SERPL-MCNC: 5 MMOL/L — SIGNIFICANT CHANGE UP (ref 3.5–5.3)
POTASSIUM SERPL-SCNC: 5 MMOL/L — SIGNIFICANT CHANGE UP (ref 3.5–5.3)
PROT SERPL-MCNC: 7 G/DL — SIGNIFICANT CHANGE UP (ref 6–8.3)
PROTHROM AB SERPL-ACNC: 12.8 SEC — SIGNIFICANT CHANGE UP (ref 10.5–13.4)
RBC # BLD: 4.27 M/UL — SIGNIFICANT CHANGE UP (ref 3.8–5.2)
RBC # FLD: 14.4 % — SIGNIFICANT CHANGE UP (ref 10.3–14.5)
SARS-COV-2 RNA SPEC QL NAA+PROBE: NEGATIVE — SIGNIFICANT CHANGE UP
SODIUM SERPL-SCNC: 131 MMOL/L — LOW (ref 135–145)
TROPONIN T SERPL-MCNC: <0.01 NG/ML — SIGNIFICANT CHANGE UP (ref 0–0.01)
WBC # BLD: 7.45 K/UL — SIGNIFICANT CHANGE UP (ref 3.8–10.5)
WBC # FLD AUTO: 7.45 K/UL — SIGNIFICANT CHANGE UP (ref 3.8–10.5)

## 2022-05-14 PROCEDURE — 71045 X-RAY EXAM CHEST 1 VIEW: CPT | Mod: 26

## 2022-05-14 PROCEDURE — 99285 EMERGENCY DEPT VISIT HI MDM: CPT | Mod: 25

## 2022-05-14 PROCEDURE — 93010 ELECTROCARDIOGRAM REPORT: CPT

## 2022-05-14 RX ORDER — HEPARIN SODIUM 5000 [USP'U]/ML
5000 INJECTION INTRAVENOUS; SUBCUTANEOUS ONCE
Refills: 0 | Status: COMPLETED | OUTPATIENT
Start: 2022-05-14 | End: 2022-05-14

## 2022-05-14 RX ORDER — LEVOTHYROXINE SODIUM 125 MCG
88 TABLET ORAL DAILY
Refills: 0 | Status: DISCONTINUED | OUTPATIENT
Start: 2022-05-14 | End: 2022-05-19

## 2022-05-14 RX ORDER — MIRTAZAPINE 45 MG/1
7.5 TABLET, ORALLY DISINTEGRATING ORAL AT BEDTIME
Refills: 0 | Status: DISCONTINUED | OUTPATIENT
Start: 2022-05-14 | End: 2022-05-17

## 2022-05-14 RX ORDER — GLUCAGON INJECTION, SOLUTION 0.5 MG/.1ML
1 INJECTION, SOLUTION SUBCUTANEOUS ONCE
Refills: 0 | Status: DISCONTINUED | OUTPATIENT
Start: 2022-05-14 | End: 2022-05-19

## 2022-05-14 RX ORDER — DEXTROSE 50 % IN WATER 50 %
25 SYRINGE (ML) INTRAVENOUS ONCE
Refills: 0 | Status: DISCONTINUED | OUTPATIENT
Start: 2022-05-14 | End: 2022-05-19

## 2022-05-14 RX ORDER — SODIUM CHLORIDE 9 MG/ML
1000 INJECTION, SOLUTION INTRAVENOUS
Refills: 0 | Status: DISCONTINUED | OUTPATIENT
Start: 2022-05-14 | End: 2022-05-19

## 2022-05-14 RX ORDER — DEXTROSE 50 % IN WATER 50 %
12.5 SYRINGE (ML) INTRAVENOUS ONCE
Refills: 0 | Status: DISCONTINUED | OUTPATIENT
Start: 2022-05-14 | End: 2022-05-19

## 2022-05-14 RX ORDER — INSULIN LISPRO 100/ML
VIAL (ML) SUBCUTANEOUS
Refills: 0 | Status: DISCONTINUED | OUTPATIENT
Start: 2022-05-14 | End: 2022-05-19

## 2022-05-14 RX ORDER — DEXTROSE 50 % IN WATER 50 %
15 SYRINGE (ML) INTRAVENOUS ONCE
Refills: 0 | Status: DISCONTINUED | OUTPATIENT
Start: 2022-05-14 | End: 2022-05-19

## 2022-05-14 RX ORDER — ACETAMINOPHEN 500 MG
650 TABLET ORAL EVERY 6 HOURS
Refills: 0 | Status: DISCONTINUED | OUTPATIENT
Start: 2022-05-14 | End: 2022-05-19

## 2022-05-14 RX ORDER — LANOLIN ALCOHOL/MO/W.PET/CERES
3 CREAM (GRAM) TOPICAL AT BEDTIME
Refills: 0 | Status: DISCONTINUED | OUTPATIENT
Start: 2022-05-14 | End: 2022-05-19

## 2022-05-14 RX ORDER — CHOLECALCIFEROL (VITAMIN D3) 125 MCG
1000 CAPSULE ORAL DAILY
Refills: 0 | Status: DISCONTINUED | OUTPATIENT
Start: 2022-05-14 | End: 2022-05-19

## 2022-05-14 RX ADMIN — HEPARIN SODIUM 5000 UNIT(S): 5000 INJECTION INTRAVENOUS; SUBCUTANEOUS at 22:42

## 2022-05-14 RX ADMIN — Medication 2: at 22:32

## 2022-05-14 NOTE — ED PROVIDER NOTE - CLINICAL SUMMARY MEDICAL DECISION MAKING FREE TEXT BOX
recurrent L sided effusion - d/w Pulm admit to [facility]- pulm will eval- likely will need 2nd thoracentesis- poss chest tube

## 2022-05-14 NOTE — H&P ADULT - PROBLEM SELECTOR PLAN 2
Patient with hx of metastatic breast cancer, on fulvestrant and Xgeva, follows with Dr. Funez. Patient with hx of metastatic breast cancer, on fulvestrant and Xgeva, follows with Dr. Funez.  - c/w same management per dr. funez

## 2022-05-14 NOTE — H&P ADULT - PROBLEM SELECTOR PLAN 5
F: None  E: Replete PRN  N: Carb consistent  DVT PPx: Hep sQ x1  GI PPx: None  Code: FULL  Dispo: [facility]

## 2022-05-14 NOTE — H&P ADULT - HISTORY OF PRESENT ILLNESS
88F DNR/DNI with PMHx breast cancer (on fulvestrant and Xgeva), DM2 (A1c 7.6 on this admission), hypothyroidism, HLD, HTN, lymphedema, endometrial carcinoma s/p hysterectomy and bilateral oophorectomy, and benign pancreatic tail adenoma who presented to [facility] ED 11/30 complaining of malaise and intermittent chest pressure triggered by PNA. Workup revealed hyponatremia and left pleural effusion on CT scan concerning for malignant effusion. She underwent left thoracentesis with pulmonology which was complicated by pneumothorax- returns today with progressive SOB that worsened over the past 2 weeks.      In the ED:  Initial vital signs: T: 97.8 F, HR: 106, BP: 130/78, R: 22, SpO2: 96% on RA  ED course:   Labs: significant for Na 131, glucose 233, , AST 41,   Imaging:  CXR: left pleural effusion,   EKG:   Medications:   Consults: none  89F DNR/DNI with PMHx breast cancer (on fulvestrant and Xgeva), DM2 (A1c 7.6 on this admission), hypothyroidism, HLD, lymphedema, endometrial carcinoma s/p hysterectomy and bilateral oophorectomy, and benign pancreatic tail adenoma who presented to [facility] ED 11/30 complaining of malaise and intermittent chest pressure triggered by PNA. Workup revealed hyponatremia and left pleural effusion on CT scan concerning for malignant effusion. She underwent left thoracentesis with pulmonology which was complicated by pneumothorax- returns today with progressive SOB that worsened over the past 2 weeks. Patient states that she wanted to get evaluated before it worsened.       In the ED:  Initial vital signs: T: 97.8 F, HR: 106, BP: 130/78, R: 22, SpO2: 96% on RA  ED course:   Labs: significant for Na 131, glucose 233, , AST 41,   Imaging:  CXR: left pleural effusion  EKG: sinus tach  Medications: none   Consults: none

## 2022-05-14 NOTE — ED ADULT TRIAGE NOTE - CHIEF COMPLAINT QUOTE
Pt c/o intermitted SOB x weeks and chest pressure. Pt endorses SOB worse with exertion. EKG in progress.

## 2022-05-14 NOTE — H&P ADULT - NSHPLABSRESULTS_GEN_ALL_CORE
.  LABS:                         13.9   7.45  )-----------( 328      ( 14 May 2022 17:34 )             39.9     05-14    131<L>  |  96  |  22  ----------------------------<  233<H>  5.0   |  23  |  0.73    Ca    9.0      14 May 2022 17:34    TPro  7.0  /  Alb  3.7  /  TBili  0.4  /  DBili  x   /  AST  41<H>  /  ALT  39  /  AlkPhos  235<H>  05-14    PT/INR - ( 14 May 2022 17:34 )   PT: 12.8 sec;   INR: 1.07          PTT - ( 14 May 2022 17:34 )  PTT:29.8 sec    CARDIAC MARKERS ( 14 May 2022 17:34 )  x     / <0.01 ng/mL / 211 U/L / x     / x          Serum Pro-Brain Natriuretic Peptide: 100 pg/mL (05-14 @ 17:34)        RADIOLOGY, EKG & ADDITIONAL TESTS: Reviewed.

## 2022-05-14 NOTE — CHART NOTE - NSCHARTNOTEFT_GEN_A_CORE
PULMONARY NOTIFICATION NOTE:    Paged by ED regarding this patient, known to Pulmonary service during last admission, and patient of Dr. Hargrove, who presents with acute-on-chronic shortness of breath and found again with recurrence of her known left pleural effusion (1/2 hemithorax, roughly the same as when she presented in Dec 2021). She is hemodynamically stable, on RA, and not using accessory muscles. No fever or leukocytosis.     Pt seen and examined this evening   ***    Formal consult to be staffed/noted on Marco 5/15   Unclear benefit of additional thoracentesis at this point (may benefit greater from indwelling pleural catheter/PleurX) PULMONARY NOTIFICATION NOTE:    Paged by ED regarding this patient, known to Pulmonary service during last admission, and patient of Dr. Hargrove, who presents with acute-on-chronic shortness of breath and found again with recurrence of her known left pleural effusion (1/2 hemithorax, roughly the same as when she presented in Dec 2021).     Pt seen and examined this evening   Lying comfortably in bed. Feels better when at rest and when on NC (although not hypoxemic on RA). Speaking in complete sentences.   She is hemodynamically stable, not hypoxemic, and not using accessory muscles. No fever or leukocytosis.     Formal consult to be staffed/noted on Marco 5/15   Unclear benefit of additional thoracentesis at this point (may benefit greater from indwelling pleural catheter/PleurX)

## 2022-05-14 NOTE — ED PROVIDER NOTE - OBJECTIVE STATEMENT
89yo DNR/DNI female PMHx breast cancer (on fulvestrant and Xgeva), DM2 (A1c 7.6 on this admission), hypothyroidism, HLD, HTN, lymphedema, endometrial carcinoma s/p hysterectomy and bilateral oophorectomy, and benign pancreatic tail adenoma who presented to [facility] ED 11/30 complaining of malaise and intermittent chest pressure triggered by PNA. Workup revealed hyponatremia and left pleural effusion on CT scan concerning for malignant effusion. She underwent left thoracentesis with pulmonology which was complicated by pneumothorax- returns today with prog  sob over the past 2 months- worse over the past 2 weeks  no f/c no n/v  severity- mod  exac- light walking

## 2022-05-14 NOTE — ED PROVIDER NOTE - CONSTITUTIONAL, MLM
normal... thin elderly female, awake, alert, oriented to person, place, time/situation and in no apparent distress.

## 2022-05-14 NOTE — H&P ADULT - PROBLEM SELECTOR PLAN 3
Patient with hx of DM2 on glipiride at home.   - moderate ISF-25 lispro sliding scale AC+qHS  - monitor FS  - consistent carb diet

## 2022-05-14 NOTE — H&P ADULT - NSHPPHYSICALEXAM_GEN_ALL_CORE
.  VITAL SIGNS:  T(C): 37.3 (05-14-22 @ 21:29), Max: 37.3 (05-14-22 @ 21:29)  T(F): 99.1 (05-14-22 @ 21:29), Max: 99.1 (05-14-22 @ 21:29)  HR: 98 (05-14-22 @ 21:29) (88 - 106)  BP: 139/84 (05-14-22 @ 21:29) (130/78 - 148/72)  BP(mean): --  RR: 18 (05-14-22 @ 21:29) (18 - 22)  SpO2: 97% (05-14-22 @ 21:29) (96% - 97%)  Wt(kg): --    PHYSICAL EXAM:    Constitutional: Resting comfortably in bed; NAD  Head: NC/AT  Eyes: PERRL, EOMI, anicteric sclera  ENT: no nasal discharge; uvula midline, no oropharyngeal erythema or exudates; MMM  Neck: supple; no JVD or thyromegaly  Respiratory: decreased breath sounds at b/l bases, R>L  Cardiac: +S1/S2; RRR; no M/R/G; PMI non-displaced  Gastrointestinal: abdomen soft, NT/ND; no rebound or guarding; +BSx4  Back: spine midline, no bony tenderness or step-offs; no CVAT B/L  Extremities: WWP, no clubbing or cyanosis; no peripheral edema  Musculoskeletal: NROM x4; no joint swelling, tenderness or erythema  Vascular: 2+ radial, femoral, DP/PT pulses B/L  Dermatologic: skin warm, dry and intact; no rashes, wounds, or scars  Neurologic: AAOx3; CNII-XII grossly intact; no focal deficits

## 2022-05-14 NOTE — ED ADULT NURSE NOTE - OBJECTIVE STATEMENT
88 y/o female with hx of metastatic breast CA here for evaluation of worsening SOB in the past 2 weeks, Pt denies cough, fever or chills, reports hx of pulmonary edema two years ago.

## 2022-05-14 NOTE — H&P ADULT - ASSESSMENT
89F DNR/DNI with PMHx breast cancer (on fulvestrant and Xgeva), DM2 (A1c 7.6 on this admission), hypothyroidism, HLD, lymphedema, endometrial carcinoma s/p hysterectomy and bilateral oophorectomy, and benign pancreatic tail adenoma who presented to [facility] ED 11/30 complaining of malaise and intermittent chest pressure triggered by PNA now presenting with recurrence of pleural effusion admitted for management.

## 2022-05-14 NOTE — H&P ADULT - PROBLEM SELECTOR PLAN 1
Patient presenting with 2 weeks of SOB. Has hx of metastatic breast  cancer as well as pleural effusions now presenting with recurrence of pleural effusion (1/2 hemothorax on left side per pulm note. Patient comfortable on 2L NC (not for hypoxemia, but for comfort). Speaking in complete sentence and ambulating freely. Hemodynamically stable, afebrile and no leukocytosis.   - Pulm consulted- Plan for possible thoracentesis vs. pleurX  - Patient comfortable, will continue to monitor oxygenation

## 2022-05-15 DIAGNOSIS — J90 PLEURAL EFFUSION, NOT ELSEWHERE CLASSIFIED: ICD-10-CM

## 2022-05-15 PROBLEM — C50.919 MALIGNANT NEOPLASM OF UNSPECIFIED SITE OF UNSPECIFIED FEMALE BREAST: Chronic | Status: ACTIVE | Noted: 2021-11-30

## 2022-05-15 PROBLEM — E03.9 HYPOTHYROIDISM, UNSPECIFIED: Chronic | Status: ACTIVE | Noted: 2021-11-30

## 2022-05-15 PROBLEM — C54.1 MALIGNANT NEOPLASM OF ENDOMETRIUM: Chronic | Status: ACTIVE | Noted: 2021-11-30

## 2022-05-15 LAB
A1C WITH ESTIMATED AVERAGE GLUCOSE RESULT: 8.7 % — HIGH (ref 4–5.6)
ALBUMIN SERPL ELPH-MCNC: 3.3 G/DL — SIGNIFICANT CHANGE UP (ref 3.3–5)
ALP SERPL-CCNC: 230 U/L — HIGH (ref 40–120)
ALT FLD-CCNC: 32 U/L — SIGNIFICANT CHANGE UP (ref 10–45)
ANION GAP SERPL CALC-SCNC: 10 MMOL/L — SIGNIFICANT CHANGE UP (ref 5–17)
ANISOCYTOSIS BLD QL: SLIGHT — SIGNIFICANT CHANGE UP
APTT BLD: 30.4 SEC — SIGNIFICANT CHANGE UP (ref 27.5–35.5)
AST SERPL-CCNC: 37 U/L — SIGNIFICANT CHANGE UP (ref 10–40)
BASOPHILS # BLD AUTO: 0.1 K/UL — SIGNIFICANT CHANGE UP (ref 0–0.2)
BASOPHILS NFR BLD AUTO: 1.8 % — SIGNIFICANT CHANGE UP (ref 0–2)
BILIRUB SERPL-MCNC: 0.4 MG/DL — SIGNIFICANT CHANGE UP (ref 0.2–1.2)
BUN SERPL-MCNC: 16 MG/DL — SIGNIFICANT CHANGE UP (ref 7–23)
BURR CELLS BLD QL SMEAR: PRESENT — SIGNIFICANT CHANGE UP
CALCIUM SERPL-MCNC: 8.3 MG/DL — LOW (ref 8.4–10.5)
CHLORIDE SERPL-SCNC: 100 MMOL/L — SIGNIFICANT CHANGE UP (ref 96–108)
CO2 SERPL-SCNC: 23 MMOL/L — SIGNIFICANT CHANGE UP (ref 22–31)
CREAT SERPL-MCNC: 0.63 MG/DL — SIGNIFICANT CHANGE UP (ref 0.5–1.3)
EGFR: 85 ML/MIN/1.73M2 — SIGNIFICANT CHANGE UP
EOSINOPHIL # BLD AUTO: 0.25 K/UL — SIGNIFICANT CHANGE UP (ref 0–0.5)
EOSINOPHIL NFR BLD AUTO: 4.4 % — SIGNIFICANT CHANGE UP (ref 0–6)
ESTIMATED AVERAGE GLUCOSE: 203 MG/DL — HIGH (ref 68–114)
GGT SERPL-CCNC: 35 U/L — SIGNIFICANT CHANGE UP (ref 8–40)
GIANT PLATELETS BLD QL SMEAR: PRESENT — SIGNIFICANT CHANGE UP
GLUCOSE BLDC GLUCOMTR-MCNC: 138 MG/DL — HIGH (ref 70–99)
GLUCOSE BLDC GLUCOMTR-MCNC: 248 MG/DL — HIGH (ref 70–99)
GLUCOSE BLDC GLUCOMTR-MCNC: 80 MG/DL — SIGNIFICANT CHANGE UP (ref 70–99)
GLUCOSE SERPL-MCNC: 112 MG/DL — HIGH (ref 70–99)
HCT VFR BLD CALC: 41.2 % — SIGNIFICANT CHANGE UP (ref 34.5–45)
HGB BLD-MCNC: 13.4 G/DL — SIGNIFICANT CHANGE UP (ref 11.5–15.5)
HYPOCHROMIA BLD QL: SLIGHT — SIGNIFICANT CHANGE UP
INR BLD: 1.1 — SIGNIFICANT CHANGE UP (ref 0.88–1.16)
LYMPHOCYTES # BLD AUTO: 0.99 K/UL — LOW (ref 1–3.3)
LYMPHOCYTES # BLD AUTO: 17.7 % — SIGNIFICANT CHANGE UP (ref 13–44)
MACROCYTES BLD QL: SLIGHT — SIGNIFICANT CHANGE UP
MAGNESIUM SERPL-MCNC: 2 MG/DL — SIGNIFICANT CHANGE UP (ref 1.6–2.6)
MANUAL SMEAR VERIFICATION: SIGNIFICANT CHANGE UP
MCHC RBC-ENTMCNC: 30.7 PG — SIGNIFICANT CHANGE UP (ref 27–34)
MCHC RBC-ENTMCNC: 32.5 GM/DL — SIGNIFICANT CHANGE UP (ref 32–36)
MCV RBC AUTO: 94.5 FL — SIGNIFICANT CHANGE UP (ref 80–100)
MICROCYTES BLD QL: SLIGHT — SIGNIFICANT CHANGE UP
MONOCYTES # BLD AUTO: 0.45 K/UL — SIGNIFICANT CHANGE UP (ref 0–0.9)
MONOCYTES NFR BLD AUTO: 8 % — SIGNIFICANT CHANGE UP (ref 2–14)
NEUTROPHILS # BLD AUTO: 3.8 K/UL — SIGNIFICANT CHANGE UP (ref 1.8–7.4)
NEUTROPHILS NFR BLD AUTO: 68.1 % — SIGNIFICANT CHANGE UP (ref 43–77)
OSMOLALITY UR: 299 MOSM/KG — LOW (ref 300–900)
OVALOCYTES BLD QL SMEAR: SLIGHT — SIGNIFICANT CHANGE UP
PHOSPHATE SERPL-MCNC: 2.8 MG/DL — SIGNIFICANT CHANGE UP (ref 2.5–4.5)
PLAT MORPH BLD: ABNORMAL
PLATELET # BLD AUTO: 323 K/UL — SIGNIFICANT CHANGE UP (ref 150–400)
POIKILOCYTOSIS BLD QL AUTO: SIGNIFICANT CHANGE UP
POLYCHROMASIA BLD QL SMEAR: SLIGHT — SIGNIFICANT CHANGE UP
POTASSIUM SERPL-MCNC: 4.5 MMOL/L — SIGNIFICANT CHANGE UP (ref 3.5–5.3)
POTASSIUM SERPL-SCNC: 4.5 MMOL/L — SIGNIFICANT CHANGE UP (ref 3.5–5.3)
PROT SERPL-MCNC: 6.4 G/DL — SIGNIFICANT CHANGE UP (ref 6–8.3)
PROTHROM AB SERPL-ACNC: 13.1 SEC — SIGNIFICANT CHANGE UP (ref 10.5–13.4)
RBC # BLD: 4.36 M/UL — SIGNIFICANT CHANGE UP (ref 3.8–5.2)
RBC # FLD: 14.1 % — SIGNIFICANT CHANGE UP (ref 10.3–14.5)
RBC BLD AUTO: ABNORMAL
SCHISTOCYTES BLD QL AUTO: SLIGHT — SIGNIFICANT CHANGE UP
SMUDGE CELLS # BLD: PRESENT — SIGNIFICANT CHANGE UP
SODIUM SERPL-SCNC: 133 MMOL/L — LOW (ref 135–145)
SODIUM UR-SCNC: 74 MMOL/L — SIGNIFICANT CHANGE UP
SPHEROCYTES BLD QL SMEAR: SLIGHT — SIGNIFICANT CHANGE UP
WBC # BLD: 5.58 K/UL — SIGNIFICANT CHANGE UP (ref 3.8–10.5)
WBC # FLD AUTO: 5.58 K/UL — SIGNIFICANT CHANGE UP (ref 3.8–10.5)

## 2022-05-15 PROCEDURE — 99222 1ST HOSP IP/OBS MODERATE 55: CPT

## 2022-05-15 PROCEDURE — 99232 SBSQ HOSP IP/OBS MODERATE 35: CPT

## 2022-05-15 RX ORDER — ENOXAPARIN SODIUM 100 MG/ML
40 INJECTION SUBCUTANEOUS EVERY 24 HOURS
Refills: 0 | Status: DISCONTINUED | OUTPATIENT
Start: 2022-05-15 | End: 2022-05-16

## 2022-05-15 RX ADMIN — Medication 1000 UNIT(S): at 12:08

## 2022-05-15 RX ADMIN — ENOXAPARIN SODIUM 40 MILLIGRAM(S): 100 INJECTION SUBCUTANEOUS at 22:21

## 2022-05-15 RX ADMIN — Medication 4: at 12:57

## 2022-05-15 RX ADMIN — Medication 88 MICROGRAM(S): at 05:26

## 2022-05-15 NOTE — PATIENT PROFILE ADULT - NSPRESCRALCSIXMORE_GEN_A_NUR
May 12, 2022    Dawson Grider  [id number] [place] [id number]             [place] [address]  Pediatric Urology  [address]  [facility] [address]  Phone: [phone]   May 12, 2022     Patient: Dawson Grider   YOB: 2011   Date of Visit: 5/12/2022       To Whom it May Concern:    Dawson Grider was seen in my clinic on 5/12/2022. He may return to school on 5/13/2022.    Please excuse him from any classes or work missed.    If you have any questions or concerns, please don't hesitate to call.    Sincerely,         Elena Song NP     
Never

## 2022-05-15 NOTE — PROGRESS NOTE ADULT - NUTRITIONAL ASSESSMENT
This patient has been assessed with a concern for Malnutrition and has been determined to have a diagnosis/diagnoses of Moderate protein-calorie malnutrition.    This patient is being managed with:   Diet Consistent Carbohydrate/No Snacks-  Entered: Dec  9 2021 11:29AM

## 2022-05-15 NOTE — PATIENT PROFILE ADULT - FALL HARM RISK - HARM RISK INTERVENTIONS

## 2022-05-15 NOTE — PROGRESS NOTE ADULT - PROBLEM SELECTOR PLAN 1
Patient presenting with 2 weeks of SOB. Has hx of metastatic breast  cancer as well as pleural effusions now presenting with recurrence of pleural effusion (1/2 hemothorax on left side per pulm note. Patient comfortable on 2L NC (not for hypoxemia, but for comfort). Speaking in complete sentence and ambulating freely. Hemodynamically stable, afebrile and no leukocytosis.   - appreciate Pulm recs  - Patient comfortable, will continue to monitor oxygenation

## 2022-05-15 NOTE — CONSULT NOTE ADULT - ATTENDING COMMENTS
Recurrent L effusion probably malignant in setting of metastatic breast cancer - comfortable today on nasal cannula at rest but was too dyspneic at home to make meals. Agree that Pleurx is a better management choice than repeated thora. Will discuss timing w team tomorrow. States she got considerable relief from thora in 11/2021.

## 2022-05-15 NOTE — PROGRESS NOTE ADULT - SUBJECTIVE AND OBJECTIVE BOX
Interval Events: Reviewed  Patient seen and examined at bedside.    Patient is a 89y old  Female who presents with a chief complaint of worsening sob x 2weeks.  2L NC98%, comfortable, able to converse w/o sob    PAST MEDICAL & SURGICAL HISTORY:  Type 2 diabetes mellitus  DM (diabetes mellitus)      HTN (hypertension)      Breast cancer      Hypothyroid      Endometrial cancer      Other acquired absence of organ  S/P splenectomy      Status post total hysterectomy  S/P hysterectomy      Other acquired absence of organ  S/P cholecystectomy      Status post cataract extraction  S/P cataract extraction          MEDICATIONS:  Pulmonary:    Antimicrobials:    Anticoagulants:  enoxaparin Injectable 40 milliGRAM(s) SubCutaneous every 24 hours    Cardiac:      Allergies    No Known Allergies    Intolerances        Vital Signs Last 24 Hrs  T(C): 36.5 (15 May 2022 05:08), Max: 37.3 (14 May 2022 21:29)  T(F): 97.7 (15 May 2022 05:08), Max: 99.1 (14 May 2022 21:29)  HR: 79 (15 May 2022 05:08) (79 - 106)  BP: 124/74 (15 May 2022 05:08) (124/74 - 148/72)  BP(mean): --  RR: 17 (15 May 2022 05:08) (17 - 22)  SpO2: 98% (15 May 2022 05:08) (96% - 98%)        Review of Systems:   •	General: negative  •	Skin/Breast: negative  •	Ophthalmologic: negative  •	ENMT: negative  •	Respiratory and Thorax: negative  •	Cardiovascular: negative  •	Gastrointestinal: negative  •	Genitourinary: negative  •	Musculoskeletal: negative  •	Neurological: negative  •	Psychiatric: negative  •	Hematology/Lymphatics: negative  •	Endocrine: negative  •	Allergic/Immunologic: negative    Physical Exam:   • Constitutional:	elderly female, NAD  • Eyes:	EOMI; PERRL; no drainage or redness  • ENMT:	No oral lesions; no gross abnormalities  • Neck	no thyromegaly or nodules  • Breasts:	not examined  • Back:	No deformity or limitation of movement  • Respiratory:	Breath Sounds equal & clear to auscultation, no accessory muscle use, decreased breath sounds b/l bases  • Cardiovascular:	Regular rate & rhythm, normal S1, S2; no murmurs, gallops or rubs; no S3, S4  • Gastrointestinal:	Soft, non-tender, no hepatosplenomegaly, normal bowel sounds  • Genitourinary:	not examined  • Rectal: not examined  • Extremities:	No cyanosis, clubbing or edema  • Vascular:	Equal and normal pulses (dorsalis pedis)  • Neurologica:l	not examined  • Skin:	No lesions; no rash  • Lymph Nodes:	No lymphadedenopathy  • Musculoskeletal:	No joint pain, swelling or deformity; no limitation of movement        LABS:      CBC Full  -  ( 15 May 2022 07:46 )  WBC Count : 5.58 K/uL  RBC Count : 4.36 M/uL  Hemoglobin : 13.4 g/dL  Hematocrit : 41.2 %  Platelet Count - Automated : 323 K/uL  Mean Cell Volume : 94.5 fl  Mean Cell Hemoglobin : 30.7 pg  Mean Cell Hemoglobin Concentration : 32.5 gm/dL  Auto Neutrophil # : x  Auto Lymphocyte # : x  Auto Monocyte # : x  Auto Eosinophil # : x  Auto Basophil # : x  Auto Neutrophil % : x  Auto Lymphocyte % : x  Auto Monocyte % : x  Auto Eosinophil % : x  Auto Basophil % : x    05-15    133<L>  |  100  |  16  ----------------------------<  112<H>  4.5   |  23  |  0.63    Ca    8.3<L>      15 May 2022 07:46  Phos  2.8     05-15  Mg     2.0     05-15    TPro  6.4  /  Alb  3.3  /  TBili  0.4  /  DBili  x   /  AST  37  /  ALT  32  /  AlkPhos  230<H>  05-15    PT/INR - ( 15 May 2022 07:45 )   PT: 13.1 sec;   INR: 1.10          PTT - ( 15 May 2022 07:45 )  PTT:30.4 sec                    RADIOLOGY & ADDITIONAL STUDIES (The following images were personally reviewed):  Rosales:                                     No  Urine output:                       adequate  DVT prophylaxis:                 Yes  Flattus:                                  Yes  Bowel movement:              No

## 2022-05-16 ENCOUNTER — TRANSCRIPTION ENCOUNTER (OUTPATIENT)
Age: 87
End: 2022-05-16

## 2022-05-16 LAB
ANION GAP SERPL CALC-SCNC: 10 MMOL/L — SIGNIFICANT CHANGE UP (ref 5–17)
BASOPHILS # BLD AUTO: 0.07 K/UL — SIGNIFICANT CHANGE UP (ref 0–0.2)
BASOPHILS NFR BLD AUTO: 1 % — SIGNIFICANT CHANGE UP (ref 0–2)
BUN SERPL-MCNC: 22 MG/DL — SIGNIFICANT CHANGE UP (ref 7–23)
CALCIUM SERPL-MCNC: 8.2 MG/DL — LOW (ref 8.4–10.5)
CHLORIDE SERPL-SCNC: 101 MMOL/L — SIGNIFICANT CHANGE UP (ref 96–108)
CO2 SERPL-SCNC: 22 MMOL/L — SIGNIFICANT CHANGE UP (ref 22–31)
CREAT SERPL-MCNC: 0.77 MG/DL — SIGNIFICANT CHANGE UP (ref 0.5–1.3)
EGFR: 74 ML/MIN/1.73M2 — SIGNIFICANT CHANGE UP
EOSINOPHIL # BLD AUTO: 0.15 K/UL — SIGNIFICANT CHANGE UP (ref 0–0.5)
EOSINOPHIL NFR BLD AUTO: 2.2 % — SIGNIFICANT CHANGE UP (ref 0–6)
GLUCOSE BLDC GLUCOMTR-MCNC: 149 MG/DL — HIGH (ref 70–99)
GLUCOSE BLDC GLUCOMTR-MCNC: 168 MG/DL — HIGH (ref 70–99)
GLUCOSE BLDC GLUCOMTR-MCNC: 174 MG/DL — HIGH (ref 70–99)
GLUCOSE BLDC GLUCOMTR-MCNC: 177 MG/DL — HIGH (ref 70–99)
GLUCOSE BLDC GLUCOMTR-MCNC: 228 MG/DL — HIGH (ref 70–99)
GLUCOSE SERPL-MCNC: 153 MG/DL — HIGH (ref 70–99)
HCT VFR BLD CALC: 39.9 % — SIGNIFICANT CHANGE UP (ref 34.5–45)
HGB BLD-MCNC: 13 G/DL — SIGNIFICANT CHANGE UP (ref 11.5–15.5)
IMM GRANULOCYTES NFR BLD AUTO: 0.6 % — SIGNIFICANT CHANGE UP (ref 0–1.5)
LYMPHOCYTES # BLD AUTO: 1.42 K/UL — SIGNIFICANT CHANGE UP (ref 1–3.3)
LYMPHOCYTES # BLD AUTO: 21.1 % — SIGNIFICANT CHANGE UP (ref 13–44)
MAGNESIUM SERPL-MCNC: 2.1 MG/DL — SIGNIFICANT CHANGE UP (ref 1.6–2.6)
MCHC RBC-ENTMCNC: 30.5 PG — SIGNIFICANT CHANGE UP (ref 27–34)
MCHC RBC-ENTMCNC: 32.6 GM/DL — SIGNIFICANT CHANGE UP (ref 32–36)
MCV RBC AUTO: 93.7 FL — SIGNIFICANT CHANGE UP (ref 80–100)
MONOCYTES # BLD AUTO: 1.14 K/UL — HIGH (ref 0–0.9)
MONOCYTES NFR BLD AUTO: 16.9 % — HIGH (ref 2–14)
NEUTROPHILS # BLD AUTO: 3.92 K/UL — SIGNIFICANT CHANGE UP (ref 1.8–7.4)
NEUTROPHILS NFR BLD AUTO: 58.2 % — SIGNIFICANT CHANGE UP (ref 43–77)
NRBC # BLD: 0 /100 WBCS — SIGNIFICANT CHANGE UP (ref 0–0)
PLATELET # BLD AUTO: 343 K/UL — SIGNIFICANT CHANGE UP (ref 150–400)
POTASSIUM SERPL-MCNC: 4.4 MMOL/L — SIGNIFICANT CHANGE UP (ref 3.5–5.3)
POTASSIUM SERPL-SCNC: 4.4 MMOL/L — SIGNIFICANT CHANGE UP (ref 3.5–5.3)
RBC # BLD: 4.26 M/UL — SIGNIFICANT CHANGE UP (ref 3.8–5.2)
RBC # FLD: 14.3 % — SIGNIFICANT CHANGE UP (ref 10.3–14.5)
SODIUM SERPL-SCNC: 133 MMOL/L — LOW (ref 135–145)
WBC # BLD: 6.74 K/UL — SIGNIFICANT CHANGE UP (ref 3.8–10.5)
WBC # FLD AUTO: 6.74 K/UL — SIGNIFICANT CHANGE UP (ref 3.8–10.5)

## 2022-05-16 PROCEDURE — 99232 SBSQ HOSP IP/OBS MODERATE 35: CPT | Mod: GC

## 2022-05-16 RX ADMIN — Medication 4: at 12:13

## 2022-05-16 RX ADMIN — Medication 88 MICROGRAM(S): at 06:14

## 2022-05-16 RX ADMIN — Medication 2: at 17:47

## 2022-05-16 RX ADMIN — Medication 2: at 23:11

## 2022-05-16 RX ADMIN — Medication 1000 UNIT(S): at 11:26

## 2022-05-16 NOTE — DISCHARGE NOTE PROVIDER - NSDCCPCAREPLAN_GEN_ALL_CORE_FT
PRINCIPAL DISCHARGE DIAGNOSIS  Diagnosis: Pleural effusion, left  Assessment and Plan of Treatment: You presented with worserning shortness of breath and were found to have recurrent pleural effusion. This is when fluid builds around your lungs and makes it difficult to breath. The pulmonary team (lung doctors) saw you and you underwent thoracocentesis which fluids this fluid. The fluid was sent for studies to see why this is happening. Please follow up with pulmonary outpatient and your oncologist Dr. Funez.

## 2022-05-16 NOTE — PROGRESS NOTE ADULT - SUBJECTIVE AND OBJECTIVE BOX
INTERVAL HPI/OVERNIGHT EVENTS:  Interim reviewed;   Patient appears comfortable; Will discuss with Dr Jara plans       MEDICATIONS  (STANDING):  cholecalciferol 1000 Unit(s) Oral daily  dextrose 5%. 1000 milliLiter(s) (50 mL/Hr) IV Continuous <Continuous>  dextrose 5%. 1000 milliLiter(s) (100 mL/Hr) IV Continuous <Continuous>  dextrose 50% Injectable 25 Gram(s) IV Push once  dextrose 50% Injectable 12.5 Gram(s) IV Push once  dextrose 50% Injectable 25 Gram(s) IV Push once  enoxaparin Injectable 40 milliGRAM(s) SubCutaneous every 24 hours  glucagon  Injectable 1 milliGRAM(s) IntraMuscular once  insulin lispro (ADMELOG) corrective regimen sliding scale   SubCutaneous three times a day before meals  levothyroxine 88 MICROGram(s) Oral daily  mirtazapine 7.5 milliGRAM(s) Oral at bedtime    MEDICATIONS  (PRN):  acetaminophen     Tablet .. 650 milliGRAM(s) Oral every 6 hours PRN Temp greater or equal to 38C (100.4F), Mild Pain (1 - 3)  dextrose Oral Gel 15 Gram(s) Oral once PRN Blood Glucose LESS THAN 70 milliGRAM(s)/deciliter  melatonin 3 milliGRAM(s) Oral at bedtime PRN Insomnia      Allergies    No Known Allergies    Intolerances        Vital Signs Last 24 Hrs  T(C): 37.3 (16 May 2022 05:57), Max: 37.5 (15 May 2022 20:11)  T(F): 99.2 (16 May 2022 05:57), Max: 99.5 (15 May 2022 20:11)  HR: 82 (16 May 2022 05:57) (82 - 88)  BP: 122/75 (16 May 2022 05:57) (122/75 - 125/73)  BP(mean): --  RR: 18 (16 May 2022 05:57) (18 - 18)  SpO2: 96% (16 May 2022 05:57) (96% - 98%)          Constitutional: Awake     Eyes: BRIDGER    ENMT: Negative    Neck: Supple    Back:  no tenderness     Respiratory:  decreased breath sounds on left    Cardiovascular: S1 S2    Gastrointestinal: soft    Genitourinary:    Extremities:  no edema    Vascular:    Neurological:    Skin:    Lymph Nodes:            05-16 @ 07:01  -  05-16 @ 08:27  --------------------------------------------------------  IN: 0 mL / OUT: 50 mL / NET: -50 mL      LABS:                        13.0   6.74  )-----------( 343      ( 16 May 2022 07:30 )             39.9     05-16    133<L>  |  101  |  22  ----------------------------<  153<H>  4.4   |  22  |  0.77    Ca    8.2<L>      16 May 2022 07:30  Phos  2.8     05-15  Mg     2.1     05-16    TPro  6.4  /  Alb  3.3  /  TBili  0.4  /  DBili  x   /  AST  37  /  ALT  32  /  AlkPhos  230<H>  05-15    PT/INR - ( 15 May 2022 07:45 )   PT: 13.1 sec;   INR: 1.10          PTT - ( 15 May 2022 07:45 )  PTT:30.4 sec      RADIOLOGY & ADDITIONAL TESTS:

## 2022-05-16 NOTE — CONSULT NOTE ADULT - SUBJECTIVE AND OBJECTIVE BOX
Patient is a 89y old  Female who presents with a chief complaint of pleural effusion (15 May 2022 09:45)        HPI:  89F DNR/DNI with PMHx breast cancer (on fulvestrant and Xgeva), DM2 (A1c 7.6 on this admission), hypothyroidism, HLD, lymphedema, endometrial carcinoma s/p hysterectomy and bilateral oophorectomy, and benign pancreatic tail adenoma who presented to [facility] ED 11/30 complaining of malaise and intermittent chest pressure triggered by PNA. Workup revealed hyponatremia and left pleural effusion on CT scan concerning for malignant effusion. She underwent left thoracentesis with pulmonology which was complicated by pneumothorax- returns today with progressive SOB that worsened over the past 2 weeks. Patient states that she wanted to get evaluated before it worsened.       In the ED:  Initial vital signs: T: 97.8 F, HR: 106, BP: 130/78, R: 22, SpO2: 96% on RA  ED course:   Labs: significant for Na 131, glucose 233, , AST 41,   Imaging:  CXR: left pleural effusion  EKG: sinus tach  Medications: none   Consults: none  (14 May 2022 20:21)      PAST MEDICAL & SURGICAL HISTORY:  Type 2 diabetes mellitus  DM (diabetes mellitus)      HTN (hypertension)      Breast cancer      Hypothyroid      Endometrial cancer      Other acquired absence of organ  S/P splenectomy      Status post total hysterectomy  S/P hysterectomy      Other acquired absence of organ  S/P cholecystectomy      Status post cataract extraction  S/P cataract extraction          MEDICATIONS  (STANDING):  cholecalciferol 1000 Unit(s) Oral daily  dextrose 5%. 1000 milliLiter(s) (50 mL/Hr) IV Continuous <Continuous>  dextrose 5%. 1000 milliLiter(s) (100 mL/Hr) IV Continuous <Continuous>  dextrose 50% Injectable 25 Gram(s) IV Push once  dextrose 50% Injectable 12.5 Gram(s) IV Push once  dextrose 50% Injectable 25 Gram(s) IV Push once  enoxaparin Injectable 40 milliGRAM(s) SubCutaneous every 24 hours  glucagon  Injectable 1 milliGRAM(s) IntraMuscular once  insulin lispro (ADMELOG) corrective regimen sliding scale   SubCutaneous three times a day before meals  levothyroxine 88 MICROGram(s) Oral daily  mirtazapine 7.5 milliGRAM(s) Oral at bedtime    MEDICATIONS  (PRN):  acetaminophen     Tablet .. 650 milliGRAM(s) Oral every 6 hours PRN Temp greater or equal to 38C (100.4F), Mild Pain (1 - 3)  dextrose Oral Gel 15 Gram(s) Oral once PRN Blood Glucose LESS THAN 70 milliGRAM(s)/deciliter  melatonin 3 milliGRAM(s) Oral at bedtime PRN Insomnia      FAMILY HISTORY:  FH: type 2 diabetes (Mother)      CBC Full  -  ( 16 May 2022 07:30 )  WBC Count : 6.74 K/uL  RBC Count : 4.26 M/uL  Hemoglobin : 13.0 g/dL  Hematocrit : 39.9 %  Platelet Count - Automated : 343 K/uL  Mean Cell Volume : 93.7 fl  Mean Cell Hemoglobin : 30.5 pg  Mean Cell Hemoglobin Concentration : 32.6 gm/dL  Auto Neutrophil # : 3.92 K/uL  Auto Lymphocyte # : 1.42 K/uL  Auto Monocyte # : 1.14 K/uL  Auto Eosinophil # : 0.15 K/uL  Auto Basophil # : 0.07 K/uL  Auto Neutrophil % : 58.2 %  Auto Lymphocyte % : 21.1 %  Auto Monocyte % : 16.9 %  Auto Eosinophil % : 2.2 %  Auto Basophil % : 1.0 %      05-16    133<L>  |  101  |  22  ----------------------------<  153<H>  4.4   |  22  |  0.77    Ca    8.2<L>      16 May 2022 07:30  Phos  2.8     05-15  Mg     2.1     05-16    TPro  6.4  /  Alb  3.3  /  TBili  0.4  /  DBili  x   /  AST  37  /  ALT  32  /  AlkPhos  230<H>  05-15            Radiology:    < from: Xray Chest 1 View- PORTABLE-Urgent (05.14.22 @ 17:43) >  ACC: [id number] EXAM:  XR CHEST PORTABLE URGENT 1V                          PROCEDURE DATE:  05/14/2022          INTERPRETATION:  Clinical History: Shortness of breath    Frontal examination of the chest demonstrates progression left effusion   with progression in comparison to prior examination of the chest   12/9/2021. Cardiomegaly. Diffuse osteoblastic metastatic disease noted.   Levoscoliosis thoracic spine surgical clips noted left upper abdomen.    IMPRESSION: Left effusion                Vital Signs Last 24 Hrs  T(C): 37.3 (16 May 2022 05:57), Max: 37.5 (15 May 2022 20:11)  T(F): 99.2 (16 May 2022 05:57), Max: 99.5 (15 May 2022 20:11)  HR: 82 (16 May 2022 05:57) (82 - 88)  BP: 122/75 (16 May 2022 05:57) (122/75 - 125/73)  BP(mean): --  RR: 18 (16 May 2022 05:57) (18 - 18)  SpO2: 96% (16 May 2022 05:57) (96% - 98%)        REVIEW OF SYSTEMS:      CONSTITUTIONAL: No fever, weight loss, or fatigue  EYES: No eye pain, visual disturbances, or discharge  ENMT:  No difficulty hearing, tinnitus, vertigo; No sinus or throat pain  NECK: No pain or stiffness  BREASTS: No pain, masses, or nipple discharge  RESPIRATORY: per HPI  CARDIOVASCULAR: No chest pain, palpitations, dizziness, or leg swelling  GASTROINTESTINAL: No abdominal or epigastric pain. No nausea, vomiting, or hematemesis; No diarrhea or constipation. No melena or hematochezia.  GENITOURINARY: No dysuria, frequency, hematuria, or incontinence  NEUROLOGICAL: No headaches, memory loss, loss of strength, numbness, or tremors  SKIN: No itching, burning, rashes, or lesions   LYMPH NODES: No enlarged glands  ENDOCRINE: No heat or cold intolerance; No hair loss  MUSCULOSKELETAL: No joint pain or swelling; No muscle, back, or extremity pain  PSYCHIATRIC: No depression, anxiety, mood swings, or difficulty sleeping  HEME/LYMPH: No easy bruising, or bleeding gums  ALLERGY AND IMMUNOLOGIC: No hives or eczema  VASCULAR: no swelling, erythema          Physical Exam:  88 yo  woman lying in semi Pearce's position, awake, alert,  feels tired    Head: normocephalic, atraumatic    Eyes: PERRLA, EOMI, no nystagmus, sclera anicteric    ENT: nasal discharge, uvula midline, no oropharyngeal erythema/exudate    Neck: supple, negative JVD, negative carotid bruits, no thyromegaly    Chest:  dec breath sounds left base    Cardiovascular: regular rate and rhythm, neg murmurs/rubs/gallops    Abdomen: soft, non distended, non tender to palpation in all 4 quadrants, negative rebound/guarding, normal bowel sounds    Extremities: WWP, neg cyanosis/clubbing/edema      Neurologic Exam:    Alert and oriented x 3      Motor Exam:    Right UE:           no focal weakness,  > 3+/5 throughout                                 Left UE:             no focal weakness,  > 3+/5 throughout        Right LE:            no focal weakness,  > 3+/5 throughout    Left LE:              no focal weakness,  > 3+/5 throughout        Sensation:         intact to light touch x 4 extremities      DTR:                     biceps/brachioradialis: equal                                              patella/ankle: equal       Gait:  not tested              PM&R Impression:    1) deconditioned  2) no focal weakness    Recommendations/ Plan :    1) Physical / Occupational therapy focusing on therapeutic exercises, bed mobility/transfer out of bed evaluation, progressive ambulation with assistive devices prn.    2) Anticipated Disposition Plan/Recs  :   pending functional progress                  
PULMONARY SERVICE INITIAL CONSULT NOTE  -------------------------------------------------------------    HPI:     88yo F minimal-former-smoker, with hx of Breast Ca (w/ char mets, on Folvestrant, follows with Dr. Funez), hypothyroidism, Presents to ED on 5/14 for acute-on-chronic fatigue and TESFAYE, Found again with persistent known left-sided pleural effusion     Pulmonary consulted for evaluation for thoracentesis     --    Pt first presented January [facility] to [facility] with similar symptoms and also found with moderate-sized L pleural effusion on CT imaging. Pulmonary was consulted with plans for thoracentesis, however pocket of fluid was not amenable for safe drainage. She then presented again 11/2021 with similar symptoms and continued left pleural effusion. She underwent bedside thoracentesis, complicated by pneumothorax (thought to have been ex-vacuo), ultimately underwent IR-guided L-chest tube placement on 12/3/21. Fluid studies were c/w exudative and lymphocytic-predominant, but negative cytology. She reported improvement in symptoms after fluid removal at that time.     She now re-presents with several months of dyspnea on exertion, worsening over the past few weeks, associated with fatigue. Symptoms usually only on exertion. No fevers/chills, CP, cough, LE edema.     In ED, afebrile and HD-stable. Not hypoxemic.   No leukocytosis, normal renal/liver function.   CXR with L pleural effusion up 1/2 hemithorax     --    Pt seen and examined this AM.   Resting comfortably, not tachypneic, and speaking in complete sentences. On 2L NC for comfort, not hypoxemia.   No new complaints.       -------------------------------------------------------------  PAST MEDICAL & SURGICAL HISTORY:  Type 2 diabetes mellitus  DM (diabetes mellitus)      HTN (hypertension)      Breast cancer      Hypothyroid      Endometrial cancer      Other acquired absence of organ  S/P splenectomy      Status post total hysterectomy  S/P hysterectomy      Other acquired absence of organ  S/P cholecystectomy      Status post cataract extraction  S/P cataract extraction          FAMILY HISTORY:  FH: type 2 diabetes (Mother)        SOCIAL HISTORY:  Smoking Status: [ ] Current, [X] Former, [ ] Never  Pack Years:    MEDICATIONS:  Pulmonary:    Antimicrobials:    Anticoagulants:  enoxaparin Injectable 40 milliGRAM(s) SubCutaneous every 24 hours    Onc:    GI/:    Endocrine:  dextrose 50% Injectable 25 Gram(s) IV Push once  dextrose 50% Injectable 12.5 Gram(s) IV Push once  dextrose 50% Injectable 25 Gram(s) IV Push once  dextrose Oral Gel 15 Gram(s) Oral once PRN  glucagon  Injectable 1 milliGRAM(s) IntraMuscular once  insulin lispro (ADMELOG) corrective regimen sliding scale   SubCutaneous three times a day before meals  levothyroxine 88 MICROGram(s) Oral daily    Cardiac:    Other Medications:  acetaminophen     Tablet .. 650 milliGRAM(s) Oral every 6 hours PRN  cholecalciferol 1000 Unit(s) Oral daily  dextrose 5%. 1000 milliLiter(s) IV Continuous <Continuous>  dextrose 5%. 1000 milliLiter(s) IV Continuous <Continuous>  melatonin 3 milliGRAM(s) Oral at bedtime PRN  mirtazapine 7.5 milliGRAM(s) Oral at bedtime      Allergies    No Known Allergies    Intolerances        Vital Signs Last 24 Hrs  T(C): 36.5 (15 May 2022 05:08), Max: 37.3 (14 May 2022 21:29)  T(F): 97.7 (15 May 2022 05:08), Max: 99.1 (14 May 2022 21:29)  HR: 79 (15 May 2022 05:08) (79 - 106)  BP: 124/74 (15 May 2022 05:08) (124/74 - 148/72)  BP(mean): --  RR: 17 (15 May 2022 05:08) (17 - 22)  SpO2: 98% (15 May 2022 05:08) (96% - 98%)        -------------------------------------------------------------  PHYSICAL EXAM:    GEN: Comfortable, in NAD  HEENT: NC/AT, PEERLA, MMM  CARDIAC: RRR, Normal S1/S2, No MRGs  PULMONARY: Decreased BS L hemithorax up 1/2; no wheezing/rhonchi/rales - no accessory muscle use  ABDOMEN: Soft, NT/ND   EXT: No LE edema  NEURO: A&O x 3, CN II-XII grossly intact, moves all ext, sensation intact    -------------------------------------------------------------  LABS:        CBC Full  -  ( 15 May 2022 07:46 )  WBC Count : 5.58 K/uL  RBC Count : 4.36 M/uL  Hemoglobin : 13.4 g/dL  Hematocrit : 41.2 %  Platelet Count - Automated : 323 K/uL  Mean Cell Volume : 94.5 fl  Mean Cell Hemoglobin : 30.7 pg  Mean Cell Hemoglobin Concentration : 32.5 gm/dL  Auto Neutrophil # : x  Auto Lymphocyte # : x  Auto Monocyte # : x  Auto Eosinophil # : x  Auto Basophil # : x  Auto Neutrophil % : x  Auto Lymphocyte % : x  Auto Monocyte % : x  Auto Eosinophil % : x  Auto Basophil % : x    05-15    x   |  x   |  16  ----------------------------<  112<H>  x    |  23  |  0.63    Ca    9.0      14 May 2022 17:34  Phos  2.8     05-15  Mg     2.0     05-15    TPro  7.0  /  Alb  3.7  /  TBili  0.4  /  DBili  x   /  AST  41<H>  /  ALT  39  /  AlkPhos  235<H>  05-14    PT/INR - ( 15 May 2022 07:45 )   PT: 13.1 sec;   INR: 1.10          PTT - ( 15 May 2022 07:45 )  PTT:30.4 sec      -------------------------------------------------------------  RADIOLOGY & ADDITIONAL STUDIES:

## 2022-05-16 NOTE — CONSULT NOTE ADULT - ASSESSMENT
per Internal Medicine    89 y o F DNR/DNI with PMHx breast cancer (on fulvestrant and Xgeva), DM2 (A1c 7.6 on this admission), hypothyroidism, HLD, lymphedema, endometrial carcinoma s/p hysterectomy and bilateral oophorectomy, and benign pancreatic tail adenoma who presented to [facility] ED 11/30 complaining of malaise and intermittent chest pressure triggered by PNA now presenting with recurrence of pleural effusion admitted for management.      Problem/Plan - 1:  ·  Problem: Shortness of breath.   ·  Plan: Patient presenting with 2 weeks of SOB. Has hx of metastatic breast  cancer as well as pleural effusions now presenting with recurrence of pleural effusion (1/2 hemothorax on left side per pulm note. Patient comfortable on 2L NC (not for hypoxemia, but for comfort). Speaking in complete sentence and ambulating freely. Hemodynamically stable, afebrile and no leukocytosis.   - appreciate Pulm recs  - Patient comfortable, will continue to monitor oxygenation.    Problem/Plan - 2:  ·  Problem: Breast cancer.   ·  Plan: Patient with hx of metastatic breast cancer, on fulvestrant and Xgeva, follows with Dr. Funez.  - c/w same management per dr. funez.    Problem/Plan - 3:  ·  Problem: Diabetes mellitus.   ·  Plan: Patient with hx of DM2 on glipiride at home.   - moderate ISF-25 lispro sliding scale AC+qHS  - monitor FS  - consistent carb diet.    Problem/Plan - 4:  ·  Problem: Hypothyroidism.   ·  Plan: Patient with hypothyroidism on levothyroxine at home.   - C/w home meds.    Problem/Plan - 5:  ·  Problem: Prophylactic measure.   ·  Plan: F: None  E: Replete PRN  N: Carb consistent  DVT PPx: Hep sQ x1  GI PPx: None  Code: FULL  Dispo: RMF.
88yo F minimal-former-smoker, with hx of Breast Ca (w/ char mets, on Folvestrant, follows with Dr. Funez), hypothyroidism, Presents to ED on 5/14 for acute-on-chronic fatigue and TESFAYE, Found again with persistent known left-sided pleural effusion     Pulmonary consulted for evaluation for thoracentesis     #Pleural Effusion - Chronic, simple-appearing L-sided effusion dating back to at least Jan 2020 in the setting of L-sided breast cancer with char met disease; leading differential is 2/2 malignancy. Prior fluid studies c/w exudative and lymphocytic-predominant effusion, however cytology negative. Not uncommon for true malignant effusions negative for cytology with one study. Rarely Denosumab can be associated with pleural effusion in 2-3% of cases, however on Folvestrant alone now. No signs/symptoms concerning for infectious etiology. No signs of volume overload.     She had a history of pneumothorax following prior thoracentesis 11/2021, which was believed to have been ex-vacuo/entrapped/trapped lung given the chronicity of the effusion, presence of thickened pleura surrounding non-expandable lung, and prior presence of hydro-pneumothorax. Ultimately, she underwent IR-guided chest tube placement at that time.     She now returns with continued dyspnea on exertion and fatigue, with reaccumulation of L pleural effusion to 1/2 hemithorax (similar to prior visits). At this point, would be reasonable to place an indwelling-pleural-catheter (PleurX) given she reported improvement in symptoms after last thoracentesis, anticipation that she will continue to accumulate, and likely malignant cause of the effusion. Patient is in agreement with possible procedure.       - Will discuss timing of potential PleurX placement this week, pending availability   - Defer thoracentesis for now given stability of symptoms, non-infectious etiology, and the fact that fluid is required prior to PleurX placement   - Out of bed to chair, PT/OT, ambulate as tolerated

## 2022-05-16 NOTE — PROGRESS NOTE ADULT - SUBJECTIVE AND OBJECTIVE BOX
*INCOMPLETE NOTE*    Hospital course:    INTERVAL HPI/OVERNIGHT EVENTS:  As per night team, no overnight events. Patient seen and examined at bedside. Patient denies fever, chills, dizziness, weakness, HA, CP, SOB, N/V/D/C    VITALS  Vital Signs Last 24 Hrs  T(C): 37.3 (16 May 2022 05:57), Max: 37.5 (15 May 2022 20:11)  T(F): 99.2 (16 May 2022 05:57), Max: 99.5 (15 May 2022 20:11)  HR: 82 (16 May 2022 05:57) (82 - 88)  BP: 122/75 (16 May 2022 05:57) (122/75 - 125/73)  BP(mean): --  RR: 18 (16 May 2022 05:57) (18 - 18)  SpO2: 96% (16 May 2022 05:57) (96% - 98%)    CAPILLARY BLOOD GLUCOSE      POCT Blood Glucose.: 80 mg/dL (15 May 2022 17:39)  POCT Blood Glucose.: 248 mg/dL (15 May 2022 12:30)  POCT Blood Glucose.: 138 mg/dL (15 May 2022 08:49)      PHYSICAL EXAM  General: NAD, sitting comfortably in bed   HEENT: PERRL/ EOMI, no scleral icterus, MMM  Neck: Supple, no JVD  Respiratory: decr BS b/l, no wheezes/crackles, no accessory muscle use  Cardiovascular: Regular rhythm/rate; +S1 +S2, no murmurs  Gastrointestinal: Soft, NTND, normoactive BS, no rebound, no guarding  Extremities: WWP, no cyanosis, no clubbing, no edema, pulses equal  Neurological: A&Ox3, no gross focal deficits, follows commands  Skin: Normal temperature, warm, dry        MEDICATIONS  (STANDING):  cholecalciferol 1000 Unit(s) Oral daily  dextrose 5%. 1000 milliLiter(s) (50 mL/Hr) IV Continuous <Continuous>  dextrose 5%. 1000 milliLiter(s) (100 mL/Hr) IV Continuous <Continuous>  dextrose 50% Injectable 25 Gram(s) IV Push once  dextrose 50% Injectable 12.5 Gram(s) IV Push once  dextrose 50% Injectable 25 Gram(s) IV Push once  enoxaparin Injectable 40 milliGRAM(s) SubCutaneous every 24 hours  glucagon  Injectable 1 milliGRAM(s) IntraMuscular once  insulin lispro (ADMELOG) corrective regimen sliding scale   SubCutaneous three times a day before meals  levothyroxine 88 MICROGram(s) Oral daily  mirtazapine 7.5 milliGRAM(s) Oral at bedtime    MEDICATIONS  (PRN):  acetaminophen     Tablet .. 650 milliGRAM(s) Oral every 6 hours PRN Temp greater or equal to 38C (100.4F), Mild Pain (1 - 3)  dextrose Oral Gel 15 Gram(s) Oral once PRN Blood Glucose LESS THAN 70 milliGRAM(s)/deciliter  melatonin 3 milliGRAM(s) Oral at bedtime PRN Insomnia      No Known Allergies      LABS                        13.0   6.74  )-----------( 343      ( 16 May 2022 07:30 )             39.9     05-16    133<L>  |  101  |  22  ----------------------------<  153<H>  4.4   |  22  |  0.77    Ca    8.2<L>      16 May 2022 07:30  Phos  2.8     05-15  Mg     2.1     05-16    TPro  6.4  /  Alb  3.3  /  TBili  0.4  /  DBili  x   /  AST  37  /  ALT  32  /  AlkPhos  230<H>  05-15    PT/INR - ( 15 May 2022 07:45 )   PT: 13.1 sec;   INR: 1.10          PTT - ( 15 May 2022 07:45 )  PTT:30.4 sec    CARDIAC MARKERS ( 14 May 2022 17:34 )  x     / <0.01 ng/mL / 211 U/L / x     / x            RADIOLOGY & ADDITIONAL TESTS: Reviewed     INTERVAL HPI/OVERNIGHT EVENTS:  As per night team, no overnight events. Patient seen and examined at bedside. Patient comfortable       VITALS  Vital Signs Last 24 Hrs  T(C): 37.3 (16 May 2022 05:57), Max: 37.5 (15 May 2022 20:11)  T(F): 99.2 (16 May 2022 05:57), Max: 99.5 (15 May 2022 20:11)  HR: 82 (16 May 2022 05:57) (82 - 88)  BP: 122/75 (16 May 2022 05:57) (122/75 - 125/73)  BP(mean): --  RR: 18 (16 May 2022 05:57) (18 - 18)  SpO2: 96% (16 May 2022 05:57) (96% - 98%)    CAPILLARY BLOOD GLUCOSE      POCT Blood Glucose.: 80 mg/dL (15 May 2022 17:39)  POCT Blood Glucose.: 248 mg/dL (15 May 2022 12:30)  POCT Blood Glucose.: 138 mg/dL (15 May 2022 08:49)      PHYSICAL EXAM  General: NAD, sitting comfortably in bed   HEENT: PERRL/ EOMI, no scleral icterus, MMM  Neck: Supple, no JVD  Respiratory: decr BS b/l, no wheezes/crackles, no accessory muscle use  Cardiovascular: Regular rhythm/rate; +S1 +S2, no murmurs  Gastrointestinal: Soft, NTND, normoactive BS, no rebound, no guarding  Extremities: WWP, no cyanosis, no clubbing, no edema, pulses equal  Neurological: A&Ox3, no gross focal deficits, follows commands  Skin: Normal temperature, warm, dry        MEDICATIONS  (STANDING):  cholecalciferol 1000 Unit(s) Oral daily  dextrose 5%. 1000 milliLiter(s) (50 mL/Hr) IV Continuous <Continuous>  dextrose 5%. 1000 milliLiter(s) (100 mL/Hr) IV Continuous <Continuous>  dextrose 50% Injectable 25 Gram(s) IV Push once  dextrose 50% Injectable 12.5 Gram(s) IV Push once  dextrose 50% Injectable 25 Gram(s) IV Push once  enoxaparin Injectable 40 milliGRAM(s) SubCutaneous every 24 hours  glucagon  Injectable 1 milliGRAM(s) IntraMuscular once  insulin lispro (ADMELOG) corrective regimen sliding scale   SubCutaneous three times a day before meals  levothyroxine 88 MICROGram(s) Oral daily  mirtazapine 7.5 milliGRAM(s) Oral at bedtime    MEDICATIONS  (PRN):  acetaminophen     Tablet .. 650 milliGRAM(s) Oral every 6 hours PRN Temp greater or equal to 38C (100.4F), Mild Pain (1 - 3)  dextrose Oral Gel 15 Gram(s) Oral once PRN Blood Glucose LESS THAN 70 milliGRAM(s)/deciliter  melatonin 3 milliGRAM(s) Oral at bedtime PRN Insomnia      No Known Allergies      LABS                        13.0   6.74  )-----------( 343      ( 16 May 2022 07:30 )             39.9     05-16    133<L>  |  101  |  22  ----------------------------<  153<H>  4.4   |  22  |  0.77    Ca    8.2<L>      16 May 2022 07:30  Phos  2.8     05-15  Mg     2.1     05-16    TPro  6.4  /  Alb  3.3  /  TBili  0.4  /  DBili  x   /  AST  37  /  ALT  32  /  AlkPhos  230<H>  05-15    PT/INR - ( 15 May 2022 07:45 )   PT: 13.1 sec;   INR: 1.10          PTT - ( 15 May 2022 07:45 )  PTT:30.4 sec    CARDIAC MARKERS ( 14 May 2022 17:34 )  x     / <0.01 ng/mL / 211 U/L / x     / x            RADIOLOGY & ADDITIONAL TESTS: Reviewed

## 2022-05-16 NOTE — PROGRESS NOTE ADULT - SUBJECTIVE AND OBJECTIVE BOX
PULMONARY CONSULT SERVICE FOLLOW-UP NOTE    INTERVAL HPI:  Reviewed chart and overnight events; patient seen and examined at bedside. Patient this morning states that she does feel better but is on oxygen and has not gotten up to walk. Otherwise no complaints    MEDICATIONS:  Pulmonary:    Antimicrobials:    Anticoagulants:  enoxaparin Injectable 40 milliGRAM(s) SubCutaneous every 24 hours    Cardiac:      Allergies    No Known Allergies    Intolerances        Vital Signs Last 24 Hrs  T(C): 36.5 (16 May 2022 12:59), Max: 37.3 (16 May 2022 05:57)  T(F): 97.7 (16 May 2022 12:59), Max: 99.2 (16 May 2022 05:57)  HR: 77 (16 May 2022 12:59) (77 - 82)  BP: 123/77 (16 May 2022 12:59) (122/75 - 123/77)  BP(mean): --  RR: 18 (16 May 2022 12:59) (18 - 18)  SpO2: 97% (16 May 2022 12:59) (96% - 97%)    05-16 @ 07:01  -  05-16 @ 21:24  --------------------------------------------------------  IN: 720 mL / OUT: 450 mL / NET: 270 mL          PHYSICAL EXAM:  Constitutional: WDWN  HEENT: NC/AT; PERRL, anicteric sclera; MMM  Neck: supple  Cardiovascular: +S1/S2, RRR  Respiratory: CTA B/L; no W/R/R  Gastrointestinal: soft, NT/ND  Extremities: WWP; no edema, clubbing or cyanosis  Vascular: 2+ radial pulses B/L  Neurological: awake and alert; MERCADO    LABS:      CBC Full  -  ( 16 May 2022 07:30 )  WBC Count : 6.74 K/uL  RBC Count : 4.26 M/uL  Hemoglobin : 13.0 g/dL  Hematocrit : 39.9 %  Platelet Count - Automated : 343 K/uL  Mean Cell Volume : 93.7 fl  Mean Cell Hemoglobin : 30.5 pg  Mean Cell Hemoglobin Concentration : 32.6 gm/dL  Auto Neutrophil # : 3.92 K/uL  Auto Lymphocyte # : 1.42 K/uL  Auto Monocyte # : 1.14 K/uL  Auto Eosinophil # : 0.15 K/uL  Auto Basophil # : 0.07 K/uL  Auto Neutrophil % : 58.2 %  Auto Lymphocyte % : 21.1 %  Auto Monocyte % : 16.9 %  Auto Eosinophil % : 2.2 %  Auto Basophil % : 1.0 %    05-16    133<L>  |  101  |  22  ----------------------------<  153<H>  4.4   |  22  |  0.77    Ca    8.2<L>      16 May 2022 07:30  Phos  2.8     05-15  Mg     2.1     05-16    TPro  6.4  /  Alb  3.3  /  TBili  0.4  /  DBili  x   /  AST  37  /  ALT  32  /  AlkPhos  230<H>  05-15    PT/INR - ( 15 May 2022 07:45 )   PT: 13.1 sec;   INR: 1.10          PTT - ( 15 May 2022 07:45 )  PTT:30.4 sec                  RADIOLOGY & ADDITIONAL STUDIES:  < from: Xray Chest 1 View- PORTABLE-Urgent (05.14.22 @ 17:43) >    ACC: [id number] EXAM:  XR CHEST PORTABLE URGENT 1V                          PROCEDURE DATE:  05/14/2022          INTERPRETATION:  Clinical History: Shortness of breath    Frontal examination of the chest demonstrates progression left effusion   with progression in comparison to prior examination of the chest   12/9/2021. Cardiomegaly. Diffuse osteoblastic metastatic disease noted.   Levoscoliosis thoracic spine surgical clips noted left upper abdomen.    IMPRESSION: Left effusion

## 2022-05-16 NOTE — PHYSICAL THERAPY INITIAL EVALUATION ADULT - LIVES WITH, PROFILE
alone Doxycycline Counseling:  Patient counseled regarding possible photosensitivity and increased risk for sunburn.  Patient instructed to avoid sunlight, if possible.  When exposed to sunlight, patients should wear protective clothing, sunglasses, and sunscreen.  The patient was instructed to call the office immediately if the following severe adverse effects occur:  hearing changes, easy bruising/bleeding, severe headache, or vision changes.  The patient verbalized understanding of the proper use and possible adverse effects of doxycycline.  All of the patient's questions and concerns were addressed.

## 2022-05-16 NOTE — PROGRESS NOTE ADULT - PROBLEM SELECTOR PLAN 1
Patient presenting with 2 weeks of SOB. Has hx of metastatic breast  cancer as well as pleural effusions now presenting with recurrence of pleural effusion (1/2 hemothorax on left side per pulm note. Patient comfortable on 2L NC (not for hypoxemia, but for comfort). Speaking in complete sentence and ambulating freely. Hemodynamically stable, afebrile and no leukocytosis.   - Pulm following, plan for possible thoracentesis vs. pleurX  - Patient comfortable, will continue to monitor oxygenation

## 2022-05-16 NOTE — PHYSICAL THERAPY INITIAL EVALUATION ADULT - CRITERIA FOR SKILLED THERAPEUTIC INTERVENTIONS
impairments found/functional limitations in following categories/risk reduction/prevention/rehab potential/anticipated equipment needs at discharge/anticipated discharge recommendation

## 2022-05-16 NOTE — DISCHARGE NOTE PROVIDER - CARE PROVIDER_API CALL
Chrystal Funez)  Hematology; Medical Oncology  [address], [address]  [place] [address]  Phone: [phone]  Fax: [phone]  Follow Up Time:

## 2022-05-16 NOTE — PHYSICAL THERAPY INITIAL EVALUATION ADULT - PERTINENT HX OF CURRENT PROBLEM, REHAB EVAL
89F  who presented to [facility] ED 11/30 complaining of malaise and intermittent chest pressure triggered by PNA. Workup revealed hyponatremia and left pleural effusion on CT scan concerning for malignant effusion. She underwent left thoracentesis with pulmonology which was complicated by pneumothorax- returns today with progressive SOB that worsened over the past 2 weeks.

## 2022-05-16 NOTE — DISCHARGE NOTE PROVIDER - HOSPITAL COURSE
#Discharge: do not delete    Patient is an 89F DNR/DNI with PMHx breast cancer (on fulvestrant and Xgeva), DM2 (A1c 7.6 on this admission), hypothyroidism, HLD, lymphedema, endometrial carcinoma s/p hysterectomy and bilateral oophorectomy, and benign pancreatic tail adenoma who presented to [facility] ED 11/30 complaining of malaise and intermittent chest pressure triggered by PNA now presenting with recurrence of pleural effusion admitted for management    Hospital course (by problem):     Patient was discharged to: (home/[facility]/acute rehab/hospice, etc, and with what services – home health PT/RN? Home O2?)    New medications:   Changes to old medications:  Medications that were stopped:    Items to follow up as outpatient:    Physical exam at the time of discharge:       #Discharge: do not delete    Patient is an 89F DNR/DNI with PMHx breast cancer (on fulvestrant and Xgeva), DM2 (A1c 7.6 on this admission), hypothyroidism, HLD, lymphedema, endometrial carcinoma s/p hysterectomy and bilateral oophorectomy, and benign pancreatic tail adenoma who presented to [facility] ED 11/30 complaining of malaise and intermittent chest pressure triggered by PNA now presenting with recurrence of pleural effusion admitted for management    Hospital course (by problem):     #Shortness of breath: Patient presenting with 2 weeks of SOB. Has hx of metastatic breast  cancer as well as pleural effusions now presenting with recurrence of pleural effusion (1/2 hemothorax on left side per pulm note. Patient comfortable on 2L NC (not for hypoxemia, but for comfort). Speaking in complete sentence and ambulating freely. Hemodynamically stable, afebrile and no leukocytosis. Now s/p thoracocentesis 5/17 with pulmonary.     #Breast cancer: Patient with hx of metastatic breast cancer, on fulvestrant and Xgeva, follows with Dr. Funez. c/w same management outpatient per dr. Funez.    #Diabetes mellitus: Patient with hx of DM2. C/w glipiride at home.    #Hypothyroidism: c/w levothyroxine     Patient was discharged to: home    New medications:   Changes to old medications:  Medications that were stopped:    Items to follow up as outpatient: f/u with pulmonary outpatient     Physical exam at the time of discharge:  General: NAD, sitting comfortably in bed   HEENT: PERRL/ EOMI, no scleral icterus, MMM  Neck: Supple, no JVD  Respiratory: decr BS b/l, no wheezes/crackles, no accessory muscle use  Cardiovascular: Regular rhythm/rate; +S1 +S2, no murmurs  Gastrointestinal: Soft, NTND, normoactive BS, no rebound, no guarding  Extremities: WWP, no cyanosis, no clubbing, no edema, pulses equal  Neurological: A&Ox3, no gross focal deficits, follows commands  Skin: Normal temperature, warm, dry       #Discharge: do not delete    Patient is an 89F DNR/DNI with PMHx breast cancer (on fulvestrant and Xgeva), DM2 (A1c 7.6 on this admission), hypothyroidism, HLD, lymphedema, endometrial carcinoma s/p hysterectomy and bilateral oophorectomy, and benign pancreatic tail adenoma who presented to [facility] ED 11/30 complaining of malaise and intermittent chest pressure triggered by PNA now presenting with recurrence of pleural effusion admitted for management    Hospital course (by problem):     #Shortness of breath: Patient presenting with 2 weeks of SOB. Has hx of metastatic breast  cancer as well as pleural effusions now presenting with recurrence of pleural effusion (1/2 hemothorax on left side per pulm note. Patient comfortable on 2L NC (not for hypoxemia, but for comfort). Speaking in complete sentence and ambulating freely. Hemodynamically stable, afebrile and no leukocytosis. Now s/p thoracocentesis 5/17 with pulmonary.     #Breast cancer: Patient with hx of metastatic breast cancer, on fulvestrant and Xgeva, follows with Dr. Funez. c/w same management outpatient per dr. Funez.    #Diabetes mellitus: Patient with hx of DM2. C/w glipiride at home.    #Hypothyroidism: c/w levothyroxine     Patient was discharged to: home    New medications: None  Changes to old medications: None  Medications that were stopped: None    Items to follow up as outpatient: f/u with pulmonary outpatient     Physical exam at the time of discharge:  General: NAD, sitting comfortably in bed   HEENT: PERRL/ EOMI, no scleral icterus, MMM  Neck: Supple, no JVD  Respiratory: decr BS b/l, no wheezes/crackles, no accessory muscle use  Cardiovascular: Regular rhythm/rate; +S1 +S2, no murmurs  Gastrointestinal: Soft, NTND, normoactive BS, no rebound, no guarding  Extremities: WWP, no cyanosis, no clubbing, no edema, pulses equal  Neurological: A&Ox3, no gross focal deficits, follows commands  Skin: Normal temperature, warm, dry

## 2022-05-17 ENCOUNTER — RESULT REVIEW (OUTPATIENT)
Age: 87
End: 2022-05-17

## 2022-05-17 LAB
ALBUMIN FLD-MCNC: 2.3 G/DL — SIGNIFICANT CHANGE UP
AMYLASE FLD-CCNC: 43 U/L — SIGNIFICANT CHANGE UP
ANION GAP SERPL CALC-SCNC: 10 MMOL/L — SIGNIFICANT CHANGE UP (ref 5–17)
BASOPHILS # BLD AUTO: 0.07 K/UL — SIGNIFICANT CHANGE UP (ref 0–0.2)
BASOPHILS NFR BLD AUTO: 1 % — SIGNIFICANT CHANGE UP (ref 0–2)
BUN SERPL-MCNC: 20 MG/DL — SIGNIFICANT CHANGE UP (ref 7–23)
CALCIUM SERPL-MCNC: 8.6 MG/DL — SIGNIFICANT CHANGE UP (ref 8.4–10.5)
CHLORIDE SERPL-SCNC: 99 MMOL/L — SIGNIFICANT CHANGE UP (ref 96–108)
CO2 SERPL-SCNC: 23 MMOL/L — SIGNIFICANT CHANGE UP (ref 22–31)
CREAT SERPL-MCNC: 0.76 MG/DL — SIGNIFICANT CHANGE UP (ref 0.5–1.3)
EGFR: 75 ML/MIN/1.73M2 — SIGNIFICANT CHANGE UP
EOSINOPHIL # BLD AUTO: 0.2 K/UL — SIGNIFICANT CHANGE UP (ref 0–0.5)
EOSINOPHIL NFR BLD AUTO: 2.8 % — SIGNIFICANT CHANGE UP (ref 0–6)
GLUCOSE BLDC GLUCOMTR-MCNC: 146 MG/DL — HIGH (ref 70–99)
GLUCOSE BLDC GLUCOMTR-MCNC: 178 MG/DL — HIGH (ref 70–99)
GLUCOSE BLDC GLUCOMTR-MCNC: 195 MG/DL — HIGH (ref 70–99)
GLUCOSE BLDC GLUCOMTR-MCNC: 215 MG/DL — HIGH (ref 70–99)
GLUCOSE FLD-MCNC: 196 MG/DL — SIGNIFICANT CHANGE UP
GLUCOSE SERPL-MCNC: 142 MG/DL — HIGH (ref 70–99)
GRAM STN FLD: SIGNIFICANT CHANGE UP
HCT VFR BLD CALC: 39 % — SIGNIFICANT CHANGE UP (ref 34.5–45)
HGB BLD-MCNC: 12.7 G/DL — SIGNIFICANT CHANGE UP (ref 11.5–15.5)
IMM GRANULOCYTES NFR BLD AUTO: 0.4 % — SIGNIFICANT CHANGE UP (ref 0–1.5)
LDH SERPL L TO P-CCNC: 108 U/L — SIGNIFICANT CHANGE UP
LYMPHOCYTES # BLD AUTO: 1.51 K/UL — SIGNIFICANT CHANGE UP (ref 1–3.3)
LYMPHOCYTES # BLD AUTO: 21.5 % — SIGNIFICANT CHANGE UP (ref 13–44)
MAGNESIUM SERPL-MCNC: 1.9 MG/DL — SIGNIFICANT CHANGE UP (ref 1.6–2.6)
MCHC RBC-ENTMCNC: 29.7 PG — SIGNIFICANT CHANGE UP (ref 27–34)
MCHC RBC-ENTMCNC: 32.6 GM/DL — SIGNIFICANT CHANGE UP (ref 32–36)
MCV RBC AUTO: 91.3 FL — SIGNIFICANT CHANGE UP (ref 80–100)
MONOCYTES # BLD AUTO: 1.06 K/UL — HIGH (ref 0–0.9)
MONOCYTES NFR BLD AUTO: 15.1 % — HIGH (ref 2–14)
NEUTROPHILS # BLD AUTO: 4.16 K/UL — SIGNIFICANT CHANGE UP (ref 1.8–7.4)
NEUTROPHILS NFR BLD AUTO: 59.2 % — SIGNIFICANT CHANGE UP (ref 43–77)
NRBC # BLD: 0 /100 WBCS — SIGNIFICANT CHANGE UP (ref 0–0)
PH, PLEURAL FLUID: >7.81 — SIGNIFICANT CHANGE UP
PHOSPHATE SERPL-MCNC: 3 MG/DL — SIGNIFICANT CHANGE UP (ref 2.5–4.5)
PLATELET # BLD AUTO: 321 K/UL — SIGNIFICANT CHANGE UP (ref 150–400)
POTASSIUM SERPL-MCNC: 4.4 MMOL/L — SIGNIFICANT CHANGE UP (ref 3.5–5.3)
POTASSIUM SERPL-SCNC: 4.4 MMOL/L — SIGNIFICANT CHANGE UP (ref 3.5–5.3)
PROT FLD-MCNC: 4.2 G/DL — SIGNIFICANT CHANGE UP
RBC # BLD: 4.27 M/UL — SIGNIFICANT CHANGE UP (ref 3.8–5.2)
RBC # FLD: 14.1 % — SIGNIFICANT CHANGE UP (ref 10.3–14.5)
SODIUM SERPL-SCNC: 132 MMOL/L — LOW (ref 135–145)
SPECIMEN SOURCE FLD: SIGNIFICANT CHANGE UP
SPECIMEN SOURCE: SIGNIFICANT CHANGE UP
WBC # BLD: 7.03 K/UL — SIGNIFICANT CHANGE UP (ref 3.8–10.5)
WBC # FLD AUTO: 7.03 K/UL — SIGNIFICANT CHANGE UP (ref 3.8–10.5)

## 2022-05-17 PROCEDURE — 99232 SBSQ HOSP IP/OBS MODERATE 35: CPT | Mod: GC,25

## 2022-05-17 PROCEDURE — 32555 ASPIRATE PLEURA W/ IMAGING: CPT | Mod: GC

## 2022-05-17 PROCEDURE — 99232 SBSQ HOSP IP/OBS MODERATE 35: CPT | Mod: GC

## 2022-05-17 PROCEDURE — 88305 TISSUE EXAM BY PATHOLOGIST: CPT | Mod: 26

## 2022-05-17 PROCEDURE — 71045 X-RAY EXAM CHEST 1 VIEW: CPT | Mod: 26

## 2022-05-17 PROCEDURE — 88112 CYTOPATH CELL ENHANCE TECH: CPT | Mod: 26

## 2022-05-17 RX ORDER — ENOXAPARIN SODIUM 100 MG/ML
40 INJECTION SUBCUTANEOUS EVERY 24 HOURS
Refills: 0 | Status: DISCONTINUED | OUTPATIENT
Start: 2022-05-17 | End: 2022-05-19

## 2022-05-17 RX ADMIN — ENOXAPARIN SODIUM 40 MILLIGRAM(S): 100 INJECTION SUBCUTANEOUS at 21:48

## 2022-05-17 RX ADMIN — Medication 2: at 08:33

## 2022-05-17 RX ADMIN — Medication 88 MICROGRAM(S): at 05:49

## 2022-05-17 RX ADMIN — Medication 4: at 17:45

## 2022-05-17 RX ADMIN — Medication 1000 UNIT(S): at 12:53

## 2022-05-17 NOTE — PROGRESS NOTE ADULT - SUBJECTIVE AND OBJECTIVE BOX
INTERVAL HPI/OVERNIGHT EVENTS:  Interim reviewed;  No complaint;  For thoracentesis today;  Oncology follow up noted  Patient would like to get the Covid booster while here      MEDICATIONS  (STANDING):  cholecalciferol 1000 Unit(s) Oral daily  dextrose 5%. 1000 milliLiter(s) (50 mL/Hr) IV Continuous <Continuous>  dextrose 5%. 1000 milliLiter(s) (100 mL/Hr) IV Continuous <Continuous>  dextrose 50% Injectable 25 Gram(s) IV Push once  dextrose 50% Injectable 12.5 Gram(s) IV Push once  dextrose 50% Injectable 25 Gram(s) IV Push once  glucagon  Injectable 1 milliGRAM(s) IntraMuscular once  insulin lispro (ADMELOG) corrective regimen sliding scale   SubCutaneous Before meals and at bedtime  levothyroxine 88 MICROGram(s) Oral daily  mirtazapine 7.5 milliGRAM(s) Oral at bedtime    MEDICATIONS  (PRN):  acetaminophen     Tablet .. 650 milliGRAM(s) Oral every 6 hours PRN Temp greater or equal to 38C (100.4F), Mild Pain (1 - 3)  dextrose Oral Gel 15 Gram(s) Oral once PRN Blood Glucose LESS THAN 70 milliGRAM(s)/deciliter  melatonin 3 milliGRAM(s) Oral at bedtime PRN Insomnia      Allergies    No Known Allergies    Intolerances        Vital Signs Last 24 Hrs  T(C): 36.4 (17 May 2022 05:56), Max: 36.9 (16 May 2022 20:40)  T(F): 97.5 (17 May 2022 05:56), Max: 98.4 (16 May 2022 20:40)  HR: 89 (17 May 2022 05:56) (77 - 89)  BP: 131/79 (17 May 2022 05:56) (123/77 - 147/72)  BP(mean): --  RR: 18 (17 May 2022 05:56) (18 - 18)  SpO2: 98% (17 May 2022 05:56) (97% - 98%)          Constitutional:  Awake    Eyes: BRIDGER    ENMT: Negative    Neck: Supple    Back:  no tenderness     Respiratory:  decreased breath sounds on left    Cardiovascular: S1 S2    Gastrointestinal:  soft    Genitourinary:    Extremities: no edema    Vascular:    Neurological:    Skin:    Lymph Nodes:            05-16 @ 07:01  -  05-17 @ 07:00  --------------------------------------------------------  IN: 720 mL / OUT: 1025 mL / NET: -305 mL      LABS:                        12.7   7.03  )-----------( 321      ( 17 May 2022 06:51 )             39.0     05-17    132<L>  |  99  |  20  ----------------------------<  142<H>  4.4   |  23  |  0.76    Ca    8.6      17 May 2022 06:51  Phos  3.0     05-17  Mg     1.9     05-17            RADIOLOGY & ADDITIONAL TESTS:

## 2022-05-17 NOTE — PROGRESS NOTE ADULT - SUBJECTIVE AND OBJECTIVE BOX
PULMONARY CONSULT SERVICE FOLLOW-UP NOTE    INTERVAL HPI:  Reviewed chart and overnight events; patient seen and examined at bedside. Patient this morning resting comfortably in bed and having improvement in symptoms after thoracentesis performed this afternoon. Able to get up and walk off oxygen    MEDICATIONS:  Pulmonary:    Antimicrobials:    Anticoagulants:  enoxaparin Injectable 40 milliGRAM(s) SubCutaneous every 24 hours    Cardiac:      Allergies    No Known Allergies    Intolerances        Vital Signs Last 24 Hrs  T(C): 36.5 (17 May 2022 13:30), Max: 36.9 (16 May 2022 20:40)  T(F): 97.7 (17 May 2022 13:30), Max: 98.4 (16 May 2022 20:40)  HR: 73 (17 May 2022 13:30) (73 - 89)  BP: 128/78 (17 May 2022 13:30) (128/78 - 147/72)  BP(mean): --  RR: 17 (17 May 2022 13:30) (17 - 18)  SpO2: 97% (17 May 2022 13:30) (97% - 98%)    05-16 @ 07:01  -  05-17 @ 07:00  --------------------------------------------------------  IN: 720 mL / OUT: 1025 mL / NET: -305 mL    05-17 @ 07:01  -  05-17 @ 19:34  --------------------------------------------------------  IN: 260 mL / OUT: 500 mL / NET: -240 mL          PHYSICAL EXAM:  Constitutional: WDWN  HEENT: NC/AT; PERRL, anicteric sclera; MMM  Neck: supple  Cardiovascular: +S1/S2, RRR  Respiratory: CTA B/L; no W/R/R  Gastrointestinal: soft, NT/ND  Extremities: WWP; no edema, clubbing or cyanosis  Vascular: 2+ radial pulses B/L  Neurological: awake and alert; MERCADO    LABS:      CBC Full  -  ( 17 May 2022 06:51 )  WBC Count : 7.03 K/uL  RBC Count : 4.27 M/uL  Hemoglobin : 12.7 g/dL  Hematocrit : 39.0 %  Platelet Count - Automated : 321 K/uL  Mean Cell Volume : 91.3 fl  Mean Cell Hemoglobin : 29.7 pg  Mean Cell Hemoglobin Concentration : 32.6 gm/dL  Auto Neutrophil # : 4.16 K/uL  Auto Lymphocyte # : 1.51 K/uL  Auto Monocyte # : 1.06 K/uL  Auto Eosinophil # : 0.20 K/uL  Auto Basophil # : 0.07 K/uL  Auto Neutrophil % : 59.2 %  Auto Lymphocyte % : 21.5 %  Auto Monocyte % : 15.1 %  Auto Eosinophil % : 2.8 %  Auto Basophil % : 1.0 %    05-17    132<L>  |  99  |  20  ----------------------------<  142<H>  4.4   |  23  |  0.76    Ca    8.6      17 May 2022 06:51  Phos  3.0     05-17  Mg     1.9     05-17                        RADIOLOGY & ADDITIONAL STUDIES:

## 2022-05-17 NOTE — PROGRESS NOTE ADULT - SUBJECTIVE AND OBJECTIVE BOX
[facility]    KAI FRANCES  MRN-[id number]    HPI: 89 y.o woman with metastatic breast cancer under our care since 2/2019, treated with anastrozole and denosumab 2/2020-11/2020. On fulvestrant/denosumab since 11/2020.   She was admitted with progressive SOB and found to have worsening pleural effusion    ROS:  + SOB.    No chest pain or cough  Eating well. No nausea/vomiting  No fevers/chills/night sweats   No history of easy bruising/bleeding  No leg pain or leg swelling    ROS is otherwise negative.    PMH/PSH:  PAST MEDICAL & SURGICAL HISTORY:  Type 2 diabetes mellitus  DM (diabetes mellitus)    HTN (hypertension)    Hyperthyroidism    Breast cancer    Hypothyroid    Endometrial cancer    Other acquired absence of organ  S/P splenectomy    Status post total hysterectomy  S/P hysterectomy    Other acquired absence of organ  S/P cholecystectomy    Status post cataract extraction  S/P cataract extraction    Medications:  MEDICATIONS  (STANDING):  cholecalciferol 1000 Unit(s) Oral daily  dextrose 5%. 1000 milliLiter(s) (50 mL/Hr) IV Continuous <Continuous>  dextrose 5%. 1000 milliLiter(s) (100 mL/Hr) IV Continuous <Continuous>  dextrose 50% Injectable 25 Gram(s) IV Push once  dextrose 50% Injectable 12.5 Gram(s) IV Push once  dextrose 50% Injectable 25 Gram(s) IV Push once  glucagon  Injectable 1 milliGRAM(s) IntraMuscular once  insulin lispro (ADMELOG) corrective regimen sliding scale   SubCutaneous Before meals and at bedtime  levothyroxine 88 MICROGram(s) Oral daily  mirtazapine 7.5 milliGRAM(s) Oral at bedtime    MEDICATIONS  (PRN):  acetaminophen     Tablet .. 650 milliGRAM(s) Oral every 6 hours PRN Temp greater or equal to 38C (100.4F), Mild Pain (1 - 3)  dextrose Oral Gel 15 Gram(s) Oral once PRN Blood Glucose LESS THAN 70 milliGRAM(s)/deciliter  melatonin 3 milliGRAM(s) Oral at bedtime PRN Insomnia    Allergies  No Known Allergies    Intolerances    Exam:  Vital Signs Last 24 Hrs  T(C): 36.4 (05-17-22 @ 05:56), Max: 36.9 (05-16-22 @ 20:40)  T(F): 97.5 (05-17-22 @ 05:56), Max: 98.4 (05-16-22 @ 20:40)  HR: 89 (05-17-22 @ 05:56) (77 - 89)  BP: 131/79 (05-17-22 @ 05:56) (123/77 - 147/72)  BP(mean): --  RR: 18 (05-17-22 @ 05:56) (18 - 18)  SpO2: 98% (05-17-22 @ 05:56) (97% - 98%)  HEENT: pink mucosae, anicteric sclerae  No palpable peripheral lymphadenopathy  COR: regular rhythm rate, no murmurs, rubs or gallops  PULMO:  decreased BS on L  ABD: soft, no palpable masses, no splenomegaly  EXT: no edema  NEURO: intact    Labs:  CBC Full  -  ( 17 May 2022 06:51 )  WBC Count : 7.03 K/uL  RBC Count : 4.27 M/uL  Hemoglobin : 12.7 g/dL  Hematocrit : 39.0 %  Platelet Count - Automated : 321 K/uL  Mean Cell Volume : 91.3 fl  Mean Cell Hemoglobin : 29.7 pg  Mean Cell Hemoglobin Concentration : 32.6 gm/dL  Auto Neutrophil # : 4.16 K/uL  Auto Lymphocyte # : 1.51 K/uL  Auto Monocyte # : 1.06 K/uL  Auto Eosinophil # : 0.20 K/uL  Auto Basophil # : 0.07 K/uL  Auto Neutrophil % : 59.2 %  Auto Lymphocyte % : 21.5 %  Auto Monocyte % : 15.1 %  Auto Eosinophil % : 2.8 %  Auto Basophil % : 1.0 %    PT/INR - ( 15 May 2022 07:45 )   PT: 13.1 sec;   INR: 1.10          PTT - ( 15 May 2022 07:45 )  PTT:30.4 sec    05-17    132<L>  |  99  |  20  ----------------------------<  142<H>  4.4   |  23  |  0.76    Ca    8.6      17 May 2022 06:51  Phos  3.0     05-17  Mg     1.9     05-17    TPro  6.4  /  Alb  3.3  /  TBili  0.4  /  DBili  x   /  AST  37  /  ALT  32  /  AlkPhos  230<H>  05-15      Pertinent imaging studies: reviewed    Assessment:    Metastatic ER+ breast cancer   L pleural effusion    Plan:    Clinically stable  Overall highly functional and independent  For thoracentesis today   Please check cytology  Will plan to resume fulvestrant/denosumab as outpatient    Thank you    Chrystal Funez MD  [phone]

## 2022-05-17 NOTE — PROGRESS NOTE ADULT - PROBLEM SELECTOR PLAN 1
Patient presenting with 2 weeks of SOB. Has hx of metastatic breast  cancer as well as pleural effusions now presenting with recurrence of pleural effusion (1/2 hemothorax on left side per pulm note. Patient comfortable on 2L NC (not for hypoxemia, but for comfort). Speaking in complete sentence and ambulating freely. Hemodynamically stable, afebrile and no leukocytosis.   - Pulm following, plan for thoracentesis today 5.18  - wean o2 as tolerated  - Patient comfortable Patient presenting with 2 weeks of SOB. Has hx of metastatic breast  cancer as well as pleural effusions now presenting with recurrence of pleural effusion (1/2 hemothorax on left side per pulm note. Patient comfortable on 2L NC (not for hypoxemia, but for comfort). Speaking in complete sentence and ambulating freely. Hemodynamically stable, afebrile and no leukocytosis.   - Pulm following, plan for thoracentesis today 5/17  - wean o2 as tolerated  - Patient comfortable

## 2022-05-17 NOTE — PROCEDURE NOTE - NSPROCDETAILS_GEN_ALL_CORE
location identified, draped/prepped, sterile technique used, needle inserted/introduced/Seldinger technique/catheter inserted over needle/connection to syringe

## 2022-05-17 NOTE — PROGRESS NOTE ADULT - SUBJECTIVE AND OBJECTIVE BOX
INTERVAL HPI/OVERNIGHT EVENTS:  As per night team, no overnight events. Patient seen and examined at bedside. Patient denies fever, chills, dizziness, weakness, HA, CP, SOB, N/V/D/C    VITALS  Vital Signs Last 24 Hrs  T(C): 36.4 (17 May 2022 05:56), Max: 36.9 (16 May 2022 20:40)  T(F): 97.5 (17 May 2022 05:56), Max: 98.4 (16 May 2022 20:40)  HR: 89 (17 May 2022 05:56) (77 - 89)  BP: 131/79 (17 May 2022 05:56) (123/77 - 147/72)  BP(mean): --  RR: 18 (17 May 2022 05:56) (18 - 18)  SpO2: 98% (17 May 2022 05:56) (97% - 98%)    CAPILLARY BLOOD GLUCOSE      POCT Blood Glucose.: 178 mg/dL (17 May 2022 07:56)  POCT Blood Glucose.: 177 mg/dL (16 May 2022 23:06)  POCT Blood Glucose.: 174 mg/dL (16 May 2022 21:40)  POCT Blood Glucose.: 168 mg/dL (16 May 2022 17:43)  POCT Blood Glucose.: 228 mg/dL (16 May 2022 12:08)      PHYSICAL EXAM  General: NAD, sitting comfortably in bed   HEENT: PERRL/ EOMI, no scleral icterus, MMM  Neck: Supple, no JVD  Respiratory: decr BS b/l, no wheezes/crackles, no accessory muscle use  Cardiovascular: Regular rhythm/rate; +S1 +S2, no murmurs  Gastrointestinal: Soft, NTND, normoactive BS, no rebound, no guarding  Extremities: WWP, no cyanosis, no clubbing, no edema, pulses equal  Neurological: A&Ox3, no gross focal deficits, follows commands  Skin: Normal temperature, warm, dry    MEDICATIONS  (STANDING):  cholecalciferol 1000 Unit(s) Oral daily  dextrose 5%. 1000 milliLiter(s) (50 mL/Hr) IV Continuous <Continuous>  dextrose 5%. 1000 milliLiter(s) (100 mL/Hr) IV Continuous <Continuous>  dextrose 50% Injectable 25 Gram(s) IV Push once  dextrose 50% Injectable 12.5 Gram(s) IV Push once  dextrose 50% Injectable 25 Gram(s) IV Push once  glucagon  Injectable 1 milliGRAM(s) IntraMuscular once  insulin lispro (ADMELOG) corrective regimen sliding scale   SubCutaneous Before meals and at bedtime  levothyroxine 88 MICROGram(s) Oral daily    MEDICATIONS  (PRN):  acetaminophen     Tablet .. 650 milliGRAM(s) Oral every 6 hours PRN Temp greater or equal to 38C (100.4F), Mild Pain (1 - 3)  dextrose Oral Gel 15 Gram(s) Oral once PRN Blood Glucose LESS THAN 70 milliGRAM(s)/deciliter  melatonin 3 milliGRAM(s) Oral at bedtime PRN Insomnia      No Known Allergies      LABS                        12.7   7.03  )-----------( 321      ( 17 May 2022 06:51 )             39.0     05-17    132<L>  |  99  |  20  ----------------------------<  142<H>  4.4   |  23  |  0.76    Ca    8.6      17 May 2022 06:51  Phos  3.0     05-17  Mg     1.9     05-17                RADIOLOGY & ADDITIONAL TESTS: Reviewed

## 2022-05-18 LAB
ANION GAP SERPL CALC-SCNC: 10 MMOL/L — SIGNIFICANT CHANGE UP (ref 5–17)
B PERT IGG+IGM PNL SER: CLEAR — SIGNIFICANT CHANGE UP
BUN SERPL-MCNC: 18 MG/DL — SIGNIFICANT CHANGE UP (ref 7–23)
CALCIUM SERPL-MCNC: 8.2 MG/DL — LOW (ref 8.4–10.5)
CHLORIDE SERPL-SCNC: 102 MMOL/L — SIGNIFICANT CHANGE UP (ref 96–108)
CHOLEST FLD-MCNC: 74 MG/DL — SIGNIFICANT CHANGE UP
CO2 SERPL-SCNC: 20 MMOL/L — LOW (ref 22–31)
COLOR FLD: YELLOW — SIGNIFICANT CHANGE UP
CREAT SERPL-MCNC: 0.78 MG/DL — SIGNIFICANT CHANGE UP (ref 0.5–1.3)
EGFR: 73 ML/MIN/1.73M2 — SIGNIFICANT CHANGE UP
FLUID INTAKE SUBSTANCE CLASS: SIGNIFICANT CHANGE UP
GLUCOSE BLDC GLUCOMTR-MCNC: 165 MG/DL — HIGH (ref 70–99)
GLUCOSE BLDC GLUCOMTR-MCNC: 185 MG/DL — HIGH (ref 70–99)
GLUCOSE BLDC GLUCOMTR-MCNC: 190 MG/DL — HIGH (ref 70–99)
GLUCOSE BLDC GLUCOMTR-MCNC: 227 MG/DL — HIGH (ref 70–99)
GLUCOSE SERPL-MCNC: 162 MG/DL — HIGH (ref 70–99)
HCT VFR BLD CALC: 40.3 % — SIGNIFICANT CHANGE UP (ref 34.5–45)
HGB BLD-MCNC: 13.1 G/DL — SIGNIFICANT CHANGE UP (ref 11.5–15.5)
LYMPHOCYTES # FLD: 36 % — SIGNIFICANT CHANGE UP
MAGNESIUM SERPL-MCNC: 2 MG/DL — SIGNIFICANT CHANGE UP (ref 1.6–2.6)
MCHC RBC-ENTMCNC: 29.9 PG — SIGNIFICANT CHANGE UP (ref 27–34)
MCHC RBC-ENTMCNC: 32.5 GM/DL — SIGNIFICANT CHANGE UP (ref 32–36)
MCV RBC AUTO: 92 FL — SIGNIFICANT CHANGE UP (ref 80–100)
MONOS+MACROS # FLD: 60 % — SIGNIFICANT CHANGE UP
NEUTROPHILS-BODY FLUID: 4 % — SIGNIFICANT CHANGE UP
NIGHT BLUE STAIN TISS: SIGNIFICANT CHANGE UP
NRBC # BLD: 0 /100 WBCS — SIGNIFICANT CHANGE UP (ref 0–0)
PHOSPHATE SERPL-MCNC: 2.7 MG/DL — SIGNIFICANT CHANGE UP (ref 2.5–4.5)
PLATELET # BLD AUTO: 292 K/UL — SIGNIFICANT CHANGE UP (ref 150–400)
POTASSIUM SERPL-MCNC: 4.6 MMOL/L — SIGNIFICANT CHANGE UP (ref 3.5–5.3)
POTASSIUM SERPL-SCNC: 4.6 MMOL/L — SIGNIFICANT CHANGE UP (ref 3.5–5.3)
RBC # BLD: 4.38 M/UL — SIGNIFICANT CHANGE UP (ref 3.8–5.2)
RBC # FLD: 14.2 % — SIGNIFICANT CHANGE UP (ref 10.3–14.5)
RCV VOL RI: 0 /UL — SIGNIFICANT CHANGE UP (ref 0–0)
SODIUM SERPL-SCNC: 132 MMOL/L — LOW (ref 135–145)
SPECIMEN SOURCE FLD: SIGNIFICANT CHANGE UP
SPECIMEN SOURCE: SIGNIFICANT CHANGE UP
TOTAL NUCLEATED CELL COUNT, BODY FLUID: 122 /UL — SIGNIFICANT CHANGE UP
TUBE TYPE: SIGNIFICANT CHANGE UP
WBC # BLD: 6.37 K/UL — SIGNIFICANT CHANGE UP (ref 3.8–10.5)
WBC # FLD AUTO: 6.37 K/UL — SIGNIFICANT CHANGE UP (ref 3.8–10.5)

## 2022-05-18 PROCEDURE — 99232 SBSQ HOSP IP/OBS MODERATE 35: CPT | Mod: GC

## 2022-05-18 RX ADMIN — Medication 4: at 12:19

## 2022-05-18 RX ADMIN — Medication 1000 UNIT(S): at 12:10

## 2022-05-18 RX ADMIN — Medication 2: at 22:20

## 2022-05-18 RX ADMIN — Medication 2: at 08:24

## 2022-05-18 RX ADMIN — ENOXAPARIN SODIUM 40 MILLIGRAM(S): 100 INJECTION SUBCUTANEOUS at 21:24

## 2022-05-18 RX ADMIN — Medication 2: at 17:54

## 2022-05-18 RX ADMIN — Medication 88 MICROGRAM(S): at 06:35

## 2022-05-18 NOTE — PROGRESS NOTE ADULT - SUBJECTIVE AND OBJECTIVE BOX
HemOnc    Interim History - After thoracentesis, symptoms greatly improved. No concerns voiced this a.m.    HPI: 89 y.o woman with metastatic breast cancer under our care since 2/2019, treated with anastrozole and denosumab 2/2020-11/2020. On fulvestrant/denosumab since 11/2020.   She was admitted with progressive SOB and found to have worsening pleural effusion    ROS:  No chest pain or cough  Eating well. No nausea/vomiting  No fevers/chills/night sweats   No history of easy bruising/bleeding  No leg pain or leg swelling    ROS is otherwise negative.    PMH/PSH:  PAST MEDICAL & SURGICAL HISTORY:  Type 2 diabetes mellitus  DM (diabetes mellitus)    HTN (hypertension)    Hyperthyroidism    Breast cancer    Hypothyroid    Endometrial cancer    Other acquired absence of organ  S/P splenectomy    Status post total hysterectomy  S/P hysterectomy    Other acquired absence of organ  S/P cholecystectomy    Status post cataract extraction  S/P cataract extraction    Medications:  MEDICATIONS  (STANDING):  cholecalciferol 1000 Unit(s) Oral daily  dextrose 5%. 1000 milliLiter(s) (50 mL/Hr) IV Continuous <Continuous>  dextrose 5%. 1000 milliLiter(s) (100 mL/Hr) IV Continuous <Continuous>  dextrose 50% Injectable 25 Gram(s) IV Push once  dextrose 50% Injectable 12.5 Gram(s) IV Push once  dextrose 50% Injectable 25 Gram(s) IV Push once  glucagon  Injectable 1 milliGRAM(s) IntraMuscular once  insulin lispro (ADMELOG) corrective regimen sliding scale   SubCutaneous Before meals and at bedtime  levothyroxine 88 MICROGram(s) Oral daily  mirtazapine 7.5 milliGRAM(s) Oral at bedtime    MEDICATIONS  (PRN):  acetaminophen     Tablet .. 650 milliGRAM(s) Oral every 6 hours PRN Temp greater or equal to 38C (100.4F), Mild Pain (1 - 3)  dextrose Oral Gel 15 Gram(s) Oral once PRN Blood Glucose LESS THAN 70 milliGRAM(s)/deciliter  melatonin 3 milliGRAM(s) Oral at bedtime PRN Insomnia    Allergies  No Known Allergies    Intolerances    Exam:  Vital Signs Last 24 Hrs  T(C): 36.7 (18 May 2022 06:10), Max: 36.9 (17 May 2022 21:14)  T(F): 98.1 (18 May 2022 06:10), Max: 98.5 (17 May 2022 21:14)  HR: 84 (18 May 2022 06:10) (73 - 84)  BP: 115/64 (18 May 2022 06:10) (115/64 - 128/78)  BP(mean): --  RR: 17 (18 May 2022 06:10) (17 - 18)  SpO2: 96% (18 May 2022 06:10) (96% - 97%)    HEENT: pink mucosae, anicteric sclerae  No palpable peripheral lymphadenopathy  COR: regular rhythm rate, no murmurs, rubs or gallops  ABD: soft, no palpable masses, no splenomegaly  EXT: no edema  NEURO: intact    Labs:                        13.1   6.37  )-----------( 292      ( 18 May 2022 07:14 )             40.3         CBC Full  -  ( 18 May 2022 07:14 )  WBC Count : 6.37 K/uL  RBC Count : 4.38 M/uL  Hemoglobin : 13.1 g/dL  Hematocrit : 40.3 %  Platelet Count - Automated : 292 K/uL  Mean Cell Volume : 92.0 fl  Mean Cell Hemoglobin : 29.9 pg  Mean Cell Hemoglobin Concentration : 32.5 gm/dL  Auto Neutrophil # : x  Auto Lymphocyte # : x  Auto Monocyte # : x  Auto Eosinophil # : x  Auto Basophil # : x  Auto Neutrophil % : x  Auto Lymphocyte % : x  Auto Monocyte % : x  Auto Eosinophil % : x  Auto Basophil % : x        05-18    132<L>  |  102  |  18  ----------------------------<  162<H>  4.6   |  20<L>  |  0.78    Ca    8.2<L>      18 May 2022 07:14  Phos  2.7     05-18  Mg     2.0     05-18            Pertinent imaging studies: reviewed  < from: Xray Chest 1 View-PORTABLE IMMEDIATE (Xray Chest 1 View-PORTABLE IMMEDIATE .) (05.17.22 @ 19:16) >  Findings/  impression: Left opacity/pleural effusion, decreased. Heart size within   normal limits. Unchanged diffuse osteoblastic metastatic disease.   Scoliosis Left upper quadrant surgical clips. Left breast surgical clip.    < end of copied text >    Assessment:    Metastatic ER+ breast cancer   L pleural effusion    Plan:    Clinically stable  Overall highly functional and independent  Thoracentesis yesterday, symptoms improved, no evidence of PTX on f/u CXR  Will plan to resume fulvestrant/denosumab as outpatient      Thank you    Chris Sherman MD for Chrystal Funez MD  [phone]

## 2022-05-18 NOTE — PROGRESS NOTE ADULT - PROBLEM SELECTOR PLAN 5
F: None  E: Replete PRN  N: Carb consistent  DVT PPx: Hep sQ x1  GI PPx: None  Code: FULL  Dispo: [facility]
F: None  E: Replete PRN  N: Carb consistent  DVT PPx: Hep sQ x1  GI PPx: None  Code: FULL  Dispo: [facility]
F: None  E: Replete PRN  N: Carb consistent  DVT PPx: Hep sQ x1  GI PPx: None  Code: FULL  Dispo: [facility]
F: None  E: Replete PRN  N: Carb consistent  DVT PPx: Hep sQ x1  GI PPx: None  Code: FULL  Dispo: [facility]

## 2022-05-18 NOTE — PROGRESS NOTE ADULT - PROBLEM SELECTOR PLAN 1
Patient presenting with 2 weeks of SOB. Has hx of metastatic breast  cancer as well as pleural effusions now presenting with recurrence of pleural effusion (1/2 hemothorax on left side per pulm note. Patient comfortable on 2L NC (not for hypoxemia, but for comfort). Speaking in complete sentence and ambulating freely. Hemodynamically stable, afebrile and no leukocytosis.   - Pulm following, s/p thoracentesis 5/17  - wean o2 as tolerated  - Patient comfortable

## 2022-05-18 NOTE — PROGRESS NOTE ADULT - ATTENDING COMMENTS
H/o breast ca with left pleural effusion, which was tapped in November 2021.  Patient reaccumulated after 6 month, she is symptomatic, plan for diagnostic and therapeutic thoracentesis tomorrow.
Exudate, ma/mo predominant.  Will continue to follow cytology. If patient reaccumulate will plan for outpatient thoracentesis, unless accumulation happens quickly when she may benefit from IPC although etiology is not completely clear, presumed MPE.
as above

## 2022-05-18 NOTE — PROGRESS NOTE ADULT - PROBLEM SELECTOR PLAN 2
Patient with hx of metastatic breast cancer, on fulvestrant and Xgeva, follows with Dr. Funez.  - c/w same management per dr. Funez

## 2022-05-18 NOTE — PROGRESS NOTE ADULT - SUBJECTIVE AND OBJECTIVE BOX
INTERVAL HPI/OVERNIGHT EVENTS:  Interim reviewed;  Anxious about going home;  Chest xray noted; Decreased pleural effusion  Off oxygen;          MEDICATIONS  (STANDING):  cholecalciferol 1000 Unit(s) Oral daily  dextrose 5%. 1000 milliLiter(s) (50 mL/Hr) IV Continuous <Continuous>  dextrose 5%. 1000 milliLiter(s) (100 mL/Hr) IV Continuous <Continuous>  dextrose 50% Injectable 25 Gram(s) IV Push once  dextrose 50% Injectable 12.5 Gram(s) IV Push once  dextrose 50% Injectable 25 Gram(s) IV Push once  enoxaparin Injectable 40 milliGRAM(s) SubCutaneous every 24 hours  glucagon  Injectable 1 milliGRAM(s) IntraMuscular once  insulin lispro (ADMELOG) corrective regimen sliding scale   SubCutaneous Before meals and at bedtime  levothyroxine 88 MICROGram(s) Oral daily    MEDICATIONS  (PRN):  acetaminophen     Tablet .. 650 milliGRAM(s) Oral every 6 hours PRN Temp greater or equal to 38C (100.4F), Mild Pain (1 - 3)  dextrose Oral Gel 15 Gram(s) Oral once PRN Blood Glucose LESS THAN 70 milliGRAM(s)/deciliter  melatonin 3 milliGRAM(s) Oral at bedtime PRN Insomnia      Allergies    No Known Allergies    Intolerances        Vital Signs Last 24 Hrs  T(C): 36.7 (18 May 2022 06:10), Max: 36.9 (17 May 2022 21:14)  T(F): 98.1 (18 May 2022 06:10), Max: 98.5 (17 May 2022 21:14)  HR: 84 (18 May 2022 06:10) (73 - 84)  BP: 115/64 (18 May 2022 06:10) (115/64 - 128/78)  BP(mean): --  RR: 17 (18 May 2022 06:10) (17 - 18)  SpO2: 96% (18 May 2022 06:10) (96% - 97%)          Constitutional: Awake     Eyes: BRIDGER    ENMT: Negative    Neck: Supple    Back:  no tenderness     Respiratory:  decreased breath sounds on left    Cardiovascular: S1 S2    Gastrointestinal: soft    Genitourinary:    Extremities:  no edema    Vascular:    Neurological:    Skin:    Lymph Nodes:            05-17 @ 07:01  -  05-18 @ 07:00  --------------------------------------------------------  IN: 900 mL / OUT: 1975 mL / NET: -1075 mL      LABS:                        13.1   6.37  )-----------( 292      ( 18 May 2022 07:14 )             40.3     05-18    132<L>  |  102  |  18  ----------------------------<  162<H>  4.6   |  20<L>  |  0.78    Ca    8.2<L>      18 May 2022 07:14  Phos  2.7     05-18  Mg     2.0     05-18            RADIOLOGY & ADDITIONAL TESTS:

## 2022-05-18 NOTE — PROGRESS NOTE ADULT - PROBLEM SELECTOR PLAN 4
Patient with hypothyroidism on levothyroxine at home.   - C/w home meds

## 2022-05-18 NOTE — PROGRESS NOTE ADULT - ASSESSMENT
88yo F minimal-former-smoker, with hx of Breast Ca (w/ char mets, on Folvestrant, follows with Dr. Funez), hypothyroidism, Presents to ED on 5/14 for acute-on-chronic fatigue and TESFAYE, Found again with persistent known left-sided pleural effusion     Pulmonary consulted for evaluation for thoracentesis     #Pleural Effusion -    Bedside thoracentesis performed today with removal of roughly 1000cc of fluid. Initial studies showing exudative effusion with protein over .6. Cholesterol, cytology, and cell count all pending but already tapped before and was exudative so secondary to malignancy. Patient improved after drainage up and walking on Room air with no problems.     Recommendation  - Thoracentesis performed with 1000cc fluid removed  - Cytology pending  - Exudative effusion    
89F DNR/DNI with PMHx breast cancer (on fulvestrant and Xgeva), DM2 (A1c 7.6 on this admission), hypothyroidism, HLD, lymphedema, endometrial carcinoma s/p hysterectomy and bilateral oophorectomy, and benign pancreatic tail adenoma who presented to [facility] ED 11/30 complaining of malaise and intermittent chest pressure triggered by PNA now presenting with recurrence of pleural effusion admitted for management. 
per Internal Medicine    Problem/Plan - 1:  ·  Problem: Shortness of breath.     Problem/Plan - 2:  ·  Problem: Breast cancer.     Problem/Plan - 3:  ·  Problem: Diabetes mellitus.     Problem/Plan - 4:  ·  Problem: Hypothyroidism.     Problem/Plan - 5:  ·  Problem: Pleural effusion, left. 
88yo F minimal-former-smoker, with hx of Breast Ca (w/ char mets, on Folvestrant, follows with Dr. Funez), hypothyroidism, Presents to ED on 5/14 for acute-on-chronic fatigue and TESFAYE, Found again with persistent known left-sided pleural effusion     Pulmonary consulted for evaluation for thoracentesis     #Pleural Effusion -    Patient much improved after thoracentesis on 5/17. Fluid shows 37% lymphocytes and exudative with high cholesterol and protein levels. From pulmonary perspective will plan for serial thoracentesis as needed for her SOB as it comes back so slow and can be done roughly every 6 months as that is when see seems to have symptoms. Can follow up outpatient as needed for SOB and thoracentesis does not need to get admitted for this. All likely secondary to cancer with cytology pending    Recommendation  - Baseline respiratory status  - Follow up outpatient as it re-occurs will do thora as outpatient if needed again  - Cytology pending  - Exudative effusion  - Pulmonary will sign off    
89F DNR/DNI with PMHx breast cancer (on fulvestrant and Xgeva), DM2 (A1c 7.6 on this admission), hypothyroidism, HLD, lymphedema, endometrial carcinoma s/p hysterectomy and bilateral oophorectomy, and benign pancreatic tail adenoma who presented to [facility] ED 11/30 complaining of malaise and intermittent chest pressure triggered by PNA now presenting with recurrence of pleural effusion admitted for management. 
89yo F minimal-former-smoker, with hx of Breast Ca (w/ char mets, on Folvestrant, follows with Dr. Funez), hypothyroidism, Presents to ED on 5/14 for acute-on-chronic fatigue and TESFAYE, Found again with persistent known left-sided pleural effusion     Pulmonary consulted for evaluation for thoracentesis     #Pleural Effusion -    Bedside US performed showing L left sided effusion that is simple appearing with previous negative cystology lymphocyte predominance, and exudative. Likely re-accumulation is cause of her symptoms especially with diaphragm flattening. Given that this was drained in november of 2021 and reaccumulated in 6 months would be indication for just thoracentesis at this time and no indication for pleurex as this accumulated slow. Given that will plan for thoracentesis in the AM.    Recommendation  - Thoracentesis in the AM  - Hold evening lovenox  - No indication for pleurex at this time as it came back in 6 months and at that pace would not be fast enough for patient benefit  
89F DNR/DNI with PMHx breast cancer (on fulvestrant and Xgeva), DM2 (A1c 7.6 on this admission), hypothyroidism, HLD, lymphedema, endometrial carcinoma s/p hysterectomy and bilateral oophorectomy, and benign pancreatic tail adenoma who presented to [facility] ED 11/30 complaining of malaise and intermittent chest pressure triggered by PNA now presenting with recurrence of pleural effusion admitted for management. 
89F DNR/DNI with PMHx breast cancer (on fulvestrant and Xgeva), DM2 (A1c 7.6 on this admission), hypothyroidism, HLD, lymphedema, endometrial carcinoma s/p hysterectomy and bilateral oophorectomy, and benign pancreatic tail adenoma who presented to [facility] ED 11/30 complaining of malaise and intermittent chest pressure triggered by PNA now presenting with recurrence of pleural effusion admitted for management.

## 2022-05-18 NOTE — PROGRESS NOTE ADULT - SUBJECTIVE AND OBJECTIVE BOX
PULMONARY CONSULT SERVICE FOLLOW-UP NOTE    INTERVAL HPI:  Reviewed chart and overnight events; patient seen and examined at bedside. Patient this morning with breathing returned to baseline no longer requiring oxygen.    MEDICATIONS:  Pulmonary:    Antimicrobials:    Anticoagulants:  enoxaparin Injectable 40 milliGRAM(s) SubCutaneous every 24 hours    Cardiac:      Allergies    No Known Allergies    Intolerances        Vital Signs Last 24 Hrs  T(C): 36.4 (18 May 2022 15:07), Max: 36.9 (17 May 2022 21:14)  T(F): 97.5 (18 May 2022 15:07), Max: 98.5 (17 May 2022 21:14)  HR: 83 (18 May 2022 15:07) (77 - 84)  BP: 153/91 (18 May 2022 15:07) (115/64 - 153/91)  BP(mean): --  RR: 14 (18 May 2022 15:07) (14 - 18)  SpO2: 100% (18 May 2022 15:07) (96% - 100%)    05-17 @ 07:01  -  05-18 @ 07:00  --------------------------------------------------------  IN: 900 mL / OUT: 1975 mL / NET: -1075 mL          PHYSICAL EXAM:  Constitutional: WDWN  HEENT: NC/AT; PERRL, anicteric sclera; MMM  Neck: supple  Cardiovascular: +S1/S2, RRR  Respiratory: CTA B/L; no W/R/R  Gastrointestinal: soft, NT/ND  Extremities: WWP; no edema, clubbing or cyanosis  Vascular: 2+ radial pulses B/L  Neurological: awake and alert; MERCADO    LABS:      CBC Full  -  ( 18 May 2022 07:14 )  WBC Count : 6.37 K/uL  RBC Count : 4.38 M/uL  Hemoglobin : 13.1 g/dL  Hematocrit : 40.3 %  Platelet Count - Automated : 292 K/uL  Mean Cell Volume : 92.0 fl  Mean Cell Hemoglobin : 29.9 pg  Mean Cell Hemoglobin Concentration : 32.5 gm/dL  Auto Neutrophil # : x  Auto Lymphocyte # : x  Auto Monocyte # : x  Auto Eosinophil # : x  Auto Basophil # : x  Auto Neutrophil % : x  Auto Lymphocyte % : x  Auto Monocyte % : x  Auto Eosinophil % : x  Auto Basophil % : x    05-18    132<L>  |  102  |  18  ----------------------------<  162<H>  4.6   |  20<L>  |  0.78    Ca    8.2<L>      18 May 2022 07:14  Phos  2.7     05-18  Mg     2.0     05-18                        RADIOLOGY & ADDITIONAL STUDIES:    < from: Xray Chest 1 View-PORTABLE IMMEDIATE (Xray Chest 1 View-PORTABLE IMMEDIATE .) (05.17.22 @ 19:16) >  ACC: [id number] EXAM:  XR CHEST PORTABLE IMMED 1V                          PROCEDURE DATE:  05/17/2022          INTERPRETATION:  Clinical history/reason for exam: Postop.    Frontal chest.    COMPARISON: May 14, 2022.    Findings/  impression: Left opacity/pleural effusion, decreased. Heart size within   normal limits. Unchanged diffuse osteoblastic metastatic disease.   Scoliosis Left upper quadrant surgical clips. Left breast surgical clip.    < end of copied text >

## 2022-05-18 NOTE — PROGRESS NOTE ADULT - SUBJECTIVE AND OBJECTIVE BOX
Physical Medicine and Rehabilitation Progress Note:    Patient is a 89y old  Female who presents with a chief complaint of SOB (16 May 2022 09:16)      HPI:  89F DNR/DNI with PMHx breast cancer (on fulvestrant and Xgeva), DM2 (A1c 7.6 on this admission), hypothyroidism, HLD, lymphedema, endometrial carcinoma s/p hysterectomy and bilateral oophorectomy, and benign pancreatic tail adenoma who presented to [facility] ED 11/30 complaining of malaise and intermittent chest pressure triggered by PNA. Workup revealed hyponatremia and left pleural effusion on CT scan concerning for malignant effusion. She underwent left thoracentesis with pulmonology which was complicated by pneumothorax- returns today with progressive SOB that worsened over the past 2 weeks. Patient states that she wanted to get evaluated before it worsened.       In the ED:  Initial vital signs: T: 97.8 F, HR: 106, BP: 130/78, R: 22, SpO2: 96% on RA  ED course:   Labs: significant for Na 131, glucose 233, , AST 41,   Imaging:  CXR: left pleural effusion  EKG: sinus tach  Medications: none   Consults: none  (14 May 2022 20:21)                            13.1   6.37  )-----------( 292      ( 18 May 2022 07:14 )             40.3       05-18    132<L>  |  102  |  18  ----------------------------<  162<H>  4.6   |  20<L>  |  0.78    Ca    8.2<L>      18 May 2022 07:14  Phos  2.7     05-18  Mg     2.0     05-18      Vital Signs Last 24 Hrs  T(C): 36.7 (18 May 2022 06:10), Max: 36.9 (17 May 2022 21:14)  T(F): 98.1 (18 May 2022 06:10), Max: 98.5 (17 May 2022 21:14)  HR: 84 (18 May 2022 06:10) (73 - 84)  BP: 115/64 (18 May 2022 06:10) (115/64 - 128/78)  BP(mean): --  RR: 17 (18 May 2022 06:10) (17 - 18)  SpO2: 96% (18 May 2022 06:10) (96% - 97%)    MEDICATIONS  (STANDING):  cholecalciferol 1000 Unit(s) Oral daily  dextrose 5%. 1000 milliLiter(s) (50 mL/Hr) IV Continuous <Continuous>  dextrose 5%. 1000 milliLiter(s) (100 mL/Hr) IV Continuous <Continuous>  dextrose 50% Injectable 25 Gram(s) IV Push once  dextrose 50% Injectable 12.5 Gram(s) IV Push once  dextrose 50% Injectable 25 Gram(s) IV Push once  enoxaparin Injectable 40 milliGRAM(s) SubCutaneous every 24 hours  glucagon  Injectable 1 milliGRAM(s) IntraMuscular once  insulin lispro (ADMELOG) corrective regimen sliding scale   SubCutaneous Before meals and at bedtime  levothyroxine 88 MICROGram(s) Oral daily    MEDICATIONS  (PRN):  acetaminophen     Tablet .. 650 milliGRAM(s) Oral every 6 hours PRN Temp greater or equal to 38C (100.4F), Mild Pain (1 - 3)  dextrose Oral Gel 15 Gram(s) Oral once PRN Blood Glucose LESS THAN 70 milliGRAM(s)/deciliter  melatonin 3 milliGRAM(s) Oral at bedtime PRN Insomnia    Currently Undergoing Physical/ Occupational Therapy at bedside.    Initial PT/OT Functional Status Assessment:   5/16/2022    Previous Level of Function:     · Ambulation Skills	independent  · Transfer Skills	independent  · ADL Skills	independent  · Work/Leisure Activity	independent; needs device; straight cane outdoors, ~4-5 block  · Additional Comments	independent prior to arrival, apartment, elevator, has ramp access, no use of home oxygen    Cognitive Status Examination:   · Orientation	oriented to person, place, time and situation  · Level of Consciousness	alert  · Follows Commands and Answers Questions	100% of the time  · Personal Safety and Judgment	intact  · Short Term Memory	intact  · Long Term Memory	intact    Range of Motion Exam:   · Range of Motion Examination	no ROM deficits were identified  · Active Range of Motion Examination	no Active ROM deficits were identified    Manual Muscle Testing:   · Manual Muscle Testing Results	grossly assessed due to  functional observation against gravity > 3/5 MMT    Bed Mobility: Rolling/Turning:     · Level of [place]	supervision    Bed Mobility: Scooting/Bridging:     · Level of [place]	contact guard    Bed Mobility: Supine to Sit:     · Level of [place]	supervision    Transfer: Sit to Stand:     · Level of [place]	contact guard  · Assistive Device	straight cane    Transfer: Stand to Sit:     · Level of [place]	contact guard  · Assistive Device	straight cane    Gait Skills:     · Level of [place]	contact guard  · Assistive Device	straight cane  · Gait Distance	100 feet    Gait Analysis:     · Gait Deviations Noted	steady, SOB, desat to 93% on RA, tachy to 113 bpm  · Impairments Contributing to Gait Deviations	decreased strength    Balance Skills Assessment:     · Sitting Balance: Static	good balance  · Sitting Balance: Dynamic	good balance  · Sit-to-Stand Balance	fair minus  · Standing Balance: Static	fair minus  · Standing Balance: Dynamic	fair minus  · Systems Impairment Contributing to Balance Disturbance	musculoskeletal  · Identified Impairments Contributing to Balance Disturbance	decreased strength    Clinical Impressions:   · Criteria for Skilled Therapeutic Interventions	impairments found; functional limitations in following categories; risk reduction/prevention; rehab potential; anticipated equipment needs at discharge; anticipated discharge recommendation  · Impairments Found (describe specific impairments)	aerobic capacity/endurance; gait, locomotion, and balance; muscle strength  · Functional Limitations in Following Categories (describe specific limitations)	home management; self-care; community/leisure  · Risk Reduction/Prevention (Describe Specific Areas of risk reduction/prevention)	risk factors  · Risk Areas	fall  · Rehab Potential	good, to achieve stated therapy goals  · Therapy Frequency	2-3x/week        PM&R Impression: as above    Current Disposition Plan Recommendations:    d/c home with no post discharge rehab needs

## 2022-05-19 ENCOUNTER — TRANSCRIPTION ENCOUNTER (OUTPATIENT)
Age: 87
End: 2022-05-19

## 2022-05-19 VITALS
OXYGEN SATURATION: 95 % | RESPIRATION RATE: 18 BRPM | TEMPERATURE: 98 F | HEART RATE: 89 BPM | SYSTOLIC BLOOD PRESSURE: 119 MMHG | DIASTOLIC BLOOD PRESSURE: 77 MMHG

## 2022-05-19 LAB
ALBUMIN SERPL ELPH-MCNC: 3.1 G/DL — LOW (ref 3.3–5)
ALP SERPL-CCNC: 197 U/L — HIGH (ref 40–120)
ALT FLD-CCNC: 24 U/L — SIGNIFICANT CHANGE UP (ref 10–45)
ANION GAP SERPL CALC-SCNC: 8 MMOL/L — SIGNIFICANT CHANGE UP (ref 5–17)
AST SERPL-CCNC: 29 U/L — SIGNIFICANT CHANGE UP (ref 10–40)
BASOPHILS # BLD AUTO: 0.06 K/UL — SIGNIFICANT CHANGE UP (ref 0–0.2)
BASOPHILS NFR BLD AUTO: 1 % — SIGNIFICANT CHANGE UP (ref 0–2)
BILIRUB SERPL-MCNC: 0.3 MG/DL — SIGNIFICANT CHANGE UP (ref 0.2–1.2)
BUN SERPL-MCNC: 20 MG/DL — SIGNIFICANT CHANGE UP (ref 7–23)
CALCIUM SERPL-MCNC: 8.5 MG/DL — SIGNIFICANT CHANGE UP (ref 8.4–10.5)
CHLORIDE SERPL-SCNC: 100 MMOL/L — SIGNIFICANT CHANGE UP (ref 96–108)
CO2 SERPL-SCNC: 25 MMOL/L — SIGNIFICANT CHANGE UP (ref 22–31)
CREAT SERPL-MCNC: 0.79 MG/DL — SIGNIFICANT CHANGE UP (ref 0.5–1.3)
EGFR: 71 ML/MIN/1.73M2 — SIGNIFICANT CHANGE UP
EOSINOPHIL # BLD AUTO: 0.24 K/UL — SIGNIFICANT CHANGE UP (ref 0–0.5)
EOSINOPHIL NFR BLD AUTO: 3.8 % — SIGNIFICANT CHANGE UP (ref 0–6)
GLUCOSE BLDC GLUCOMTR-MCNC: 150 MG/DL — HIGH (ref 70–99)
GLUCOSE SERPL-MCNC: 165 MG/DL — HIGH (ref 70–99)
HCT VFR BLD CALC: 38.5 % — SIGNIFICANT CHANGE UP (ref 34.5–45)
HGB BLD-MCNC: 12.6 G/DL — SIGNIFICANT CHANGE UP (ref 11.5–15.5)
IMM GRANULOCYTES NFR BLD AUTO: 0.3 % — SIGNIFICANT CHANGE UP (ref 0–1.5)
LYMPHOCYTES # BLD AUTO: 1.3 K/UL — SIGNIFICANT CHANGE UP (ref 1–3.3)
LYMPHOCYTES # BLD AUTO: 20.7 % — SIGNIFICANT CHANGE UP (ref 13–44)
MAGNESIUM SERPL-MCNC: 1.8 MG/DL — SIGNIFICANT CHANGE UP (ref 1.6–2.6)
MCHC RBC-ENTMCNC: 29.9 PG — SIGNIFICANT CHANGE UP (ref 27–34)
MCHC RBC-ENTMCNC: 32.7 GM/DL — SIGNIFICANT CHANGE UP (ref 32–36)
MCV RBC AUTO: 91.4 FL — SIGNIFICANT CHANGE UP (ref 80–100)
MONOCYTES # BLD AUTO: 0.96 K/UL — HIGH (ref 0–0.9)
MONOCYTES NFR BLD AUTO: 15.3 % — HIGH (ref 2–14)
NEUTROPHILS # BLD AUTO: 3.7 K/UL — SIGNIFICANT CHANGE UP (ref 1.8–7.4)
NEUTROPHILS NFR BLD AUTO: 58.9 % — SIGNIFICANT CHANGE UP (ref 43–77)
NON-GYNECOLOGICAL CYTOLOGY STUDY: SIGNIFICANT CHANGE UP
NRBC # BLD: 0 /100 WBCS — SIGNIFICANT CHANGE UP (ref 0–0)
PHOSPHATE SERPL-MCNC: 3.3 MG/DL — SIGNIFICANT CHANGE UP (ref 2.5–4.5)
PLATELET # BLD AUTO: 312 K/UL — SIGNIFICANT CHANGE UP (ref 150–400)
POTASSIUM SERPL-MCNC: 4.4 MMOL/L — SIGNIFICANT CHANGE UP (ref 3.5–5.3)
POTASSIUM SERPL-SCNC: 4.4 MMOL/L — SIGNIFICANT CHANGE UP (ref 3.5–5.3)
PROT SERPL-MCNC: 6.1 G/DL — SIGNIFICANT CHANGE UP (ref 6–8.3)
RBC # BLD: 4.21 M/UL — SIGNIFICANT CHANGE UP (ref 3.8–5.2)
RBC # FLD: 14.2 % — SIGNIFICANT CHANGE UP (ref 10.3–14.5)
SODIUM SERPL-SCNC: 133 MMOL/L — LOW (ref 135–145)
WBC # BLD: 6.28 K/UL — SIGNIFICANT CHANGE UP (ref 3.8–10.5)
WBC # FLD AUTO: 6.28 K/UL — SIGNIFICANT CHANGE UP (ref 3.8–10.5)

## 2022-05-19 PROCEDURE — 97161 PT EVAL LOW COMPLEX 20 MIN: CPT

## 2022-05-19 PROCEDURE — 85730 THROMBOPLASTIN TIME PARTIAL: CPT

## 2022-05-19 PROCEDURE — 84300 ASSAY OF URINE SODIUM: CPT

## 2022-05-19 PROCEDURE — 71045 X-RAY EXAM CHEST 1 VIEW: CPT

## 2022-05-19 PROCEDURE — 82962 GLUCOSE BLOOD TEST: CPT

## 2022-05-19 PROCEDURE — 83615 LACTATE (LD) (LDH) ENZYME: CPT

## 2022-05-19 PROCEDURE — 36415 COLL VENOUS BLD VENIPUNCTURE: CPT

## 2022-05-19 PROCEDURE — 87075 CULTR BACTERIA EXCEPT BLOOD: CPT

## 2022-05-19 PROCEDURE — 87205 SMEAR GRAM STAIN: CPT

## 2022-05-19 PROCEDURE — 84311 SPECTROPHOTOMETRY: CPT

## 2022-05-19 PROCEDURE — 88305 TISSUE EXAM BY PATHOLOGIST: CPT

## 2022-05-19 PROCEDURE — 87102 FUNGUS ISOLATION CULTURE: CPT

## 2022-05-19 PROCEDURE — 85025 COMPLETE CBC W/AUTO DIFF WBC: CPT

## 2022-05-19 PROCEDURE — 85610 PROTHROMBIN TIME: CPT

## 2022-05-19 PROCEDURE — 84484 ASSAY OF TROPONIN QUANT: CPT

## 2022-05-19 PROCEDURE — 87070 CULTURE OTHR SPECIMN AEROBIC: CPT

## 2022-05-19 PROCEDURE — 82150 ASSAY OF AMYLASE: CPT

## 2022-05-19 PROCEDURE — 97116 GAIT TRAINING THERAPY: CPT

## 2022-05-19 PROCEDURE — 87635 SARS-COV-2 COVID-19 AMP PRB: CPT

## 2022-05-19 PROCEDURE — 82977 ASSAY OF GGT: CPT

## 2022-05-19 PROCEDURE — 89051 BODY FLUID CELL COUNT: CPT

## 2022-05-19 PROCEDURE — 99232 SBSQ HOSP IP/OBS MODERATE 35: CPT | Mod: GC

## 2022-05-19 PROCEDURE — 83986 ASSAY PH BODY FLUID NOS: CPT

## 2022-05-19 PROCEDURE — 80048 BASIC METABOLIC PNL TOTAL CA: CPT

## 2022-05-19 PROCEDURE — 87015 SPECIMEN INFECT AGNT CONCNTJ: CPT

## 2022-05-19 PROCEDURE — 84100 ASSAY OF PHOSPHORUS: CPT

## 2022-05-19 PROCEDURE — 84157 ASSAY OF PROTEIN OTHER: CPT

## 2022-05-19 PROCEDURE — 82042 OTHER SOURCE ALBUMIN QUAN EA: CPT

## 2022-05-19 PROCEDURE — 83935 ASSAY OF URINE OSMOLALITY: CPT

## 2022-05-19 PROCEDURE — 83735 ASSAY OF MAGNESIUM: CPT

## 2022-05-19 PROCEDURE — 80053 COMPREHEN METABOLIC PANEL: CPT

## 2022-05-19 PROCEDURE — 99285 EMERGENCY DEPT VISIT HI MDM: CPT | Mod: 25

## 2022-05-19 PROCEDURE — 83880 ASSAY OF NATRIURETIC PEPTIDE: CPT

## 2022-05-19 PROCEDURE — 87116 MYCOBACTERIA CULTURE: CPT

## 2022-05-19 PROCEDURE — 82945 GLUCOSE OTHER FLUID: CPT

## 2022-05-19 PROCEDURE — 88112 CYTOPATH CELL ENHANCE TECH: CPT

## 2022-05-19 PROCEDURE — 82550 ASSAY OF CK (CPK): CPT

## 2022-05-19 PROCEDURE — 83036 HEMOGLOBIN GLYCOSYLATED A1C: CPT

## 2022-05-19 PROCEDURE — 87206 SMEAR FLUORESCENT/ACID STAI: CPT

## 2022-05-19 PROCEDURE — 85027 COMPLETE CBC AUTOMATED: CPT

## 2022-05-19 RX ORDER — BNT162B2 0.23 MG/2.25ML
0.3 INJECTION, SUSPENSION INTRAMUSCULAR ONCE
Refills: 0 | Status: COMPLETED | OUTPATIENT
Start: 2022-05-19 | End: 2022-05-19

## 2022-05-19 RX ADMIN — Medication 1000 UNIT(S): at 13:26

## 2022-05-19 RX ADMIN — BNT162B2 0.3 MILLILITER(S): 0.23 INJECTION, SUSPENSION INTRAMUSCULAR at 13:03

## 2022-05-19 RX ADMIN — Medication 88 MICROGRAM(S): at 06:38

## 2022-05-19 NOTE — PROGRESS NOTE ADULT - TIME BILLING
Patient seen and examined;  Will discuss with Dr Jara;  Presently quite comfortable;  Will let oncology know patient is in hospital
Patient seen and examined;  Off Oxygen and comfortable;  Needs to be seen by physical therapy again'  Likely home tomorrow
Home today;  Follow up office 2 weeks;  Follow up with oncology next week
Thoracentesis today;  Physical therapy   Oncology follow up noted

## 2022-05-19 NOTE — DISCHARGE NOTE NURSING/CASE MANAGEMENT/SOCIAL WORK - NSDCVIVACCINE_GEN_ALL_CORE_FT
COVID-19, mRNA, LNP-S, PF, 30 mcg/0.3 mL dose, leona-sucrose (Pfizer); 19-May-2022 13:03; Reyes, Luisa (RN); [employer]; [id number] (Exp. Date: 19-May-2022); IntraMuscular; Deltoid Right.; 0.3 milliLiter(s);

## 2022-05-19 NOTE — DISCHARGE NOTE NURSING/CASE MANAGEMENT/SOCIAL WORK - NSDCPEFALRISK_GEN_ALL_CORE
For information on Fall & Injury Prevention, visit: [email or web address] OR  [email or web address] OR  https://www.cdc.gov/steadi/patient.html

## 2022-05-19 NOTE — PROGRESS NOTE ADULT - PROBLEM SELECTOR PROBLEM 1
Pleural effusion, left
Pleural effusion, left
Shortness of breath
Pleural effusion, left
Shortness of breath

## 2022-05-19 NOTE — PROGRESS NOTE ADULT - PROBLEM SELECTOR PROBLEM 2
Breast cancer

## 2022-05-19 NOTE — DISCHARGE NOTE NURSING/CASE MANAGEMENT/SOCIAL WORK - PATIENT PORTAL LINK FT
You can access the FollowMyHealth Patient Portal offered by [facility] by registering at the following website: [email or web address]. By joining [employer]’s FollowMyHealth portal, you will also be able to view your health information using other applications (apps) compatible with our system.

## 2022-05-19 NOTE — PROGRESS NOTE ADULT - SUBJECTIVE AND OBJECTIVE BOX
HemOnc    Interim History - Doing better. No supplemental oxygen since she had thoracentesis    HPI: 89 y.o woman with metastatic breast cancer under our care since 2/2019, treated with anastrozole and denosumab 2/2020-11/2020. On fulvestrant/denosumab since 11/2020.   She was admitted with progressive SOB and found to have worsening pleural effusion    ROS:  No chest pain or cough  Eating well. No nausea/vomiting  No fevers/chills/night sweats   No history of easy bruising/bleeding  No leg pain or leg swelling    ROS is otherwise negative.    PMH/PSH:  PAST MEDICAL & SURGICAL HISTORY:  Type 2 diabetes mellitus  DM (diabetes mellitus)    HTN (hypertension)    Hyperthyroidism    Breast cancer    Hypothyroid    Endometrial cancer    Other acquired absence of organ  S/P splenectomy    Status post total hysterectomy  S/P hysterectomy    Other acquired absence of organ  S/P cholecystectomy    Status post cataract extraction  S/P cataract extraction    Medications:  MEDICATIONS  (STANDING):  cholecalciferol 1000 Unit(s) Oral daily  dextrose 5%. 1000 milliLiter(s) (50 mL/Hr) IV Continuous <Continuous>  dextrose 5%. 1000 milliLiter(s) (100 mL/Hr) IV Continuous <Continuous>  dextrose 50% Injectable 25 Gram(s) IV Push once  dextrose 50% Injectable 12.5 Gram(s) IV Push once  dextrose 50% Injectable 25 Gram(s) IV Push once  glucagon  Injectable 1 milliGRAM(s) IntraMuscular once  insulin lispro (ADMELOG) corrective regimen sliding scale   SubCutaneous Before meals and at bedtime  levothyroxine 88 MICROGram(s) Oral daily  mirtazapine 7.5 milliGRAM(s) Oral at bedtime    MEDICATIONS  (PRN):  acetaminophen     Tablet .. 650 milliGRAM(s) Oral every 6 hours PRN Temp greater or equal to 38C (100.4F), Mild Pain (1 - 3)  dextrose Oral Gel 15 Gram(s) Oral once PRN Blood Glucose LESS THAN 70 milliGRAM(s)/deciliter  melatonin 3 milliGRAM(s) Oral at bedtime PRN Insomnia    Allergies  No Known Allergies    Intolerances    Exam:  Vital Signs Last 24 Hrs  T(C): 36.5 (19 May 2022 06:01), Max: 36.9 (18 May 2022 20:40)  T(F): 97.7 (19 May 2022 06:01), Max: 98.4 (18 May 2022 20:40)  HR: 88 (19 May 2022 06:01) (83 - 88)  BP: 129/77 (19 May 2022 06:01) (126/80 - 153/91)  BP(mean): --  RR: 18 (19 May 2022 06:01) (14 - 18)  SpO2: 94% (19 May 2022 06:01) (94% - 100%)    HEENT: pink mucosae, anicteric sclerae  No palpable peripheral lymphadenopathy  COR: regular rhythm rate, no murmurs, rubs or gallops  ABD: soft, no palpable masses, no splenomegaly  EXT: no edema  NEURO: intact    Labs:                          13.1   6.37  )-----------( 292      ( 18 May 2022 07:14 )             40.3         CBC Full  -  ( 18 May 2022 07:14 )  WBC Count : 6.37 K/uL  RBC Count : 4.38 M/uL  Hemoglobin : 13.1 g/dL  Hematocrit : 40.3 %  Platelet Count - Automated : 292 K/uL  Mean Cell Volume : 92.0 fl  Mean Cell Hemoglobin : 29.9 pg  Mean Cell Hemoglobin Concentration : 32.5 gm/dL  Auto Neutrophil # : x  Auto Lymphocyte # : x  Auto Monocyte # : x  Auto Eosinophil # : x  Auto Basophil # : x  Auto Neutrophil % : x  Auto Lymphocyte % : x  Auto Monocyte % : x  Auto Eosinophil % : x  Auto Basophil % : x        05-18    132<L>  |  102  |  18  ----------------------------<  162<H>  4.6   |  20<L>  |  0.78    Ca    8.2<L>      18 May 2022 07:14  Phos  2.7     05-18  Mg     2.0     05-18                Pertinent imaging studies: reviewed  < from: Xray Chest 1 View-PORTABLE IMMEDIATE (Xray Chest 1 View-PORTABLE IMMEDIATE .) (05.17.22 @ 19:16) >  Findings/  impression: Left opacity/pleural effusion, decreased. Heart size within   normal limits. Unchanged diffuse osteoblastic metastatic disease.   Scoliosis Left upper quadrant surgical clips. Left breast surgical clip.    < end of copied text >    Assessment:    Metastatic ER+ breast cancer   L pleural effusion    Plan:    Clinically stable, significantly improved after thoracentesis  Overall highly functional and independent  Await cytology  Will plan to resume fulvestrant/denosumab as outpatient      Thank you    Chrystal Funez MD  [phone]

## 2022-05-19 NOTE — PROGRESS NOTE ADULT - SUBJECTIVE AND OBJECTIVE BOX
INTERVAL HPI/OVERNIGHT EVENTS:  No chief complaint;  Will go home today      MEDICATIONS  (STANDING):  cholecalciferol 1000 Unit(s) Oral daily  dextrose 5%. 1000 milliLiter(s) (50 mL/Hr) IV Continuous <Continuous>  dextrose 5%. 1000 milliLiter(s) (100 mL/Hr) IV Continuous <Continuous>  dextrose 50% Injectable 25 Gram(s) IV Push once  dextrose 50% Injectable 12.5 Gram(s) IV Push once  dextrose 50% Injectable 25 Gram(s) IV Push once  enoxaparin Injectable 40 milliGRAM(s) SubCutaneous every 24 hours  glucagon  Injectable 1 milliGRAM(s) IntraMuscular once  insulin lispro (ADMELOG) corrective regimen sliding scale   SubCutaneous Before meals and at bedtime  levothyroxine 88 MICROGram(s) Oral daily    MEDICATIONS  (PRN):  acetaminophen     Tablet .. 650 milliGRAM(s) Oral every 6 hours PRN Temp greater or equal to 38C (100.4F), Mild Pain (1 - 3)  dextrose Oral Gel 15 Gram(s) Oral once PRN Blood Glucose LESS THAN 70 milliGRAM(s)/deciliter  melatonin 3 milliGRAM(s) Oral at bedtime PRN Insomnia      Allergies    No Known Allergies    Intolerances        Vital Signs Last 24 Hrs  T(C): 36.5 (19 May 2022 06:01), Max: 36.9 (18 May 2022 20:40)  T(F): 97.7 (19 May 2022 06:01), Max: 98.4 (18 May 2022 20:40)  HR: 88 (19 May 2022 06:01) (83 - 88)  BP: 129/77 (19 May 2022 06:01) (126/80 - 153/91)  BP(mean): --  RR: 18 (19 May 2022 06:01) (14 - 18)  SpO2: 94% (19 May 2022 06:01) (94% - 100%)          Constitutional:  Awake    Eyes: BRIDGER    ENMT: Negative    Neck: Supple    Back:  no tenderness     Respiratory:  clear    Cardiovascular: S1 S2    Gastrointestinal: soft    Genitourinary:    Extremities:  no edema    Vascular:    Neurological:  intact    Skin:    Lymph Nodes:            05-18 @ 07:01  -  05-19 @ 07:00  --------------------------------------------------------  IN: 0 mL / OUT: 300 mL / NET: -300 mL      LABS:                        12.6   6.28  )-----------( 312      ( 19 May 2022 07:15 )             38.5     05-19    133<L>  |  100  |  20  ----------------------------<  165<H>  4.4   |  25  |  0.79    Ca    8.5      19 May 2022 07:15  Phos  3.3     05-19  Mg     1.8     05-19    TPro  6.1  /  Alb  3.1<L>  /  TBili  0.3  /  DBili  x   /  AST  29  /  ALT  24  /  AlkPhos  197<H>  05-19          RADIOLOGY & ADDITIONAL TESTS:

## 2022-05-19 NOTE — PROGRESS NOTE ADULT - PROBLEM SELECTOR PROBLEM 5
Pleural effusion, left
Prophylactic measure

## 2022-05-19 NOTE — PROGRESS NOTE ADULT - PROVIDER SPECIALTY LIST ADULT
Heme/Onc
Heme/Onc
Internal Medicine
Internal Medicine
Pulmonology
Heme/Onc
Rehab Medicine
Pulmonology
Internal Medicine
Pulmonology
Internal Medicine

## 2022-05-23 LAB
CULTURE RESULTS: NO GROWTH — SIGNIFICANT CHANGE UP
SPECIMEN SOURCE: SIGNIFICANT CHANGE UP

## 2022-05-31 ENCOUNTER — RX RENEWAL (OUTPATIENT)
Age: 87
End: 2022-05-31

## 2022-05-31 DIAGNOSIS — Z85.42 PERSONAL HISTORY OF MALIGNANT NEOPLASM OF OTHER PARTS OF UTERUS: ICD-10-CM

## 2022-05-31 DIAGNOSIS — E03.9 HYPOTHYROIDISM, UNSPECIFIED: ICD-10-CM

## 2022-05-31 DIAGNOSIS — C79.51 SECONDARY MALIGNANT NEOPLASM OF BONE: ICD-10-CM

## 2022-05-31 DIAGNOSIS — C50.919 MALIGNANT NEOPLASM OF UNSPECIFIED SITE OF UNSPECIFIED FEMALE BREAST: ICD-10-CM

## 2022-05-31 DIAGNOSIS — J90 PLEURAL EFFUSION, NOT ELSEWHERE CLASSIFIED: ICD-10-CM

## 2022-05-31 DIAGNOSIS — I44.1 ATRIOVENTRICULAR BLOCK, SECOND DEGREE: ICD-10-CM

## 2022-05-31 DIAGNOSIS — I10 ESSENTIAL (PRIMARY) HYPERTENSION: ICD-10-CM

## 2022-05-31 DIAGNOSIS — Z66 DO NOT RESUSCITATE: ICD-10-CM

## 2022-05-31 DIAGNOSIS — J94.2 HEMOTHORAX: ICD-10-CM

## 2022-05-31 DIAGNOSIS — E11.9 TYPE 2 DIABETES MELLITUS WITHOUT COMPLICATIONS: ICD-10-CM

## 2022-05-31 DIAGNOSIS — Z90.710 ACQUIRED ABSENCE OF BOTH CERVIX AND UTERUS: ICD-10-CM

## 2022-05-31 DIAGNOSIS — Z20.822 CONTACT WITH AND (SUSPECTED) EXPOSURE TO COVID-19: ICD-10-CM

## 2022-05-31 DIAGNOSIS — Z83.3 FAMILY HISTORY OF DIABETES MELLITUS: ICD-10-CM

## 2022-05-31 DIAGNOSIS — E78.5 HYPERLIPIDEMIA, UNSPECIFIED: ICD-10-CM

## 2022-05-31 DIAGNOSIS — Z17.0 ESTROGEN RECEPTOR POSITIVE STATUS [ER+]: ICD-10-CM

## 2022-05-31 DIAGNOSIS — I89.0 LYMPHEDEMA, NOT ELSEWHERE CLASSIFIED: ICD-10-CM

## 2022-06-02 ENCOUNTER — RX RENEWAL (OUTPATIENT)
Age: 87
End: 2022-06-02

## 2022-06-06 ENCOUNTER — OUTPATIENT (OUTPATIENT)
Dept: OUTPATIENT SERVICES | Facility: HOSPITAL | Age: 87
LOS: 1 days | End: 2022-06-06

## 2022-06-06 ENCOUNTER — APPOINTMENT (OUTPATIENT)
Dept: CT IMAGING | Facility: CLINIC | Age: 87
End: 2022-06-06
Payer: MEDICARE

## 2022-06-06 PROCEDURE — 71260 CT THORAX DX C+: CPT | Mod: 26,MH

## 2022-06-06 PROCEDURE — 74177 CT ABD & PELVIS W/CONTRAST: CPT | Mod: 26,MH

## 2022-06-10 ENCOUNTER — INPATIENT (INPATIENT)
Facility: HOSPITAL | Age: 87
LOS: 5 days | Discharge: HOME HEALTH | DRG: 436 | End: 2022-06-16
Attending: INTERNAL MEDICINE | Admitting: INTERNAL MEDICINE
Payer: MEDICARE

## 2022-06-10 ENCOUNTER — APPOINTMENT (OUTPATIENT)
Dept: INTERNAL MEDICINE | Facility: CLINIC | Age: 87
End: 2022-06-10
Payer: MEDICARE

## 2022-06-10 VITALS
OXYGEN SATURATION: 95 % | RESPIRATION RATE: 14 BRPM | HEIGHT: 62 IN | WEIGHT: 124 LBS | TEMPERATURE: 97.9 F | DIASTOLIC BLOOD PRESSURE: 87 MMHG | HEART RATE: 98 BPM | BODY MASS INDEX: 22.82 KG/M2 | SYSTOLIC BLOOD PRESSURE: 141 MMHG

## 2022-06-10 VITALS
DIASTOLIC BLOOD PRESSURE: 73 MMHG | HEIGHT: 62 IN | SYSTOLIC BLOOD PRESSURE: 107 MMHG | OXYGEN SATURATION: 99 % | RESPIRATION RATE: 17 BRPM | WEIGHT: 125 LBS | TEMPERATURE: 98 F | HEART RATE: 103 BPM

## 2022-06-10 DIAGNOSIS — Z20.822 CONTACT WITH AND (SUSPECTED) EXPOSURE TO COVID-19: ICD-10-CM

## 2022-06-10 DIAGNOSIS — C50.919 MALIGNANT NEOPLASM OF UNSPECIFIED SITE OF UNSPECIFIED FEMALE BREAST: ICD-10-CM

## 2022-06-10 DIAGNOSIS — Z79.84 LONG TERM (CURRENT) USE OF ORAL HYPOGLYCEMIC DRUGS: ICD-10-CM

## 2022-06-10 DIAGNOSIS — Z90.722 ACQUIRED ABSENCE OF OVARIES, BILATERAL: ICD-10-CM

## 2022-06-10 DIAGNOSIS — R74.01 ELEVATION OF LEVELS OF LIVER TRANSAMINASE LEVELS: ICD-10-CM

## 2022-06-10 DIAGNOSIS — I10 ESSENTIAL (PRIMARY) HYPERTENSION: ICD-10-CM

## 2022-06-10 DIAGNOSIS — E03.9 HYPOTHYROIDISM, UNSPECIFIED: ICD-10-CM

## 2022-06-10 DIAGNOSIS — J91.0 MALIGNANT PLEURAL EFFUSION: ICD-10-CM

## 2022-06-10 DIAGNOSIS — Z90.710 ACQUIRED ABSENCE OF BOTH CERVIX AND UTERUS: ICD-10-CM

## 2022-06-10 DIAGNOSIS — E78.5 HYPERLIPIDEMIA, UNSPECIFIED: ICD-10-CM

## 2022-06-10 DIAGNOSIS — J90 PLEURAL EFFUSION, NOT ELSEWHERE CLASSIFIED: ICD-10-CM

## 2022-06-10 DIAGNOSIS — I89.0 LYMPHEDEMA, NOT ELSEWHERE CLASSIFIED: ICD-10-CM

## 2022-06-10 DIAGNOSIS — Z79.890 HORMONE REPLACEMENT THERAPY: ICD-10-CM

## 2022-06-10 DIAGNOSIS — E11.9 TYPE 2 DIABETES MELLITUS W/OUT COMPLICATIONS: ICD-10-CM

## 2022-06-10 DIAGNOSIS — E11.9 TYPE 2 DIABETES MELLITUS WITHOUT COMPLICATIONS: ICD-10-CM

## 2022-06-10 DIAGNOSIS — Z85.42 PERSONAL HISTORY OF MALIGNANT NEOPLASM OF OTHER PARTS OF UTERUS: ICD-10-CM

## 2022-06-10 DIAGNOSIS — Z90.49 ACQUIRED ABSENCE OF OTHER SPECIFIED PARTS OF DIGESTIVE TRACT: ICD-10-CM

## 2022-06-10 DIAGNOSIS — C78.7 SECONDARY MALIGNANT NEOPLASM OF LIVER AND INTRAHEPATIC BILE DUCT: ICD-10-CM

## 2022-06-10 DIAGNOSIS — Z98.49 CATARACT EXTRACTION STATUS, UNSPECIFIED EYE: ICD-10-CM

## 2022-06-10 DIAGNOSIS — Z29.9 ENCOUNTER FOR PROPHYLACTIC MEASURES, UNSPECIFIED: ICD-10-CM

## 2022-06-10 DIAGNOSIS — J98.11 ATELECTASIS: ICD-10-CM

## 2022-06-10 DIAGNOSIS — C79.51 SECONDARY MALIGNANT NEOPLASM OF BONE: ICD-10-CM

## 2022-06-10 DIAGNOSIS — Z66 DO NOT RESUSCITATE: ICD-10-CM

## 2022-06-10 DIAGNOSIS — E87.1 HYPO-OSMOLALITY AND HYPONATREMIA: ICD-10-CM

## 2022-06-10 LAB
ALBUMIN SERPL ELPH-MCNC: 3.8 G/DL — SIGNIFICANT CHANGE UP (ref 3.3–5)
ALP SERPL-CCNC: 298 U/L — HIGH (ref 40–120)
ALT FLD-CCNC: 36 U/L — SIGNIFICANT CHANGE UP (ref 10–45)
ANION GAP SERPL CALC-SCNC: 12 MMOL/L — SIGNIFICANT CHANGE UP (ref 5–17)
AST SERPL-CCNC: 46 U/L — HIGH (ref 10–40)
BASOPHILS # BLD AUTO: 0.06 K/UL — SIGNIFICANT CHANGE UP (ref 0–0.2)
BASOPHILS NFR BLD AUTO: 0.8 % — SIGNIFICANT CHANGE UP (ref 0–2)
BILIRUB SERPL-MCNC: 0.5 MG/DL — SIGNIFICANT CHANGE UP (ref 0.2–1.2)
BUN SERPL-MCNC: 18 MG/DL — SIGNIFICANT CHANGE UP (ref 7–23)
CALCIUM SERPL-MCNC: 9.2 MG/DL — SIGNIFICANT CHANGE UP (ref 8.4–10.5)
CHLORIDE SERPL-SCNC: 93 MMOL/L — LOW (ref 96–108)
CO2 SERPL-SCNC: 24 MMOL/L — SIGNIFICANT CHANGE UP (ref 22–31)
CREAT SERPL-MCNC: 0.73 MG/DL — SIGNIFICANT CHANGE UP (ref 0.5–1.3)
EGFR: 79 ML/MIN/1.73M2 — SIGNIFICANT CHANGE UP
EOSINOPHIL # BLD AUTO: 0.1 K/UL — SIGNIFICANT CHANGE UP (ref 0–0.5)
EOSINOPHIL NFR BLD AUTO: 1.3 % — SIGNIFICANT CHANGE UP (ref 0–6)
GLUCOSE BLDC GLUCOMTR-MCNC: 234 MG/DL — HIGH (ref 70–99)
GLUCOSE SERPL-MCNC: 179 MG/DL — HIGH (ref 70–99)
HCT VFR BLD CALC: 41.4 % — SIGNIFICANT CHANGE UP (ref 34.5–45)
HGB BLD-MCNC: 13.8 G/DL — SIGNIFICANT CHANGE UP (ref 11.5–15.5)
IMM GRANULOCYTES NFR BLD AUTO: 0.5 % — SIGNIFICANT CHANGE UP (ref 0–1.5)
LYMPHOCYTES # BLD AUTO: 1.18 K/UL — SIGNIFICANT CHANGE UP (ref 1–3.3)
LYMPHOCYTES # BLD AUTO: 15.8 % — SIGNIFICANT CHANGE UP (ref 13–44)
MCHC RBC-ENTMCNC: 30.7 PG — SIGNIFICANT CHANGE UP (ref 27–34)
MCHC RBC-ENTMCNC: 33.3 GM/DL — SIGNIFICANT CHANGE UP (ref 32–36)
MCV RBC AUTO: 92 FL — SIGNIFICANT CHANGE UP (ref 80–100)
MONOCYTES # BLD AUTO: 1.09 K/UL — HIGH (ref 0–0.9)
MONOCYTES NFR BLD AUTO: 14.6 % — HIGH (ref 2–14)
NEUTROPHILS # BLD AUTO: 4.99 K/UL — SIGNIFICANT CHANGE UP (ref 1.8–7.4)
NEUTROPHILS NFR BLD AUTO: 67 % — SIGNIFICANT CHANGE UP (ref 43–77)
NRBC # BLD: 0 /100 WBCS — SIGNIFICANT CHANGE UP (ref 0–0)
PLATELET # BLD AUTO: 353 K/UL — SIGNIFICANT CHANGE UP (ref 150–400)
POTASSIUM SERPL-MCNC: 4.6 MMOL/L — SIGNIFICANT CHANGE UP (ref 3.5–5.3)
POTASSIUM SERPL-SCNC: 4.6 MMOL/L — SIGNIFICANT CHANGE UP (ref 3.5–5.3)
PROT SERPL-MCNC: 7.4 G/DL — SIGNIFICANT CHANGE UP (ref 6–8.3)
RBC # BLD: 4.5 M/UL — SIGNIFICANT CHANGE UP (ref 3.8–5.2)
RBC # FLD: 15.1 % — HIGH (ref 10.3–14.5)
SARS-COV-2 RNA SPEC QL NAA+PROBE: NEGATIVE — SIGNIFICANT CHANGE UP
SODIUM SERPL-SCNC: 129 MMOL/L — LOW (ref 135–145)
WBC # BLD: 7.46 K/UL — SIGNIFICANT CHANGE UP (ref 3.8–10.5)
WBC # FLD AUTO: 7.46 K/UL — SIGNIFICANT CHANGE UP (ref 3.8–10.5)

## 2022-06-10 PROCEDURE — 93010 ELECTROCARDIOGRAM REPORT: CPT

## 2022-06-10 PROCEDURE — 71046 X-RAY EXAM CHEST 2 VIEWS: CPT | Mod: 26

## 2022-06-10 PROCEDURE — 99285 EMERGENCY DEPT VISIT HI MDM: CPT | Mod: FS,25

## 2022-06-10 PROCEDURE — 99223 1ST HOSP IP/OBS HIGH 75: CPT | Mod: GC

## 2022-06-10 PROCEDURE — 99222 1ST HOSP IP/OBS MODERATE 55: CPT | Mod: GC

## 2022-06-10 PROCEDURE — 99213 OFFICE O/P EST LOW 20 MIN: CPT

## 2022-06-10 RX ORDER — LEVOTHYROXINE SODIUM 125 MCG
88 TABLET ORAL DAILY
Refills: 0 | Status: DISCONTINUED | OUTPATIENT
Start: 2022-06-11 | End: 2022-06-16

## 2022-06-10 RX ORDER — SODIUM CHLORIDE 9 MG/ML
1000 INJECTION INTRAMUSCULAR; INTRAVENOUS; SUBCUTANEOUS ONCE
Refills: 0 | Status: COMPLETED | OUTPATIENT
Start: 2022-06-10 | End: 2022-06-10

## 2022-06-10 RX ORDER — HEPARIN SODIUM 5000 [USP'U]/ML
5000 INJECTION INTRAVENOUS; SUBCUTANEOUS EVERY 8 HOURS
Refills: 0 | Status: DISCONTINUED | OUTPATIENT
Start: 2022-06-10 | End: 2022-06-12

## 2022-06-10 RX ORDER — DEXTROSE 50 % IN WATER 50 %
12.5 SYRINGE (ML) INTRAVENOUS ONCE
Refills: 0 | Status: DISCONTINUED | OUTPATIENT
Start: 2022-06-10 | End: 2022-06-16

## 2022-06-10 RX ORDER — CHOLECALCIFEROL (VITAMIN D3) 125 MCG
1 CAPSULE ORAL
Qty: 0 | Refills: 0 | DISCHARGE

## 2022-06-10 RX ORDER — LEVOTHYROXINE SODIUM 125 MCG
1 TABLET ORAL
Qty: 0 | Refills: 0 | DISCHARGE

## 2022-06-10 RX ORDER — MIRTAZAPINE 45 MG/1
7.5 TABLET, ORALLY DISINTEGRATING ORAL AT BEDTIME
Refills: 0 | Status: DISCONTINUED | OUTPATIENT
Start: 2022-06-10 | End: 2022-06-10

## 2022-06-10 RX ORDER — DEXTROSE 50 % IN WATER 50 %
25 SYRINGE (ML) INTRAVENOUS ONCE
Refills: 0 | Status: DISCONTINUED | OUTPATIENT
Start: 2022-06-10 | End: 2022-06-16

## 2022-06-10 RX ORDER — SODIUM CHLORIDE 9 MG/ML
1000 INJECTION, SOLUTION INTRAVENOUS
Refills: 0 | Status: DISCONTINUED | OUTPATIENT
Start: 2022-06-10 | End: 2022-06-16

## 2022-06-10 RX ORDER — ONDANSETRON 8 MG/1
4 TABLET, FILM COATED ORAL EVERY 8 HOURS
Refills: 0 | Status: DISCONTINUED | OUTPATIENT
Start: 2022-06-10 | End: 2022-06-16

## 2022-06-10 RX ORDER — INSULIN LISPRO 100/ML
VIAL (ML) SUBCUTANEOUS AT BEDTIME
Refills: 0 | Status: DISCONTINUED | OUTPATIENT
Start: 2022-06-10 | End: 2022-06-16

## 2022-06-10 RX ORDER — INSULIN LISPRO 100/ML
VIAL (ML) SUBCUTANEOUS
Refills: 0 | Status: DISCONTINUED | OUTPATIENT
Start: 2022-06-10 | End: 2022-06-16

## 2022-06-10 RX ORDER — GLIMEPIRIDE 1 MG
1 TABLET ORAL
Qty: 0 | Refills: 0 | DISCHARGE

## 2022-06-10 RX ORDER — LANOLIN ALCOHOL/MO/W.PET/CERES
3 CREAM (GRAM) TOPICAL AT BEDTIME
Refills: 0 | Status: DISCONTINUED | OUTPATIENT
Start: 2022-06-10 | End: 2022-06-16

## 2022-06-10 RX ORDER — ACETAMINOPHEN 500 MG
650 TABLET ORAL EVERY 6 HOURS
Refills: 0 | Status: DISCONTINUED | OUTPATIENT
Start: 2022-06-10 | End: 2022-06-10

## 2022-06-10 RX ORDER — DEXTROSE 50 % IN WATER 50 %
15 SYRINGE (ML) INTRAVENOUS ONCE
Refills: 0 | Status: DISCONTINUED | OUTPATIENT
Start: 2022-06-10 | End: 2022-06-16

## 2022-06-10 RX ORDER — GLUCAGON INJECTION, SOLUTION 0.5 MG/.1ML
1 INJECTION, SOLUTION SUBCUTANEOUS ONCE
Refills: 0 | Status: DISCONTINUED | OUTPATIENT
Start: 2022-06-10 | End: 2022-06-16

## 2022-06-10 RX ORDER — CHOLECALCIFEROL (VITAMIN D3) 125 MCG
1000 CAPSULE ORAL DAILY
Refills: 0 | Status: DISCONTINUED | OUTPATIENT
Start: 2022-06-11 | End: 2022-06-16

## 2022-06-10 RX ADMIN — HEPARIN SODIUM 5000 UNIT(S): 5000 INJECTION INTRAVENOUS; SUBCUTANEOUS at 17:23

## 2022-06-10 RX ADMIN — SODIUM CHLORIDE 1000 MILLILITER(S): 9 INJECTION INTRAMUSCULAR; INTRAVENOUS; SUBCUTANEOUS at 16:29

## 2022-06-10 NOTE — ED ADULT TRIAGE NOTE - CHIEF COMPLAINT QUOTE
pt sent to ED by MD Hargrove for evaluation and admission. pt stated " I have metastatic cancer and I'm just getting weaker and weaker" ekg in progress.

## 2022-06-10 NOTE — ED PROVIDER NOTE - CLINICAL SUMMARY MEDICAL DECISION MAKING FREE TEXT BOX
89F DNR/DNI with PMHx breast cancer (on fulvestrant and Xgeva), DM2, hypothyroidism, HLD, lymphedema, endometrial carcinoma s/p hysterectomy and bilateral oophorectomy, and benign pancreatic tail adenoma referred by Delvin Curry for admission. Pt reports worsening weakness and sob over the past week.  Denies fever, chills, cp, abd pain, n/v/d. CT on 6/6 showed Numerous new hepatic lesions consistent with metastatic disease; Large pleural effusion, increased since December 21; Extensive sclerotic bony metastases. VSS, O2 sat 99% on RA. Dec BS on L. Cardio rrr, nml s1s2. Abd soft, nt, nd. Labs show hyponatremia. CXR with large L pleural effusion. To be admitted for thoracentesis and goals of care/plan for dc as pt lives alone and needs more help.

## 2022-06-10 NOTE — H&P ADULT - PROBLEM SELECTOR PLAN 3
CT on 6/6 showed numerous new hepatic lesions consistent with metastatic disease  most likely i/s/o of mets CA to liver    - Follow-up RUQ US  - Monitor LFTs CT on 6/6 showed numerous new hepatic lesions consistent with metastatic disease  most likely i/s/o of mets CA to liver  elevated ALP most likely 2/2 bone mets    - Follow-up RUQ US  - Monitor LFTs

## 2022-06-10 NOTE — H&P ADULT - PROBLEM SELECTOR PLAN 7
F: s/p 1L NS  E: Replete PRN  N: Carb consistent  DVT PPx: Hep subQ  GI PPx: None  Code: FULL  Dispo: RMF. F: s/p 1L NS  E: Replete PRN  N: Carb consistent  DVT PPx: Hep subQ  GI PPx: None  Code: FULL  Dispo: 7-WOL

## 2022-06-10 NOTE — HISTORY OF PRESENT ILLNESS
[FreeTextEntry1] : Looks poor today;\par Quite weak; \par Appetite good\par CT scan  [de-identified] : Unable to speak in full sentences;\par Looks quite weak

## 2022-06-10 NOTE — H&P ADULT - HISTORY OF PRESENT ILLNESS
**Incomplete Note**  89F DNR/DNI with PMHx breast cancer (on fulvestrant and Xgeva), DM2 (A1c 7.6 on this admission), hypothyroidism, HLD, lymphedema, endometrial carcinoma s/p hysterectomy and bilateral oophorectomy, and benign pancreatic tail adenoma, with recent admission on 05/14-05/19 for pleural effusion s/p thoracentesis on 05/17,  now referred by Delvin Curry for admission. Pt reports worsening weakness and dyspnea on exertion over the past week. Pt saw Dr. Hargrove today and he thought she should be admitted. Pt denies fever, chills, CP, abd pain, N/V/D.     ED course:  VS: T 97.6, /73, , RR 17, SpO2 99% on RA  Labs: CBC unremarkable, CMP significant for Na 129, , AST/ALT 46/36  CXR: Large left pleural effusion  CT on 6/6 showed numerous new hepatic lesions consistent with metastatic disease; large pleural effusion, increased since December 21; Extensive sclerotic bony metastases.  EKG: NSR, AK and QRS interval wnl, no ST or T wave changes  Interventions: 1L NS 89F DNR/DNI with PMHx breast cancer (on fulvestrant and Xgeva), DM2 (A1c 7.6 on this admission), hypothyroidism, HLD, lymphedema, endometrial carcinoma s/p hysterectomy and bilateral oophorectomy, and benign pancreatic tail adenoma, with recent admission on 05/14-05/19 for pleural effusion s/p thoracentesis on 05/17,  now referred by Delvin Curry for admission. Pt reports worsening weakness and dyspnea on exertion over the past week. Pt saw Dr. Hargrove today and he thought she should be admitted. Pt denies fever, chills, CP, abd pain, N/V/D.     ED course:  VS: T 97.6, /73, , RR 17, SpO2 99% on RA  Labs: CBC unremarkable, CMP significant for Na 129, , AST/ALT 46/36  CXR: Large left pleural effusion  CT on 6/6 showed numerous new hepatic lesions consistent with metastatic disease; large pleural effusion, increased since December 21; Extensive sclerotic bony metastases.  EKG: NSR, LA and QRS interval wnl, no ST or T wave changes  Interventions: 1L NS 89F with PMHx breast cancer (on fulvestrant and Xgeva), DM2 (A1c 7.6 on this admission), hypothyroidism, HLD, lymphedema, endometrial carcinoma s/p hysterectomy and bilateral oophorectomy, and benign pancreatic tail adenoma, with recent admission on 05/14-05/19 for pleural effusion s/p thoracentesis on 05/17,  now referred by Delvin Curry for admission. Pt reports worsening weakness and dyspnea on exertion over the past week. Pt saw Dr. Hargrove today and he thought she should be admitted. Pt denies fever, chills, CP, abd pain, N/V/D.     ED course:  VS: T 97.6, /73, , RR 17, SpO2 99% on RA  Labs: CBC unremarkable, CMP significant for Na 129, , AST/ALT 46/36  CXR: Large left pleural effusion  CT on 6/6 showed numerous new hepatic lesions consistent with metastatic disease; large pleural effusion, increased since December 21; Extensive sclerotic bony metastases.  EKG: NSR, TX and QRS interval wnl, no ST or T wave changes  Interventions: 1L NS

## 2022-06-10 NOTE — ASSESSMENT
[FreeTextEntry1] : Patient looks quite poor;\par CT scan with evidence of worsening disease including recurrence of left pleural effusion \par Will send to ED \par Discussed with Dr Espinoza;\par Will need thoracocentesis maybe Pleurx \par

## 2022-06-10 NOTE — ED PROVIDER NOTE - ATTENDING APP SHARED VISIT CONTRIBUTION OF CARE
I approve of the visit details performed / assessed by the Physician Assistant. The patient's visit details and orders are appropriate.     89F DNR/DNI with PMHx breast cancer (on fulvestrant and Xgeva), DM2, hypothyroidism, HLD, lymphedema, endometrial carcinoma s/p hysterectomy and bilateral oophorectomy, and benign pancreatic tail adenoma referred by Delvin Curry for admission, found to have new metastatic disease and large pleural effusion. Plan for thoracentesis. Goals of care discussion. HD stable.

## 2022-06-10 NOTE — H&P ADULT - NSHPLABSRESULTS_GEN_ALL_CORE
13.8   7.46  )-----------( 353      ( 10 Yann 2022 15:29 )             41.4       06-10    129<L>  |  93<L>  |  18  ----------------------------<  179<H>  4.6   |  24  |  0.73    Ca    9.2      10 Yann 2022 15:29    TPro  7.4  /  Alb  3.8  /  TBili  0.5  /  DBili  x   /  AST  46<H>  /  ALT  36  /  AlkPhos  298<H>  06-10                            CAPILLARY BLOOD GLUCOSE

## 2022-06-10 NOTE — H&P ADULT - PROBLEM SELECTOR PLAN 4
Patient with hx of DM2 on glipiride at home.     - c/w ISS  - monitor FS  - consistent carb diet  - Follow up A1c

## 2022-06-10 NOTE — H&P ADULT - PROBLEM SELECTOR PLAN 5
Patient with hx of metastatic breast cancer, on fulvestrant and Xgeva, follows with Dr. Funez.    - c/w same management per Dr. Funez. Patient with hx of metastatic breast cancer, on fulvestrant and Xgeva, follows with Dr. Funez.    - Follow up with Dr. Funez if anything needs to be done inpatient

## 2022-06-10 NOTE — ED ADULT NURSE NOTE - OBJECTIVE STATEMENT
89y Female c/o generalized weakness since Monday, intermittent SOB ( not present during assessment). Pmhx Metastatic L breast CA. Pt reports was seen by Dr Hargrove today and sent in for evaluation of weakness. Pt denies vision/speech changes, unilateral weakness, CP, F/C, N/V/D. 89y Female c/o generalized weakness since Monday, intermittent SOB ( not present during assessment). Pmhx Metastatic L breast CA, endometrial CA, HTN, Hypothyroidism, T2DM. Pt reports was seen by Dr Hargrove today and sent in for evaluation of weakness. Pt denies vision/speech changes, unilateral weakness, CP, F/C, N/V/D.

## 2022-06-10 NOTE — ED PROVIDER NOTE - PHYSICAL EXAMINATION
CONSTITUTIONAL: chronically ill appearing. NAD   HEAD: Normocephalic; atraumatic.   EYES: PERRL; EOM intact; conjunctiva and sclera clear  ENT: normal nose; no rhinorrhea; normal pharynx with no erythema or lesions.   NECK: Supple; non-tender; no LAD  CARDIOVASCULAR: Normal S1, S2; No audible murmurs. Regular rate and rhythm.   RESPIRATORY: Breathing easily; breath sounds clear and equal bilaterally; no wheezes, rhonchi, or rales.  GI: Soft; non-distended; non-tender; no palpable organomegaly.   MSK: FROM at all extremities, normal tone   EXT: No cyanosis or edema; N/V intact  SKIN: Normal for age and race; warm; dry; good turgor; no apparent lesions or rash.   NEURO: A & O x 3; face symmetric; grossly unremarkable.   PSYCHOLOGICAL: The patient’s mood and manner are appropriate. CONSTITUTIONAL: chronically ill appearing. NAD   HEAD: Normocephalic; atraumatic.   EYES: PERRL; EOM intact; conjunctiva and sclera clear  ENT: normal nose; no rhinorrhea; normal pharynx with no erythema or lesions.   NECK: Supple; non-tender; no LAD  CARDIOVASCULAR: Normal S1, S2; No audible murmurs. Regular rate and rhythm.   RESPIRATORY: Breathing easily; dec BS on L   GI: Soft; non-distended; non-tender; no palpable organomegaly.   MSK: FROM at all extremities, normal tone   EXT: No cyanosis or edema; N/V intact  SKIN: Normal for age and race; warm; dry; good turgor; no apparent lesions or rash.   NEURO: A & O x 3; face symmetric; grossly unremarkable.   PSYCHOLOGICAL: The patient’s mood and manner are appropriate.

## 2022-06-10 NOTE — ED ADULT NURSE NOTE - NSIMPLEMENTINTERV_GEN_ALL_ED
Implemented All Fall with Harm Risk Interventions:  [place] to call system. Call bell, personal items and telephone within reach. Instruct patient to call for assistance. Room bathroom lighting operational. Non-slip footwear when patient is off stretcher. Physically safe environment: no spills, clutter or unnecessary equipment. Stretcher in lowest position, wheels locked, appropriate side rails in place. Provide visual cue, wrist band, yellow gown, etc. Monitor gait and stability. Monitor for mental status changes and reorient to person, place, and time. Review medications for side effects contributing to fall risk. Reinforce activity limits and safety measures with patient and family. Provide visual clues: red socks.

## 2022-06-10 NOTE — PATIENT PROFILE ADULT - FALL HARM RISK - RISK INTERVENTIONS

## 2022-06-10 NOTE — H&P ADULT - PROBLEM SELECTOR PLAN 2
Baseline Na 131-133  Na 129 upon arrival  pt reports decrease PO intake of fluids, dry on exam  also i/s/o of mets CA  s/p 1L NS    - Monitor  - If worsening on 6/11 AM ---> get serum Osm, urine lytes, consider nephro consult if severe worsening of hyponatremia Baseline Na 131-133  Na 129 upon arrival  pt reports decrease PO intake of fluids, dry on exam  also i/s/o of mets CA  s/p 1L NS    - Monitor  - If worsening on 6/11 AM ---> get serum Osm, urine lytes

## 2022-06-10 NOTE — H&P ADULT - PROBLEM SELECTOR PLAN 1
Patient presenting with 4 weeks of SOB on exertion. Has hx of metastatic breast  cancer as well as pleural effusions now presenting with recurrence of pleural effusion on left side. Speaking in complete sentence and ambulating freely. Hemodynamically stable, afebrile and no leukocytosis.   CXR showed large left pleural effusion    - Pulm consulted ---> plan for possible thoracentesis ---> will most likely need pleur-x  - Patient comfortable, will continue to monitor oxygenation. Patient presenting with 4 weeks of SOB on exertion. Has hx of metastatic breast  cancer as well as pleural effusions now presenting with recurrence of pleural effusion on left side. Speaking in complete sentence and ambulating freely. Hemodynamically stable, afebrile and no leukocytosis.   CXR showed large left pleural effusion    - Pulm consulted ---> plan for possible thoracentesis ---> will most likely need pleur-x/indwelling pleural cath  - Patient comfortable, will continue to monitor oxygenation.

## 2022-06-10 NOTE — PHYSICAL EXAM
[Normal] : soft, non-tender, non-distended, no masses palpated, no HSM and normal bowel sounds [No Joint Swelling] : no joint swelling [Grossly Normal Strength/Tone] : grossly normal strength/tone [de-identified] : Weak [de-identified] : no breathy sounds on left

## 2022-06-10 NOTE — CONSULT NOTE ADULT - ATTENDING COMMENTS
89 yo M with metastatic breast cancer with POD on most recent ct presented with dyspnea. cxr and ct worsening left pleural effusion, partially loculated, recurent in 3 weeks. prior two times, the fluid has been exudative, cytology negative but high suspicion for MPE. discussed pleurx vs repeat thora. patient would like to think about both options before making a decision.

## 2022-06-10 NOTE — ED PROVIDER NOTE - OBJECTIVE STATEMENT
89F DNR/DNI with PMHx breast cancer (on fulvestrant and Xgeva), DM2, hypothyroidism, HLD, lymphedema, endometrial carcinoma s/p hysterectomy and bilateral oophorectomy, and benign pancreatic tail adenoma referred by Delvin Curry for admission. Pt reports worsening weakness and sob over the past week. Pt saw Dr. Hargrove today and he thought she should be admitted. Pt admitted on May for a pleural effusion. SOB mostly with exerting herself, feels better at rest. Denies fever, chills, cp, abd pain, n/v/d. CT on 6/6 showed Numerous new hepatic lesions consistent with metastatic disease; Large pleural effusion, increased since December 21; Extensive sclerotic bony metastases.

## 2022-06-10 NOTE — ED PROVIDER NOTE - IV ALTEPLASE EXCL ABS HIDDEN
Diaper rash to rectum, scrotum, red with white dots,,no relief with Desitin, advised to change diaper s soon as soiled, clean with fregrance, free hypoallergenic wipes, dry throughly, then leave open to air x10 min, next apply A&D cooper with layer of vaselin show

## 2022-06-10 NOTE — H&P ADULT - NSHPPHYSICALEXAM_GEN_ALL_CORE
VITAL SIGNS:  T(C): 36.4 (06-10-22 @ 14:13), Max: 36.4 (06-10-22 @ 14:13)  T(F): 97.6 (06-10-22 @ 14:13), Max: 97.6 (06-10-22 @ 14:13)  HR: 103 (06-10-22 @ 14:13) (103 - 103)  BP: 107/73 (06-10-22 @ 14:13) (107/73 - 107/73)  BP(mean): --  RR: 17 (06-10-22 @ 14:13) (17 - 17)  SpO2: 99% (06-10-22 @ 14:13) (99% - 99%)  Wt(kg): --    PHYSICAL EXAM:    Constitutional: Resting comfortably in bed; NAD  Head: NC/AT  Eyes: PERRL, EOMI, anicteric sclera  ENT: no nasal discharge; uvula midline, no oropharyngeal erythema or exudates; MMM  Neck: supple; no JVD or thyromegaly  Respiratory: decreased breath sounds at b/l bases, L>R  Cardiac: +S1/S2; RRR; no M/R/G; PMI non-displaced  Gastrointestinal: abdomen soft, NT/ND; no rebound or guarding; +BSx4  Back: spine midline, no bony tenderness or step-offs; no CVAT B/L  Extremities: WWP, no clubbing or cyanosis; no peripheral edema  Musculoskeletal: NROM x4; no joint swelling, tenderness or erythema  Vascular: 2+ radial, femoral, DP/PT pulses B/L  Dermatologic: skin warm, dry and intact; no rashes, wounds, or scars  Neurologic: AAOx3; CNII-XII grossly intact; no focal deficits VITAL SIGNS:  T(C): 36.4 (06-10-22 @ 14:13), Max: 36.4 (06-10-22 @ 14:13)  T(F): 97.6 (06-10-22 @ 14:13), Max: 97.6 (06-10-22 @ 14:13)  HR: 103 (06-10-22 @ 14:13) (103 - 103)  BP: 107/73 (06-10-22 @ 14:13) (107/73 - 107/73)  BP(mean): --  RR: 17 (06-10-22 @ 14:13) (17 - 17)  SpO2: 99% (06-10-22 @ 14:13) (99% - 99%)  Wt(kg): --    PHYSICAL EXAM:    Constitutional: NAD  Head: NC/AT  Eyes: PERRL, EOMI, anicteric sclera  ENT: no nasal discharge; uvula midline, no oropharyngeal erythema or exudates; MMM  Neck: supple; no JVD or thyromegaly  Respiratory: decreased breath sounds at b/l bases, L>R, no use of accesory muscles  Cardiac: +S1/S2; RRR; no M/R/G; PMI non-displaced  Gastrointestinal: abdomen soft, NT/ND; no rebound or guarding; +BSx4  Back: spine midline, no bony tenderness or step-offs; no CVAT B/L  Extremities: WWP, no clubbing or cyanosis; no peripheral edema  Musculoskeletal: NROM x4; no joint swelling, tenderness or erythema  Vascular: 2+ radial, femoral, DP/PT pulses B/L  Dermatologic: chronic rash on left side of chest, and midline of abdomen  Neurologic: AAOx3; CNII-XII grossly intact; no focal deficits VITAL SIGNS:  T(C): 36.4 (06-10-22 @ 14:13), Max: 36.4 (06-10-22 @ 14:13)  T(F): 97.6 (06-10-22 @ 14:13), Max: 97.6 (06-10-22 @ 14:13)  HR: 103 (06-10-22 @ 14:13) (103 - 103)  BP: 107/73 (06-10-22 @ 14:13) (107/73 - 107/73)  BP(mean): --  RR: 17 (06-10-22 @ 14:13) (17 - 17)  SpO2: 99% (06-10-22 @ 14:13) (99% - 99%)  Wt(kg): --    PHYSICAL EXAM:    Constitutional: NAD  Head: NC/AT  Eyes: PERRL, EOMI, anicteric sclera, wears glasses  ENT: no nasal discharge; uvula midline, no oropharyngeal erythema or exudates; dry mucous membranes  Neck: supple; no JVD or thyromegaly  Respiratory: decreased breath sounds at b/l bases, L>R, no use of accesory muscles  Cardiac: +S1/S2; RRR; no M/R/G; PMI non-displaced  Gastrointestinal: abdomen soft, NT/ND; no rebound or guarding; +BSx4  Extremities: WWP, no peripheral edema  Musculoskeletal: NROM x4; no joint swelling, tenderness or erythema  Vascular: 2+ radial, femoral, DP/PT pulses B/L  Dermatologic: chronic rash on left side of chest, and midline of abdomen  Neurologic: AAOx3; CNII-XII grossly intact; no focal deficits

## 2022-06-10 NOTE — H&P ADULT - PROBLEM SELECTOR PLAN 6
Patient with hypothyroidism on levothyroxine at home.     - c/w synthroid 88 mcg qd  - Follow up TSH

## 2022-06-10 NOTE — H&P ADULT - ASSESSMENT
89F DNR/DNI with PMHx breast cancer (on fulvestrant and Xgeva), DM2 (A1c 7.6 on this admission), hypothyroidism, HLD, lymphedema, endometrial carcinoma s/p hysterectomy and bilateral oophorectomy, and benign pancreatic tail adenoma with recent admission for pleural effusion, now being admitted for recurrence of pleural effusion; also found with hyponatremia and transaminitis, will likely require SW/CM input regarding disposition  89F DNR/DNI with PMHx breast cancer (on fulvestrant and Xgeva), DM2, hypothyroidism, endometrial carcinoma s/p hysterectomy and bilateral oophorectomy, and benign pancreatic tail adenoma with recent admission for left pleural effusion, now being admitted for recurrence of pleural effusion; also found with hyponatremia and transaminitis most likely i/s/o mets CA, will likely require SW/CM input regarding disposition  89F with PMHx breast cancer (on fulvestrant and Xgeva), DM2, hypothyroidism, endometrial carcinoma s/p hysterectomy and bilateral oophorectomy, and benign pancreatic tail adenoma with recent admission for left pleural effusion, now being admitted for recurrence of pleural effusion; also found with hyponatremia and transaminitis most likely i/s/o mets CA, will likely require SW/CM input regarding disposition

## 2022-06-10 NOTE — CONSULT NOTE ADULT - ASSESSMENT
89F with PMHx metastatic breast cancer (on fulvestrant and Xgeva, followed by Dr. Funez), DM2 (A1c 7.6), hypothyroidism, HLD, lymphedema, endometrial carcinoma s/p hysterectomy and bilateral oophorectomy, and benign pancreatic tail adenoma, prior L sided thoracentesis on 11/30/22 and IR chest tube placement for hydro-pneumothorax on 12/3 and with recent admission on 05/14-05/19 for recurrent L sided pleural effusion s/p thoracentesis on 5/17.     Discussed progression of malignancy on CT scan and recurrent pleural effusion likely malignant as prior thoracentesis x2 have been exudative with lymphocytic predominance, although notably cytology has been negative. Rarely, Xgeva has been associated with pleural effusion. She is currently on RA and not in acute distress and appears comfortable at rest so urgent thoracentesis is not necessary. Discussed given rapid reaccumulation of pleural fluid since (s/p most recent thora only one month ago although prior to that last thora was approx 6 mths ago) she would benefit from indwelling pleural catheter. Not on any AC. Notes only TESFAYE. No other respiratory sx, no fever, or leukocytosis.     -continued discussion regarding IPC  -can plan for possible IPC placement on Monday pending endoscopy schedule  -hold DVT ppx Monday AM  -coags and CBC Monday AM    Plan to be discussed with attending Dr. Kelly.   89F with PMHx metastatic breast cancer (on fulvestrant and Xgeva, followed by Dr. Funez), DM2 (A1c 7.6), hypothyroidism, HLD, lymphedema, endometrial carcinoma s/p hysterectomy and bilateral oophorectomy, and benign pancreatic tail adenoma, prior L sided thoracentesis on 11/30/22 and IR chest tube placement for hydro-pneumothorax on 12/3 and with recent admission on 05/14-05/19 for recurrent L sided pleural effusion s/p thoracentesis on 5/17.     Discussed progression of malignancy on CT scan and recurrent pleural effusion likely malignant as prior thoracentesis x2 have been exudative with lymphocytic predominance, although notably cytology has been negative. Rarely, Xgeva has been associated with pleural effusion. She is currently on RA and not in acute distress and appears comfortable at rest so urgent thoracentesis is not necessary. Discussed given rapid reaccumulation of pleural fluid since (s/p most recent thora only one month ago although prior to that last thora was approx 6 mths ago) she would benefit from indwelling pleural catheter. Not on any AC. Notes only TESFAYE. No other respiratory sx, no fever, or leukocytosis. POCUS shows large left sided simple appearing pleural effusion. She expresses concern regarding management of IPC at home.     -continued discussion regarding IPC  -can plan for possible IPC placement on Monday pending endoscopy schedule   -hold DVT ppx Monday AM  -coags and CBC Monday AM    Plan to be discussed with attending Dr. Kelly.

## 2022-06-11 LAB
A1C WITH ESTIMATED AVERAGE GLUCOSE RESULT: 8.3 % — HIGH (ref 4–5.6)
APTT BLD: 29.9 SEC — SIGNIFICANT CHANGE UP (ref 27.5–35.5)
ESTIMATED AVERAGE GLUCOSE: 192 MG/DL — HIGH (ref 68–114)
GLUCOSE BLDC GLUCOMTR-MCNC: 131 MG/DL — HIGH (ref 70–99)
GLUCOSE BLDC GLUCOMTR-MCNC: 163 MG/DL — HIGH (ref 70–99)
GLUCOSE BLDC GLUCOMTR-MCNC: 222 MG/DL — HIGH (ref 70–99)
GLUCOSE BLDC GLUCOMTR-MCNC: 273 MG/DL — HIGH (ref 70–99)
INR BLD: 1.02 — SIGNIFICANT CHANGE UP (ref 0.88–1.16)
PROTHROM AB SERPL-ACNC: 12.2 SEC — SIGNIFICANT CHANGE UP (ref 10.5–13.4)
TSH SERPL-MCNC: 5.44 UIU/ML — HIGH (ref 0.27–4.2)

## 2022-06-11 PROCEDURE — 99232 SBSQ HOSP IP/OBS MODERATE 35: CPT | Mod: GC

## 2022-06-11 PROCEDURE — 99232 SBSQ HOSP IP/OBS MODERATE 35: CPT

## 2022-06-11 RX ADMIN — HEPARIN SODIUM 5000 UNIT(S): 5000 INJECTION INTRAVENOUS; SUBCUTANEOUS at 06:21

## 2022-06-11 RX ADMIN — Medication 2: at 08:45

## 2022-06-11 RX ADMIN — Medication 88 MICROGRAM(S): at 06:21

## 2022-06-11 RX ADMIN — HEPARIN SODIUM 5000 UNIT(S): 5000 INJECTION INTRAVENOUS; SUBCUTANEOUS at 13:31

## 2022-06-11 RX ADMIN — HEPARIN SODIUM 5000 UNIT(S): 5000 INJECTION INTRAVENOUS; SUBCUTANEOUS at 22:42

## 2022-06-11 RX ADMIN — Medication 6: at 12:23

## 2022-06-11 RX ADMIN — Medication 1000 UNIT(S): at 11:56

## 2022-06-11 NOTE — PROGRESS NOTE ADULT - SUBJECTIVE AND OBJECTIVE BOX
Interval Events: Reviewed  Patient seen and examined at bedside.    Patient is a 89y old  Female who presents with a chief complaint of pleural effusion (10 Yann 2022 19:55)  no acute event overnight, RA 95%      PAST MEDICAL & SURGICAL HISTORY:  Type 2 diabetes mellitus  DM (diabetes mellitus)      HTN (hypertension)      Breast cancer      Hypothyroid      Endometrial cancer      Other acquired absence of organ  S/P splenectomy      Status post total hysterectomy  S/P hysterectomy      Other acquired absence of organ  S/P cholecystectomy      Status post cataract extraction  S/P cataract extraction          MEDICATIONS:  Pulmonary:    Antimicrobials:    Anticoagulants:  heparin   Injectable 5000 Unit(s) SubCutaneous every 8 hours    Cardiac:      Allergies    No Known Allergies    Intolerances        Vital Signs Last 24 Hrs  T(C): 36.4 (11 Jun 2022 05:47), Max: 36.6 (10 Yann 2022 20:35)  T(F): 97.6 (11 Jun 2022 05:47), Max: 97.8 (10 Yann 2022 20:35)  HR: 89 (11 Jun 2022 05:47) (78 - 103)  BP: 115/71 (11 Jun 2022 05:47) (107/73 - 137/78)  BP(mean): --  RR: 18 (11 Jun 2022 05:47) (17 - 20)  SpO2: 95% (11 Jun 2022 05:47) (95% - 99%)        Review of Systems:   •	General: negative  •	Skin/Breast: negative  •	Ophthalmologic: negative  •	ENMT: negative  •	Respiratory and Thorax: negative  •	Cardiovascular: negative  •	Gastrointestinal: negative  •	Genitourinary: negative  •	Musculoskeletal: negative  •	Neurological: negative  •	Psychiatric: negative  •	Hematology/Lymphatics: negative  •	Endocrine: negative  •	Allergic/Immunologic: negative    Physical Exam:   • Constitutional:	elderly female, NAD   • Eyes:	EOMI; PERRL; no drainage or redness  • ENMT:	No oral lesions; no gross abnormalities  • Neck	no thyromegaly or nodules  • Breasts:	not examined  • Back:	No deformity or limitation of movement  • Respiratory:	Breath Sounds equal & clear to auscultation, no accessory muscle use  • Cardiovascular:	Regular rate & rhythm, normal S1, S2; no murmurs, gallops or rubs; no S3, S4  • Gastrointestinal:	Soft, non-tender, no hepatosplenomegaly, normal bowel sounds  • Genitourinary:	not examined  • Rectal: not examined  • Extremities:	No cyanosis, clubbing or edema  • Vascular:	Equal and normal pulses (dorsalis pedis)  • Neurologica:l	not examined  • Skin:	No lesions; no rash  • Lymph Nodes:	No lymphadedenopathy  • Musculoskeletal:	No joint pain, swelling or deformity; no limitation of movement        LABS:      CBC Full  -  ( 10 Yann 2022 15:29 )  WBC Count : 7.46 K/uL  RBC Count : 4.50 M/uL  Hemoglobin : 13.8 g/dL  Hematocrit : 41.4 %  Platelet Count - Automated : 353 K/uL  Mean Cell Volume : 92.0 fl  Mean Cell Hemoglobin : 30.7 pg  Mean Cell Hemoglobin Concentration : 33.3 gm/dL  Auto Neutrophil # : 4.99 K/uL  Auto Lymphocyte # : 1.18 K/uL  Auto Monocyte # : 1.09 K/uL  Auto Eosinophil # : 0.10 K/uL  Auto Basophil # : 0.06 K/uL  Auto Neutrophil % : 67.0 %  Auto Lymphocyte % : 15.8 %  Auto Monocyte % : 14.6 %  Auto Eosinophil % : 1.3 %  Auto Basophil % : 0.8 %    06-10    129<L>  |  93<L>  |  18  ----------------------------<  179<H>  4.6   |  24  |  0.73    Ca    9.2      10 Yann 2022 15:29  Phos  3.3     06-10  Mg     2.0     06-10    TPro  7.4  /  Alb  3.8  /  TBili  0.5  /  DBili  x   /  AST  46<H>  /  ALT  36  /  AlkPhos  298<H>  06-10    PT/INR - ( 11 Jun 2022 05:30 )   PT: 12.2 sec;   INR: 1.02          PTT - ( 11 Jun 2022 05:30 )  PTT:29.9 sec                    RADIOLOGY & ADDITIONAL STUDIES (The following images were personally reviewed):  Rosales:                                     No  Urine output:                       adequate  DVT prophylaxis:                 Yes  Flattus:                                  Yes  Bowel movement:              No

## 2022-06-11 NOTE — PROGRESS NOTE ADULT - SUBJECTIVE AND OBJECTIVE BOX
PULMONARY CONSULT SERVICE FOLLOW-UP NOTE    INTERVAL HPI:  Reviewed chart and overnight events; patient seen and examined at bedside.    Subjective:  Reports ongoing TESFAYE but feels well at rest. Denies fever, cough, chills. Would like to discuss IPC further with Dr. Hargrove; she is concerned about catheter care at home given she lives alone.     MEDICATIONS:  Pulmonary:    Antimicrobials:    Anticoagulants:  heparin   Injectable 5000 Unit(s) SubCutaneous every 8 hours    Cardiac:      Allergies    No Known Allergies    Intolerances        Vital Signs Last 24 Hrs  T(C): 36.6 (11 Jun 2022 11:58), Max: 36.6 (10 Yann 2022 20:35)  T(F): 97.8 (11 Jun 2022 11:58), Max: 97.8 (10 Yann 2022 20:35)  HR: 85 (11 Jun 2022 11:58) (78 - 97)  BP: 116/78 (11 Jun 2022 11:58) (115/71 - 137/78)  BP(mean): 89 (11 Jun 2022 09:30) (89 - 89)  RR: 18 (11 Jun 2022 11:58) (18 - 20)  SpO2: 96% (11 Jun 2022 11:58) (95% - 96%)      PHYSICAL EXAM:  Constitutional: no acute distress, sitting in chair   Head: NC/AT  EENT: PERRL, anicteric sclera; oropharynx clear, MMM  Neck: supple, no appreciable JVD  Respiratory: decreased breath sounds at L base,  no W/R/R, no increased work of breathing or tachypnea, speaking in full sentences on RA  Cardiovascular: +S1/S2, RRR  Gastrointestinal: soft, NT/ND  Extremities: WWP; no edema, clubbing or cyanosis  Neurological: awake and alert; grossly intact     LABS:      CBC Full  -  ( 10 Yann 2022 15:29 )  WBC Count : 7.46 K/uL  RBC Count : 4.50 M/uL  Hemoglobin : 13.8 g/dL  Hematocrit : 41.4 %  Platelet Count - Automated : 353 K/uL  Mean Cell Volume : 92.0 fl  Mean Cell Hemoglobin : 30.7 pg  Mean Cell Hemoglobin Concentration : 33.3 gm/dL  Auto Neutrophil # : 4.99 K/uL  Auto Lymphocyte # : 1.18 K/uL  Auto Monocyte # : 1.09 K/uL  Auto Eosinophil # : 0.10 K/uL  Auto Basophil # : 0.06 K/uL  Auto Neutrophil % : 67.0 %  Auto Lymphocyte % : 15.8 %  Auto Monocyte % : 14.6 %  Auto Eosinophil % : 1.3 %  Auto Basophil % : 0.8 %    06-10    129<L>  |  93<L>  |  18  ----------------------------<  179<H>  4.6   |  24  |  0.73    Ca    9.2      10 Yann 2022 15:29  Phos  3.3     06-10  Mg     2.0     06-10    TPro  7.4  /  Alb  3.8  /  TBili  0.5  /  DBili  x   /  AST  46<H>  /  ALT  36  /  AlkPhos  298<H>  06-10    PT/INR - ( 11 Jun 2022 05:30 )   PT: 12.2 sec;   INR: 1.02          PTT - ( 11 Jun 2022 05:30 )  PTT:29.9 sec    RADIOLOGY & ADDITIONAL STUDIES  Reviewed.

## 2022-06-11 NOTE — PROGRESS NOTE ADULT - SUBJECTIVE AND OBJECTIVE BOX
ONCOLOGY COVERAGE FOR DR. FUNEZ  Medical records reviewed,  patient examined  This 89 year old female was admitted yesterday with progressive dyspnea due to enlarging malignant left pleural effusion. Diagnosed more than 2 years ago with Breast cancer, ER+UT- her2 gary - and was treated until today with monthly Fulvestrant injections. Has disease at site of left breast, bones, liver and pleura. and is obviously progressing. Other medication; XGEVA, Synthroid, Vitamin D3.  On exam; oriented x 4, in NAD. PERRLA. No oral mucositis. No JVD. Thyroid: not enlarged. Copr; Normal S1,S2. lEFT BREAST WITH PALPABLE MADD, ABOUT 3.5 CM. Lungs: dullness with decreade sounf lower hlf of left lung. Abdomen; without organomegaly. Left arm edema. No cyanosis or edema of lower extremities, Neuro; Non-focal.  CBC Full  -  ( 10 Yann 2022 15:29 )  WBC Count : 7.46 K/uL  RBC Count : 4.50 M/uL  Hemoglobin : 13.8 g/dL  Hematocrit : 41.4 %  Platelet Count - Automated : 353 K/uL  Mean Cell Volume : 92.0 fl  Mean Cell Hemoglobin : 30.7 pg  Mean Cell Hemoglobin Concentration : 33.3 gm/dL  Auto Neutrophil # : 4.99 K/uL  Auto Lymphocyte # : 1.18 K/uL  Auto Monocyte # : 1.09 K/uL  Auto Eosinophil # : 0.10 K/uL  Auto Basophil # : 0.06 K/uL  Auto Neutrophil % : 67.0 %  Auto Lymphocyte % : 15.8 %  Auto Monocyte % : 14.6 %  Auto Eosinophil % : 1.3 %  Auto Basophil % : 0.8 %  06-10    129<L>  |  93<L>  |  18  ----------------------------<  179<H>  4.6   |  24  |  0.73    Ca    9.2      10 Yann 2022 15:29  Phos  3.3     06-10  Mg     2.0     06-10    TPro  7.4  /  Alb  3.8  /  TBili  0.5  /  DBili  x   /  AST  46<H>  /  ALT  36  /  AlkPhos  298<H>  06-10  ICU Vital Signs Last 24 Hrs  T(C): 36.4 (11 Jun 2022 05:47), Max: 36.6 (10 Yann 2022 20:35)  T(F): 97.6 (11 Jun 2022 05:47), Max: 97.8 (10 Yann 2022 20:35)  HR: 97 (11 Jun 2022 09:30) (78 - 103)  BP: 132/68 (11 Jun 2022 09:30) (107/73 - 137/78)  BP(mean): 89 (11 Jun 2022 09:30) (89 - 89)  ABP: --  ABP(mean): --  RR: 18 (11 Jun 2022 05:47) (17 - 20)  SpO2: 95% (11 Jun 2022 09:30) (95% - 99%)  A/P:1. Stage IV ER Positive breast  cancer progressing thru Fulvestrant. Hormonal Regimen to be changed by Dr. Funez, possibly with addition of iv chemotherapy( a Taxane/5FU combination ?)since patient  has an excellent performance status        2. Left pleural effusion: to be drained in 2 days, probably with pleurodesis. Off Lovenox  for at leat 36 hrs.        3. Code status: full code

## 2022-06-11 NOTE — PROGRESS NOTE ADULT - PROBLEM SELECTOR PLAN 7
F: s/p 1L NS  E: Replete PRN  N: Carb consistent  DVT PPx: Hep subQ  GI PPx: None  Code: FULL  Dispo: 7-WOL

## 2022-06-11 NOTE — PHYSICAL THERAPY INITIAL EVALUATION ADULT - DIAGNOSIS, PT EVAL
5A: Primary Prevention/Risk Reduction for Loss of Balance and Falling 6B: Impaired Aerobic Capacity/Endurance Associated with Deconditioning
Yes

## 2022-06-11 NOTE — PHYSICAL THERAPY INITIAL EVALUATION ADULT - ADDITIONAL COMMENTS
Pt lives in an elevator accessible apartment with ramp access. Pt reports she has not left the home since March 2022 due to previous hospitalizations. Pt owns a rollator and cane, primarily using the cane inside the home. Pt reports she uses the rollator when shes outside shopping (but has not recently). Pt has a tub shower and a shower chair. Reports she has been having [facility] physical therapy for the last couple weeks.

## 2022-06-11 NOTE — PROGRESS NOTE ADULT - ASSESSMENT
89F with PMHx metastatic breast cancer (on fulvestrant and Xgeva, followed by Dr. Funez), DM2 (A1c 7.6), hypothyroidism, HLD, lymphedema, endometrial carcinoma s/p hysterectomy and bilateral oophorectomy, and benign pancreatic tail adenoma, prior L sided thoracentesis on 11/30/22 and IR chest tube placement for hydro-pneumothorax on 12/3 and with recent admission on 05/14-05/19 for recurrent L sided pleural effusion s/p thoracentesis on 5/17.     Discussed progression of malignancy on CT scan and recurrent pleural effusion likely malignant as prior thoracentesis x2 have been exudative with lymphocytic predominance, although notably cytology has been negative. Rarely, Xgeva has been associated with pleural effusion. She is currently on RA and not in acute distress and appears comfortable at rest so urgent thoracentesis is not necessary. Discussed given rapid reaccumulation of pleural fluid since (s/p most recent thora only one month ago although prior to that last thora was approx 6 mths ago) she would benefit from indwelling pleural catheter. Not on any AC. Notes only TESFAYE. No other respiratory sx, no fever, or leukocytosis. POCUS shows large left sided simple appearing pleural effusion. She expresses concern regarding management of IPC at home again today. She would like to discuss further with Dr. Hargrove. Reviewed options for bedside thora vs IPC. If thora would be able to d/c from pulmonary standpoint however IPC would need to occur during the week when endoscopy available.      -will contact Dr. Hargrove to discuss   -continued discussion regarding IPC  -can plan for possible IPC placement on Monday pending endoscopy schedule and pt preference   -hold DVT ppx Monday AM  -coags and CBC Monday AM    Plan to be discussed with attending Dr. Kelly.

## 2022-06-11 NOTE — CONSULT NOTE ADULT - ASSESSMENT
per Internal Medicine    89 y o F with PMHx breast cancer (on fulvestrant and Xgeva), DM2, hypothyroidism, endometrial carcinoma s/p hysterectomy and bilateral oophorectomy, and benign pancreatic tail adenoma with recent admission for left pleural effusion, now being admitted for recurrence of pleural effusion; also found with hyponatremia and transaminitis most likely i/s/o mets CA, will likely require SW/CM input regarding disposition     Problem/Plan - 1:  ·  Problem: Pleural effusion.   ·  Plan: Patient presenting with 4 weeks of SOB on exertion. Has hx of metastatic breast  cancer as well as pleural effusions now presenting with recurrence of pleural effusion on left side. Speaking in complete sentence and ambulating freely. Hemodynamically stable, afebrile and no leukocytosis.   CXR showed large left pleural effusion    - Pulm consulted ---> plan for possible thoracentesis ---> will most likely need pleur-x/indwelling pleural cath  - Patient comfortable, will continue to monitor oxygenation.    Problem/Plan - 2:  ·  Problem: Hyponatremia.   ·  Plan: Baseline Na 131-133  Na 129 upon arrival  pt reports decrease PO intake of fluids, dry on exam  also i/s/o of mets CA  s/p 1L NS    - Monitor  - If worsening on 6/11 AM ---> get serum Osm, urine lytes.    Problem/Plan - 3:  ·  Problem: Transaminitis.   ·  Plan: CT on 6/6 showed numerous new hepatic lesions consistent with metastatic disease  most likely i/s/o of mets CA to liver  elevated ALP most likely 2/2 bone mets    - Follow-up RUQ US  - Monitor LFTs.    Problem/Plan - 4:  ·  Problem: Diabetes.   ·  Plan: Patient with hx of DM2 on glipiride at home.     - c/w ISS  - monitor FS  - consistent carb diet  - Follow up A1c.    Problem/Plan - 5:  ·  Problem: Breast cancer.   ·  Plan: Patient with hx of metastatic breast cancer, on fulvestrant and Xgeva, follows with Dr. Funez.    - Follow up with Dr. Funez if anything needs to be done inpatient.    Problem/Plan - 6:  ·  Problem: Hypothyroidism.   ·  Plan: Patient with hypothyroidism on levothyroxine at home.     - c/w synthroid 88 mcg qd  - Follow up TSH.    Problem/Plan - 7:  ·  Problem: Prophylactic measure.   ·  Plan: F: s/p 1L NS  E: Replete PRN  N: Carb consistent  DVT PPx: Hep subQ  GI PPx: None  Code: FULL  Dispo: 7-WOL.

## 2022-06-11 NOTE — PROGRESS NOTE ADULT - PROBLEM SELECTOR PLAN 3
CT on 6/6 showed numerous new hepatic lesions consistent with metastatic disease  most likely i/s/o of mets CA to liver  elevated ALP most likely 2/2 bone mets    - Follow-up RUQ US  - Monitor LFTs

## 2022-06-11 NOTE — PHYSICAL THERAPY INITIAL EVALUATION ADULT - GAIT PATTERN USED, PT EVAL
Pt ambulated in hallway on RA requiring 2 standing rest breaks due to fatigue. 6min walk test attempted however pt able to complete 4 1/2 mins due to modified RPE of 5/10. Pt desat to 92% and recovered to 95% in <30 secs with rest break./3-point gait

## 2022-06-11 NOTE — PHYSICAL THERAPY INITIAL EVALUATION ADULT - IMPAIRMENTS FOUND, PT EVAL
no fever and no chills.
aerobic capacity/endurance/gait, locomotion, and balance/muscle strength/posture

## 2022-06-11 NOTE — PROGRESS NOTE ADULT - ASSESSMENT
89F with PMHx breast cancer (on fulvestrant and Xgeva), DM2, hypothyroidism, endometrial carcinoma s/p hysterectomy and bilateral oophorectomy, and benign pancreatic tail adenoma with recent admission for left pleural effusion, now being admitted for recurrence of pleural effusion; also found with hyponatremia and transaminitis most likely i/s/o mets CA, will likely require SW/CM input regarding disposition

## 2022-06-11 NOTE — PROGRESS NOTE ADULT - PROBLEM SELECTOR PLAN 5
Patient with hx of metastatic breast cancer, on fulvestrant and Xgeva, follows with Dr. Funez.    - Follow up with Dr. Funez if anything needs to be done inpatient

## 2022-06-11 NOTE — PHYSICAL THERAPY INITIAL EVALUATION ADULT - GENERAL OBSERVATIONS, REHAB EVAL
PT IE Completed. Pt received semi-supine in bed, A&Ox4, +RA,+heplock,  in NAD and agreeable to work with PT, CLARY Dalton notified.Pt left in seated in bedside chair, +call castillo within reach, CLARY Dalton notified. Pt can benefit from PT in order to improve quality of life by increasing strength/endurance/balance with functional mobility and ADLS.

## 2022-06-11 NOTE — PROGRESS NOTE ADULT - PROBLEM SELECTOR PLAN 2
Baseline Na 131-133  Na 129 upon arrival  pt reports decrease PO intake of fluids, dry on exam  also i/s/o of mets CA  s/p 1L NS    - Monitor  - If worsening on 6/11 AM ---> get serum Osm, urine lytes

## 2022-06-11 NOTE — PROGRESS NOTE ADULT - PROBLEM SELECTOR PLAN 1
Patient presenting with 4 weeks of SOB on exertion. Has hx of metastatic breast  cancer as well as pleural effusions now presenting with recurrence of pleural effusion on left side. Speaking in complete sentence and ambulating freely. Hemodynamically stable, afebrile and no leukocytosis.   CXR showed large left pleural effusion    - Pulm consulted ---> plan for possible thoracentesis ---> will most likely need pleur-x/indwelling pleural cath  - Patient comfortable, will continue to monitor oxygenation.

## 2022-06-12 LAB
GLUCOSE BLDC GLUCOMTR-MCNC: 165 MG/DL — HIGH (ref 70–99)
GLUCOSE BLDC GLUCOMTR-MCNC: 173 MG/DL — HIGH (ref 70–99)
GLUCOSE BLDC GLUCOMTR-MCNC: 181 MG/DL — HIGH (ref 70–99)
GLUCOSE BLDC GLUCOMTR-MCNC: 219 MG/DL — HIGH (ref 70–99)

## 2022-06-12 PROCEDURE — 99232 SBSQ HOSP IP/OBS MODERATE 35: CPT

## 2022-06-12 RX ADMIN — HEPARIN SODIUM 5000 UNIT(S): 5000 INJECTION INTRAVENOUS; SUBCUTANEOUS at 14:40

## 2022-06-12 RX ADMIN — Medication 4: at 13:01

## 2022-06-12 RX ADMIN — Medication 2: at 17:28

## 2022-06-12 RX ADMIN — HEPARIN SODIUM 5000 UNIT(S): 5000 INJECTION INTRAVENOUS; SUBCUTANEOUS at 06:38

## 2022-06-12 RX ADMIN — HEPARIN SODIUM 5000 UNIT(S): 5000 INJECTION INTRAVENOUS; SUBCUTANEOUS at 22:05

## 2022-06-12 RX ADMIN — Medication 2: at 08:54

## 2022-06-12 RX ADMIN — Medication 1000 UNIT(S): at 11:54

## 2022-06-12 RX ADMIN — Medication 88 MICROGRAM(S): at 06:38

## 2022-06-12 NOTE — PROGRESS NOTE ADULT - SUBJECTIVE AND OBJECTIVE BOX
Interval Events: Reviewed  Patient seen and examined at bedside.    Patient is a 89y old  Female who presents with a chief complaint of pleural effusion (11 Jun 2022 16:46)  no acute event overnight, RA 96%      PAST MEDICAL & SURGICAL HISTORY:  Type 2 diabetes mellitus  DM (diabetes mellitus)      HTN (hypertension)      Breast cancer      Hypothyroid      Endometrial cancer      Other acquired absence of organ  S/P splenectomy      Status post total hysterectomy  S/P hysterectomy      Other acquired absence of organ  S/P cholecystectomy      Status post cataract extraction  S/P cataract extraction          MEDICATIONS:  Pulmonary:    Antimicrobials:    Anticoagulants:  heparin   Injectable 5000 Unit(s) SubCutaneous every 8 hours    Cardiac:      Allergies    No Known Allergies    Intolerances        Vital Signs Last 24 Hrs  T(C): 36.6 (12 Jun 2022 05:21), Max: 36.6 (11 Jun 2022 11:58)  T(F): 97.8 (12 Jun 2022 05:21), Max: 97.9 (11 Jun 2022 21:58)  HR: 102 (12 Jun 2022 05:21) (83 - 102)  BP: 119/81 (12 Jun 2022 05:21) (116/78 - 132/68)  BP(mean): 89 (11 Jun 2022 09:30) (89 - 89)  RR: 18 (12 Jun 2022 05:21) (18 - 18)  SpO2: 95% (12 Jun 2022 05:21) (95% - 96%)        Review of Systems:   •	General: negative  •	Skin/Breast: negative  •	Ophthalmologic: negative  •	ENMT: negative  •	Respiratory and Thorax: negative  •	Cardiovascular: negative  •	Gastrointestinal: negative  •	Genitourinary: negative  •	Musculoskeletal: negative  •	Neurological: negative  •	Psychiatric: negative  •	Hematology/Lymphatics: negative  •	Endocrine: negative  •	Allergic/Immunologic: negative    Physical Exam:   • Constitutional:	elderly female, NAD  • Eyes:	EOMI; PERRL; no drainage or redness  • ENMT:	No oral lesions; no gross abnormalities  • Neck	no thyromegaly or nodules  • Breasts:	not examined  • Back:	No deformity or limitation of movement  • Respiratory:	Breath Sounds equal & clear to auscultation, no accessory muscle use, decreased breath sounds left base   • Cardiovascular:	Regular rate & rhythm, normal S1, S2; no murmurs, gallops or rubs; no S3, S4  • Gastrointestinal:	Soft, non-tender, no hepatosplenomegaly, normal bowel sounds  • Genitourinary:	not examined  • Rectal: not examined  • Extremities:	No cyanosis, clubbing or edema  • Vascular:	Equal and normal pulses (dorsalis pedis)  • Neurologica:l	not examined  • Skin:	No lesions; no rash  • Lymph Nodes:	No lymphadedenopathy  • Musculoskeletal:	No joint pain, swelling or deformity; no limitation of movement        LABS:      CBC Full  -  ( 10 Yann 2022 15:29 )  WBC Count : 7.46 K/uL  RBC Count : 4.50 M/uL  Hemoglobin : 13.8 g/dL  Hematocrit : 41.4 %  Platelet Count - Automated : 353 K/uL  Mean Cell Volume : 92.0 fl  Mean Cell Hemoglobin : 30.7 pg  Mean Cell Hemoglobin Concentration : 33.3 gm/dL  Auto Neutrophil # : 4.99 K/uL  Auto Lymphocyte # : 1.18 K/uL  Auto Monocyte # : 1.09 K/uL  Auto Eosinophil # : 0.10 K/uL  Auto Basophil # : 0.06 K/uL  Auto Neutrophil % : 67.0 %  Auto Lymphocyte % : 15.8 %  Auto Monocyte % : 14.6 %  Auto Eosinophil % : 1.3 %  Auto Basophil % : 0.8 %    06-10    129<L>  |  93<L>  |  18  ----------------------------<  179<H>  4.6   |  24  |  0.73    Ca    9.2      10 Yann 2022 15:29  Phos  3.3     06-10  Mg     2.0     06-10    TPro  7.4  /  Alb  3.8  /  TBili  0.5  /  DBili  x   /  AST  46<H>  /  ALT  36  /  AlkPhos  298<H>  06-10    PT/INR - ( 11 Jun 2022 05:30 )   PT: 12.2 sec;   INR: 1.02          PTT - ( 11 Jun 2022 05:30 )  PTT:29.9 sec                    RADIOLOGY & ADDITIONAL STUDIES (The following images were personally reviewed):  Rosales:                                     No  Urine output:                       adequate  DVT prophylaxis:                 Yes  Flattus:                                  Yes  Bowel movement:              No

## 2022-06-12 NOTE — PROGRESS NOTE ADULT - PROBLEM SELECTOR PLAN 4
Patient with hx of DM2 on glypiride at home.     - c/w ISS  - monitor FS  - consistent carb diet  - Follow up A1c

## 2022-06-13 ENCOUNTER — RESULT REVIEW (OUTPATIENT)
Age: 87
End: 2022-06-13

## 2022-06-13 DIAGNOSIS — C78.7 SECONDARY MALIGNANT NEOPLASM OF LIVER AND INTRAHEPATIC BILE DUCT: ICD-10-CM

## 2022-06-13 LAB
ALBUMIN SERPL ELPH-MCNC: 3.1 G/DL — LOW (ref 3.3–5)
ALP SERPL-CCNC: 252 U/L — HIGH (ref 40–120)
ALT FLD-CCNC: 29 U/L — SIGNIFICANT CHANGE UP (ref 10–45)
ANION GAP SERPL CALC-SCNC: 8 MMOL/L — SIGNIFICANT CHANGE UP (ref 5–17)
APTT BLD: 72.9 SEC — HIGH (ref 27.5–35.5)
AST SERPL-CCNC: 39 U/L — SIGNIFICANT CHANGE UP (ref 10–40)
BASOPHILS # BLD AUTO: 0.07 K/UL — SIGNIFICANT CHANGE UP (ref 0–0.2)
BASOPHILS NFR BLD AUTO: 1 % — SIGNIFICANT CHANGE UP (ref 0–2)
BILIRUB SERPL-MCNC: 0.3 MG/DL — SIGNIFICANT CHANGE UP (ref 0.2–1.2)
BLD GP AB SCN SERPL QL: NEGATIVE — SIGNIFICANT CHANGE UP
BUN SERPL-MCNC: 16 MG/DL — SIGNIFICANT CHANGE UP (ref 7–23)
CALCIUM SERPL-MCNC: 8.2 MG/DL — LOW (ref 8.4–10.5)
CHLORIDE SERPL-SCNC: 99 MMOL/L — SIGNIFICANT CHANGE UP (ref 96–108)
CO2 SERPL-SCNC: 24 MMOL/L — SIGNIFICANT CHANGE UP (ref 22–31)
CREAT SERPL-MCNC: 0.72 MG/DL — SIGNIFICANT CHANGE UP (ref 0.5–1.3)
EGFR: 80 ML/MIN/1.73M2 — SIGNIFICANT CHANGE UP
EOSINOPHIL # BLD AUTO: 0.3 K/UL — SIGNIFICANT CHANGE UP (ref 0–0.5)
EOSINOPHIL NFR BLD AUTO: 4.2 % — SIGNIFICANT CHANGE UP (ref 0–6)
GLUCOSE BLDC GLUCOMTR-MCNC: 142 MG/DL — HIGH (ref 70–99)
GLUCOSE BLDC GLUCOMTR-MCNC: 143 MG/DL — HIGH (ref 70–99)
GLUCOSE BLDC GLUCOMTR-MCNC: 200 MG/DL — HIGH (ref 70–99)
GLUCOSE BLDC GLUCOMTR-MCNC: 275 MG/DL — HIGH (ref 70–99)
GLUCOSE SERPL-MCNC: 150 MG/DL — HIGH (ref 70–99)
HCT VFR BLD CALC: 37.3 % — SIGNIFICANT CHANGE UP (ref 34.5–45)
HGB BLD-MCNC: 12.4 G/DL — SIGNIFICANT CHANGE UP (ref 11.5–15.5)
IMM GRANULOCYTES NFR BLD AUTO: 0.6 % — SIGNIFICANT CHANGE UP (ref 0–1.5)
INR BLD: 1.04 — SIGNIFICANT CHANGE UP (ref 0.88–1.16)
LYMPHOCYTES # BLD AUTO: 1.42 K/UL — SIGNIFICANT CHANGE UP (ref 1–3.3)
LYMPHOCYTES # BLD AUTO: 20.1 % — SIGNIFICANT CHANGE UP (ref 13–44)
MAGNESIUM SERPL-MCNC: 2 MG/DL — SIGNIFICANT CHANGE UP (ref 1.6–2.6)
MCHC RBC-ENTMCNC: 30.6 PG — SIGNIFICANT CHANGE UP (ref 27–34)
MCHC RBC-ENTMCNC: 33.2 GM/DL — SIGNIFICANT CHANGE UP (ref 32–36)
MCV RBC AUTO: 92.1 FL — SIGNIFICANT CHANGE UP (ref 80–100)
MONOCYTES # BLD AUTO: 1.15 K/UL — HIGH (ref 0–0.9)
MONOCYTES NFR BLD AUTO: 16.2 % — HIGH (ref 2–14)
NEUTROPHILS # BLD AUTO: 4.1 K/UL — SIGNIFICANT CHANGE UP (ref 1.8–7.4)
NEUTROPHILS NFR BLD AUTO: 57.9 % — SIGNIFICANT CHANGE UP (ref 43–77)
NRBC # BLD: 0 /100 WBCS — SIGNIFICANT CHANGE UP (ref 0–0)
PHOSPHATE SERPL-MCNC: 2.5 MG/DL — SIGNIFICANT CHANGE UP (ref 2.5–4.5)
PLATELET # BLD AUTO: 298 K/UL — SIGNIFICANT CHANGE UP (ref 150–400)
POTASSIUM SERPL-MCNC: 4.7 MMOL/L — SIGNIFICANT CHANGE UP (ref 3.5–5.3)
POTASSIUM SERPL-SCNC: 4.7 MMOL/L — SIGNIFICANT CHANGE UP (ref 3.5–5.3)
PROT SERPL-MCNC: 6.2 G/DL — SIGNIFICANT CHANGE UP (ref 6–8.3)
PROTHROM AB SERPL-ACNC: 12.4 SEC — SIGNIFICANT CHANGE UP (ref 10.5–13.4)
RBC # BLD: 4.05 M/UL — SIGNIFICANT CHANGE UP (ref 3.8–5.2)
RBC # FLD: 15.2 % — HIGH (ref 10.3–14.5)
RH IG SCN BLD-IMP: POSITIVE — SIGNIFICANT CHANGE UP
SODIUM SERPL-SCNC: 131 MMOL/L — LOW (ref 135–145)
T4 FREE SERPL-MCNC: 1.33 NG/DL — SIGNIFICANT CHANGE UP (ref 0.93–1.7)
WBC # BLD: 7.08 K/UL — SIGNIFICANT CHANGE UP (ref 3.8–10.5)
WBC # FLD AUTO: 7.08 K/UL — SIGNIFICANT CHANGE UP (ref 3.8–10.5)

## 2022-06-13 PROCEDURE — 99233 SBSQ HOSP IP/OBS HIGH 50: CPT | Mod: GC

## 2022-06-13 PROCEDURE — 88305 TISSUE EXAM BY PATHOLOGIST: CPT | Mod: 26

## 2022-06-13 PROCEDURE — 71045 X-RAY EXAM CHEST 1 VIEW: CPT | Mod: 26

## 2022-06-13 PROCEDURE — 32550 INSERT PLEURAL CATH: CPT

## 2022-06-13 PROCEDURE — 88112 CYTOPATH CELL ENHANCE TECH: CPT | Mod: 26

## 2022-06-13 PROCEDURE — 99233 SBSQ HOSP IP/OBS HIGH 50: CPT | Mod: 25,GC

## 2022-06-13 DEVICE — PLEURX CATHETER KIT: Type: IMPLANTABLE DEVICE | Status: FUNCTIONAL

## 2022-06-13 RX ADMIN — Medication 88 MICROGRAM(S): at 05:14

## 2022-06-13 RX ADMIN — Medication 6: at 12:05

## 2022-06-13 RX ADMIN — Medication 1000 UNIT(S): at 11:38

## 2022-06-13 NOTE — PROGRESS NOTE ADULT - PROBLEM SELECTOR PLAN 6
Patient with hypothyroidism on levothyroxine at home.     - c/w synthroid 88 mcg qd  - TSH elevated, free T4 WNL

## 2022-06-13 NOTE — PROGRESS NOTE ADULT - PROBLEM SELECTOR PLAN 1
Patient presenting with 4 weeks of SOB on exertion. Has hx of metastatic breast cancer as well as pleural effusions now presenting with recurrence of pleural effusion on left side. Speaking in complete sentence and ambulating freely. Hemodynamically stable, afebrile and no leukocytosis. CXR showed large left pleural effusion    - Pulm consulted ---> pleur-x/indwelling pleural cath today 6/13  - Patient comfortable at rest, mild SOB with ambuation, will continue to monitor oxygenation.

## 2022-06-13 NOTE — PROGRESS NOTE ADULT - SUBJECTIVE AND OBJECTIVE BOX
[facility]    KAI FRANCES  MRN-[id number]    Interval Hx: The patient feels better this am, denies SOB or CP, she is on RA and appears comfortable    HPI: 89 year old female was admitted yesterday with progressive dyspnea due to enlarging malignant left pleural effusion. Diagnosed more than 2 years ago with Breast cancer, ER+IL- her2 gary - and was treated until today with monthly Fulvestrant injections. Has disease at site of left breast, bones, liver and pleura. and is obviously progressing. Other medication; XGEVA, Synthroid, Vitamin D3.    ROS:  + SOB.    No nausea/vomiting/fevers/chills/night sweats.   Appetite is stable without weight loss.  No abdominal pain/diarrhea/constipation.   No history of easy bruising/bleeding.  No leg pain or leg swelling.    ROS is otherwise negative.    PMH/PSH:  PAST MEDICAL & SURGICAL HISTORY:  Type 2 diabetes mellitus  DM (diabetes mellitus)    HTN (hypertension)    Hyperthyroidism    Breast cancer    Hypothyroid    Endometrial cancer    Other acquired absence of organ  S/P splenectomy    Status post total hysterectomy  S/P hysterectomy    Other acquired absence of organ  S/P cholecystectomy    Status post cataract extraction  S/P cataract extraction        Medications:  MEDICATIONS  (STANDING):  cholecalciferol 1000 Unit(s) Oral daily  dextrose 5%. 1000 milliLiter(s) (100 mL/Hr) IV Continuous <Continuous>  dextrose 5%. 1000 milliLiter(s) (50 mL/Hr) IV Continuous <Continuous>  dextrose 50% Injectable 25 Gram(s) IV Push once  dextrose 50% Injectable 12.5 Gram(s) IV Push once  dextrose 50% Injectable 25 Gram(s) IV Push once  glucagon  Injectable 1 milliGRAM(s) IntraMuscular once  insulin lispro (ADMELOG) corrective regimen sliding scale   SubCutaneous three times a day before meals  insulin lispro (ADMELOG) corrective regimen sliding scale   SubCutaneous at bedtime  levothyroxine 88 MICROGram(s) Oral daily    MEDICATIONS  (PRN):  aluminum hydroxide/magnesium hydroxide/simethicone Suspension 30 milliLiter(s) Oral every 4 hours PRN Dyspepsia  dextrose Oral Gel 15 Gram(s) Oral once PRN Blood Glucose LESS THAN 70 milliGRAM(s)/deciliter  melatonin 3 milliGRAM(s) Oral at bedtime PRN Insomnia  ondansetron Injectable 4 milliGRAM(s) IV Push every 8 hours PRN Nausea and/or Vomiting    Allergies    No Known Allergies    Intolerances    Exam:  Vital Signs Last 24 Hrs  T(C): 36.6 (06-13-22 @ 05:26), Max: 36.7 (06-12-22 @ 20:30)  T(F): 97.8 (06-13-22 @ 05:26), Max: 98 (06-12-22 @ 20:30)  HR: 65 (06-13-22 @ 05:26) (65 - 86)  BP: 122/79 (06-13-22 @ 05:26) (118/85 - 129/72)  BP(mean): --  RR: 18 (06-13-22 @ 05:26) (18 - 18)  SpO2: 96% (06-13-22 @ 05:26) (96% - 97%)  HEENT: pink mucosae, anicteric sclerae  No palpable peripheral lymphadenopathy  COR: regular rhythm rate, no murmurs, rubs or gallops  PULMO: decreased BS on L  ABD: soft, no palpable masses, no splenomegaly  EXT: no edema  NEURO: intact  SKIN: no lesions on visible skin    Labs:  CBC Full  -  ( 13 Jun 2022 06:00 )  WBC Count : 7.08 K/uL  RBC Count : 4.05 M/uL  Hemoglobin : 12.4 g/dL  Hematocrit : 37.3 %  Platelet Count - Automated : 298 K/uL  Mean Cell Volume : 92.1 fl  Mean Cell Hemoglobin : 30.6 pg  Mean Cell Hemoglobin Concentration : 33.2 gm/dL  Auto Neutrophil # : 4.10 K/uL  Auto Lymphocyte # : 1.42 K/uL  Auto Monocyte # : 1.15 K/uL  Auto Eosinophil # : 0.30 K/uL  Auto Basophil # : 0.07 K/uL  Auto Neutrophil % : 57.9 %  Auto Lymphocyte % : 20.1 %  Auto Monocyte % : 16.2 %  Auto Eosinophil % : 4.2 %  Auto Basophil % : 1.0 %    PT/INR - ( 13 Jun 2022 06:00 )   PT: 12.4 sec;   INR: 1.04          PTT - ( 13 Jun 2022 06:00 )  PTT:72.9 sec    06-13    131<L>  |  99  |  16  ----------------------------<  150<H>  4.7   |  24  |  0.72    Ca    8.2<L>      13 Jun 2022 06:00  Phos  2.5     06-13  Mg     2.0     06-13    TPro  6.2  /  Alb  3.1<L>  /  TBili  0.3  /  DBili  x   /  AST  39  /  ALT  29  /  AlkPhos  252<H>  06-13      Pertinent imaging studies: reviewed    Assessment:  Metastatic breast ca  Recurrent L pleural effusion    Plan:  Progressive disease with new liver mets and recurrent L pleural effusion  Most recent treatment with faslodex  Suspect malignant effusion, despite previous negative cytology  Pleurx cath would be the most reasonable approach, but the patient wants to discuss this further with Dr. Hargorve  Systemic treatment to be discussed further, she progressed through two lines of standard hormonal treatment  Her ECOG PS remains good, but doubt that she can tolerate systemic chemotherapy  She is clear about wanting to preserve her QOL and not interested in aggressive therapy    Thank you    Chrystal Funez MD  [phone]

## 2022-06-13 NOTE — PROGRESS NOTE ADULT - ASSESSMENT
per Internal Medicine    89 y o F with PMHx breast cancer (on fulvestrant and Xgeva), DM2, hypothyroidism, endometrial carcinoma s/p hysterectomy and bilateral oophorectomy, and benign pancreatic tail adenoma with recent admission for left pleural effusion, now being admitted for recurrence of pleural effusion; also found with hyponatremia and transaminitis most likely i/s/o mets CA, will likely require SW/CM input regarding disposition     Problem/Plan - 1:  ·  Problem: Pleural effusion.   ·  Plan: Patient presenting with 4 weeks of SOB on exertion. Has hx of metastatic breast cancer as well as pleural effusions now presenting with recurrence of pleural effusion on left side. Speaking in complete sentence and ambulating freely. Hemodynamically stable, afebrile and no leukocytosis. CXR showed large left pleural effusion    - Pulm consulted ---> pleur-x/indwelling pleural cath today 6/13  - Patient comfortable at rest, mild SOB with ambuation, will continue to monitor oxygenation.    Problem/Plan - 2:  ·  Problem: Hyponatremia.   ·  Plan: Baseline Na 131-133  Na 129 upon arrival  pt reports decrease PO intake of fluids, dry on exam  also i/s/o of mets CA  s/p 1L NS    - continue to monitor.    Problem/Plan - 3:  ·  Problem: Transaminitis.   ·  Plan: CT on 6/6 showed numerous new hepatic lesions consistent with metastatic disease  most likely i/s/o of mets CA to liver  elevated ALP most likely 2/2 bone mets    - Monitor LFTs.    Problem/Plan - 4:  ·  Problem: Diabetes.   ·  Plan: Patient with hx of DM2 on glypiride at home.     - c/w ISS  - monitor FS  - consistent carb diet.    Problem/Plan - 5:  ·  Problem: Breast cancer.   ·  Plan: Patient with hx of metastatic breast cancer, on fulvestrant and Xgeva, follows with Dr. Funez.    - Follow up with Dr. Funez if anything needs to be done inpatient.    Problem/Plan - 6:  ·  Problem: Hypothyroidism.   ·  Plan: Patient with hypothyroidism on levothyroxine at home.     - c/w synthroid 88 mcg qd  - TSH elevated, free T4 WNL.    Problem/Plan - 7:  ·  Problem: Prophylactic measure.   ·  Plan: F: s/p 1L NS  E: Replete PRN  N: Carb consistent  DVT PPx: Hep subQ (held for procedure)   GI PPx: None  Code: FULL  Dispo: 7-WOL.

## 2022-06-13 NOTE — PROGRESS NOTE ADULT - PROBLEM SELECTOR PLAN 3
CT on 6/6 showed numerous new hepatic lesions consistent with metastatic disease  most likely i/s/o of mets CA to liver  elevated ALP most likely 2/2 bone mets    - Monitor LFTs

## 2022-06-13 NOTE — PROGRESS NOTE ADULT - SUBJECTIVE AND OBJECTIVE BOX
PULMONARY CONSULT SERVICE FOLLOW-UP NOTE    INTERVAL HPI:  Reviewed chart and overnight events; patient seen and examined at bedside. Felt some SOB this AM. Otherwise no cough, no sputum.     MEDICATIONS  (STANDING):  cholecalciferol 1000 Unit(s) Oral daily  dextrose 5%. 1000 milliLiter(s) (100 mL/Hr) IV Continuous <Continuous>  dextrose 5%. 1000 milliLiter(s) (50 mL/Hr) IV Continuous <Continuous>  dextrose 50% Injectable 25 Gram(s) IV Push once  dextrose 50% Injectable 12.5 Gram(s) IV Push once  dextrose 50% Injectable 25 Gram(s) IV Push once  glucagon  Injectable 1 milliGRAM(s) IntraMuscular once  insulin lispro (ADMELOG) corrective regimen sliding scale   SubCutaneous three times a day before meals  insulin lispro (ADMELOG) corrective regimen sliding scale   SubCutaneous at bedtime  levothyroxine 88 MICROGram(s) Oral daily    MEDICATIONS  (PRN):  aluminum hydroxide/magnesium hydroxide/simethicone Suspension 30 milliLiter(s) Oral every 4 hours PRN Dyspepsia  dextrose Oral Gel 15 Gram(s) Oral once PRN Blood Glucose LESS THAN 70 milliGRAM(s)/deciliter  melatonin 3 milliGRAM(s) Oral at bedtime PRN Insomnia  ondansetron Injectable 4 milliGRAM(s) IV Push every 8 hours PRN Nausea and/or Vomiting    Allergies  No Known Allergies    Vital Signs Last 24 Hrs  T(C): 36.6 (13 Jun 2022 11:52), Max: 36.7 (12 Jun 2022 20:30)  T(F): 97.9 (13 Jun 2022 11:52), Max: 98 (12 Jun 2022 20:30)  HR: 74 (13 Jun 2022 11:52) (65 - 86)  BP: 123/77 (13 Jun 2022 11:52) (122/79 - 129/72)  RR: 15 (13 Jun 2022 11:52) (15 - 18)  SpO2: 97% (13 Jun 2022 11:52) (96% - 97%)    PHYSICAL EXAM:  Constitutional: WD, comfortable  HEENT: anicteric sclera  Neck: supple  Cardiovascular: +S1/S2, RRR  Respiratory: dec breath sounds L base; no wheezing  Gastrointestinal: soft, NT/ND  Extremities: WWP; no edema, clubbing or cyanosis  Vascular: 2+ radial and pedal pulses  Neurological: alert, awake    LABS:  CBC Full  -  ( 13 Jun 2022 06:00 )  WBC Count : 7.08 K/uL  RBC Count : 4.05 M/uL  Hemoglobin : 12.4 g/dL  Hematocrit : 37.3 %  Platelet Count - Automated : 298 K/uL  Mean Cell Volume : 92.1 fl  Mean Cell Hemoglobin : 30.6 pg  Mean Cell Hemoglobin Concentration : 33.2 gm/dL  Auto Neutrophil # : 4.10 K/uL  Auto Lymphocyte # : 1.42 K/uL  Auto Monocyte # : 1.15 K/uL  Auto Eosinophil # : 0.30 K/uL  Auto Basophil # : 0.07 K/uL  Auto Neutrophil % : 57.9 %  Auto Lymphocyte % : 20.1 %  Auto Monocyte % : 16.2 %  Auto Eosinophil % : 4.2 %  Auto Basophil % : 1.0 %    06-13    131<L>  |  99  |  16  ----------------------------<  150<H>  4.7   |  24  |  0.72    Ca    8.2<L>      13 Jun 2022 06:00  Phos  2.5     06-13  Mg     2.0     06-13    TPro  6.2  /  Alb  3.1<L>  /  TBili  0.3  /  DBili  x   /  AST  39  /  ALT  29  /  AlkPhos  252<H>  06-13    PT/INR - ( 13 Jun 2022 06:00 )   PT: 12.4 sec;   INR: 1.04       PTT - ( 13 Jun 2022 06:00 )  PTT:72.9 sec    RADIOLOGY & ADDITIONAL STUDIES: Reviewed.

## 2022-06-13 NOTE — PROGRESS NOTE ADULT - SUBJECTIVE AND OBJECTIVE BOX
Physical Medicine and Rehabilitation Progress Note :    Patient is a 89y old  Female who presents with a chief complaint of pleural effusion (13 Jun 2022 13:55)      HPI:  89F with PMHx breast cancer (on fulvestrant and Xgeva), DM2 (A1c 7.6 on this admission), hypothyroidism, HLD, lymphedema, endometrial carcinoma s/p hysterectomy and bilateral oophorectomy, and benign pancreatic tail adenoma, with recent admission on 05/14-05/19 for pleural effusion s/p thoracentesis on 05/17,  now referred by Delvin Curry for admission. Pt reports worsening weakness and dyspnea on exertion over the past week. Pt saw Dr. Hargrove today and he thought she should be admitted. Pt denies fever, chills, CP, abd pain, N/V/D.     ED course:  VS: T 97.6, /73, , RR 17, SpO2 99% on RA  Labs: CBC unremarkable, CMP significant for Na 129, , AST/ALT 46/36  CXR: Large left pleural effusion  CT on 6/6 showed numerous new hepatic lesions consistent with metastatic disease; large pleural effusion, increased since December 21; Extensive sclerotic bony metastases.  EKG: NSR, FL and QRS interval wnl, no ST or T wave changes  Interventions: 1L NS (10 Yann 2022 16:26)                            12.4   7.08  )-----------( 298      ( 13 Jun 2022 06:00 )             37.3       06-13    131<L>  |  99  |  16  ----------------------------<  150<H>  4.7   |  24  |  0.72    Ca    8.2<L>      13 Jun 2022 06:00  Phos  2.5     06-13  Mg     2.0     06-13    TPro  6.2  /  Alb  3.1<L>  /  TBili  0.3  /  DBili  x   /  AST  39  /  ALT  29  /  AlkPhos  252<H>  06-13    Vital Signs Last 24 Hrs  T(C): 36.6 (13 Jun 2022 11:52), Max: 36.7 (12 Jun 2022 20:30)  T(F): 97.9 (13 Jun 2022 11:52), Max: 98 (12 Jun 2022 20:30)  HR: 74 (13 Jun 2022 11:52) (65 - 86)  BP: 123/77 (13 Jun 2022 11:52) (122/79 - 129/72)  BP(mean): --  RR: 15 (13 Jun 2022 11:52) (15 - 18)  SpO2: 97% (13 Jun 2022 11:52) (96% - 97%)    MEDICATIONS  (STANDING):  cholecalciferol 1000 Unit(s) Oral daily  dextrose 5%. 1000 milliLiter(s) (100 mL/Hr) IV Continuous <Continuous>  dextrose 5%. 1000 milliLiter(s) (50 mL/Hr) IV Continuous <Continuous>  dextrose 50% Injectable 25 Gram(s) IV Push once  dextrose 50% Injectable 12.5 Gram(s) IV Push once  dextrose 50% Injectable 25 Gram(s) IV Push once  glucagon  Injectable 1 milliGRAM(s) IntraMuscular once  insulin lispro (ADMELOG) corrective regimen sliding scale   SubCutaneous three times a day before meals  insulin lispro (ADMELOG) corrective regimen sliding scale   SubCutaneous at bedtime  levothyroxine 88 MICROGram(s) Oral daily    MEDICATIONS  (PRN):  aluminum hydroxide/magnesium hydroxide/simethicone Suspension 30 milliLiter(s) Oral every 4 hours PRN Dyspepsia  dextrose Oral Gel 15 Gram(s) Oral once PRN Blood Glucose LESS THAN 70 milliGRAM(s)/deciliter  melatonin 3 milliGRAM(s) Oral at bedtime PRN Insomnia  ondansetron Injectable 4 milliGRAM(s) IV Push every 8 hours PRN Nausea and/or Vomiting    Currently Undergoing Physical/ Occupational Therapy at bedside    Initial PT/OT Functional Status Assessment :       Previous Level of Function:     · Ambulation Skills	needs device  · Transfer Skills	needs device  · ADL Skills	needs device  · Work/Leisure Activity	needs device  · Additional Comments	Pt lives in an elevator accessible apartment with ramp access. Pt reports she has not left the home since March 2022 due to previous hospitalizations. Pt owns a rollator and cane, primarily using the cane inside the home. Pt reports she uses the rollator when shes outside shopping (but has not recently). Pt has a tub shower and a shower chair. Reports she has been having VNS physical therapy for the last couple weeks.    Cognitive Status Examination:   · Orientation	oriented to person, place, time and situation  · Level of Consciousness	alert  · Follows Commands and Answers Questions	100% of the time  · Personal Safety and Judgment	intact  · Short Term Memory	intact    Range of Motion Exam:   · Active Range of Motion Examination	bilateral  lower extremity Active ROM was WFL (within functional limits); bilateral upper extremity Active ROM was WFL (within functional limits)    MMT Hip:     · Hip Flexion	(4-) good minus, left  (4-) good minus, right    MMT Knee:     · Knee Flexion	(4-) good minus, left  (4-) good minus, right  · Knee Extension	(4-) good minus, left  (4-) good minus, right    MMT Ankle:     · Ankle Dorsiflexion	(4) good, left  (4) good, right  · Ankle Plantarflexion	(4) good, left  (4) good, right    Bed Mobility: Rolling/Turning:     · Level of Lena	independent    Bed Mobility: Scooting/Bridging:     · Level of Lena	independent    Bed Mobility: Sit to Supine:     · Level of Lena	NT - pt left in chair    Bed Mobility: Supine to Sit:     · Level of Lena	independent  · Assistive Device	HOB elevated slightly as pillows would be in home.    Bed Mobility Analysis:     · Bed Mobility Limitations	No lightheaded/dizziness/SOB reported.    Transfer: Sit to Stand:     · Level of Lena	modified independence  · Assistive Device	straight cane    Transfer: Stand to Sit:     · Level of Lena	modified independence  · Assistive Device	straight cane    Sit/Stand Transfer Safety Analysis:     · Transfer Safety Concerns Noted	Pt performed STS from EOB and into bedside chair.    Gait Skills:     · Level of Lena	supervision  · Assistive Device	straight cane  · Gait Distance	75ft + 25ft + 50ft    Gait Analysis:     · Gait Pattern Used	3-point gait  Pt ambulated in hallway on RA requiring 2 standing rest breaks due to fatigue. 6min walk test attempted however pt able to complete 4 1/2 mins due to modified RPE of 5/10. Pt desat to 92% and recovered to 95% in <30 secs with rest break.  · Gait Deviations Noted	decreased weight-shifting ability; 1x mild LOB  · Impairments Contributing to Gait Deviations	decreased endurance; impaired balance; decreased strength    Stair Negotiation:     · Level of Lena	NT    Balance Skills Assessment:     · Sitting Balance: Static	good balance  · Sitting Balance: Dynamic	good balance  · Sit-to-Stand Balance	good balance  · Standing Balance: Static	good balance  · Standing Balance: Dynamic	fair balance  · Systems Impairment Contributing to Balance Disturbance	musculoskeletal  · Identified Impairments Contributing to Balance Disturbance	decreased strength; impaired postural control    Sensory Examination:   Sensory Examination:    Grossly Intact:   · Gross Sensory Examination	Grossly Intact      Treatment Location:   · Comments	Pt with LUE swelling with do not use extremity band.    Clinical Impressions:   · Criteria for Skilled Therapeutic Interventions	impairments found; functional limitations in following categories; risk reduction/prevention  · Impairments Found (describe specific impairments)	aerobic capacity/endurance; muscle strength; gait, locomotion, and balance; posture  · Functional Limitations in Following Categories (describe specific limitations)	home management; self-care; community/leisure  · Risk Reduction/Prevention (Describe Specific Areas of risk reduction/prevention)	risk factors  · Risk Areas	fall  · Rehab Potential	good, to achieve stated therapy goals  · Therapy Frequency	2-3x/week  · Anticipated Equipment Needs at Discharge	Pt owns rollator, straight cane and shower chair.        PM&R Impression : as above    Current Disposition Plan Recommendations :    d/c home , home PT

## 2022-06-13 NOTE — PROGRESS NOTE ADULT - SUBJECTIVE AND OBJECTIVE BOX
INTERVAL HPI/OVERNIGHT EVENTS:  Interim reviewed;  Agrees to Pleurx cath;  Oncology follow up noted;   Patient without oxygen and feels Shortness of breath with ambulation  As stated she wants to preserve her quality of life;  Will need more help at home as well      MEDICATIONS  (STANDING):  cholecalciferol 1000 Unit(s) Oral daily  dextrose 5%. 1000 milliLiter(s) (100 mL/Hr) IV Continuous <Continuous>  dextrose 5%. 1000 milliLiter(s) (50 mL/Hr) IV Continuous <Continuous>  dextrose 50% Injectable 25 Gram(s) IV Push once  dextrose 50% Injectable 12.5 Gram(s) IV Push once  dextrose 50% Injectable 25 Gram(s) IV Push once  glucagon  Injectable 1 milliGRAM(s) IntraMuscular once  insulin lispro (ADMELOG) corrective regimen sliding scale   SubCutaneous three times a day before meals  insulin lispro (ADMELOG) corrective regimen sliding scale   SubCutaneous at bedtime  levothyroxine 88 MICROGram(s) Oral daily    MEDICATIONS  (PRN):  aluminum hydroxide/magnesium hydroxide/simethicone Suspension 30 milliLiter(s) Oral every 4 hours PRN Dyspepsia  dextrose Oral Gel 15 Gram(s) Oral once PRN Blood Glucose LESS THAN 70 milliGRAM(s)/deciliter  melatonin 3 milliGRAM(s) Oral at bedtime PRN Insomnia  ondansetron Injectable 4 milliGRAM(s) IV Push every 8 hours PRN Nausea and/or Vomiting      Allergies    No Known Allergies    Intolerances        Vital Signs Last 24 Hrs  T(C): 36.6 (13 Jun 2022 05:26), Max: 36.7 (12 Jun 2022 20:30)  T(F): 97.8 (13 Jun 2022 05:26), Max: 98 (12 Jun 2022 20:30)  HR: 65 (13 Jun 2022 05:26) (65 - 86)  BP: 122/79 (13 Jun 2022 05:26) (118/85 - 129/72)  BP(mean): --  RR: 18 (13 Jun 2022 05:26) (18 - 18)  SpO2: 96% (13 Jun 2022 05:26) (96% - 97%)          Constitutional:  Awake     Eyes: BRIDGER    ENMT: Negative    Neck: Supple    Back:  no tenderness     Respiratory:  decreased breath sounds on left     Cardiovascular: S1 S2    Gastrointestinal: soft    Genitourinary:    Extremities:  no edema    Vascular:    Neurological:    Skin:    Lymph Nodes:            LABS:                        12.4   7.08  )-----------( 298      ( 13 Jun 2022 06:00 )             37.3     06-13    131<L>  |  99  |  16  ----------------------------<  150<H>  4.7   |  24  |  0.72    Ca    8.2<L>      13 Jun 2022 06:00  Phos  2.5     06-13  Mg     2.0     06-13    TPro  6.2  /  Alb  3.1<L>  /  TBili  0.3  /  DBili  x   /  AST  39  /  ALT  29  /  AlkPhos  252<H>  06-13    PT/INR - ( 13 Jun 2022 06:00 )   PT: 12.4 sec;   INR: 1.04          PTT - ( 13 Jun 2022 06:00 )  PTT:72.9 sec      RADIOLOGY & ADDITIONAL TESTS:

## 2022-06-13 NOTE — PROGRESS NOTE ADULT - SUBJECTIVE AND OBJECTIVE BOX
SUBJECTIVE/OVERNIGHT EVENTS: No acute overnight events. Pt seen in AM at bedside, resting comfortably in bed, and does not appear to be in any acute distress. When asked, pt denies any recent or active fever, chills, nausea, vomiting, headache, acute sob, chest pain, abdominal pain, genitourinary sx, extremity pain or swelling.    VITAL SIGNS:  Vital Signs Last 24 Hrs  T(C): 36.6 (13 Jun 2022 11:52), Max: 36.7 (12 Jun 2022 20:30)  T(F): 97.9 (13 Jun 2022 11:52), Max: 98 (12 Jun 2022 20:30)  HR: 74 (13 Jun 2022 11:52) (65 - 86)  BP: 123/77 (13 Jun 2022 11:52) (122/79 - 129/72)  BP(mean): --  RR: 15 (13 Jun 2022 11:52) (15 - 18)  SpO2: 97% (13 Jun 2022 11:52) (96% - 97%)    PHYSICAL EXAM:  General: NAD; speaking in full sentences   HEENT: NC/AT; PERRL; EOMI; MMM  Neck: supple; no JVD  Cardiac: RRR; +S1/S2  Pulm: decreased L sided breath sounds   GI: soft, NT/ND, +BS  Extremities: WWP; L sided edematous arm (chronic)   Vasc: 2+ radial, DP pulses B/L  Neuro: AAOx3; no focal deficits    MEDICATIONS:  MEDICATIONS  (STANDING):  cholecalciferol 1000 Unit(s) Oral daily  dextrose 5%. 1000 milliLiter(s) (100 mL/Hr) IV Continuous <Continuous>  dextrose 5%. 1000 milliLiter(s) (50 mL/Hr) IV Continuous <Continuous>  dextrose 50% Injectable 25 Gram(s) IV Push once  dextrose 50% Injectable 12.5 Gram(s) IV Push once  dextrose 50% Injectable 25 Gram(s) IV Push once  glucagon  Injectable 1 milliGRAM(s) IntraMuscular once  insulin lispro (ADMELOG) corrective regimen sliding scale   SubCutaneous three times a day before meals  insulin lispro (ADMELOG) corrective regimen sliding scale   SubCutaneous at bedtime  levothyroxine 88 MICROGram(s) Oral daily    MEDICATIONS  (PRN):  aluminum hydroxide/magnesium hydroxide/simethicone Suspension 30 milliLiter(s) Oral every 4 hours PRN Dyspepsia  dextrose Oral Gel 15 Gram(s) Oral once PRN Blood Glucose LESS THAN 70 milliGRAM(s)/deciliter  melatonin 3 milliGRAM(s) Oral at bedtime PRN Insomnia  ondansetron Injectable 4 milliGRAM(s) IV Push every 8 hours PRN Nausea and/or Vomiting      ALLERGIES:  Allergies    No Known Allergies    Intolerances        LABS:                        12.4   7.08  )-----------( 298      ( 13 Jun 2022 06:00 )             37.3     06-13    131<L>  |  99  |  16  ----------------------------<  150<H>  4.7   |  24  |  0.72    Ca    8.2<L>      13 Jun 2022 06:00  Phos  2.5     06-13  Mg     2.0     06-13    TPro  6.2  /  Alb  3.1<L>  /  TBili  0.3  /  DBili  x   /  AST  39  /  ALT  29  /  AlkPhos  252<H>  06-13    PT/INR - ( 13 Jun 2022 06:00 )   PT: 12.4 sec;   INR: 1.04          PTT - ( 13 Jun 2022 06:00 )  PTT:72.9 sec    RADIOLOGY & ADDITIONAL TESTS: Reviewed.

## 2022-06-13 NOTE — PROGRESS NOTE ADULT - PROBLEM SELECTOR PLAN 2
Baseline Na 131-133  Na 129 upon arrival  pt reports decrease PO intake of fluids, dry on exam  also i/s/o of mets CA  s/p 1L NS    - continue to monitor

## 2022-06-13 NOTE — PROGRESS NOTE ADULT - PROBLEM SELECTOR PLAN 7
F: s/p 1L NS  E: Replete PRN  N: Carb consistent  DVT PPx: Hep subQ (held for procedure)   GI PPx: None  Code: FULL  Dispo: 7-[facility]OL

## 2022-06-13 NOTE — PROGRESS NOTE ADULT - ASSESSMENT
89F with PMHx metastatic breast cancer (on fulvestrant and Xgeva, followed by Dr. Funez), DM2 (A1c 7.6), hypothyroidism, HLD, lymphedema, endometrial carcinoma s/p hysterectomy and bilateral oophorectomy, and benign pancreatic tail adenoma, prior L sided thoracentesis on 11/30/22 and IR chest tube placement for hydro-pneumothorax on 12/3 and with recent admission on 05/14-05/19 for recurrent L sided pleural effusion s/p thoracentesis on 5/17. Pulmonary consulted for recurrent effusion.    Discussed progression of malignancy on CT scan and recurrent pleural effusion likely malignant as prior thoracentesis x2 have been exudative with lymphocytic predominance, although notably cytology has been negative. Rarely, Xgeva has been associated with pleural effusion. She is currently on RA and not in acute distress and appears comfortable at rest so urgent thoracentesis is not necessary. Discussed given rapid reaccumulation of pleural fluid since (s/p most recent thoracentesis only one month ago although prior to that last thoracentesis was 6 months ago) she would benefit from indwelling pleural catheter. Patient in agreement with plan. Plan for IPC today.    POCUS: Large L simple pleural effusion, L compression atelectasis    Recommendations:  - IPC today   - Will require VNS services set up  - Hold DVT prophylaxis until 6/14    Patient s/e/d with attending Dr. Vasquez.

## 2022-06-14 ENCOUNTER — TRANSCRIPTION ENCOUNTER (OUTPATIENT)
Age: 87
End: 2022-06-14

## 2022-06-14 DIAGNOSIS — Z71.89 OTHER SPECIFIED COUNSELING: ICD-10-CM

## 2022-06-14 DIAGNOSIS — Z51.5 ENCOUNTER FOR PALLIATIVE CARE: ICD-10-CM

## 2022-06-14 DIAGNOSIS — R53.81 OTHER MALAISE: ICD-10-CM

## 2022-06-14 DIAGNOSIS — R06.02 SHORTNESS OF BREATH: ICD-10-CM

## 2022-06-14 LAB
ANION GAP SERPL CALC-SCNC: 7 MMOL/L — SIGNIFICANT CHANGE UP (ref 5–17)
BUN SERPL-MCNC: 12 MG/DL — SIGNIFICANT CHANGE UP (ref 7–23)
CALCIUM SERPL-MCNC: 8.2 MG/DL — LOW (ref 8.4–10.5)
CHLORIDE SERPL-SCNC: 99 MMOL/L — SIGNIFICANT CHANGE UP (ref 96–108)
CO2 SERPL-SCNC: 26 MMOL/L — SIGNIFICANT CHANGE UP (ref 22–31)
CREAT SERPL-MCNC: 0.8 MG/DL — SIGNIFICANT CHANGE UP (ref 0.5–1.3)
EGFR: 70 ML/MIN/1.73M2 — SIGNIFICANT CHANGE UP
GLUCOSE BLDC GLUCOMTR-MCNC: 135 MG/DL — HIGH (ref 70–99)
GLUCOSE BLDC GLUCOMTR-MCNC: 153 MG/DL — HIGH (ref 70–99)
GLUCOSE BLDC GLUCOMTR-MCNC: 167 MG/DL — HIGH (ref 70–99)
GLUCOSE BLDC GLUCOMTR-MCNC: 208 MG/DL — HIGH (ref 70–99)
GLUCOSE SERPL-MCNC: 162 MG/DL — HIGH (ref 70–99)
HCT VFR BLD CALC: 38.2 % — SIGNIFICANT CHANGE UP (ref 34.5–45)
HGB BLD-MCNC: 12.5 G/DL — SIGNIFICANT CHANGE UP (ref 11.5–15.5)
MAGNESIUM SERPL-MCNC: 2 MG/DL — SIGNIFICANT CHANGE UP (ref 1.6–2.6)
MCHC RBC-ENTMCNC: 30.2 PG — SIGNIFICANT CHANGE UP (ref 27–34)
MCHC RBC-ENTMCNC: 32.7 GM/DL — SIGNIFICANT CHANGE UP (ref 32–36)
MCV RBC AUTO: 92.3 FL — SIGNIFICANT CHANGE UP (ref 80–100)
NRBC # BLD: 0 /100 WBCS — SIGNIFICANT CHANGE UP (ref 0–0)
PHOSPHATE SERPL-MCNC: 2.5 MG/DL — SIGNIFICANT CHANGE UP (ref 2.5–4.5)
PLATELET # BLD AUTO: 315 K/UL — SIGNIFICANT CHANGE UP (ref 150–400)
POTASSIUM SERPL-MCNC: 4.8 MMOL/L — SIGNIFICANT CHANGE UP (ref 3.5–5.3)
POTASSIUM SERPL-SCNC: 4.8 MMOL/L — SIGNIFICANT CHANGE UP (ref 3.5–5.3)
RBC # BLD: 4.14 M/UL — SIGNIFICANT CHANGE UP (ref 3.8–5.2)
RBC # FLD: 15.2 % — HIGH (ref 10.3–14.5)
SODIUM SERPL-SCNC: 132 MMOL/L — LOW (ref 135–145)
WBC # BLD: 6.61 K/UL — SIGNIFICANT CHANGE UP (ref 3.8–10.5)
WBC # FLD AUTO: 6.61 K/UL — SIGNIFICANT CHANGE UP (ref 3.8–10.5)

## 2022-06-14 PROCEDURE — 99232 SBSQ HOSP IP/OBS MODERATE 35: CPT | Mod: GC

## 2022-06-14 PROCEDURE — 99358 PROLONG SERVICE W/O CONTACT: CPT | Mod: NC

## 2022-06-14 PROCEDURE — 99233 SBSQ HOSP IP/OBS HIGH 50: CPT | Mod: GC

## 2022-06-14 PROCEDURE — 99497 ADVNCD CARE PLAN 30 MIN: CPT | Mod: 25

## 2022-06-14 PROCEDURE — 99223 1ST HOSP IP/OBS HIGH 75: CPT

## 2022-06-14 RX ORDER — HEPARIN SODIUM 5000 [USP'U]/ML
5000 INJECTION INTRAVENOUS; SUBCUTANEOUS EVERY 8 HOURS
Refills: 0 | Status: DISCONTINUED | OUTPATIENT
Start: 2022-06-14 | End: 2022-06-16

## 2022-06-14 RX ADMIN — HEPARIN SODIUM 5000 UNIT(S): 5000 INJECTION INTRAVENOUS; SUBCUTANEOUS at 22:29

## 2022-06-14 RX ADMIN — Medication 4: at 12:36

## 2022-06-14 RX ADMIN — Medication 1000 UNIT(S): at 11:52

## 2022-06-14 RX ADMIN — Medication 2: at 08:49

## 2022-06-14 RX ADMIN — Medication 88 MICROGRAM(S): at 06:48

## 2022-06-14 RX ADMIN — Medication 3 MILLIGRAM(S): at 22:29

## 2022-06-14 NOTE — CHART NOTE - NSCHARTNOTEFT_GEN_A_CORE
PALLIATIVE MEDICINE COORDINATION OF CARE NOTE FOR KAI FRANCES  [  ] ED Trigger   [  ] MICU Trigger     [  ] Consult    [ X ] AI Comanagement    Patient last assessed: _2/6/2020___  to manage: GOC/AD, Symptoms, and Support was recommended: __Support____    ___30___ Minutes; Start: __0700am___  End: __0730am__, of non-face-to-face prolonged service provided that relates to (face-to-face) care that has or will occur and ongoing patient management, including one or more of the following:   - Reviewed records from other physicians or other health care professional services, including one or more of the following: other medical records and diagnostic / radiology study results     HPI:  89F with PMHx breast cancer (on fulvestrant and Xgeva), DM2 (A1c 7.6 on this admission), hypothyroidism, HLD, lymphedema, endometrial carcinoma s/p hysterectomy and bilateral oophorectomy, and benign pancreatic tail adenoma, with recent admission on 05/14-05/19 for pleural effusion s/p thoracentesis on 05/17,  now referred by Delvin Curry for admission. Pt reports worsening weakness and dyspnea on exertion over the past week. Pt saw Dr. Hargrove today and he thought she should be admitted. Pt denies fever, chills, CP, abd pain, N/V/D.     ED course:  VS: T 97.6, /73, , RR 17, SpO2 99% on RA  Labs: CBC unremarkable, CMP significant for Na 129, , AST/ALT 46/36  CXR: Large left pleural effusion  CT on 6/6 showed numerous new hepatic lesions consistent with metastatic disease; large pleural effusion, increased since December 21; Extensive sclerotic bony metastases.  EKG: NSR, CT and QRS interval wnl, no ST or T wave changes  Interventions: 1L NS (10 Yann 2022 16:26)      - Other: iStop reviewed.    Rx found on iStop review. Ref #: [id number]    - Other: Medication reviewed.    The patient HAS NOT used PRN's in the last 24h.    MEDICATIONS  (STANDING):  cholecalciferol 1000 Unit(s) Oral daily  dextrose 5%. 1000 milliLiter(s) (100 mL/Hr) IV Continuous <Continuous>  dextrose 5%. 1000 milliLiter(s) (50 mL/Hr) IV Continuous <Continuous>  dextrose 50% Injectable 25 Gram(s) IV Push once  dextrose 50% Injectable 12.5 Gram(s) IV Push once  dextrose 50% Injectable 25 Gram(s) IV Push once  glucagon  Injectable 1 milliGRAM(s) IntraMuscular once  insulin lispro (ADMELOG) corrective regimen sliding scale   SubCutaneous three times a day before meals  insulin lispro (ADMELOG) corrective regimen sliding scale   SubCutaneous at bedtime  levothyroxine 88 MICROGram(s) Oral daily    MEDICATIONS  (PRN):  aluminum hydroxide/magnesium hydroxide/simethicone Suspension 30 milliLiter(s) Oral every 4 hours PRN Dyspepsia  dextrose Oral Gel 15 Gram(s) Oral once PRN Blood Glucose LESS THAN 70 milliGRAM(s)/deciliter  melatonin 3 milliGRAM(s) Oral at bedtime PRN Insomnia  ondansetron Injectable 4 milliGRAM(s) IV Push every 8 hours PRN Nausea and/or Vomiting      - Other: Advanced directives     Full Code     MOLST form found on patient window under admit page 5/6 for 1/30/2020 admission stating DNR/DNI     HCP form found on patient window under admit page 2 for 1/30/2020 admission     No Living will / POA / Advance directives found on [facility] / patient window     [facility] notes on [facility] 2/4/2020    - Other: Coordination/Plan of care     _3___ admissions in 1 year     Current admission LOS: __4__ days     LACE score: __13__ ADVANCE ILLNESS PATIENT.     Patient previously seen by palliative medicine consult service.      Consult request for: Advanced Illness Comanagement     Full consult to follow within 24h.

## 2022-06-14 NOTE — CONSULT NOTE ADULT - SUBJECTIVE AND OBJECTIVE BOX
Patient is a 89y old  Female who presents with a chief complaint of pleural effusion (11 Jun 2022 10:49)        HPI:  89F with PMHx breast cancer (on fulvestrant and Xgeva), DM2 (A1c 7.6 on this admission), hypothyroidism, HLD, lymphedema, endometrial carcinoma s/p hysterectomy and bilateral oophorectomy, and benign pancreatic tail adenoma, with recent admission on 05/14-05/19 for pleural effusion s/p thoracentesis on 05/17,  now referred by Delvin Curry for admission. Pt reports worsening weakness and dyspnea on exertion over the past week. Pt saw Dr. Hargrove today and he thought she should be admitted. Pt denies fever, chills, CP, abd pain, N/V/D.     ED course:  VS: T 97.6, /73, , RR 17, SpO2 99% on RA  Labs: CBC unremarkable, CMP significant for Na 129, , AST/ALT 46/36  CXR: Large left pleural effusion  CT on 6/6 showed numerous new hepatic lesions consistent with metastatic disease; large pleural effusion, increased since December 21; Extensive sclerotic bony metastases.  EKG: NSR, CO and QRS interval wnl, no ST or T wave changes  Interventions: 1L NS (10 Yann 2022 16:26)      PAST MEDICAL & SURGICAL HISTORY:  Type 2 diabetes mellitus  DM (diabetes mellitus)      HTN (hypertension)      Breast cancer      Hypothyroid      Endometrial cancer      Other acquired absence of organ  S/P splenectomy      Status post total hysterectomy  S/P hysterectomy      Other acquired absence of organ  S/P cholecystectomy      Status post cataract extraction  S/P cataract extraction          MEDICATIONS  (STANDING):  cholecalciferol 1000 Unit(s) Oral daily  dextrose 5%. 1000 milliLiter(s) (100 mL/Hr) IV Continuous <Continuous>  dextrose 5%. 1000 milliLiter(s) (50 mL/Hr) IV Continuous <Continuous>  dextrose 50% Injectable 25 Gram(s) IV Push once  dextrose 50% Injectable 12.5 Gram(s) IV Push once  dextrose 50% Injectable 25 Gram(s) IV Push once  glucagon  Injectable 1 milliGRAM(s) IntraMuscular once  heparin   Injectable 5000 Unit(s) SubCutaneous every 8 hours  insulin lispro (ADMELOG) corrective regimen sliding scale   SubCutaneous three times a day before meals  insulin lispro (ADMELOG) corrective regimen sliding scale   SubCutaneous at bedtime  levothyroxine 88 MICROGram(s) Oral daily    MEDICATIONS  (PRN):  aluminum hydroxide/magnesium hydroxide/simethicone Suspension 30 milliLiter(s) Oral every 4 hours PRN Dyspepsia  dextrose Oral Gel 15 Gram(s) Oral once PRN Blood Glucose LESS THAN 70 milliGRAM(s)/deciliter  melatonin 3 milliGRAM(s) Oral at bedtime PRN Insomnia  ondansetron Injectable 4 milliGRAM(s) IV Push every 8 hours PRN Nausea and/or Vomiting        Social History:                   -  Home Living Status:  lives alone in an elevator accessible apartment building in [place], [place]         -  Prior Home Care Services :   none      Baseline Functional Level Prior to Admission :             - ADL's/ IADL's :  independent         - ambulatory status PTA :  walks with a rollator        FAMILY HISTORY:  FH: type 2 diabetes (Mother)      CBC Full  -  ( 10 Yann 2022 15:29 )  WBC Count : 7.46 K/uL  RBC Count : 4.50 M/uL  Hemoglobin : 13.8 g/dL  Hematocrit : 41.4 %  Platelet Count - Automated : 353 K/uL  Mean Cell Volume : 92.0 fl  Mean Cell Hemoglobin : 30.7 pg  Mean Cell Hemoglobin Concentration : 33.3 gm/dL  Auto Neutrophil # : 4.99 K/uL  Auto Lymphocyte # : 1.18 K/uL  Auto Monocyte # : 1.09 K/uL  Auto Eosinophil # : 0.10 K/uL  Auto Basophil # : 0.06 K/uL  Auto Neutrophil % : 67.0 %  Auto Lymphocyte % : 15.8 %  Auto Monocyte % : 14.6 %  Auto Eosinophil % : 1.3 %  Auto Basophil % : 0.8 %      06-10    129<L>  |  93<L>  |  18  ----------------------------<  179<H>  4.6   |  24  |  0.73    Ca    9.2      10 Yann 2022 15:29  Phos  3.3     06-10  Mg     2.0     06-10    TPro  7.4  /  Alb  3.8  /  TBili  0.5  /  DBili  x   /  AST  46<H>  /  ALT  36  /  AlkPhos  298<H>  06-10            Radiology :    < from: Xray Chest 2 Views PA/Lat (06.10.22 @ 15:47) >  ACC: [id number] EXAM:  XR CHEST PA LAT 2V                          PROCEDURE DATE:  06/10/2022          INTERPRETATION:  Clinical History: Weakness    Frontal examination of the chest demonstrates cardiomegaly. Left   effusion. Diffuse osteoblastic osteoblastic metastatic disease noted.   Levoscoliosis thoracic spine. Surgical clips noted upper abdomen.    IMPRESSION: Left effusion                Vital Signs Last 24 Hrs  T(C): 36.4 (11 Jun 2022 05:47), Max: 36.6 (10 Yann 2022 20:35)  T(F): 97.6 (11 Jun 2022 05:47), Max: 97.8 (10 Yann 2022 20:35)  HR: 97 (11 Jun 2022 09:30) (78 - 103)  BP: 132/68 (11 Jun 2022 09:30) (107/73 - 137/78)  BP(mean): 89 (11 Jun 2022 09:30) (89 - 89)  RR: 18 (11 Jun 2022 05:47) (17 - 20)  SpO2: 95% (11 Jun 2022 09:30) (95% - 99%)        REVIEW OF SYSTEMS:      CONSTITUTIONAL: No fever, weight loss, or fatigue  EYES: No eye pain, visual disturbances, or discharge  ENMT:  No difficulty hearing, tinnitus, vertigo; No sinus or throat pain  NECK: No pain or stiffness  BREASTS: No pain, masses, or nipple discharge  RESPIRATORY:  per HPI  CARDIOVASCULAR: No chest pain, palpitations, dizziness, or leg swelling  GASTROINTESTINAL: No abdominal or epigastric pain. No nausea, vomiting, or hematemesis; No diarrhea or constipation. No melena or hematochezia.  GENITOURINARY: No dysuria, frequency, hematuria, or incontinence  NEUROLOGICAL: No headaches, memory loss, loss of strength, numbness, or tremors  SKIN: No itching, burning, rashes, or lesions   LYMPH NODES: No enlarged glands  ENDOCRINE: No heat or cold intolerance; No hair loss  MUSCULOSKELETAL: No joint pain or swelling; No muscle, back, or extremity pain  PSYCHIATRIC: No depression, anxiety, mood swings, or difficulty sleeping  HEME/LYMPH: No easy bruising, or bleeding gums  ALLERGY AND IMMUNOLOGIC: No hives or eczema  VASCULAR: no swelling , erythema          Physical Exam:  thin 89 y o  woman lying in semi Pearce's position , awake , alert , no acute complaints    Head : normocephalic , atraumatic    Eyes : PERRLA , EOMI , no nystagmus , sclera anicteric    ENT : nasal discharge , uvula midline , no oropharyngeal erythema / exudate    Neck : supple , negative JVD , negative carotid bruits , no thyromegaly    Chest : dec BS left base    Cardiovascular: regular rate and rhythm , neg murmurs / rubs / gallops    Abdomen : soft , non distended , non tender to palpation in all 4 quadrants , negative rebound / guarding , normal bowel sounds    Extremities : WWP , neg cyanosis /clubbing / edema    Neurologic Exam:    Alert and oriented x 3       Motor Exam:    Right UE:           no focal weakness ,  > 3+/5 throughout                                Left UE:             no focal weakness,  > 3+/5 throughout    Right LE:            no focal weakness,  > 3+/5 throughout    Left LE:              no focal weakness,  > 3+/5 throughout        Sensation:         intact to light touch x 4 extremities                         DTR :                     biceps/brachioradialis : equal                                              patella/ankle : equalGait :  not tested              PM&R Impression : admitted for c/o     1) deconditioned    2) no focal weakness      Recommendations / Plan :     1) Physical / Occupational therapy focusing on therapeutic exercises , bed mobility/transfer out of bed evaluation , progressive ambulation with assistive       devices prn .    2) Anticipated Disposition Plan / Recs  :  d/c home , home PT              
PULMONARY SERVICE INITIAL CONSULT NOTE    HPI:  89F with PMHx metastatic breast cancer (on fulvestrant and Xgeva, followed by Dr. Funez), DM2 (A1c 7.6), hypothyroidism, HLD, lymphedema, endometrial carcinoma s/p hysterectomy and bilateral oophorectomy, and benign pancreatic tail adenoma, with recent admission on 05/14-05/19 for pleural effusion s/p thoracentesis x2 (most recently 5/17).     Pt known to pulmonary service from prior admissions.   Pt reports worsening weakness and dyspnea on exertion over the past week.  She has had two prior thoracenteses for recurrent pleural effusion last on 5/17 and prior to that approx 6 months ago.   Pt saw Dr. Hargrove today and given sx and recurrent pleural effusions rec hospitalization for futher management. Understands worsening of pleural effusion possibly 2/2 malignancy and progression of disease. denies cough, sputum production, fever, chills, chest pain, abd pain, N/V/D.     REVIEW OF SYSTEMS:  as above     PAST MEDICAL & SURGICAL HISTORY:  Type 2 diabetes mellitus  DM (diabetes mellitus)      HTN (hypertension)      Breast cancer      Hypothyroid      Endometrial cancer      Other acquired absence of organ  S/P splenectomy      Status post total hysterectomy  S/P hysterectomy      Other acquired absence of organ  S/P cholecystectomy      Status post cataract extraction  S/P cataract extraction      FAMILY HISTORY:  FH: type 2 diabetes (Mother)    SOCIAL HISTORY:  minimal prior smoker    MEDICATIONS:  Pulmonary:    Antimicrobials:    Anticoagulants:  heparin   Injectable 5000 Unit(s) SubCutaneous every 8 hours    Onc:    GI/:  aluminum hydroxide/magnesium hydroxide/simethicone Suspension 30 milliLiter(s) Oral every 4 hours PRN    Endocrine:  dextrose 50% Injectable 25 Gram(s) IV Push once  dextrose 50% Injectable 12.5 Gram(s) IV Push once  dextrose 50% Injectable 25 Gram(s) IV Push once  dextrose Oral Gel 15 Gram(s) Oral once PRN  glucagon  Injectable 1 milliGRAM(s) IntraMuscular once  insulin lispro (ADMELOG) corrective regimen sliding scale   SubCutaneous three times a day before meals  insulin lispro (ADMELOG) corrective regimen sliding scale   SubCutaneous at bedtime    Cardiac:    Other Medications:  dextrose 5%. 1000 milliLiter(s) IV Continuous <Continuous>  dextrose 5%. 1000 milliLiter(s) IV Continuous <Continuous>  melatonin 3 milliGRAM(s) Oral at bedtime PRN  ondansetron Injectable 4 milliGRAM(s) IV Push every 8 hours PRN    Allergies  No Known Allergies    Vital Signs Last 24 Hrs  T(C): 36.5 (10 Yann 2022 19:26), Max: 36.5 (10 Yann 2022 19:26)  T(F): 97.7 (10 Yann 2022 19:26), Max: 97.7 (10 Yann 2022 19:26)  HR: 96 (10 Yann 2022 19:26) (78 - 103)  BP: 133/77 (10 Yann 2022 19:26) (107/73 - 137/78)  BP(mean): --  RR: 20 (10 Yann 2022 19:26) (17 - 20)  SpO2: 95% (10 Yann 2022 19:26) (95% - 99%)    PHYSICAL EXAM:  Constitutional: no acute distress,  Head: NC/AT  EENT: PERRL, anicteric sclera; oropharynx clear, MMM  Neck: supple, no appreciable JVD  Respiratory: decreased breath sounds at L base,  no W/R/R, no increased work of breathing or tachypnea, speaking in full sentences on RA  Cardiovascular: +S1/S2, RRR  Gastrointestinal: soft, NT/ND  Extremities: WWP; no edema, clubbing or cyanosis  Neurological: awake and alert; grossly intact     LABS:    CBC Full  -  ( 10 Yann 2022 15:29 )  WBC Count : 7.46 K/uL  RBC Count : 4.50 M/uL  Hemoglobin : 13.8 g/dL  Hematocrit : 41.4 %  Platelet Count - Automated : 353 K/uL  Mean Cell Volume : 92.0 fl  Mean Cell Hemoglobin : 30.7 pg  Mean Cell Hemoglobin Concentration : 33.3 gm/dL  Auto Neutrophil # : 4.99 K/uL  Auto Lymphocyte # : 1.18 K/uL  Auto Monocyte # : 1.09 K/uL  Auto Eosinophil # : 0.10 K/uL  Auto Basophil # : 0.06 K/uL  Auto Neutrophil % : 67.0 %  Auto Lymphocyte % : 15.8 %  Auto Monocyte % : 14.6 %  Auto Eosinophil % : 1.3 %  Auto Basophil % : 0.8 %    06-10    129<L>  |  93<L>  |  18  ----------------------------<  179<H>  4.6   |  24  |  0.73    Ca    9.2      10 Yann 2022 15:29  Phos  3.3     06-10  Mg     2.0     06-10    TPro  7.4  /  Alb  3.8  /  TBili  0.5  /  DBili  x   /  AST  46<H>  /  ALT  36  /  AlkPhos  298<H>  06-10    RADIOLOGY & ADDITIONAL STUDIES:  Reviewed  < from: CT Abdomen and Pelvis w/ IV Cont (06.06.22 @ 13:59) >  NTERPRETATION:  CLINICAL INFORMATION: Breast cancer    COMPARISON: CT chest 12/8/2021 and other studies dating back to 1/30/2020    CONTRAST/COMPLICATIONS:  IV Contrast: Omnipaque 350 90 cc administered   10 cc discarded  Oral Contrast: NONE  Complications: None reported at time of study completion    PROCEDURE:  CT of the Chest, Abdomen and Pelvis was performed.  Sagittal and coronal reformats were performed.    FINDINGS:  CHEST:  LUNGS AND LARGE AIRWAYS: Patent central airways. Compressive atelectasis   of the left lower lobe and dependent atelectasis in theleft upper lobe.   Stable subcentimeter nodules right middle lobe.  PLEURA: No right pleural effusion. Large partially loculated left pleural   effusion, increased since study of December 2021. No pneumothorax.  VESSELS: No thoracic aortic aneurysm. No pulmonary emboli.  HEART: Heart size is normal. No pericardial effusion.  MEDIASTINUM AND ANNMARIE: No lymphadenopathy.  CHEST WALL AND LOWER NECK: 3.3 x 1.6 cm left breast mass containing   biopsy clip associated with skin thickening in the left chestwall, as   seen on prior study. Other smaller nodular densities in the subcutaneous   tissues of the left breast towards the axilla. Clip in the left axilla.    ABDOMEN AND PELVIS:  LIVER: Interval development of numerous hypodense lesions throughoutthe   liver consistent with metastatic disease. The largest lesion measures 1.8   cm located in hepatic segment 2. Multiple hepatic cysts prior study.  BILE DUCTS: Normal caliber.  GALLBLADDER: Cholecystectomy.  SPLEEN: Splenectomy. Unchanged residual splenule in the left upper   quadrant.  PANCREAS: Distal pancreatectomy.  ADRENALS: Normal right adrenal gland. Stable 1.8 cm left adrenal nodule.  KIDNEYS/URETERS: A few renal cysts. No right hydronephrosis or   nephrolithiasis. 0.7 cm nonobstructive left kidney stone. Mild left   hydronephrosis and proximal hydroureter, new since prior study. Etiology   is not visualized.    BLADDER: Unremarkable.  REPRODUCTIVE ORGANS: Post hysterectomy.    BOWEL: Colonic diverticulosis.  PERITONEUM: No ascites.  VESSELS: No aneurysm.  RETROPERITONEUM/LYMPH NODES: No lymphadenopathy.  ABDOMINAL WALL: Within normal limits.  BONES: No significant change in extensive bony metastases throughout the   skeleton.    IMPRESSION:    Numerous new hepatic lesions consistent with metastatic disease.    Large pleural effusion, increased since December 21.    Left breast mass again noted.    CXR 6/10/22  large L sided pleural effusion     Extensive sclerotic bony metastases, unchanged.    Mild left hydronephrosis. Etiology not visualized.      
KAI FRANCES          MRN-[id number]            (1933))    HPI:  89F with PMHx breast cancer (on fulvestrant and Xgeva), DM2 (A1c 7.6 on this admission), hypothyroidism, HLD, lymphedema, endometrial carcinoma s/p hysterectomy and bilateral oophorectomy, and benign pancreatic tail adenoma, with recent admission on - for pleural effusion s/p thoracentesis on ,  now referred by Delvin Curry for admission. Pt reports worsening weakness and dyspnea on exertion over the past week. Pt saw Dr. Hargrove today and he thought she should be admitted. Pt denies fever, chills, CP, abd pain, N/V/D.     ED course:  VS: T 97.6, /73, , RR 17, SpO2 99% on RA  Labs: CBC unremarkable, CMP significant for Na 129, , AST/ALT 46/36  CXR: Large left pleural effusion  CT on  showed numerous new hepatic lesions consistent with metastatic disease; large pleural effusion, increased since ; Extensive sclerotic bony metastases.  EKG: NSR, [place] and QRS interval wnl, no ST or T wave changes  Interventions: 1L NS (10 Yann 2022 16:26)      PAST MEDICAL & SURGICAL HISTORY:  Type 2 diabetes mellitus  DM (diabetes mellitus)    HTN (hypertension)    Breast cancer      Hypothyroid    Endometrial cancer    Other acquired absence of organ  S/P splenectomy    Status post total hysterectomy  S/P hysterectomy    Other acquired absence of organ  S/P cholecystectomy    Status post cataract extraction  S/P cataract extraction    FAMILY HISTORY:  FH: type 2 diabetes (Mother)     Reviewed and found non contributory in mother or father    SOCIAL HISTORY: Lives Alone, not , and no Children. No aides.  Family is a cousin in [place].  Has friends for support but also elderly.  Retired .  Denies Smoking, Substance abuse. Endorses Social EtOH.    PSYCHOSOCIAL ASSESSMENT:  Yarsanism/Spiritual practice: none  Role of organized Druze [ ] important [ ] some [x] non [ ] unable to assess dt pt mentation   Coping: [x ] well [ ] with difficulty [ ] poor coping [ ] unable to assess dt pt mentation  Support system: [ x] strong [ ] adequate [ ] inadequate    ROS:    Unable to attain due to:   n/a     Dyspnea (Elissa 0-10):  0                      N/V (Y/N):               N               Secretions (Y/N) :      N          Agitation(Y/N): N  Pain (Y/N):     N  -Provocation/Palliation: n/a   -Quality/Quantity: n/a   -Radiating: n/a   -Severity: n/a   -Timing/Frequency: n/a   -Impact on ADLs: n/a     General:  Denied  HEENT:    Denied  Neck:  Denied  CVS:  Denied  Resp:  Denied  GI:  Denied  :  Denied  Musc:  Denied  Neuro:  Denied  Psych:  Denied  Skin:  Denied  Lymph:  Denied    Allergies    No Known Allergies    Intolerances      Opiate Naive (Y/N): Y  -iStop reviewed (Y/N): Y (Ref#:  [id number]         )    Medications:      MEDICATIONS  (STANDING):  cholecalciferol 1000 Unit(s) Oral daily  dextrose 5%. 1000 milliLiter(s) (100 mL/Hr) IV Continuous <Continuous>  dextrose 5%. 1000 milliLiter(s) (50 mL/Hr) IV Continuous <Continuous>  dextrose 50% Injectable 25 Gram(s) IV Push once  dextrose 50% Injectable 12.5 Gram(s) IV Push once  dextrose 50% Injectable 25 Gram(s) IV Push once  glucagon  Injectable 1 milliGRAM(s) IntraMuscular once  insulin lispro (ADMELOG) corrective regimen sliding scale   SubCutaneous three times a day before meals  insulin lispro (ADMELOG) corrective regimen sliding scale   SubCutaneous at bedtime  levothyroxine 88 MICROGram(s) Oral daily    MEDICATIONS  (PRN):  aluminum hydroxide/magnesium hydroxide/simethicone Suspension 30 milliLiter(s) Oral every 4 hours PRN Dyspepsia  dextrose Oral Gel 15 Gram(s) Oral once PRN Blood Glucose LESS THAN 70 milliGRAM(s)/deciliter  melatonin 3 milliGRAM(s) Oral at bedtime PRN Insomnia  ondansetron Injectable 4 milliGRAM(s) IV Push every 8 hours PRN Nausea and/or Vomiting    Labs:    CBC:                        12.5   6.61  )-----------( 315      ( 2022 07:16 )             38.2     CMP:        132<L>  |  99  |  12  ----------------------------<  162<H>  4.8   |  26  |  0.80    Ca    8.2<L>      2022 07:16  Phos  2.5       Mg     2.0     -    TPro  6.2  /  Alb  3.1<L>  /  TBili  0.3  /  DBili  x   /  AST  39  /  ALT  29  /  AlkPhos  252<H>  -    PT/INR - ( 2022 06:00 )   PT: 12.4 sec;   INR: 1.04          PTT - ( 2022 06:00 )  PTT:72.9 sec    Imaging:    < from: CT Abdomen and Pelvis w/ IV Cont (22 @ 13:59) >    EXAM: [id number] - CT CHEST IC  - ORDERED BY: PHIL PETERSON    EXAM: [id number] - CT ABDOMEN AND PELVIS IC  - ORDERED BY: PHIL PETERSON      PROCEDURE DATE:  2022  IMPRESSION:    Numerous new hepatic lesions consistent with metastatic disease.    Large pleural effusion, increased since .    Left breast mass again noted.    Extensive sclerotic bony metastases, unchanged.    Mild left hydronephrosis. Etiology not visualized.      < end of copied text >    PEx:  T(C): 36.6 (22 @ 05:37), Max: 36.6 (22 @ 21:00)  HR: 83 (22 @ 05:37) (68 - 83)  BP: 126/80 (22 @ 05:37) (122/72 - 126/80)  RR: 18 (22 @ 05:37) (18 - 18)  SpO2: 97% (22 @ 05:37) (97% - 98%)  Wt(kg): 56.7kG  Daily     Daily   CAPILLARY BLOOD GLUCOSE    POCT Blood Glucose.: 208 mg/dL (2022 12:23)    I&O's Summary    GENERAL:  [x ]Alert  [x ]Oriented x 4   [ ]Lethargic  [ ]Cachexia  [ ]Unarousable  [x ]Verbal  [ ]Non-Verbal  Behavioral:   [ ] Anxiety  [ ] Delirium [ ] Agitation [x ] Other - calm   HEENT:  [ x]Normal   [ ]Dry mouth   [ ]ET Tube/Trach  [ ]Oral lesions  PULMONARY:   [ x]Clear  [ ]Tachypnea  [ ]Audible excessive secretions   [ ]Rhonchi        [ ]Right [ ]Left [ ]Bilateral  [ ]Crackles        [ ]Right [ ]Left [ ]Bilateral  [ ]Wheezing     [ ]Right [ ]Left [ ]Bilateral  CARDIOVASCULAR:    [x ]Regular [ ]Irregular [ ]Tachy  [ ]Hipolito [ ]Murmur [ ]Other  GASTROINTESTINAL:  [x ]Soft  [ ]Distended   [ ]+BS  [x ]Non tender [ ]Tender  [ ]PEG [ ]OGT/ NGT  Last BM:   GENITOURINARY:  [x ]Normal [ ] Incontinent   [ ]Oliguria/Anuria   [ ]Rosales  MUSCULOSKELETAL:   [x ]Normal   [ ]Weakness  [ ]Bed/Wheelchair bound [ ]Edema  NEUROLOGIC:   [ x]No focal deficits  [ ] Cognitive impairment  [ ] Dysphagia [ ]Dysarthria [ ] Paresis [ ]Other   SKIN:   [ ]Normal   [ ]Pressure ulcer(s)  [ ]Rash [x] left sided Pleurx noted     Preadmit Karnofsky: 70 %           Current Karnofsky:    70 %  Cachexia (Y/N): N  BMI: 22.9kG/m2     Advanced Directives:     DNR/DNI     [facility]     HCP    DECISION MAKER: Patient is able to make her own decisions.  LEGAL SURROGATE: Gerson Sung (HCP/Cousin) [phone]    GOALS OF CARE DISCUSSION       Palliative care info/counseling provided	           Family meeting       Advanced Directives addressed please see Advance Care Planning Note	           See previous Palliative Medicine Note       Documentation of GOC: DNR/DNI	          REFERRALS	        Unit SW/Case Mgmt       PT/OT

## 2022-06-14 NOTE — DISCHARGE NOTE PROVIDER - CARE PROVIDER_API CALL
Ania Hargrove)  Critical Care Medicine; Internal Medicine  [address], [address]  [place], [place] [address]  Phone: [phone]  Fax: [phone]  Established Patient  Follow Up Time: 2 weeks   Ania Hargrove)  Critical Care Medicine; Internal Medicine  [address], [address]  [place] [address]  Phone: [phone]  Fax: [phone]  Established Patient  Follow Up Time: 2 weeks    James Vasquez)  Critical Care Medicine; Internal Medicine; Pulmonary Disease  [address] [place] [address]  Phone: [phone]  Fax: [phone]  Follow Up Time: 1 month   Ania Hargrove)  Critical Care Medicine; Internal Medicine  [address], [address]  [place] [address]  Phone: [phone]  Fax: [phone]  Follow Up Time: 2 weeks    James Vasquez  Pulmonary Medicine  [address] [place] [address]  Phone: [phone]  Fax: (   )    -  Scheduled Appointment: 07/06/2022 11:00 AM   Ania Hargrove)  Critical Care Medicine; Internal Medicine  [address], [address]  [place] [address]  Phone: [phone]  Fax: [phone]  Established Patient  Scheduled Appointment: 07/18/2022 03:20 PM    James Vasquez  Pulmonary Medicine  [address] [place] [address]  Phone: [phone]  Fax: (   )    -  Scheduled Appointment: 07/06/2022 11:00 AM

## 2022-06-14 NOTE — PROGRESS NOTE ADULT - ASSESSMENT
89F with PMHx metastatic breast cancer (on fulvestrant and Xgeva, followed by Dr. Funez), DM2 (A1c 7.6), hypothyroidism, HLD, lymphedema, endometrial carcinoma s/p hysterectomy and bilateral oophorectomy, and benign pancreatic tail adenoma, prior L sided thoracentesis on 11/30/22 and IR chest tube placement for hydro-pneumothorax on 12/3 and with recent admission on 05/14-05/19 for recurrent L sided pleural effusion s/p thoracentesis on 5/17. Pulmonary consulted for recurrent effusion.    Discussed progression of malignancy on CT scan and recurrent pleural effusion likely malignant as prior thoracentesis x2 have been exudative with lymphocytic predominance, although notably cytology has been negative. Rarely, Xgeva has been associated with pleural effusion. s/p IPC 6/13 with removal of 850cc.     Recommendations:  - Will require VNS services set up and teaching for Pleurx drainage  - Please ensure patient has a follow up appointment with Dr. Vasquez in 3-4 weeks from discharge to f/u on Pleurx    Patient discussed with attending Dr. Vasquez.

## 2022-06-14 NOTE — PROGRESS NOTE ADULT - PROBLEM SELECTOR PLAN 7
F: s/p 1L NS  E: Replete PRN  N: Carb consistent  DVT PPx: Hep subQ   GI PPx: None  Code: FULL  Dispo: [facility]

## 2022-06-14 NOTE — PROGRESS NOTE ADULT - PROBLEM SELECTOR PLAN 1
Patient presenting with 4 weeks of SOB on exertion. Has hx of metastatic breast cancer as well as pleural effusions now presenting with recurrence of pleural effusion on left side. Speaking in complete sentence and ambulating freely. Hemodynamically stable, afebrile and no leukocytosis. CXR showed large left pleural effusion. S/p indwelling catheter placement at bedside with pulm, with 850 cc     - Pulm consulted ---> s/p indwelling pleural cath   - Patient comfortable at rest, mild SOB with ambuation, will continue to monitor oxygenation.  - f/u cytology

## 2022-06-14 NOTE — CONSULT NOTE ADULT - PROBLEM SELECTOR RECOMMENDATION 9
PPS 70%. Skin care PRN. Assist with ADL's PRN. Appreciate PT Involvement, thought patient is independent.

## 2022-06-14 NOTE — PROGRESS NOTE ADULT - PROBLEM SELECTOR PLAN 3
CT on 6/6 showed numerous new hepatic lesions consistent with metastatic disease. most likely i/s/o of mets CA to liver. elevated ALP most likely 2/2 bone mets    - Monitor LFTs

## 2022-06-14 NOTE — CONSULT NOTE ADULT - ASSESSMENT
89 year old woman  Debility, Pleural effusions,  Breast cancer, advance care planning and encounter for palliative care.

## 2022-06-14 NOTE — DISCHARGE NOTE PROVIDER - NSDCMRMEDTOKEN_GEN_ALL_CORE_FT
glimepiride 1 mg oral tablet: 1 milligram(s) orally 2 times a day  Synthroid 88 mcg (0.088 mg) oral tablet: 1 tab(s) orally once a day  Vitamin D3 1000 intl units (25 mcg) oral tablet: 1 tab(s) orally once a day

## 2022-06-14 NOTE — CONSULT NOTE ADULT - PROBLEM SELECTOR RECOMMENDATION 2
Patient with Large pleural effusion on CT chest. Patient s/p Left Pleurx Placement. Continue care as per primary team.

## 2022-06-14 NOTE — DISCHARGE NOTE PROVIDER - PROVIDER TOKENS
PROVIDER:[TOKEN:[[id number]],FOLLOWUP:[2 weeks],ESTABLISHEDPATIENT:[T]] PROVIDER:[TOKEN:[[id number]],FOLLOWUP:[2 weeks],ESTABLISHEDPATIENT:[T]],PROVIDER:[TOKEN:[[id number]],FOLLOWUP:[1 month]] PROVIDER:[TOKEN:[[id number]],FOLLOWUP:[2 weeks]],FREE:[LAST:[Vasquez],FIRST:[James CARO],PHONE:[phone]],FAX:[(   )    -],ADDRESS:[Pulmonary Medicine  [address] [place] [address], [address] [place] [address]],SCHEDULEDAPPT:[07/06/2022],SCHEDULEDAPPTTIME:[11:00 AM]] PROVIDER:[TOKEN:[[id number]],SCHEDULEDAPPT:[07/18/2022],SCHEDULEDAPPTTIME:[03:20 PM],ESTABLISHEDPATIENT:[T]],FREE:[LAST:[Pedro],FIRST:[James CARO],PHONE:[phone]],FAX:[(   )    -],ADDRESS:[Pulmonary Medicine  [address] [place] [address], [address] [place] [address]],SCHEDULEDAPPT:[07/06/2022],SCHEDULEDAPPTTIME:[11:00 AM]]

## 2022-06-14 NOTE — DISCHARGE NOTE PROVIDER - NSDCFUADDAPPT_GEN_ALL_CORE_FT
Please bring your Insurance card, Photo ID and Discharge paperwork to the following appointment:    (1) Please follow up with your Pulmonary Medicine Provider, Dr. James Vasquez at [address] [place] [address], [address] [place] [address] on 07/06/2022 at 11:00am.    Appointment was scheduled by Ms. KITA Chandler, Referral Coordinator.   Please bring your Insurance card, Photo ID and Discharge paperwork to the following appointments:    (1) Please follow up with your Pulmonary Medicine Provider, Dr. James Vasquez at [address], [address] [place] [address] on 07/06/2022 at 11:00am.    Appointment was scheduled by Ms. KITA Chandler, Referral Coordinator.    (2) Please follow up with your Primary Care Provider, Dr. Ania Hargrove at [address], [address] [place] [id number] on 07/18/2022 at 3:20pm.    Please note that the office of Dr. Hargrove will contact you for an earlier appointment once available.    Appointment was scheduled by Ms. KITA Chandler, Referral Coordinator.   Please bring your Insurance card, Photo ID and Discharge paperwork to the following appointments:    (1) Please follow up with your Pulmonary Medicine Provider, Dr. James Vasquez at [address], [address] [place] [address] on 07/06/2022 at 11:00am.    (2) Please follow up with your Primary Care Provider, Dr. Ania Hargrove at [address], [address] [place] [id number] on 07/18/2022 at 3:20pm.    Please note that the office of Dr. Hargrove will contact you for an earlier appointment once available.  Appointment was scheduled by Ms. KITA Chandler, Referral Coordinator.

## 2022-06-14 NOTE — DISCHARGE NOTE PROVIDER - NSDCCPTREATMENT_GEN_ALL_CORE_FT
PRINCIPAL PROCEDURE  Procedure: CT abdomen and pelvis without then with contrast  Findings and Treatment: Date of exam: 6/6/2022  Findings:  - Large partially loculated left pleural effusion, increased since study 12/2021.  - Compressive atelectasis of left lower lobe and dependent atelectasis in left upper lobe. Stable subcentimeter nodules right middle lobe.  - Large pleural effusion, increased since December 21.  - Stable 1.8 cm left adrenal nodule.  - Left breast mass again noted.  - Numerous new hepatic lesions consistent with metastatic disease.  - Colonic diverticulosis.  - A few renal cysts. No right hydronephrosis or nephrolithiasis. 0.7 cm nonobstructive left kidney stone. Mild left hydronephrosis and proximal hydroureter, new since prior study.  - Mild left hydronephrosis.  - Extensive sclerotic bony metastases, unchanged.

## 2022-06-14 NOTE — CONSULT NOTE ADULT - PROBLEM SELECTOR RECOMMENDATION 5
Patient made herself a DNR/DNI. Please see [facility] note above for details, patient wants to continue to receive palliative treatment and is not ready for hospice at this time.  As discussed during the palliative IDT meeting, the patients PSSA screening did not identify any current psychosocial need or spiritual support deficits.  - Jainism/Spiritual practice: non  - Coping: [ x] well [ ] with difficulty [ ] poor coping   - Support system: [ x] strong [ ] adequate [ ] inadequate  - All questions answered, emotional support provided  -  primary team   - Please contact Palliative Medicine 24/7 at [phone] for any acute symptoms or further questions  Palliative care will sign off at this time. Please reconsult as needed.

## 2022-06-14 NOTE — DISCHARGE NOTE PROVIDER - NSDCCPCAREPLAN_GEN_ALL_CORE_FT
PRINCIPAL DISCHARGE DIAGNOSIS  Diagnosis: Large pleural effusion  Assessment and Plan of Treatment: You were diagnosed with a large pleural effusion, whcih means there was a collection of fluid at the base of the lung. This can cause shortness of breath which can lead to fatigue and dizziness. The pulmonology team placed a catheter which is a small tube into the area of the fluid to drain it out. This fluid was sent to the lab. The drain was left in the area of the fluid and a nursing service will visit you to help drain the fluid if more is to recollect.      SECONDARY DISCHARGE DIAGNOSES  Diagnosis: Large pleural effusion  Assessment and Plan of Treatment:      PRINCIPAL DISCHARGE DIAGNOSIS  Diagnosis: Large pleural effusion  Assessment and Plan of Treatment: You were found to have a recurrence of your left pleural effusion, which is a collection of fluid at the base of the lung. This can cause shortness of breath which can lead to fatigue and dizziness, as you experienced at home. The pulmonary team placed a catheter, or small tube into the affected part of the lung, to help drain the fluid. The area was drained a few times while you were in the hospital and studies were sent to better characterize the fluid. These studies have not yet resulted, but Dr. Vasquez (pulmonologist) will be able to discuss the results with you at your appointment on 7/6. The visiting nursing service (VNS) will help you manage the chest tube at home.  Per the pulmponary team, can drain up to 1L every week at home, and VNS should come next week to drain it.  Please attend your appointments with Dr. Vasquez (pulmonary) on Wednesday 7/6 at 11am and with Dr. Hargrove on Monday 7/18 at 3/20pm.  Please schedule an appointment with Dr. Funez for within 1-2 weeks of discharge. Please call her office to discuss when and if to restart your chemotherapeutic medications.  Please call 911 or go to the nearest ER if you experience shortness of breath, breathing difficulties or chest pain as these may be signs that the fluid has reaccumulated very quickly in your lung necessitating further management in the hospital.

## 2022-06-14 NOTE — DISCHARGE NOTE PROVIDER - NSDCFUSCHEDAPPT_GEN_ALL_CORE_FT
James Vasquez  [facility]  [address]  Scheduled Appointment: 07/06/2022     James Vasquez  [facility]  PULMMED [address]  Scheduled Appointment: 07/06/2022    Ania Hargrove  [facility]  INTMED [address]  Scheduled Appointment: 07/18/2022

## 2022-06-14 NOTE — CONSULT NOTE ADULT - CONVERSATION DETAILS
In addition to the EM visit, an advance care planning meeting was performed  Start time: 1145pm  End time: 1210pm  Total time: 25 minutes   A face to face meeting to discuss advance care planning was held today regarding: KAI GARCÍA  Primary decision maker:  Kai García  Alternate/surrogate: Gerson Clemenciaannmarie (HCP/Cousin) [phone]  Discussed advance directives including, but not limited to, healthcare proxy and code status.  Decision regarding code status: DNR/DNI  Documentation completed today: GLENN     Discussion had with patient about her treatment course and wishes at the end of life. She stated that in the past she has answered this question and wants to continue to remain A DNR/DNI. Hospice was also discussed with her and she believes that she is not ready for hospice just yet as she has a good quality of life and wants to continue to receive Palliative treatment. GLENN discussed and filled in, copy placed in chart and given to patient for her records.

## 2022-06-14 NOTE — DISCHARGE NOTE PROVIDER - CARE PROVIDERS DIRECT ADDRESSES
,[email or web address] [email or web address]

## 2022-06-14 NOTE — DISCHARGE NOTE PROVIDER - HOSPITAL COURSE
#Discharge: do not delete    89F with PMHx breast cancer (on fulvestrant and Xgeva), DM2, hypothyroidism, endometrial carcinoma s/p hysterectomy and bilateral oophorectomy, and benign pancreatic tail adenoma with recent admission for left pleural effusion, now being admitted for recurrence of pleural effusion; also found with hyponatremia and transaminitis most likely i/s/o mets CA, will likely require SW/CM input regarding disposition     #Pleural effusion.   Patient presenting with 4 weeks of SOB on exertion. Has hx of metastatic breast cancer as well as pleural effusions now presenting with recurrence of pleural effusion on left side. Speaking in complete sentence and ambulating freely. Hemodynamically stable, afebrile and no leukocytosis. CXR showed large left pleural effusion. S/p indwelling catheter placement at bedside with pulm, with 850 cc     - Pulm consulted ---> s/p indwelling pleural cath   - Patient comfortable at rest, mild SOB with ambuation, will continue to monitor oxygenation.  - f/u cytology.    #Hyponatremia.   Baseline Na 131-133. Na 129 upon arrival. At baseline. i/s/o of mets CA.     - continue to monitor.    #Transaminitis.    CT on 6/6 showed numerous new hepatic lesions consistent with metastatic disease. most likely i/s/o of mets CA to liver. elevated ALP most likely 2/2 bone mets    - Monitor LFTs.    #Diabetes.   Patient with hx of DM2 on glypiride at home.     - c/w ISS  - monitor FS  - consistent carb diet.    #Breast cancer.    Patient with hx of metastatic breast cancer, on fulvestrant and Xgeva, follows with Dr. Funez.    - Follow up with Dr. Funez if anything needs to be done inpatient.    #Hypothyroidism.    Patient with hypothyroidism on levothyroxine at home.     - c/w synthroid 88 mcg qd  - TSH elevated, free T4 WNL.      Discharge to:     Inpatient treatment course:     Problem List/Main Diagnoses:     New medications/therapies:   New lines/hardware:  Labs to be followed outpatient:   Exam to be followed outpatient:   Outpatient appts:     #Discharge: do not delete    89yo F PMH breast cancer (on fulvestrant and Xgeva), T2DM, hypothyroidism, endometrial carcinoma s/p hysterectomy and bilateral oophorectomy, benign pancreatic tail adenoma w/ recent admission for L pleural effusion, now being admitted for recurrence of L-sided pleural effusion, also found to have hyponatremia and transaminitis likely i/s/o mets. Pleurx catheter placed 6/13. Dc'd w/ catheter in place and home VNS services for chest tube mgmt.    #Pleural effusion.   Presented with 4 weeks of SOB on exertion. Has h/o metastatic breast cancer as well as pleural effusions now presenting with recurrence of pleural effusion on left side. Speaking in complete sentence and ambulating freely. Hemodynamically stable, afebrile and no leukocytosis. CXR showed large left pleural effusion. S/p indwelling catheter placement at bedside with pulm, with 850 cc    Presented w/ 4w h/o SOB on exertion. Has known h/o metastatic breast cancer and pleural effusions, found to have recurrence of L pleural effusion. CXR showed large left pleural effusion.  - CXR 6/13: L pleural effusion decreased; small L pleural effusion remains; no pneumothorax or acute lung infiltrate; no acute bone abnormality.  - pulm consulted, pt now s/p L-sided indwelling catheter placement 6/13 --> 850cc drained immediately  - comfortable at rest, mild SOB with ambulation, will continue to monitor oxygenation.  - f/u cytology report.    - Pulm consulted ---> s/p indwelling pleural cath   - Patient comfortable at rest, mild SOB with ambuation, will continue to monitor oxygenation.  - f/u cytology.    #Hyponatremia.   Baseline Na 131-133. Na 129 upon arrival. At baseline. i/s/o of mets CA.     - continue to monitor.    #Transaminitis.    CT on 6/6 showed numerous new hepatic lesions consistent with metastatic disease. most likely i/s/o of mets CA to liver. elevated ALP most likely 2/2 bone mets    - Monitor LFTs.    #Diabetes.   Patient with hx of DM2 on glypiride at home.     - c/w ISS  - monitor FS  - consistent carb diet.    #Breast cancer.    Patient with hx of metastatic breast cancer, on fulvestrant and Xgeva, follows with Dr. Funez.    - Follow up with Dr. Funez if anything needs to be done inpatient.    #Hypothyroidism.    Patient with hypothyroidism on levothyroxine at home.     - c/w synthroid 88 mcg qd  - TSH elevated, free T4 WNL.      Patient was discharged to: home w/ VNS    New medications: none  Changes to old medications: none  Medications that were stopped: none    Items to follow up as outpatient:  1. Appt w/ PCP Dr. Hargrove  2. Pulm appt  3. pleural fluid cytology results    Physical exam at the time of discharge:   Constitutional: elderly female lying in bed breathing comfortably on RA  HEENT: NC/AT, EOMI, no conjunctival pallor or scleral icterus, MMM  Neck: Supple, no JVD  Chest: blotchy, raised, papular erythematous rash on anterior L chest extending up to shoulder and around to mid-axillary line, nontender, not pruritic  Respiratory: decreased breath sounds diffusely   Cardiovascular: RRR, normal S1/S2, no murmurs  Back: L sided indwelling catheter covered w/ gauze and tape, site is c/d/i and nontender to palpation; pressure ulcer at L mid-back covered w/ pressure dressing  Gastrointestinal: +BS, soft, NTND, no guarding  Extremities: wwp; no LE edema  Vascular: 2+ DP, radial pulses b/l  Neurological: AAOx3, no focal deficits  Skin: No gross skin abnormalities or rashes         #Discharge: do not delete    88yo F PMH breast cancer (on fulvestrant and Xgeva), T2DM, hypothyroidism, endometrial carcinoma s/p hysterectomy and bilateral oophorectomy, benign pancreatic tail adenoma w/ recent admission for L pleural effusion, now being admitted for recurrence of L-sided pleural effusion, also found to have hyponatremia and transaminitis likely i/s/o mets. Pleurx catheter placed 6/13. Dc'd w/ catheter in place and home VNS services for chest tube mgmt.    #Pleural effusion.   Presented w/ 4w h/o SOB on exertion. Known h/o metastatic breast cancer and pleural effusions, found to have recurrence of L pleural effusion. Pulm placed L-sided pleurx 6/13, drained multiple times during hospitalization. Pleural fluid cytology sent, still pending.    #Hyponatremia.   Baseline Na 131-133, i/s/o cancer w/ mets. Ranged 129-133. No interventions.    #Transaminitis.   ALP elevated, AST/ALT and Tbili wnl. CT A/P 6/6 w/ numerous new hepatic lesions and extensive sclerotic bony metastases (unchanged). Given CT findings w/ new mets, elevated ALP attributed to bone mets.    #Diabetes.   Known h/o T2DM, takes Glimepiride 1mg BID at home. Was on mISS inpt.    #Breast cancer.   Known h/o metastatic breast cancer, on fulvestrant and Xgeva at home, follows with Dr. Funez. Held chemotherapeutic agents inpt, will f/u w/ Dr. Funez outpt.    #Hypothyroidism.   Known h/o hypothyroidism, takes Levothyroxine 88mcg qd at home. TSH elevated, fT4 wnl (6/11-6/13). Continued synthroid 88mcg qd.    Patient was discharged to: home w/ VNS    New medications: none  Changes to old medications: none  Medications that were stopped: none    Items to follow up as outpatient:  1. Appt w/ PCP Dr. Hargrove 7/18  2. Pulm appt 7/6  3. pleural fluid cytology results    Physical exam at the time of discharge:   Constitutional: elderly female lying in bed breathing comfortably on RA  HEENT: NC/AT, EOMI, no conjunctival pallor or scleral icterus, MMM  Neck: Supple, no JVD  Chest: blotchy, raised, papular erythematous rash on anterior L chest extending up to shoulder and around to mid-axillary line, nontender, not pruritic  Respiratory: decreased breath sounds diffusely   Cardiovascular: RRR, normal S1/S2, no murmurs  Back: L sided indwelling catheter covered w/ gauze and tape, site is c/d/i and nontender to palpation; pressure ulcer at L mid-back covered w/ pressure dressing  Gastrointestinal: +BS, soft, NTND, no guarding  Extremities: wwp; no LE edema  Vascular: 2+ DP, radial pulses b/l  Neurological: AAOx3, no focal deficits  Skin: No gross skin abnormalities or rashes

## 2022-06-14 NOTE — PROGRESS NOTE ADULT - SUBJECTIVE AND OBJECTIVE BOX
[facility]    KAI FRANCES  MRN-[id number]    Interval Hx: The patient feels better this am, denies SOB or CP, she is on RA and appears comfortable  S/p PleurX placement.     HPI: 89 year old female was admitted yesterday with progressive dyspnea due to enlarging malignant left pleural effusion. Diagnosed more than 2 years ago with Breast cancer, ER+SD- her2 gary - and was treated until today with monthly Fulvestrant injections. Has disease at site of left breast, bones, liver and pleura. and is obviously progressing. Other medication; XGEVA, Synthroid, Vitamin D3.    ROS:  + SOB.    No nausea/vomiting/fevers/chills/night sweats.   Appetite is stable without weight loss.  No abdominal pain/diarrhea/constipation.   No history of easy bruising/bleeding.  No leg pain or leg swelling.    ROS is otherwise negative.    PMH/PSH:  PAST MEDICAL & SURGICAL HISTORY:  Type 2 diabetes mellitus  DM (diabetes mellitus)    HTN (hypertension)    Hyperthyroidism    Breast cancer    Hypothyroid    Endometrial cancer    Other acquired absence of organ  S/P splenectomy    Status post total hysterectomy  S/P hysterectomy    Other acquired absence of organ  S/P cholecystectomy    Status post cataract extraction  S/P cataract extraction    MEDICATIONS  (STANDING):  cholecalciferol 1000 Unit(s) Oral daily  dextrose 5%. 1000 milliLiter(s) (100 mL/Hr) IV Continuous <Continuous>  dextrose 5%. 1000 milliLiter(s) (50 mL/Hr) IV Continuous <Continuous>  dextrose 50% Injectable 25 Gram(s) IV Push once  dextrose 50% Injectable 12.5 Gram(s) IV Push once  dextrose 50% Injectable 25 Gram(s) IV Push once  glucagon  Injectable 1 milliGRAM(s) IntraMuscular once  heparin   Injectable 5000 Unit(s) SubCutaneous every 8 hours  insulin lispro (ADMELOG) corrective regimen sliding scale   SubCutaneous three times a day before meals  insulin lispro (ADMELOG) corrective regimen sliding scale   SubCutaneous at bedtime  levothyroxine 88 MICROGram(s) Oral daily    MEDICATIONS  (PRN):  aluminum hydroxide/magnesium hydroxide/simethicone Suspension 30 milliLiter(s) Oral every 4 hours PRN Dyspepsia  dextrose Oral Gel 15 Gram(s) Oral once PRN Blood Glucose LESS THAN 70 milliGRAM(s)/deciliter  melatonin 3 milliGRAM(s) Oral at bedtime PRN Insomnia  ondansetron Injectable 4 milliGRAM(s) IV Push every 8 hours PRN Nausea and/or Vomiting      Allergies    No Known Allergies    Intolerances    Exam:  ICU Vital Signs Last 24 Hrs  T(C): 36.9 (14 Jun 2022 14:42), Max: 36.9 (14 Jun 2022 14:42)  T(F): 98.5 (14 Jun 2022 14:42), Max: 98.5 (14 Jun 2022 14:42)  HR: 91 (14 Jun 2022 14:42) (83 - 91)  BP: 130/83 (14 Jun 2022 14:42) (126/80 - 130/83)  BP(mean): --  ABP: --  ABP(mean): --  RR: 18 (14 Jun 2022 14:42) (18 - 18)  SpO2: 95% (14 Jun 2022 14:42) (95% - 97%)    HEENT: pink mucosae, anicteric sclerae  No palpable peripheral lymphadenopathy  COR: regular rhythm rate, no murmurs, rubs or gallops  PULMO: decreased BS on L  ABD: soft, no palpable masses, no splenomegaly  EXT: no edema  NEURO: intact  SKIN: no lesions on visible skin                          12.5   6.61  )-----------( 315      ( 14 Jun 2022 07:16 )             38.2   06-14    132<L>  |  99  |  12  ----------------------------<  162<H>  4.8   |  26  |  0.80    Ca    8.2<L>      14 Jun 2022 07:16  Phos  2.5     06-14  Mg     2.0     06-14    TPro  6.2  /  Alb  3.1<L>  /  TBili  0.3  /  DBili  x   /  AST  39  /  ALT  29  /  AlkPhos  252<H>  06-13          Pertinent imaging studies: reviewed    Assessment:  Metastatic breast ca  Recurrent L pleural effusion    Plan:  Progressive disease with new liver mets and recurrent L pleural effusion  Most recent treatment with faslodex  Suspect malignant effusion, despite previous negative cytology  S/p Pleurx cath placement  Systemic treatment to be discussed further, she progressed through two lines of standard hormonal treatment  Her ECOG PS remains good, but doubt that she can tolerate systemic chemotherapy  She is clear about wanting to preserve her QOL and not interested in aggressive therapy    D/C planning, lives home alone, will need home care set up     Thank you    Sofiya Brady for Chrystal Funez MD  [phone]

## 2022-06-14 NOTE — PROGRESS NOTE ADULT - SUBJECTIVE AND OBJECTIVE BOX
PULMONARY CONSULT SERVICE FOLLOW-UP NOTE    INTERVAL HPI:  Reviewed chart and overnight events; patient seen and examined at bedside. Feels well. No pain.     MEDICATIONS:  Allergies    No Known Allergies    Vital Signs Last 24 Hrs  T(C): 36.6 (14 Jun 2022 05:37), Max: 36.6 (13 Jun 2022 11:52)  T(F): 97.9 (14 Jun 2022 05:37), Max: 97.9 (13 Jun 2022 11:52)  HR: 83 (14 Jun 2022 05:37) (68 - 83)  BP: 126/80 (14 Jun 2022 05:37) (122/72 - 126/80)  RR: 18 (14 Jun 2022 05:37) (15 - 18)  SpO2: 97% (14 Jun 2022 05:37) (97% - 98%)    PHYSICAL EXAM:  Constitutional: WD  HEENT: NC/AT; anicteric sclera; MMM  Neck: supple  Cardiovascular: +S1/S2, RRR  Respiratory: CTA B/L; no W/R/R; pleurx site c/d/i  Gastrointestinal: soft, NT/ND  Extremities: WWP; no edema, clubbing or cyanosis  Vascular: 2+ radial and pedal pulses  Neurological: alert, oriented    LABS:  CBC Full  -  ( 14 Jun 2022 07:16 )  WBC Count : 6.61 K/uL  RBC Count : 4.14 M/uL  Hemoglobin : 12.5 g/dL  Hematocrit : 38.2 %  Platelet Count - Automated : 315 K/uL  Mean Cell Volume : 92.3 fl  Mean Cell Hemoglobin : 30.2 pg  Mean Cell Hemoglobin Concentration : 32.7 gm/dL    06-14    132<L>  |  99  |  12  ----------------------------<  162<H>  4.8   |  26  |  0.80    Ca    8.2<L>      14 Jun 2022 07:16  Phos  2.5     06-14  Mg     2.0     06-14    TPro  6.2  /  Alb  3.1<L>  /  TBili  0.3  /  DBili  x   /  AST  39  /  ALT  29  /  AlkPhos  252<H>  06-13    PT/INR - ( 13 Jun 2022 06:00 )   PT: 12.4 sec;   INR: 1.04          PTT - ( 13 Jun 2022 06:00 )  PTT:72.9 sec    RADIOLOGY & ADDITIONAL STUDIES: Reviewed.

## 2022-06-14 NOTE — CONSULT NOTE ADULT - PROBLEM SELECTOR RECOMMENDATION 3
Patient with metastatic breast cancer with mets to bone and Liver. Patient follows Dr. Funez and wants to continue with palliative treatment. Patient is aware that she is not going to live forever, but for know she has a good quality of life and she wants to maintain it. Appreciate oncology involvement.

## 2022-06-15 LAB
ANION GAP SERPL CALC-SCNC: 9 MMOL/L — SIGNIFICANT CHANGE UP (ref 5–17)
BUN SERPL-MCNC: 19 MG/DL — SIGNIFICANT CHANGE UP (ref 7–23)
CALCIUM SERPL-MCNC: 8.1 MG/DL — LOW (ref 8.4–10.5)
CHLORIDE SERPL-SCNC: 97 MMOL/L — SIGNIFICANT CHANGE UP (ref 96–108)
CO2 SERPL-SCNC: 23 MMOL/L — SIGNIFICANT CHANGE UP (ref 22–31)
CREAT ?TM UR-MCNC: 55 MG/DL — SIGNIFICANT CHANGE UP
CREAT SERPL-MCNC: 0.74 MG/DL — SIGNIFICANT CHANGE UP (ref 0.5–1.3)
CULTURE RESULTS: SIGNIFICANT CHANGE UP
EGFR: 77 ML/MIN/1.73M2 — SIGNIFICANT CHANGE UP
GLUCOSE BLDC GLUCOMTR-MCNC: 152 MG/DL — HIGH (ref 70–99)
GLUCOSE BLDC GLUCOMTR-MCNC: 160 MG/DL — HIGH (ref 70–99)
GLUCOSE BLDC GLUCOMTR-MCNC: 202 MG/DL — HIGH (ref 70–99)
GLUCOSE BLDC GLUCOMTR-MCNC: 260 MG/DL — HIGH (ref 70–99)
GLUCOSE SERPL-MCNC: 369 MG/DL — HIGH (ref 70–99)
MAGNESIUM SERPL-MCNC: 2 MG/DL — SIGNIFICANT CHANGE UP (ref 1.6–2.6)
OSMOLALITY UR: 576 MOSM/KG — SIGNIFICANT CHANGE UP (ref 300–900)
PHOSPHATE SERPL-MCNC: 2.5 MG/DL — SIGNIFICANT CHANGE UP (ref 2.5–4.5)
POTASSIUM SERPL-MCNC: 4.7 MMOL/L — SIGNIFICANT CHANGE UP (ref 3.5–5.3)
POTASSIUM SERPL-SCNC: 4.7 MMOL/L — SIGNIFICANT CHANGE UP (ref 3.5–5.3)
SODIUM SERPL-SCNC: 129 MMOL/L — LOW (ref 135–145)
SODIUM UR-SCNC: 92 MMOL/L — SIGNIFICANT CHANGE UP
SPECIMEN SOURCE: SIGNIFICANT CHANGE UP

## 2022-06-15 PROCEDURE — 99232 SBSQ HOSP IP/OBS MODERATE 35: CPT | Mod: GC

## 2022-06-15 PROCEDURE — 99233 SBSQ HOSP IP/OBS HIGH 50: CPT | Mod: GC

## 2022-06-15 RX ADMIN — HEPARIN SODIUM 5000 UNIT(S): 5000 INJECTION INTRAVENOUS; SUBCUTANEOUS at 06:52

## 2022-06-15 RX ADMIN — Medication 88 MICROGRAM(S): at 06:53

## 2022-06-15 RX ADMIN — HEPARIN SODIUM 5000 UNIT(S): 5000 INJECTION INTRAVENOUS; SUBCUTANEOUS at 13:15

## 2022-06-15 RX ADMIN — Medication 1000 UNIT(S): at 11:07

## 2022-06-15 RX ADMIN — Medication 4: at 17:24

## 2022-06-15 RX ADMIN — Medication 6: at 13:15

## 2022-06-15 RX ADMIN — Medication 2: at 08:58

## 2022-06-15 RX ADMIN — HEPARIN SODIUM 5000 UNIT(S): 5000 INJECTION INTRAVENOUS; SUBCUTANEOUS at 22:54

## 2022-06-15 NOTE — PROGRESS NOTE ADULT - PROBLEM SELECTOR PLAN 6
Patient with hypothyroidism on levothyroxine at home.     - c/w synthroid 88 mcg qd  - TSH elevated, free T4 WNL Known h/o hypothyroidism, takes Levothyroxine 88mcg qd at home.   - TSH elevated, fT4 wnl (6/11-6/13)  - c/w synthroid 88mcg qd

## 2022-06-15 NOTE — PROGRESS NOTE ADULT - SUBJECTIVE AND OBJECTIVE BOX
PULMONARY CONSULT SERVICE FOLLOW-UP NOTE    INTERVAL HPI:  Reviewed chart and overnight events; patient seen and examined at bedside. Feels well. ROS unchanged.    MEDICATIONS:  Anticoagulants:  heparin   Injectable 5000 Unit(s) SubCutaneous every 8 hours    Allergies  No Known Allergies    Vital Signs Last 24 Hrs  T(C): 36.3 (15 Yann 2022 05:34), Max: 36.9 (14 Jun 2022 14:42)  T(F): 97.4 (15 Yann 2022 05:34), Max: 98.5 (14 Jun 2022 14:42)  HR: 75 (15 Yann 2022 05:34) (75 - 91)  BP: 128/84 (15 Yann 2022 05:34) (128/84 - 132/78)  RR: 18 (15 Yann 2022 05:34) (18 - 18)  SpO2: 97% (15 Yann 2022 05:34) (95% - 97%)    PHYSICAL EXAM:  Constitutional: WD  HEENT: NC/AT; anicteric sclera; MMM  Neck: supple  Cardiovascular: +S1/S2, RRR  Respiratory: CTA B/L; no W/R/R; pleurx site c/d/i  Gastrointestinal: soft, NT/ND  Extremities: WWP; no edema, clubbing or cyanosis  Vascular: 2+ radial and pedal pulses  Neurological: alert, oriented    LABS:  CBC Full  -  ( 14 Jun 2022 07:16 )  WBC Count : 6.61 K/uL  RBC Count : 4.14 M/uL  Hemoglobin : 12.5 g/dL  Hematocrit : 38.2 %  Platelet Count - Automated : 315 K/uL  Mean Cell Volume : 92.3 fl  Mean Cell Hemoglobin : 30.2 pg  Mean Cell Hemoglobin Concentration : 32.7 gm/dL  Auto Neutrophil # : x  Auto Lymphocyte # : x  Auto Monocyte # : x  Auto Eosinophil # : x  Auto Basophil # : x  Auto Neutrophil % : x  Auto Lymphocyte % : x  Auto Monocyte % : x  Auto Eosinophil % : x  Auto Basophil % : x    06-14    132<L>  |  99  |  12  ----------------------------<  162<H>  4.8   |  26  |  0.80    Ca    8.2<L>      14 Jun 2022 07:16  Phos  2.5     06-14  Mg     2.0     06-14    RADIOLOGY & ADDITIONAL STUDIES: Reviewed.

## 2022-06-15 NOTE — PROGRESS NOTE ADULT - ASSESSMENT
89F with PMHx metastatic breast cancer (on fulvestrant and Xgeva, followed by Dr. Funez), DM2 (A1c 7.6), hypothyroidism, HLD, lymphedema, endometrial carcinoma s/p hysterectomy and bilateral oophorectomy, and benign pancreatic tail adenoma, prior L sided thoracentesis on 11/30/22 and IR chest tube placement for hydro-pneumothorax on 12/3 and with recent admission on 05/14-05/19 for recurrent L sided pleural effusion s/p thoracentesis on 5/17. Pulmonary consulted for recurrent effusion.    Discussed progression of malignancy on CT scan and recurrent pleural effusion likely malignant as prior thoracentesis x2 have been exudative with lymphocytic predominance, although notably cytology has been negative. Rarely, Xgeva has been associated with pleural effusion. s/p IPC 6/13 with removal of 850cc. Drained again today.    Recommendations:  - Ensure patient has been taught how to drain pleurx before she leaves the hospital  - Drained again today  - Please ensure patient has a follow up appointment with Dr. Vasquez in 3-4 weeks from discharge to f/u on Pleurx    Patient discussed with attending Dr. Vasquez. Pulmonary will sign off, please call us back with new changes in clinical status. 89F with PMHx metastatic breast cancer (on fulvestrant and Xgeva, followed by Dr. Funez), DM2 (A1c 7.6), hypothyroidism, HLD, lymphedema, endometrial carcinoma s/p hysterectomy and bilateral oophorectomy, and benign pancreatic tail adenoma, prior L sided thoracentesis on 11/30/22 and IR chest tube placement for hydro-pneumothorax on 12/3 and with recent admission on 05/14-05/19 for recurrent L sided pleural effusion s/p thoracentesis on 5/17. Pulmonary consulted for recurrent effusion.    Discussed progression of malignancy on CT scan and recurrent pleural effusion likely malignant as prior thoracentesis x2 have been exudative with lymphocytic predominance, although notably cytology has been negative. Rarely, Xgeva has been associated with pleural effusion. s/p IPC 6/13 with removal of 850cc. Drained again today.    Recommendations:  - Ensure patient has been taught how to drain pleurx before she leaves the hospital  - Drained again 6/15 800cc  - On discharge patient should have pleurx drained once weekly, up to 1L at a time  - Please ensure patient has a follow up appointment with Dr. Vasquez in 3-4 weeks from discharge to f/u on Pleurx    Patient discussed with attending Dr. Vasquez. Pulmonary will sign off, please call us back with new changes in clinical status.

## 2022-06-15 NOTE — PROGRESS NOTE ADULT - ASSESSMENT
89F with PMHx breast cancer (on fulvestrant and Xgeva), DM2, hypothyroidism, endometrial carcinoma s/p hysterectomy and bilateral oophorectomy, and benign pancreatic tail adenoma with recent admission for left pleural effusion, now being admitted for recurrence of pleural effusion; also found with hyponatremia and transaminitis most likely i/s/o mets CA, will likely require SW/CM input regarding disposition  88yo F PMH breast cancer (on fulvestrant and Xgeva), T2DM, hypothyroidism, endometrial carcinoma s/p hysterectomy and bilateral oophorectomy, benign pancreatic tail adenoma w/ recent admission for L pleural effusion, now being admitted for recurrence of L-sided pleural effusion, also found to have hyponatremia and transaminitis likely i/s/o mets, pending dispo to home w/ VNS for chest tube mgmt.

## 2022-06-15 NOTE — PROGRESS NOTE ADULT - PROBLEM SELECTOR PLAN 4
Patient with hx of DM2 on glypiride at home.     - c/w ISS  - monitor FS  - consistent carb diet Known h/o T2DM, takes Glimepiride 1mg BID at home.   - mISS inpt  - monitor FS  - consistent carb diet

## 2022-06-15 NOTE — PROGRESS NOTE ADULT - SUBJECTIVE AND OBJECTIVE BOX
Physical Medicine and Rehabilitation Progress Note :    Patient is a 89y old  Female who presents with a chief complaint of pleural effusion (15 Yann 2022 10:38)      HPI:  89F with PMHx breast cancer (on fulvestrant and Xgeva), DM2 (A1c 7.6 on this admission), hypothyroidism, HLD, lymphedema, endometrial carcinoma s/p hysterectomy and bilateral oophorectomy, and benign pancreatic tail adenoma, with recent admission on 05/14-05/19 for pleural effusion s/p thoracentesis on 05/17,  now referred by Delvin Curry for admission. Pt reports worsening weakness and dyspnea on exertion over the past week. Pt saw Dr. Hargrove today and he thought she should be admitted. Pt denies fever, chills, CP, abd pain, N/V/D.     ED course:  VS: T 97.6, /73, , RR 17, SpO2 99% on RA  Labs: CBC unremarkable, CMP significant for Na 129, , AST/ALT 46/36  CXR: Large left pleural effusion  CT on 6/6 showed numerous new hepatic lesions consistent with metastatic disease; large pleural effusion, increased since December 21; Extensive sclerotic bony metastases.  EKG: NSR, VA and QRS interval wnl, no ST or T wave changes  Interventions: 1L NS (10 Yann 2022 16:26)                            12.5   6.61  )-----------( 315      ( 14 Jun 2022 07:16 )             38.2       06-15    129<L>  |  97  |  19  ----------------------------<  369<H>  4.7   |  23  |  0.74    Ca    8.1<L>      15 Yann 2022 10:52  Phos  2.5     06-15  Mg     2.0     06-15      Vital Signs Last 24 Hrs  T(C): 36.7 (15 Yann 2022 11:12), Max: 36.9 (14 Jun 2022 14:42)  T(F): 98.1 (15 Yann 2022 11:12), Max: 98.5 (14 Jun 2022 14:42)  HR: 89 (15 Yann 2022 11:12) (75 - 91)  BP: 124/74 (15 Yann 2022 11:12) (124/74 - 132/78)  BP(mean): --  RR: 17 (15 Yann 2022 11:12) (17 - 18)  SpO2: 96% (15 Yann 2022 11:12) (95% - 97%)    MEDICATIONS  (STANDING):  cholecalciferol 1000 Unit(s) Oral daily  dextrose 5%. 1000 milliLiter(s) (100 mL/Hr) IV Continuous <Continuous>  dextrose 5%. 1000 milliLiter(s) (50 mL/Hr) IV Continuous <Continuous>  dextrose 50% Injectable 25 Gram(s) IV Push once  dextrose 50% Injectable 12.5 Gram(s) IV Push once  dextrose 50% Injectable 25 Gram(s) IV Push once  glucagon  Injectable 1 milliGRAM(s) IntraMuscular once  heparin   Injectable 5000 Unit(s) SubCutaneous every 8 hours  insulin lispro (ADMELOG) corrective regimen sliding scale   SubCutaneous three times a day before meals  insulin lispro (ADMELOG) corrective regimen sliding scale   SubCutaneous at bedtime  levothyroxine 88 MICROGram(s) Oral daily    MEDICATIONS  (PRN):  aluminum hydroxide/magnesium hydroxide/simethicone Suspension 30 milliLiter(s) Oral every 4 hours PRN Dyspepsia  dextrose Oral Gel 15 Gram(s) Oral once PRN Blood Glucose LESS THAN 70 milliGRAM(s)/deciliter  melatonin 3 milliGRAM(s) Oral at bedtime PRN Insomnia  ondansetron Injectable 4 milliGRAM(s) IV Push every 8 hours PRN Nausea and/or Vomiting    Currently Undergoing Physical/ Occupational Therapy at bedside    PT/OT Functional Status Assessment :         Cognitive/Neuro/Behavioral  Level of Consciousness: alert  Arousal Level: arouses to voice  Orientation: oriented x 4  Speech: clear;  spontaneous;  well paced;  logical  Mood/Behavior: calm;  cooperative;  behavior appropriate to situation    Language Assistance  Preferred Language to Address Healthcare Preferred Language to Address Healthcare: English    Therapeutic Interventions      Bed Mobility  Bed Mobility Training Rolling/Turning: independent  Bed Mobility Training Scooting: independent  Bed Mobility Training Bridging: independent  Bed Mobility Training Sit-to-Supine: independent  Bed Mobility Training Supine-to-Sit: independent    Sit-Stand Transfer Training  Transfer Training Sit-to-Stand Transfer: independent  Transfer Training Stand-to-Sit Transfer: independent  Sit-to-Stand Transfer Training Transfer Safety Analysis: straight cane;  rollator    Gait Training  Gait Training: rollator + straight cane;  150 feet;  modified independence   Gait Analysis: 3-point gait   Pt ambulated in [place] using rollator for 75ft and straight cane for 75ft.    150 feet;  rolling walker;  straight cane    Therapeutic Exercise  Therapeutic Exercise Detail: Seated scapular retractions 1x5, 1x10 marches; supine: heel slides 1x10, bridges x5, ankle pumps 1x10           PM&R Impression : as above    Current Disposition Plan Recommendations :   d/c home, outpatient PT for reconditioning

## 2022-06-15 NOTE — PROGRESS NOTE ADULT - SUBJECTIVE AND OBJECTIVE BOX
[facility]    KAI FRANCES  [id number]    Interval Hx: The patient feels OK, No SOB or CP, she is on RA and appears comfortable. S/p pleurX catheter placement    HPI: 89 year old female was admitted yesterday with progressive dyspnea due to enlarging malignant left pleural effusion. Diagnosed more than 2 years ago with Breast cancer, ER+DE- her2 gary - and was treated until today with monthly Fulvestrant injections. Has disease at site of left breast, bones, liver and pleura. and is obviously progressing. Other medication; XGEVA, Synthroid, Vitamin D3.    ROS:  + SOB.    No nausea/vomiting/fevers/chills/night sweats.   Appetite is stable without weight loss.  No abdominal pain/diarrhea/constipation.   No history of easy bruising/bleeding.  No leg pain or leg swelling.    ROS is otherwise negative.    PMH/PSH:  PAST MEDICAL & SURGICAL HISTORY:  Type 2 diabetes mellitus  DM (diabetes mellitus)    HTN (hypertension)    Hyperthyroidism    Breast cancer    Hypothyroid    Endometrial cancer    Other acquired absence of organ  S/P splenectomy    Status post total hysterectomy  S/P hysterectomy    Other acquired absence of organ  S/P cholecystectomy    Status post cataract extraction  S/P cataract extraction        Medications:  MEDICATIONS  (STANDING):  cholecalciferol 1000 Unit(s) Oral daily  dextrose 5%. 1000 milliLiter(s) (100 mL/Hr) IV Continuous <Continuous>  dextrose 5%. 1000 milliLiter(s) (50 mL/Hr) IV Continuous <Continuous>  dextrose 50% Injectable 25 Gram(s) IV Push once  dextrose 50% Injectable 12.5 Gram(s) IV Push once  dextrose 50% Injectable 25 Gram(s) IV Push once  glucagon  Injectable 1 milliGRAM(s) IntraMuscular once  insulin lispro (ADMELOG) corrective regimen sliding scale   SubCutaneous three times a day before meals  insulin lispro (ADMELOG) corrective regimen sliding scale   SubCutaneous at bedtime  levothyroxine 88 MICROGram(s) Oral daily    MEDICATIONS  (PRN):  aluminum hydroxide/magnesium hydroxide/simethicone Suspension 30 milliLiter(s) Oral every 4 hours PRN Dyspepsia  dextrose Oral Gel 15 Gram(s) Oral once PRN Blood Glucose LESS THAN 70 milliGRAM(s)/deciliter  melatonin 3 milliGRAM(s) Oral at bedtime PRN Insomnia  ondansetron Injectable 4 milliGRAM(s) IV Push every 8 hours PRN Nausea and/or Vomiting    Allergies    No Known Allergies    Intolerances    Exam:  ICU Vital Signs Last 24 Hrs  T(C): 36.3 (15 Yann 2022 05:34), Max: 36.9 (14 Jun 2022 14:42)  T(F): 97.4 (15 Yann 2022 05:34), Max: 98.5 (14 Jun 2022 14:42)  HR: 75 (15 Yann 2022 05:34) (75 - 91)  BP: 128/84 (15 Yann 2022 05:34) (128/84 - 132/78)  BP(mean): --  ABP: --  ABP(mean): --  RR: 18 (15 Yann 2022 05:34) (18 - 18)  SpO2: 97% (15 Yann 2022 05:34) (95% - 97%)    HEENT: pink mucosae, anicteric sclerae  No palpable peripheral lymphadenopathy  COR: regular rhythm rate, no murmurs, rubs or gallops  PULMO: decreased BS on L  ABD: soft, no palpable masses, no splenomegaly  EXT: no edema  NEURO: intact  SKIN: no lesions on visible skin    Labs:                        12.5   6.61  )-----------( 315      ( 14 Jun 2022 07:16 )             38.2         CBC Full  -  ( 14 Jun 2022 07:16 )  WBC Count : 6.61 K/uL  RBC Count : 4.14 M/uL  Hemoglobin : 12.5 g/dL  Hematocrit : 38.2 %  Platelet Count - Automated : 315 K/uL  Mean Cell Volume : 92.3 fl  Mean Cell Hemoglobin : 30.2 pg  Mean Cell Hemoglobin Concentration : 32.7 gm/dL  Auto Neutrophil # : x  Auto Lymphocyte # : x  Auto Monocyte # : x  Auto Eosinophil # : x  Auto Basophil # : x  Auto Neutrophil % : x  Auto Lymphocyte % : x  Auto Monocyte % : x  Auto Eosinophil % : x  Auto Basophil % : x        06-14    132<L>  |  99  |  12  ----------------------------<  162<H>  4.8   |  26  |  0.80    Ca    8.2<L>      14 Jun 2022 07:16  Phos  2.5     06-14  Mg     2.0     06-14    Pertinent imaging studies: reviewed    Assessment:  Metastatic breast ca  Recurrent L pleural effusion    Plan:  Progressive disease with new liver mets and recurrent L pleural effusion  Most recent treatment with faslodex  Suspect malignant effusion, despite previous negative cytology  S/p Pleurx cath placement  Systemic treatment to be discussed further, she progressed through two lines of standard hormonal treatment  Her ECOG PS remains good   She is clear about wanting to preserve her QOL and not interested in aggressive therapy  Will arrange outpatient follow up    Thank you    Chrystal Funez MD  [phone]

## 2022-06-15 NOTE — PROGRESS NOTE ADULT - PROBLEM SELECTOR PLAN 7
F: s/p 1L NS  E: Replete PRN  N: Carb consistent  DVT PPx: Hep subQ   GI PPx: None  Code: FULL  Dispo: [facility] F: none  E: replete PRN  N: consistent carb  DVT ppx: heparin 5000u q8h subq   GI ppx: none  Code: FULL CODE  Dispo: [facility] --> home w/ VNS

## 2022-06-15 NOTE — PROGRESS NOTE ADULT - SUBJECTIVE AND OBJECTIVE BOX
**INCOMPLETE NOTE    OVERNIGHT EVENTS:    SUBJECTIVE:  Patient seen and examined at bedside.    Vital Signs Last 12 Hrs  T(F): 97.4 (06-15-22 @ 05:34), Max: 98.2 (06-14-22 @ 21:00)  HR: 75 (06-15-22 @ 05:34) (75 - 84)  BP: 128/84 (06-15-22 @ 05:34) (128/84 - 132/78)  BP(mean): --  RR: 18 (06-15-22 @ 05:34) (18 - 18)  SpO2: 97% (06-15-22 @ 05:34) (96% - 97%)  I&O's Summary      PHYSICAL EXAM:  Constitutional: NAD, comfortable in bed.  HEENT: NC/AT, PERRLA, EOMI, no conjunctival pallor or scleral icterus, MMM  Neck: Supple, no JVD  Respiratory: CTA B/L. No w/r/r.   Cardiovascular: RRR, normal S1 and S2, no m/r/g.   Gastrointestinal: +BS, soft NTND, no guarding or rebound tenderness, no palpable masses   Extremities: wwp; no cyanosis, clubbing or edema.   Vascular: Pulses equal and strong throughout.   Neurological: AAOx3, no CN deficits, strength and sensation intact throughout.   Skin: No gross skin abnormalities or rashes        LABS:                        12.5   6.61  )-----------( 315      ( 14 Jun 2022 07:16 )             38.2     06-14    132<L>  |  99  |  12  ----------------------------<  162<H>  4.8   |  26  |  0.80    Ca    8.2<L>      14 Jun 2022 07:16  Phos  2.5     06-14  Mg     2.0     06-14              RADIOLOGY & ADDITIONAL TESTS:    MEDICATIONS  (STANDING):  cholecalciferol 1000 Unit(s) Oral daily  dextrose 5%. 1000 milliLiter(s) (100 mL/Hr) IV Continuous <Continuous>  dextrose 5%. 1000 milliLiter(s) (50 mL/Hr) IV Continuous <Continuous>  dextrose 50% Injectable 25 Gram(s) IV Push once  dextrose 50% Injectable 12.5 Gram(s) IV Push once  dextrose 50% Injectable 25 Gram(s) IV Push once  glucagon  Injectable 1 milliGRAM(s) IntraMuscular once  heparin   Injectable 5000 Unit(s) SubCutaneous every 8 hours  insulin lispro (ADMELOG) corrective regimen sliding scale   SubCutaneous three times a day before meals  insulin lispro (ADMELOG) corrective regimen sliding scale   SubCutaneous at bedtime  levothyroxine 88 MICROGram(s) Oral daily    MEDICATIONS  (PRN):  aluminum hydroxide/magnesium hydroxide/simethicone Suspension 30 milliLiter(s) Oral every 4 hours PRN Dyspepsia  dextrose Oral Gel 15 Gram(s) Oral once PRN Blood Glucose LESS THAN 70 milliGRAM(s)/deciliter  melatonin 3 milliGRAM(s) Oral at bedtime PRN Insomnia  ondansetron Injectable 4 milliGRAM(s) IV Push every 8 hours PRN Nausea and/or Vomiting   OVERNIGHT EVENTS: renita    SUBJECTIVE:  Patient seen and examined at bedside. States she is worried about going home and having to deal with draining her pleurx on her own. Was mildly reassured that she will have VNS to help her manage the chest tube. Denied chest pain, pain at the chest tube site, shortness of breath, pain w/ inspiration, back pain, abdominal pain, changes to urinary or bowel habits.    Vital Signs Last 12 Hrs  T(F): 97.4 (06-15-22 @ 05:34), Max: 98.2 (06-14-22 @ 21:00)  HR: 75 (06-15-22 @ 05:34) (75 - 84)  BP: 128/84 (06-15-22 @ 05:34) (128/84 - 132/78)  BP(mean): --  RR: 18 (06-15-22 @ 05:34) (18 - 18)  SpO2: 97% (06-15-22 @ 05:34) (96% - 97%)  I&O's Summary      PHYSICAL EXAM:  Constitutional: elderly female lying in bed breathing comfortably on RA  HEENT: NC/AT, EOMI, no conjunctival pallor or scleral icterus, MMM  Neck: Supple, no JVD  Chest: blotchy, raised, papular erythematous rash on anterior L chest extending up to shoulder and around to mid-axillary line, nontender, not pruritic  Respiratory: decreased breath sounds diffusely   Cardiovascular: RRR, normal S1/S2, no murmurs  Back: L sided indwelling catheter covered w/ gauze and tape, site is c/d/i and nontender to palpation; pressure ulcer at L mid-back covered w/ pressure dressing  Gastrointestinal: +BS, soft, NTND, no guarding  Extremities: wwp; no LE edema  Vascular: 2+ DP, radial pulses b/l  Neurological: AAOx3, no focal deficits  Skin: No gross skin abnormalities or rashes        LABS:                        12.5   6.61  )-----------( 315      ( 14 Jun 2022 07:16 )             38.2     06-14    132<L>  |  99  |  12  ----------------------------<  162<H>  4.8   |  26  |  0.80    Ca    8.2<L>      14 Jun 2022 07:16  Phos  2.5     06-14  Mg     2.0     06-14              RADIOLOGY & ADDITIONAL TESTS:    MEDICATIONS  (STANDING):  cholecalciferol 1000 Unit(s) Oral daily  dextrose 5%. 1000 milliLiter(s) (100 mL/Hr) IV Continuous <Continuous>  dextrose 5%. 1000 milliLiter(s) (50 mL/Hr) IV Continuous <Continuous>  dextrose 50% Injectable 25 Gram(s) IV Push once  dextrose 50% Injectable 12.5 Gram(s) IV Push once  dextrose 50% Injectable 25 Gram(s) IV Push once  glucagon  Injectable 1 milliGRAM(s) IntraMuscular once  heparin   Injectable 5000 Unit(s) SubCutaneous every 8 hours  insulin lispro (ADMELOG) corrective regimen sliding scale   SubCutaneous three times a day before meals  insulin lispro (ADMELOG) corrective regimen sliding scale   SubCutaneous at bedtime  levothyroxine 88 MICROGram(s) Oral daily    MEDICATIONS  (PRN):  aluminum hydroxide/magnesium hydroxide/simethicone Suspension 30 milliLiter(s) Oral every 4 hours PRN Dyspepsia  dextrose Oral Gel 15 Gram(s) Oral once PRN Blood Glucose LESS THAN 70 milliGRAM(s)/deciliter  melatonin 3 milliGRAM(s) Oral at bedtime PRN Insomnia  ondansetron Injectable 4 milliGRAM(s) IV Push every 8 hours PRN Nausea and/or Vomiting

## 2022-06-15 NOTE — PROGRESS NOTE ADULT - PROBLEM SELECTOR PLAN 3
CT on 6/6 showed numerous new hepatic lesions consistent with metastatic disease. most likely i/s/o of mets CA to liver. elevated ALP most likely 2/2 bone mets    - Monitor LFTs ALP elevated, AST/ALT and Tbili wnl.  - CT A/P 6/6: numerous new hepatic lesions and extensive sclerotic bony metastases (unchanged).  - given CT findings w/ new mets, elevated ALP suspected due to bone mets  - monitor LFTs

## 2022-06-15 NOTE — PROGRESS NOTE ADULT - SUBJECTIVE AND OBJECTIVE BOX
INTERVAL HPI/OVERNIGHT EVENTS:  Interim reviewed;  No chief complaint;  Respiratory status is stable  Will be able to be discharged tomorrow      MEDICATIONS  (STANDING):  cholecalciferol 1000 Unit(s) Oral daily  dextrose 5%. 1000 milliLiter(s) (100 mL/Hr) IV Continuous <Continuous>  dextrose 5%. 1000 milliLiter(s) (50 mL/Hr) IV Continuous <Continuous>  dextrose 50% Injectable 25 Gram(s) IV Push once  dextrose 50% Injectable 12.5 Gram(s) IV Push once  dextrose 50% Injectable 25 Gram(s) IV Push once  glucagon  Injectable 1 milliGRAM(s) IntraMuscular once  heparin   Injectable 5000 Unit(s) SubCutaneous every 8 hours  insulin lispro (ADMELOG) corrective regimen sliding scale   SubCutaneous three times a day before meals  insulin lispro (ADMELOG) corrective regimen sliding scale   SubCutaneous at bedtime  levothyroxine 88 MICROGram(s) Oral daily    MEDICATIONS  (PRN):  aluminum hydroxide/magnesium hydroxide/simethicone Suspension 30 milliLiter(s) Oral every 4 hours PRN Dyspepsia  dextrose Oral Gel 15 Gram(s) Oral once PRN Blood Glucose LESS THAN 70 milliGRAM(s)/deciliter  melatonin 3 milliGRAM(s) Oral at bedtime PRN Insomnia  ondansetron Injectable 4 milliGRAM(s) IV Push every 8 hours PRN Nausea and/or Vomiting      Allergies    No Known Allergies    Intolerances        Vital Signs Last 24 Hrs  T(C): 36.3 (15 Yann 2022 05:34), Max: 36.9 (14 Jun 2022 14:42)  T(F): 97.4 (15 Yann 2022 05:34), Max: 98.5 (14 Jun 2022 14:42)  HR: 75 (15 Yann 2022 05:34) (75 - 91)  BP: 128/84 (15 Yann 2022 05:34) (128/84 - 132/78)  BP(mean): --  RR: 18 (15 Yann 2022 05:34) (18 - 18)  SpO2: 97% (15 Yann 2022 05:34) (95% - 97%)          Constitutional: Awake    Eyes: BRIDGER    ENMT: Negative    Neck: Supple    Back:  no tenderness     Respiratory:  clear    Cardiovascular: S1 S2    Gastrointestinal:  soft    Genitourinary:    Extremities:  no edema    Vascular:    Neurological:    Skin:    Lymph Nodes:            LABS:                        12.5   6.61  )-----------( 315      ( 14 Jun 2022 07:16 )             38.2     06-14    132<L>  |  99  |  12  ----------------------------<  162<H>  4.8   |  26  |  0.80    Ca    8.2<L>      14 Jun 2022 07:16  Phos  2.5     06-14  Mg     2.0     06-14            RADIOLOGY & ADDITIONAL TESTS:

## 2022-06-15 NOTE — PROGRESS NOTE ADULT - PROBLEM SELECTOR PLAN 5
Patient with hx of metastatic breast cancer, on fulvestrant and Xgeva, follows with Dr. Funez.    - Follow up with Dr. Funez if anything needs to be done inpatient Known h/o metastatic breast cancer, on fulvestrant and Xgeva at home, follows with Dr. Funez.  - holding chemotherapeutic agents inpt  - f/u w/ Dr. Funez outpt

## 2022-06-15 NOTE — PROGRESS NOTE ADULT - ASSESSMENT
per Internal Medicine    89 y o F PMH breast cancer (on fulvestrant and Xgeva), T2DM, hypothyroidism, endometrial carcinoma s/p hysterectomy and bilateral oophorectomy, benign pancreatic tail adenoma w/ recent admission for L pleural effusion, now being admitted for recurrence of L-sided pleural effusion, also found to have hyponatremia and transaminitis likely i/s/o mets, pending dispo to home w/ VNS for chest tube mgmt.    Problem/Plan - 1:  ·  Problem: Pleural effusion.   ·  Plan: Presented w/ 4w h/o SOB on exertion. Has known h/o metastatic breast cancer and pleural effusions, found to have recurrence of L pleural effusion. CXR showed large left pleural effusion.  - CXR 6/13: L pleural effusion decreased; small L pleural effusion remains; no pneumothorax or acute lung infiltrate; no acute bone abnormality.  - pulm consulted, pt now s/p L-sided indwelling catheter placement 6/13 --> 850cc drained immediately  - comfortable at rest, mild SOB with ambulation, will continue to monitor oxygenation.  - f/u cytology report.    Problem/Plan - 2:  ·  Problem: Hyponatremia.   ·  Plan: Baseline Na 131-133, i/s/o cancer w/ mets. Na 129 on arrival, has ranged 129-133.  - continue to monitor.    Problem/Plan - 3:  ·  Problem: Transaminitis.   ·  Plan: ALP elevated, AST/ALT and Tbili wnl.  - CT A/P 6/6: numerous new hepatic lesions and extensive sclerotic bony metastases (unchanged).  - given CT findings w/ new mets, elevated ALP suspected due to bone mets  - monitor LFTs.    Problem/Plan - 4:  ·  Problem: Diabetes.   ·  Plan: Known h/o T2DM, takes Glimepiride 1mg BID at home.   - mISS inpt  - monitor FS  - consistent carb diet.    Problem/Plan - 5:  ·  Problem: Breast cancer.   ·  Plan: Known h/o metastatic breast cancer, on fulvestrant and Xgeva at home, follows with Dr. Funez.  - holding chemotherapeutic agents inpt  - f/u w/ Dr. Funez outpt.    Problem/Plan - 6:  ·  Problem: Hypothyroidism.   ·  Plan: Known h/o hypothyroidism, takes Levothyroxine 88mcg qd at home.   - TSH elevated, fT4 wnl (6/11-6/13)  - c/w synthroid 88mcg qd.    Problem/Plan - 7:  ·  Problem: Prophylactic measure.   ·  Plan: F: none  E: replete PRN  N: consistent carb  DVT ppx: heparin 5000u q8h subq   GI ppx: none  Code: FULL CODE  Dispo: [facility] --> home w/ VNS.

## 2022-06-15 NOTE — PROGRESS NOTE ADULT - PROBLEM SELECTOR PLAN 1
Patient presenting with 4 weeks of SOB on exertion. Has hx of metastatic breast cancer as well as pleural effusions now presenting with recurrence of pleural effusion on left side. Speaking in complete sentence and ambulating freely. Hemodynamically stable, afebrile and no leukocytosis. CXR showed large left pleural effusion. S/p indwelling catheter placement at bedside with pulm, with 850 cc     - Pulm consulted ---> s/p indwelling pleural cath   - Patient comfortable at rest, mild SOB with ambuation, will continue to monitor oxygenation.  - f/u cytology Presented w/ 4w h/o SOB on exertion. Has known h/o metastatic breast cancer and pleural effusions, found to have recurrence of L pleural effusion. CXR showed large left pleural effusion.  - CXR 6/13: L pleural effusion decreased; small L pleural effusion remains; no pneumothorax or acute lung infiltrate; no acute bone abnormality.  - pulm consulted, pt now s/p L-sided indwelling catheter placement 6/13 --> 850cc drained immediately  - comfortable at rest, mild SOB with ambulation, will continue to monitor oxygenation.  - f/u cytology report

## 2022-06-15 NOTE — PROGRESS NOTE ADULT - PROBLEM SELECTOR PLAN 2
Baseline Na 131-133. Na 129 upon arrival. At baseline. i/s/o of mets CA.     - continue to monitor Baseline Na 131-133, i/s/o cancer w/ mets. Na 129 on arrival, has ranged 129-133.  - continue to monitor

## 2022-06-16 ENCOUNTER — NON-APPOINTMENT (OUTPATIENT)
Age: 87
End: 2022-06-16

## 2022-06-16 ENCOUNTER — TRANSCRIPTION ENCOUNTER (OUTPATIENT)
Age: 87
End: 2022-06-16

## 2022-06-16 VITALS
HEART RATE: 100 BPM | RESPIRATION RATE: 18 BRPM | SYSTOLIC BLOOD PRESSURE: 124 MMHG | TEMPERATURE: 98 F | DIASTOLIC BLOOD PRESSURE: 84 MMHG | OXYGEN SATURATION: 95 %

## 2022-06-16 LAB
GLUCOSE BLDC GLUCOMTR-MCNC: 173 MG/DL — HIGH (ref 70–99)
GLUCOSE BLDC GLUCOMTR-MCNC: 254 MG/DL — HIGH (ref 70–99)

## 2022-06-16 PROCEDURE — 71045 X-RAY EXAM CHEST 1 VIEW: CPT

## 2022-06-16 PROCEDURE — 84439 ASSAY OF FREE THYROXINE: CPT

## 2022-06-16 PROCEDURE — 71046 X-RAY EXAM CHEST 2 VIEWS: CPT

## 2022-06-16 PROCEDURE — 86901 BLOOD TYPING SEROLOGIC RH(D): CPT

## 2022-06-16 PROCEDURE — 82570 ASSAY OF URINE CREATININE: CPT

## 2022-06-16 PROCEDURE — 36415 COLL VENOUS BLD VENIPUNCTURE: CPT

## 2022-06-16 PROCEDURE — 99285 EMERGENCY DEPT VISIT HI MDM: CPT

## 2022-06-16 PROCEDURE — 97116 GAIT TRAINING THERAPY: CPT

## 2022-06-16 PROCEDURE — 80048 BASIC METABOLIC PNL TOTAL CA: CPT

## 2022-06-16 PROCEDURE — 88305 TISSUE EXAM BY PATHOLOGIST: CPT

## 2022-06-16 PROCEDURE — 83036 HEMOGLOBIN GLYCOSYLATED A1C: CPT

## 2022-06-16 PROCEDURE — 85730 THROMBOPLASTIN TIME PARTIAL: CPT

## 2022-06-16 PROCEDURE — 99232 SBSQ HOSP IP/OBS MODERATE 35: CPT | Mod: GC

## 2022-06-16 PROCEDURE — C1729: CPT

## 2022-06-16 PROCEDURE — 85610 PROTHROMBIN TIME: CPT

## 2022-06-16 PROCEDURE — 85025 COMPLETE CBC W/AUTO DIFF WBC: CPT

## 2022-06-16 PROCEDURE — 80053 COMPREHEN METABOLIC PANEL: CPT

## 2022-06-16 PROCEDURE — 86850 RBC ANTIBODY SCREEN: CPT

## 2022-06-16 PROCEDURE — 84443 ASSAY THYROID STIM HORMONE: CPT

## 2022-06-16 PROCEDURE — 84300 ASSAY OF URINE SODIUM: CPT

## 2022-06-16 PROCEDURE — 83935 ASSAY OF URINE OSMOLALITY: CPT

## 2022-06-16 PROCEDURE — 85027 COMPLETE CBC AUTOMATED: CPT

## 2022-06-16 PROCEDURE — 82962 GLUCOSE BLOOD TEST: CPT

## 2022-06-16 PROCEDURE — 97162 PT EVAL MOD COMPLEX 30 MIN: CPT

## 2022-06-16 PROCEDURE — 88112 CYTOPATH CELL ENHANCE TECH: CPT

## 2022-06-16 PROCEDURE — 84100 ASSAY OF PHOSPHORUS: CPT

## 2022-06-16 PROCEDURE — 87635 SARS-COV-2 COVID-19 AMP PRB: CPT

## 2022-06-16 PROCEDURE — 86900 BLOOD TYPING SEROLOGIC ABO: CPT

## 2022-06-16 PROCEDURE — 83735 ASSAY OF MAGNESIUM: CPT

## 2022-06-16 RX ADMIN — Medication 2: at 09:14

## 2022-06-16 RX ADMIN — Medication 88 MICROGRAM(S): at 06:41

## 2022-06-16 RX ADMIN — HEPARIN SODIUM 5000 UNIT(S): 5000 INJECTION INTRAVENOUS; SUBCUTANEOUS at 06:41

## 2022-06-16 RX ADMIN — Medication 6: at 12:17

## 2022-06-16 RX ADMIN — Medication 1000 UNIT(S): at 12:21

## 2022-06-16 NOTE — PROGRESS NOTE ADULT - PROBLEM SELECTOR PROBLEM 2
Hyponatremia
Hyponatremia
Diabetes
Breast cancer
Diabetes
Hyponatremia
Diabetes
Diabetes
Hyponatremia
Hyponatremia

## 2022-06-16 NOTE — PROGRESS NOTE ADULT - REASON FOR ADMISSION
pleural effusion

## 2022-06-16 NOTE — DISCHARGE NOTE NURSING/CASE MANAGEMENT/SOCIAL WORK - NSDCPEFALRISK_GEN_ALL_CORE
For information on Fall & Injury Prevention, visit: [email or web address] OR  [email or web address] OR  https://www.cdc.gov/steadi/patient.html

## 2022-06-16 NOTE — PROGRESS NOTE ADULT - SUBJECTIVE AND OBJECTIVE BOX
[facility]    KAI FRANCES  MRN-[id number]    Interval Hx: The patient feels OK, No SOB or CP, she is on RA and appears comfortable. S/p pleurX catheter placement    HPI: 89 year old female was admitted yesterday with progressive dyspnea due to enlarging malignant left pleural effusion. Diagnosed more than 2 years ago with Breast cancer, ER+IN- her2 gary - and was treated until today with monthly Fulvestrant injections. Has disease at site of left breast, bones, liver and pleura. and is obviously progressing. Other medication; XGEVA, Synthroid, Vitamin D3.    ROS:  + SOB.    No nausea/vomiting/fevers/chills/night sweats.   Appetite is stable without weight loss.  No abdominal pain/diarrhea/constipation.   No history of easy bruising/bleeding.  No leg pain or leg swelling.    ROS is otherwise negative.    PMH/PSH:  PAST MEDICAL & SURGICAL HISTORY:  Type 2 diabetes mellitus  DM (diabetes mellitus)    HTN (hypertension)    Hyperthyroidism    Breast cancer    Hypothyroid    Endometrial cancer    Other acquired absence of organ  S/P splenectomy    Status post total hysterectomy  S/P hysterectomy    Other acquired absence of organ  S/P cholecystectomy    Status post cataract extraction  S/P cataract extraction        Medications:  MEDICATIONS  (STANDING):  cholecalciferol 1000 Unit(s) Oral daily  dextrose 5%. 1000 milliLiter(s) (100 mL/Hr) IV Continuous <Continuous>  dextrose 5%. 1000 milliLiter(s) (50 mL/Hr) IV Continuous <Continuous>  dextrose 50% Injectable 25 Gram(s) IV Push once  dextrose 50% Injectable 12.5 Gram(s) IV Push once  dextrose 50% Injectable 25 Gram(s) IV Push once  glucagon  Injectable 1 milliGRAM(s) IntraMuscular once  insulin lispro (ADMELOG) corrective regimen sliding scale   SubCutaneous three times a day before meals  insulin lispro (ADMELOG) corrective regimen sliding scale   SubCutaneous at bedtime  levothyroxine 88 MICROGram(s) Oral daily    MEDICATIONS  (PRN):  aluminum hydroxide/magnesium hydroxide/simethicone Suspension 30 milliLiter(s) Oral every 4 hours PRN Dyspepsia  dextrose Oral Gel 15 Gram(s) Oral once PRN Blood Glucose LESS THAN 70 milliGRAM(s)/deciliter  melatonin 3 milliGRAM(s) Oral at bedtime PRN Insomnia  ondansetron Injectable 4 milliGRAM(s) IV Push every 8 hours PRN Nausea and/or Vomiting    Allergies    No Known Allergies    Intolerances    Exam:  ICU Vital Signs Last 24 Hrs  T(C): 36.7 (16 Jun 2022 12:05), Max: 37.1 (16 Jun 2022 05:56)  T(F): 98.1 (16 Jun 2022 12:05), Max: 98.7 (16 Jun 2022 05:56)  HR: 100 (16 Jun 2022 12:05) (84 - 100)  BP: 124/84 (16 Jun 2022 12:05) (110/61 - 127/69)  BP(mean): --  ABP: --  ABP(mean): --  RR: 18 (16 Jun 2022 12:05) (16 - 18)  SpO2: 95% (16 Jun 2022 12:05) (95% - 97%)      HEENT: pink mucosae, anicteric sclerae  No palpable peripheral lymphadenopathy  COR: regular rhythm rate, no murmurs, rubs or gallops  PULMO: decreased BS on L  ABD: soft, no palpable masses, no splenomegaly  EXT: no edema  NEURO: intact  SKIN: no lesions on visible skin    Labs:                        12.5   6.61  )-----------( 315      ( 14 Jun 2022 07:16 )             38.2         CBC Full  -  ( 14 Jun 2022 07:16 )  WBC Count : 6.61 K/uL  RBC Count : 4.14 M/uL  Hemoglobin : 12.5 g/dL  Hematocrit : 38.2 %  Platelet Count - Automated : 315 K/uL  Mean Cell Volume : 92.3 fl  Mean Cell Hemoglobin : 30.2 pg  Mean Cell Hemoglobin Concentration : 32.7 gm/dL  Auto Neutrophil # : x  Auto Lymphocyte # : x  Auto Monocyte # : x  Auto Eosinophil # : x  Auto Basophil # : x  Auto Neutrophil % : x  Auto Lymphocyte % : x  Auto Monocyte % : x  Auto Eosinophil % : x  Auto Basophil % : x        06-14    132<L>  |  99  |  12  ----------------------------<  162<H>  4.8   |  26  |  0.80    Ca    8.2<L>      14 Jun 2022 07:16  Phos  2.5     06-14  Mg     2.0     06-14    Pertinent imaging studies: reviewed    Assessment:  Metastatic breast ca  Recurrent L pleural effusion    Plan:  Progressive disease with new liver mets and recurrent L pleural effusion  Most recent treatment with faslodex  Suspect malignant effusion, despite previous negative cytology  S/p Pleurx cath placement  Systemic treatment to be discussed further, she progressed through two lines of standard hormonal treatment  Her ECOG PS remains good   She is clear about wanting to preserve her QOL and not interested in aggressive therapy  Will arrange outpatient follow up  Oncologically stable for d/c    Thank you    Sofiya Brady for Chrystal Funez MD  [phone]

## 2022-06-16 NOTE — PROGRESS NOTE ADULT - TIME BILLING
Patient seen and examined'  Plans as outlined above;
1
Home tomorrow;  Pulmonary plans noted
Patient seen and examined;  Improved post Pleurx  Probably ready for discharge tomorrow
Patient seen and examined;  Discharge today;  VNS to come to home re Pleurx   Follow up with various consultants

## 2022-06-16 NOTE — PROGRESS NOTE ADULT - PROVIDER SPECIALTY LIST ADULT
Heme/Onc
Heme/Onc
Internal Medicine
Heme/Onc
Internal Medicine
Pulmonology
Rehab Medicine
Rehab Medicine
Pulmonology
Internal Medicine

## 2022-06-16 NOTE — DISCHARGE NOTE NURSING/CASE MANAGEMENT/SOCIAL WORK - NSDCVIVACCINE_GEN_ALL_CORE_FT
COVID-19, mRNA, LNP-S, PF, 30 mcg/0.3 mL dose, leona-sucrose (Pfizer); 19-May-2022 13:03; Reyes, Luisa (RN); [employer]; [id number] (Exp. Date: 19-May-2022); IntraMuscular; Deltoid Right.; 0.3 milliLiter(s);

## 2022-06-16 NOTE — PROGRESS NOTE ADULT - PROBLEM SELECTOR PROBLEM 3
Transaminitis
Breast cancer
Breast cancer
Transaminitis
Shortness of breath
Transaminitis
Breast cancer
Breast cancer
Transaminitis
Transaminitis

## 2022-06-16 NOTE — PROGRESS NOTE ADULT - PROBLEM SELECTOR PROBLEM 5
Breast cancer
Breast cancer
Hepatic metastasis
Breast cancer
Hepatic metastasis
Breast cancer
Hepatic metastasis
Hepatic metastasis
Breast cancer

## 2022-06-16 NOTE — PROGRESS NOTE ADULT - SUBJECTIVE AND OBJECTIVE BOX
INTERVAL HPI/OVERNIGHT EVENTS:  Awake and alert;  No complaint;   Feels a bit stromger  Discharge today       MEDICATIONS  (STANDING):  cholecalciferol 1000 Unit(s) Oral daily  dextrose 5%. 1000 milliLiter(s) (100 mL/Hr) IV Continuous <Continuous>  dextrose 5%. 1000 milliLiter(s) (50 mL/Hr) IV Continuous <Continuous>  dextrose 50% Injectable 25 Gram(s) IV Push once  dextrose 50% Injectable 12.5 Gram(s) IV Push once  dextrose 50% Injectable 25 Gram(s) IV Push once  glucagon  Injectable 1 milliGRAM(s) IntraMuscular once  heparin   Injectable 5000 Unit(s) SubCutaneous every 8 hours  insulin lispro (ADMELOG) corrective regimen sliding scale   SubCutaneous three times a day before meals  insulin lispro (ADMELOG) corrective regimen sliding scale   SubCutaneous at bedtime  levothyroxine 88 MICROGram(s) Oral daily    MEDICATIONS  (PRN):  aluminum hydroxide/magnesium hydroxide/simethicone Suspension 30 milliLiter(s) Oral every 4 hours PRN Dyspepsia  dextrose Oral Gel 15 Gram(s) Oral once PRN Blood Glucose LESS THAN 70 milliGRAM(s)/deciliter  melatonin 3 milliGRAM(s) Oral at bedtime PRN Insomnia  ondansetron Injectable 4 milliGRAM(s) IV Push every 8 hours PRN Nausea and/or Vomiting      Allergies    No Known Allergies    Intolerances        Vital Signs Last 24 Hrs  T(C): 37.1 (16 Jun 2022 05:56), Max: 37.1 (16 Jun 2022 05:56)  T(F): 98.7 (16 Jun 2022 05:56), Max: 98.7 (16 Jun 2022 05:56)  HR: 84 (16 Jun 2022 05:56) (84 - 99)  BP: 110/61 (16 Jun 2022 05:56) (110/61 - 127/69)  BP(mean): --  RR: 16 (16 Jun 2022 05:56) (16 - 17)  SpO2: 96% (16 Jun 2022 05:56) (96% - 97%)          Constitutional:    Eyes: BRIDGER    ENMT: Negative    Neck: Supple    Back:  no tenderness     Respiratory:      Cardiovascular: S1 S2    Gastrointestinal:    Genitourinary:    Extremities:    Vascular:    Neurological:    Skin:    Lymph Nodes:            LABS:    06-15    129<L>  |  97  |  19  ----------------------------<  369<H>  4.7   |  23  |  0.74    Ca    8.1<L>      15 Jun 2022 10:52  Phos  2.5     06-15  Mg     2.0     06-15            RADIOLOGY & ADDITIONAL TESTS:

## 2022-06-16 NOTE — DISCHARGE NOTE NURSING/CASE MANAGEMENT/SOCIAL WORK - PATIENT PORTAL LINK FT
You can access the FollowMyHealth Patient Portal offered by [facility] by registering at the following website: [email or web address]. By joining [employer]’s FollowMyHealth portal, you will also be able to view your health information using other applications (apps) compatible with our system.

## 2022-06-16 NOTE — DISCHARGE NOTE NURSING/CASE MANAGEMENT/SOCIAL WORK - NSDCFUADDAPPT_GEN_ALL_CORE_FT
Please bring your Insurance card, Photo ID and Discharge paperwork to the following appointments:    (1) Please follow up with your Pulmonary Medicine Provider, Dr. James Vasquez at [address] [place] [address] on 07/06/2022 at 11:00am.    (2) Please follow up with your Primary Care Provider, Dr. Ania Hargrove at [address], [address] [place] [id number] on 07/18/2022 at 3:20pm.    Please note that the office of Dr. Hargrove will contact you for an earlier appointment once available.  Appointment was scheduled by Ms. KITA Chandler, Referral Coordinator.

## 2022-06-16 NOTE — PROGRESS NOTE ADULT - PROBLEM SELECTOR PROBLEM 4
Diabetes
Hypothyroidism
Diabetes
Hypothyroidism
Hypothyroidism
Diabetes
Hypothyroidism
Diabetes
Diabetes

## 2022-06-17 LAB — NON-GYNECOLOGICAL CYTOLOGY STUDY: SIGNIFICANT CHANGE UP

## 2022-07-06 ENCOUNTER — APPOINTMENT (OUTPATIENT)
Dept: PULMONOLOGY | Facility: CLINIC | Age: 87
End: 2022-07-06

## 2022-07-06 ENCOUNTER — INPATIENT (INPATIENT)
Facility: HOSPITAL | Age: 87
LOS: 1 days | Discharge: HOME HEALTH | DRG: 948 | End: 2022-07-08
Attending: INTERNAL MEDICINE | Admitting: INTERNAL MEDICINE
Payer: MEDICARE

## 2022-07-06 VITALS
OXYGEN SATURATION: 96 % | RESPIRATION RATE: 18 BRPM | SYSTOLIC BLOOD PRESSURE: 110 MMHG | WEIGHT: 119.05 LBS | HEIGHT: 62 IN | DIASTOLIC BLOOD PRESSURE: 72 MMHG | TEMPERATURE: 98 F | HEART RATE: 91 BPM

## 2022-07-06 VITALS
SYSTOLIC BLOOD PRESSURE: 151 MMHG | TEMPERATURE: 97.8 F | OXYGEN SATURATION: 95 % | BODY MASS INDEX: 21.9 KG/M2 | RESPIRATION RATE: 12 BRPM | HEART RATE: 99 BPM | HEIGHT: 62 IN | DIASTOLIC BLOOD PRESSURE: 76 MMHG | WEIGHT: 119 LBS

## 2022-07-06 DIAGNOSIS — E11.9 TYPE 2 DIABETES MELLITUS WITHOUT COMPLICATIONS: ICD-10-CM

## 2022-07-06 DIAGNOSIS — I89.0 LYMPHEDEMA, NOT ELSEWHERE CLASSIFIED: ICD-10-CM

## 2022-07-06 DIAGNOSIS — C50.919 MALIGNANT NEOPLASM OF UNSPECIFIED SITE OF UNSPECIFIED FEMALE BREAST: ICD-10-CM

## 2022-07-06 DIAGNOSIS — R53.1 WEAKNESS: ICD-10-CM

## 2022-07-06 DIAGNOSIS — J90 PLEURAL EFFUSION, NOT ELSEWHERE CLASSIFIED: ICD-10-CM

## 2022-07-06 DIAGNOSIS — J91.0 MALIGNANT PLEURAL EFFUSION: ICD-10-CM

## 2022-07-06 DIAGNOSIS — R63.8 OTHER SYMPTOMS AND SIGNS CONCERNING FOOD AND FLUID INTAKE: ICD-10-CM

## 2022-07-06 DIAGNOSIS — E03.9 HYPOTHYROIDISM, UNSPECIFIED: ICD-10-CM

## 2022-07-06 DIAGNOSIS — R32 UNSPECIFIED URINARY INCONTINENCE: ICD-10-CM

## 2022-07-06 DIAGNOSIS — E87.1 HYPO-OSMOLALITY AND HYPONATREMIA: ICD-10-CM

## 2022-07-06 DIAGNOSIS — R06.00 DYSPNEA, UNSPECIFIED: ICD-10-CM

## 2022-07-06 LAB
ALBUMIN SERPL ELPH-MCNC: 2.9 G/DL — LOW (ref 3.3–5)
ALP SERPL-CCNC: 240 U/L — HIGH (ref 40–120)
ALT FLD-CCNC: 24 U/L — SIGNIFICANT CHANGE UP (ref 10–45)
ANION GAP SERPL CALC-SCNC: 10 MMOL/L — SIGNIFICANT CHANGE UP (ref 5–17)
APPEARANCE UR: CLEAR — SIGNIFICANT CHANGE UP
AST SERPL-CCNC: 30 U/L — SIGNIFICANT CHANGE UP (ref 10–40)
BASOPHILS # BLD AUTO: 0.04 K/UL — SIGNIFICANT CHANGE UP (ref 0–0.2)
BASOPHILS NFR BLD AUTO: 0.6 % — SIGNIFICANT CHANGE UP (ref 0–2)
BILIRUB SERPL-MCNC: 0.3 MG/DL — SIGNIFICANT CHANGE UP (ref 0.2–1.2)
BILIRUB UR-MCNC: NEGATIVE — SIGNIFICANT CHANGE UP
BUN SERPL-MCNC: 19 MG/DL — SIGNIFICANT CHANGE UP (ref 7–23)
CALCIUM SERPL-MCNC: 8.8 MG/DL — SIGNIFICANT CHANGE UP (ref 8.4–10.5)
CHLORIDE SERPL-SCNC: 96 MMOL/L — SIGNIFICANT CHANGE UP (ref 96–108)
CO2 SERPL-SCNC: 22 MMOL/L — SIGNIFICANT CHANGE UP (ref 22–31)
COLOR SPEC: YELLOW — SIGNIFICANT CHANGE UP
CREAT SERPL-MCNC: 0.68 MG/DL — SIGNIFICANT CHANGE UP (ref 0.5–1.3)
CULTURE RESULTS: SIGNIFICANT CHANGE UP
DIFF PNL FLD: NEGATIVE — SIGNIFICANT CHANGE UP
EGFR: 83 ML/MIN/1.73M2 — SIGNIFICANT CHANGE UP
EOSINOPHIL # BLD AUTO: 0.17 K/UL — SIGNIFICANT CHANGE UP (ref 0–0.5)
EOSINOPHIL NFR BLD AUTO: 2.6 % — SIGNIFICANT CHANGE UP (ref 0–6)
GLUCOSE BLDC GLUCOMTR-MCNC: 184 MG/DL — HIGH (ref 70–99)
GLUCOSE SERPL-MCNC: 122 MG/DL — HIGH (ref 70–99)
GLUCOSE UR QL: NEGATIVE — SIGNIFICANT CHANGE UP
HCT VFR BLD CALC: 35 % — SIGNIFICANT CHANGE UP (ref 34.5–45)
HGB BLD-MCNC: 11.6 G/DL — SIGNIFICANT CHANGE UP (ref 11.5–15.5)
IMM GRANULOCYTES NFR BLD AUTO: 0.5 % — SIGNIFICANT CHANGE UP (ref 0–1.5)
KETONES UR-MCNC: NEGATIVE — SIGNIFICANT CHANGE UP
LEUKOCYTE ESTERASE UR-ACNC: NEGATIVE — SIGNIFICANT CHANGE UP
LYMPHOCYTES # BLD AUTO: 1.09 K/UL — SIGNIFICANT CHANGE UP (ref 1–3.3)
LYMPHOCYTES # BLD AUTO: 16.8 % — SIGNIFICANT CHANGE UP (ref 13–44)
MCHC RBC-ENTMCNC: 30.5 PG — SIGNIFICANT CHANGE UP (ref 27–34)
MCHC RBC-ENTMCNC: 33.1 GM/DL — SIGNIFICANT CHANGE UP (ref 32–36)
MCV RBC AUTO: 92.1 FL — SIGNIFICANT CHANGE UP (ref 80–100)
MONOCYTES # BLD AUTO: 0.55 K/UL — SIGNIFICANT CHANGE UP (ref 0–0.9)
MONOCYTES NFR BLD AUTO: 8.5 % — SIGNIFICANT CHANGE UP (ref 2–14)
NEUTROPHILS # BLD AUTO: 4.6 K/UL — SIGNIFICANT CHANGE UP (ref 1.8–7.4)
NEUTROPHILS NFR BLD AUTO: 71 % — SIGNIFICANT CHANGE UP (ref 43–77)
NITRITE UR-MCNC: NEGATIVE — SIGNIFICANT CHANGE UP
NRBC # BLD: 0 /100 WBCS — SIGNIFICANT CHANGE UP (ref 0–0)
PH UR: 6 — SIGNIFICANT CHANGE UP (ref 5–8)
PLATELET # BLD AUTO: 407 K/UL — HIGH (ref 150–400)
POTASSIUM SERPL-MCNC: 5 MMOL/L — SIGNIFICANT CHANGE UP (ref 3.5–5.3)
POTASSIUM SERPL-SCNC: 5 MMOL/L — SIGNIFICANT CHANGE UP (ref 3.5–5.3)
PROT SERPL-MCNC: 6.9 G/DL — SIGNIFICANT CHANGE UP (ref 6–8.3)
PROT UR-MCNC: NEGATIVE MG/DL — SIGNIFICANT CHANGE UP
RBC # BLD: 3.8 M/UL — SIGNIFICANT CHANGE UP (ref 3.8–5.2)
RBC # FLD: 14.9 % — HIGH (ref 10.3–14.5)
SARS-COV-2 RNA SPEC QL NAA+PROBE: NEGATIVE — SIGNIFICANT CHANGE UP
SODIUM SERPL-SCNC: 128 MMOL/L — LOW (ref 135–145)
SP GR SPEC: 1.02 — SIGNIFICANT CHANGE UP (ref 1–1.03)
SPECIMEN SOURCE: SIGNIFICANT CHANGE UP
UROBILINOGEN FLD QL: 0.2 E.U./DL — SIGNIFICANT CHANGE UP
WBC # BLD: 6.48 K/UL — SIGNIFICANT CHANGE UP (ref 3.8–10.5)
WBC # FLD AUTO: 6.48 K/UL — SIGNIFICANT CHANGE UP (ref 3.8–10.5)

## 2022-07-06 PROCEDURE — 93010 ELECTROCARDIOGRAM REPORT: CPT

## 2022-07-06 PROCEDURE — 71045 X-RAY EXAM CHEST 1 VIEW: CPT | Mod: 26

## 2022-07-06 PROCEDURE — 99223 1ST HOSP IP/OBS HIGH 75: CPT | Mod: GC

## 2022-07-06 PROCEDURE — 99215 OFFICE O/P EST HI 40 MIN: CPT

## 2022-07-06 PROCEDURE — 93971 EXTREMITY STUDY: CPT | Mod: 26,50

## 2022-07-06 PROCEDURE — 99285 EMERGENCY DEPT VISIT HI MDM: CPT | Mod: FS,25

## 2022-07-06 RX ORDER — CHOLECALCIFEROL (VITAMIN D3) 125 MCG
1000 CAPSULE ORAL DAILY
Refills: 0 | Status: DISCONTINUED | OUTPATIENT
Start: 2022-07-06 | End: 2022-07-08

## 2022-07-06 RX ORDER — SODIUM CHLORIDE 9 MG/ML
1000 INJECTION, SOLUTION INTRAVENOUS
Refills: 0 | Status: DISCONTINUED | OUTPATIENT
Start: 2022-07-06 | End: 2022-07-08

## 2022-07-06 RX ORDER — DEXTROSE 50 % IN WATER 50 %
15 SYRINGE (ML) INTRAVENOUS ONCE
Refills: 0 | Status: DISCONTINUED | OUTPATIENT
Start: 2022-07-06 | End: 2022-07-08

## 2022-07-06 RX ORDER — DEXTROSE 50 % IN WATER 50 %
25 SYRINGE (ML) INTRAVENOUS ONCE
Refills: 0 | Status: DISCONTINUED | OUTPATIENT
Start: 2022-07-06 | End: 2022-07-08

## 2022-07-06 RX ORDER — GLUCAGON INJECTION, SOLUTION 0.5 MG/.1ML
1 INJECTION, SOLUTION SUBCUTANEOUS ONCE
Refills: 0 | Status: DISCONTINUED | OUTPATIENT
Start: 2022-07-06 | End: 2022-07-08

## 2022-07-06 RX ORDER — LANOLIN ALCOHOL/MO/W.PET/CERES
3 CREAM (GRAM) TOPICAL AT BEDTIME
Refills: 0 | Status: DISCONTINUED | OUTPATIENT
Start: 2022-07-06 | End: 2022-07-08

## 2022-07-06 RX ORDER — LEVOTHYROXINE SODIUM 125 MCG
88 TABLET ORAL DAILY
Refills: 0 | Status: DISCONTINUED | OUTPATIENT
Start: 2022-07-06 | End: 2022-07-08

## 2022-07-06 RX ORDER — ENOXAPARIN SODIUM 100 MG/ML
40 INJECTION SUBCUTANEOUS EVERY 24 HOURS
Refills: 0 | Status: DISCONTINUED | OUTPATIENT
Start: 2022-07-06 | End: 2022-07-07

## 2022-07-06 RX ORDER — ACETAMINOPHEN 500 MG
650 TABLET ORAL EVERY 6 HOURS
Refills: 0 | Status: DISCONTINUED | OUTPATIENT
Start: 2022-07-06 | End: 2022-07-08

## 2022-07-06 RX ORDER — INSULIN LISPRO 100/ML
VIAL (ML) SUBCUTANEOUS
Refills: 0 | Status: DISCONTINUED | OUTPATIENT
Start: 2022-07-06 | End: 2022-07-08

## 2022-07-06 RX ADMIN — Medication 2: at 21:27

## 2022-07-06 NOTE — ED ADULT TRIAGE NOTE - CHIEF COMPLAINT QUOTE
Patient was seen in pulmonary office earlier today, sent to ED for admission after c/o generalized weakness/too weak to be at home alone.  Hx BRCA.

## 2022-07-06 NOTE — H&P ADULT - NSHPSOCIALHISTORY_GEN_ALL_CORE
Patient lives alone, uses a 4  w/ chair. Patient has PT coming at home and a nurse 2x per week for her chest tube. Patient denies smoking, alcohol and illicit drug use. Denies any HHA.

## 2022-07-06 NOTE — ED ADULT NURSE REASSESSMENT NOTE - NS ED NURSE REASSESS COMMENT FT1
Pt received from day shift RN. Pt is awake and alert and conversive. Weakness still noted. Awaiting transport will cont to monitor.

## 2022-07-06 NOTE — CONSULT NOTE ADULT - ASSESSMENT
INCOMPLETE    Recommendations:     89F with PMHx metastatic breast cancer (on fulvestrant and Xgeva, followed by Dr. Funez), DM2 (A1c 7.6), hypothyroidism, HLD, lymphedema, endometrial carcinoma s/p hysterectomy and bilateral oophorectomy, benign pancreatic tail adenoma, L malignant effusion s/p IPC 6/13 who presents for weakness. Pulm consulted for pleural effusion.    #Malignant pleural effusion, left  #Metastatic breast cancer    Well known to our service and to Dr. Vasquez. s/p IPC with weekly drainage outpatient. Now returns for placement at rehab due to weakness and we have drained her pleurx again today.    Recommendations:  - s/p Pleurx drainage 350cc 7/6  - No suspicion for pneumonia at this time    Patient s/e/d with attending Dr. Jara.

## 2022-07-06 NOTE — H&P ADULT - PROBLEM SELECTOR PLAN 7
Patient at home on glimepiride 1 mg BID. Last A1c 8.3 on 6/11/22.  - CC diet  - FSG monitoring  - mISS w/ meals and at bedtime She reports chronic LUE swelling and newly swelling of her RLE. Patient denies pain in the back nor in her extremities.   - US  Duplex LUE: negative for DVT. Patient additional to malignancy has low albumin at 2.9.   - US Duplex RLE: negative for DVT

## 2022-07-06 NOTE — H&P ADULT - PROBLEM SELECTOR PLAN 10
F: None  E: Replete PRN to K>4, Mg>2  N: CC diet   DVT PPx: Lovenox 40mg SQ  GI PPx: None   Dispo: [facility]  Code: FULL CODE F: None  E: Replete PRN to K>4, Mg>2  N: CC diet   DVT PPx: Lovenox 40mg SQ  GI PPx: None   Dispo: [facility]

## 2022-07-06 NOTE — CONSULT NOTE ADULT - SUBJECTIVE AND OBJECTIVE BOX
PULMONARY SERVICE INITIAL CONSULT NOTE    HPI:      REVIEW OF SYSTEMS:  Constitutional: No fever, weight loss or fatigue  Eyes: No eye pain, visual disturbances, or discharge  ENMT:  No difficulty hearing, tinnitus, vertigo; No sinus or throat pain  Neck: No pain, stiffness or neck swelling  Respiratory: see HPI  Cardiovascular: No chest pain, palpitations, dizziness or leg swelling  Gastrointestinal: No abdominal or epigastric pain. No nausea, vomiting or hematemesis; No diarrhea or constipation. No melena or hematochezia.  Genitourinary: No dysuria, frequency, hematuria or incontinence  Neurological: No headaches, memory loss, loss of strength, numbness or tremors  Skin: No itching, burning, rashes or lesions   Lymph Nodes: No enlarged glands  Endocrine: No heat or cold intolerance; No hair loss  Musculoskeletal: No joint pain or swelling; No muscle, back or extremity pain  Psychiatric: No depression, anxiety, mood swings or difficulty sleeping  Heme/Lymph: No easy bruising or bleeding gums  Allergy and Immunologic: No hives or eczema    PAST MEDICAL & SURGICAL HISTORY:  Type 2 diabetes mellitus  DM (diabetes mellitus)      HTN (hypertension)      Breast cancer      Hypothyroid      Endometrial cancer      Other acquired absence of organ  S/P splenectomy      Status post total hysterectomy  S/P hysterectomy      Other acquired absence of organ  S/P cholecystectomy      Status post cataract extraction  S/P cataract extraction          FAMILY HISTORY:  FH: type 2 diabetes (Mother)        SOCIAL HISTORY:  Smoking Status: [ ] Current, [ ] Former, [ ] Never  Pack Years:    MEDICATIONS:  Pulmonary:    Antimicrobials:    Anticoagulants:    Onc:    GI/:    Endocrine:    Cardiac:    Other Medications:      Allergies    No Known Allergies    Intolerances        Vital Signs Last 24 Hrs  T(C): 36.7 (2022 12:45), Max: 36.7 (2022 12:45)  T(F): 98.1 (2022 12:45), Max: 98.1 (2022 12:45)  HR: 91 (2022 12:45) (91 - 91)  BP: 110/72 (2022 12:45) (110/72 - 110/72)  BP(mean): --  RR: 18 (2022 12:45) (18 - 18)  SpO2: 96% (2022 12:45) (96% - 96%)        PHYSICAL EXAM:  Constitutional: WD  Head: NC/AT  EENT: anicteric sclera; oropharynx clear, MMM  Neck: supple, no JVD  Respiratory: CTA B/L; no W/R/R  Cardiovascular: +S1/S2, RRR  Gastrointestinal: soft, NT/ND  Extremities: WWP; no clubbing or cyanosis; no edema  Vascular: 2+ radial and pedal pulses  Neurological: alert, oriented    LABS:      CBC Full  -  ( 2022 14:42 )  WBC Count : 6.48 K/uL  RBC Count : 3.80 M/uL  Hemoglobin : 11.6 g/dL  Hematocrit : 35.0 %  Platelet Count - Automated : 407 K/uL  Mean Cell Volume : 92.1 fl  Mean Cell Hemoglobin : 30.5 pg  Mean Cell Hemoglobin Concentration : 33.1 gm/dL  Auto Neutrophil # : 4.60 K/uL  Auto Lymphocyte # : 1.09 K/uL  Auto Monocyte # : 0.55 K/uL  Auto Eosinophil # : 0.17 K/uL  Auto Basophil # : 0.04 K/uL  Auto Neutrophil % : 71.0 %  Auto Lymphocyte % : 16.8 %  Auto Monocyte % : 8.5 %  Auto Eosinophil % : 2.6 %  Auto Basophil % : 0.6 %        128<L>  |  96  |  19  ----------------------------<  122<H>  5.0   |  22  |  0.68    Ca    8.8      2022 14:42    TPro  6.9  /  Alb  2.9<L>  /  TBili  0.3  /  DBili  x   /  AST  30  /  ALT  24  /  AlkPhos  240<H>            Urinalysis Basic - ( 2022 14:42 )    Color: Yellow / Appearance: Clear / S.020 / pH: x  Gluc: x / Ketone: NEGATIVE  / Bili: Negative / Urobili: 0.2 E.U./dL   Blood: x / Protein: NEGATIVE mg/dL / Nitrite: NEGATIVE   Leuk Esterase: NEGATIVE / RBC: x / WBC x   Sq Epi: x / Non Sq Epi: x / Bacteria: x                RADIOLOGY & ADDITIONAL STUDIES: Personally reviewed. PULMONARY SERVICE INITIAL CONSULT NOTE    HPI:  89F with PMHx metastatic breast cancer (on fulvestrant and Xgeva, followed by Dr. Funez), DM2 (A1c 7.6), hypothyroidism, HLD, lymphedema, endometrial carcinoma s/p hysterectomy and bilateral oophorectomy, and benign pancreatic tail adenoma, prior L sided thoracentesis on 22 and IR chest tube placement for hydro-pneumothorax on 12/3 and with recent admission on - for recurrent L sided pleural effusion s/p thoracentesis on  and L IPC . Patient went to pulmonologist Dr. Vasquez office for hospital f/u and was found to be weak and was therefore referred to the ED. She reports shortness of breath with any activity but is not short of breath at rest. She has had the Pleurx drained once weekly, last drainage was . She follows with Dee Dee who she last saw yesterday.     REVIEW OF SYSTEMS:  Constitutional: No fever, weight loss or fatigue  Eyes: No eye pain, visual disturbances, or discharge  ENMT:  No difficulty hearing, tinnitus, vertigo; No sinus or throat pain  Neck: No pain, stiffness or neck swelling  Respiratory: see HPI  Cardiovascular: No chest pain, palpitations, dizziness or leg swelling  Gastrointestinal: No abdominal or epigastric pain. No nausea, vomiting or hematemesis; No diarrhea or constipation. No melena or hematochezia.  Genitourinary: No dysuria, frequency, hematuria or incontinence  Neurological: No headaches, memory loss, loss of strength, numbness or tremors  Skin: No itching, burning, rashes or lesions   Lymph Nodes: No enlarged glands  Endocrine: No heat or cold intolerance; No hair loss  Musculoskeletal: No joint pain or swelling; No muscle, back or extremity pain  Psychiatric: No depression, anxiety, mood swings or difficulty sleeping  Heme/Lymph: No easy bruising or bleeding gums  Allergy and Immunologic: No hives or eczema    PAST MEDICAL & SURGICAL HISTORY:  Type 2 diabetes mellitus  DM (diabetes mellitus)  HTN (hypertension)  Breast cancer  Hypothyroid  Endometrial cancer  Other acquired absence of organ  S/P splenectomy  Status post total hysterectomy  S/P hysterectomy  Other acquired absence of organ  S/P cholecystectomy  Status post cataract extraction  S/P cataract extraction    FAMILY HISTORY:  FH: type 2 diabetes (Mother)    SOCIAL HISTORY:  minimal prior smoking history    MEDICATIONS:  Home Medications:  glimepiride 1 mg oral tablet: 1 milligram(s) orally 2 times a day (10 Yann 2022 18:34)  Synthroid 88 mcg (0.088 mg) oral tablet: 1 tab(s) orally once a day (10 Yann 2022 18:34)  Vitamin D3 1000 intl units (25 mcg) oral tablet: 1 tab(s) orally once a day (10 Yann 2022 18:34)    Allergies  No Known Allergies    Vital Signs Last 24 Hrs  T(C): 36.7 (2022 12:45), Max: 36.7 (2022 12:45)  T(F): 98.1 (2022 12:45), Max: 98.1 (2022 12:45)  HR: 91 (2022 12:45) (91 - 91)  BP: 110/72 (2022 12:45) (110/72 - 110/72)  RR: 18 (2022 12:45) (18 - 18)  SpO2: 96% (2022 12:45) (96% - 96%)    PHYSICAL EXAM:  Constitutional: WD, thin  Head: NC/AT  EENT: anicteric sclera; oropharynx clear, MMM  Neck: supple, no JVD  Respiratory: dec breath sounds L base; no W/R/R  Cardiovascular: +S1/S2, RRR  Gastrointestinal: soft, NT/ND  Extremities: WWP; no clubbing or cyanosis; nonpitting LE edema  Vascular: 2+ radial and pedal pulses  Neurological: alert, oriented    LABS:  CBC Full  -  ( 2022 14:42 )  WBC Count : 6.48 K/uL  RBC Count : 3.80 M/uL  Hemoglobin : 11.6 g/dL  Hematocrit : 35.0 %  Platelet Count - Automated : 407 K/uL  Mean Cell Volume : 92.1 fl  Mean Cell Hemoglobin : 30.5 pg  Mean Cell Hemoglobin Concentration : 33.1 gm/dL  Auto Neutrophil # : 4.60 K/uL  Auto Lymphocyte # : 1.09 K/uL  Auto Monocyte # : 0.55 K/uL  Auto Eosinophil # : 0.17 K/uL  Auto Basophil # : 0.04 K/uL  Auto Neutrophil % : 71.0 %  Auto Lymphocyte % : 16.8 %  Auto Monocyte % : 8.5 %  Auto Eosinophil % : 2.6 %  Auto Basophil % : 0.6 %        128<L>  |  96  |  19  ----------------------------<  122<H>  5.0   |  22  |  0.68    Ca    8.8      2022 14:42    TPro  6.9  /  Alb  2.9<L>  /  TBili  0.3  /  DBili  x   /  AST  30  /  ALT  24  /  AlkPhos  240<H>      Urinalysis Basic - ( 2022 14:42 )    Color: Yellow / Appearance: Clear / S.020 / pH: x  Gluc: x / Ketone: NEGATIVE  / Bili: Negative / Urobili: 0.2 E.U./dL   Blood: x / Protein: NEGATIVE mg/dL / Nitrite: NEGATIVE   Leuk Esterase: NEGATIVE / RBC: x / WBC x   Sq Epi: x / Non Sq Epi: x / Bacteria: x    RADIOLOGY & ADDITIONAL STUDIES: Personally reviewed.

## 2022-07-06 NOTE — H&P ADULT - PROBLEM SELECTOR PLAN 9
F: None  E: Replete PRN to K>4, Mg>2  N: CC diet   DVT PPx: Lovenox 40mg SQ  GI PPx: None   Dispo: [facility]  Code: FULL CODE Patient at home on Synthroid 88 mcg daily. Upon admission TSH 3.86.   - c/w home med

## 2022-07-06 NOTE — H&P ADULT - NSHPLABSRESULTS_GEN_ALL_CORE
LABS:                         11.6   6.48  )-----------( 407      ( 2022 14:42 )             35.0     07-06    128<L>  |  96  |  19  ----------------------------<  122<H>  5.0   |  22  |  0.68    Ca    8.8      2022 14:42    TPro  6.9  /  Alb  2.9<L>  /  TBili  0.3  /  DBili  x   /  AST  30  /  ALT  24  /  AlkPhos  240<H>        Urinalysis Basic - ( 2022 14:42 )    Color: Yellow / Appearance: Clear / S.020 / pH: x  Gluc: x / Ketone: NEGATIVE  / Bili: Negative / Urobili: 0.2 E.U./dL   Blood: x / Protein: NEGATIVE mg/dL / Nitrite: NEGATIVE   Leuk Esterase: NEGATIVE / RBC: x / WBC x   Sq Epi: x / Non Sq Epi: x / Bacteria: x                RADIOLOGY, EKG & ADDITIONAL TESTS: Reviewed.

## 2022-07-06 NOTE — ED ADULT NURSE NOTE - ED CARDIAC CAPILLARY REFILL
ED General





- General


Chief Complaint: Medication Refill


Stated Complaint: ASTHMA ISSUE/MED REFILL


Time Seen by Provider: 07/02/18 22:48


Notes: 





Patient is a 19-year-old male with a past medical history of asthma who 

presents with several hours of wheezing and some mild difficulty breathing.  He 

states that this feels identical to prior mild asthma exacerbations that he has 

had it since he was a child.  He states that he feels like his symptoms would 

be fine if he had an inhaler but unfortunately does not have access to this 

medication and does not have a primary care physician.  He has not noted that 

anything clearly triggered his shortness of breath today.  He denies any cough, 

sputum production, vomiting, fever or constitutional symptoms.  No chest pain.


TRAVEL OUTSIDE OF THE [place] IN LAST 30 DAYS: No





- Related Data


Allergies/Adverse Reactions: 


 





No Known Allergies Allergy (Verified 01/28/18 13:11)


 











Past Medical History





- General


Information source: Patient





- Social History


Smoking Status: Never Smoker


Chew tobacco use (# tins/day): No


Frequency of alcohol use: None


Drug Abuse: None


Lives with: Family


Family History: Reviewed & Not Pertinent


Patient has suicidal ideation: No


Patient has homicidal ideation: No


Pulmonary Medical History: Reports: Hx Asthma


Renal/ Medical History: Denies: Hx Peritoneal Dialysis





Review of Systems





- Review of Systems


Notes: 





Constitutional: Negative for fever.


HENT: Negative for sore throat.


Eyes: Negative for visual changes.


Cardiovascular: Negative for chest pain.


Respiratory: Positive for shortness of breath and wheezing


Gastrointestinal: Negative for abdominal pain, vomiting or diarrhea.


Genitourinary: Negative for dysuria.


Musculoskeletal: Negative for back pain.


Skin: Negative for rash.


Neurological: Negative for headaches, weakness or numbness.





10 point ROS negative except as marked above and in HPI.





Physical Exam





- Vital signs


Vitals: 


 











Temp Pulse Resp BP Pulse Ox


 


 99.0 F   88   16   145/72 H  97 


 


 07/02/18 22:22  07/02/18 22:22  07/02/18 22:22  07/02/18 22:22  07/02/18 22:22











Interpretation: Hypertensive


Notes: 





PHYSICAL EXAMINATION:





GENERAL: Well-appearing, well-nourished and in no acute distress.





HEAD: Atraumatic, normocephalic.





EYES: Pupils equal round and reactive to light, extraocular movements intact, 

sclera anicteric, conjunctiva are normal.





ENT: nares patent, oropharynx clear without exudates.  Moist mucous membranes.





NECK: Normal range of motion, supple without lymphadenopathy





LUNGS: Breath sounds clear to auscultation bilaterally and equal.  Faint end 

expiratory wheezing in all lung fields 





HEART: Regular rate and rhythm without murmurs





ABDOMEN: Soft, nontender, normoactive bowel sounds.  No guarding, no rebound.  

No masses appreciated.





EXTREMITIES: Normal range of motion, no pitting or edema.  No cyanosis.





NEUROLOGICAL: No focal neurological deficits. Moves all extremities 

spontaneously and on command.





PSYCH: Normal mood, normal affect.





SKIN: Warm, Dry, normal turgor, no rashes or lesions noted.





Course





- Re-evaluation


Re-evalutation: 





07/02/18 23:17


Patient presents with a mild exacerbation of their baseline asthma.  Mild 

wheezing at time of presentation but vitals do not show significant hypoxemia 

or tachypnea.  No retractions.  Patient feels improved after receiving an 

albuterol treatment here in the emergency department.  His main reason for come 

to the emergency room today with a he did not have access to an albuterol 

inhaler.  No indication for a chest x-ray.  Based on patient's overall 

reassuring assessment, I believe they are stable for outpatient management with 

steroids.   I do not suspect an acute alternative pathology at this time based 

on history and exam including acute pulmonary embolus, ACS, pneumothorax, or 

aortic dissection. At this time will discharge with return precautions and 

follow-up recommendations.  Verbal discharge instructions given a the bedside 

and opportunity for questions given. Medication warnings reviewed. Patient is 

in agreement with this plan and has verbalized understanding of return 

precautions and the need for primary care follow-up in the next 24-72 hours.





- Vital Signs


Vital signs: 


 











Temp Pulse Resp BP Pulse Ox


 


 99.0 F   88   16   145/72 H  97 


 


 07/02/18 22:22  07/02/18 22:22  07/02/18 22:22  07/02/18 22:22  07/02/18 22:22














Discharge





- Discharge


Clinical Impression: 


 Medication refill





Asthma exacerbation


Qualifiers:


 Asthma severity: mild Asthma persistence: intermittent Qualified Code(s): 

J45.21 - Mild intermittent asthma with (acute) exacerbation





Condition: Good


Disposition: HOME, SELF-CARE


Additional Instructions: 


You were seen for an asthma exacerbation.  Your symptoms improved with 

treatment here in the emergency department.  However, it is very important that 

you return to the emergency department immediately if you began to have 

worsening difficulty breathing that does not respond to your normal home 

nebulizers.  You are also being sent home on a five-day course of steroids that 

you should start taking tomorrow.  Please also follow closely with your primary 

care physician.  you should also return to emergency department if you develop 

fever greater than 101, persistent cough, persistent vomiting, pass out, or any 

other symptoms that are concerning to you.


Prescriptions: 


Albuterol Sulfate [Proair HFA Inhalation Aerosol 8.5 gm MDI] 2 puff IH Q4H PRN #

1 mdi


 PRN Reason: 


Forms:  Return to Work
2 seconds or less

## 2022-07-06 NOTE — CONSULT NOTE ADULT - ATTENDING COMMENTS
Sent in for generalized weakness rather than new pulmonary problem - we drained the PleurX and will follow along w you for PleurX drainage.

## 2022-07-06 NOTE — ED ADULT NURSE NOTE - OBJECTIVE STATEMENT
pt is an 89 y/o F, PMH Breast CA w METS, endometrial CA, hypothyroid, HTN, DM. pt was seen in pulmonary office earlier today, sent to ED for admission after c/o generalized weakness/too weak to be at home alone.  pt states recently started on new chemo drug, has taken a total of 3 doses, but stopped yesterday due to feeling weak. pt states she is too weak to perform ADLS at home. Pt ambulates with walker at baseline, A&O x4. Denies pain, n/v/d, fever, chills, cough, cp or sob

## 2022-07-06 NOTE — HISTORY OF PRESENT ILLNESS
[TextBox_4] : 89 year old female with metastatic breast cancer on chemotherapy and with recurrent L sided pleural effusions s/p multiple drainage wads admitted to hospital in June 2022 for reaccumulation of pleural effusion. Left side IPC was placed on  6/13 and 1600 ml drained before discharge. Patient is getting pleural catheter drainage once a week. She was started on new chemotherapy regimen but she felt very weak. According to patient, new chemotherapy was d/c by oncology PA. Patient is feeling very weak. She told me that she can not even lift a cup of coffee to drink. She has no help at home. She told me that if she goes home today then she will lie down and die as she feels that she will not be able to sit down.  [ESS] : 2

## 2022-07-06 NOTE — PATIENT PROFILE ADULT - VISION (WITH CORRECTIVE LENSES IF THE PATIENT USUALLY WEARS THEM):
Dilution (U/0.1 Cc): 4 Partially impaired: cannot see medication labels or newsprint, but can see obstacles in path, and the surrounding layout; can count fingers at arm's length

## 2022-07-06 NOTE — ED PROVIDER NOTE - PROGRESS NOTE DETAILS
pt seen by pulm; pleurex drained 350 mL.  agrees stable for RMF.  I also d/w Dr Hargrove, he will admit.

## 2022-07-06 NOTE — ED PROVIDER NOTE - PHYSICAL EXAMINATION
CONSTITUTIONAL: elderly fem NAD  SKIN: Normal color and turgor.    HEAD: NC/AT.  EYES: Conjunctiva clear. EOMI. PERRL.    ENT: Airway clear. Normal voice.   RESPIRATORY:  Normal work of breathing. Lungs: dimin left base.  CARDIOVASCULAR:  RRR, S1S2. No M/R/G.      GI:  Abdomen soft, nontender.    MSK: Neck supple.  +2 left arm swelling; no erythema/warmth/tenderness.  Trace RLE edema; no erythema/warmth/tenderness.   NEURO: Alert; CN: grossly intact. Speech clear.  MERCADO.

## 2022-07-06 NOTE — H&P ADULT - PROBLEM SELECTOR PLAN 5
Patient reports chronic urinary incontinence, no change in urine color nor dysuria.   - upon admission UA negative  - no concerns for UTI at this time Patient at home fulvestrant and Xgeva, followed by Dr. Funez. Patient recently started 5 days ago on Ibrance 5 days ago; saw oncology yesterday and agreed to stop Ibrance.  - continue outpatient care w Oncology   - Consider palliative consult in the morning

## 2022-07-06 NOTE — H&P ADULT - PROBLEM SELECTOR PLAN 1
Patient presented with acute generalized weakness and her inability to perform ADLs, after starting Ibrance 5 day that was discontinued on Tuesday.   - PT consulted  - OT consulted

## 2022-07-06 NOTE — H&P ADULT - PROBLEM SELECTOR PLAN 8
Patient at home on Synthroid 88 mcg daily. Upon admission TSH 3.86.   - c/w home med Patient at home on glimepiride 1 mg BID. Last A1c 8.3 on 6/11/22.  - CC diet  - FSG monitoring  - mISS w/ meals and at bedtime

## 2022-07-06 NOTE — ED PROVIDER NOTE - WR INTERPRETATION 1
Decrease in left pleural effusion; lungs otherwise clear.  Pleurex tubing noted.  No pneumothorax.  No cardiomegaly.

## 2022-07-06 NOTE — PATIENT PROFILE ADULT - FALL HARM RISK - RISK INTERVENTIONS

## 2022-07-06 NOTE — ED PROVIDER NOTE - NS ED MD DISPO DIVISION
Chief Complaint   Patient presents with   • Follow-up     6 month f/u       HISTORY OF PRESENT ILLNESS: Toño Muniz is a 40 year old  male who presented today for six-month follow-up with most recent lab work done on 5/3/2021    Has toe nail fungus on right first digit    Past Medical History:   Diagnosis Date   • Castleman disease (CMS/HCC)    • Depression    • Essential (primary) hypertension    • IBS (irritable bowel syndrome)    • OCD (obsessive compulsive disorder)    • Osteopenia    • Phenylketonuria (CMS/HCC)    • Sjogren's disease (CMS/HCC)          Past Surgical History:   Procedure Laterality Date   • Esophagogastroduodenoscopy     • Esophagogastroduodenoscopy  07/31/2020   • Lymph node biopsy         MEDICATIONS:  Current Outpatient Medications   Medication Sig Dispense Refill   • hydroxychloroquine (PLAQUENIL) 200 MG tablet TAKE ONE TABLET BY MOUTH DAILY 90 tablet 0   • losartan (COZAAR) 50 MG tablet TAKE ONE TABLET BY MOUTH DAILY 90 tablet 3   • escitalopram (Lexapro) 20 MG tablet Take 1 tablet by mouth daily. 90 tablet 1   • prazosin (MINIPRESS) 1 MG capsule May take 1-4 tabs nightly. 120 capsule 1   • OLANZapine (ZyPREXA) 20 MG tablet Take 1 tablet by mouth every evening. 90 tablet 1   • prazosin (MINIPRESS) 1 MG capsule TAKE ONE CAPSULE BY MOUTH ONCE NIGHTLY 30 capsule 0   • dicyclomine (BENTYL) 20 MG tablet Take 1 tablet by mouth 3 times daily (before meals). Note dose reduction to 3 times daily before meals 270 tablet 3   • pantoprazole (PROTONIX) 40 MG tablet Take 1 tablet by mouth daily (before breakfast). 90 tablet 3   • cevimeline (EVOXAC) 30 MG capsule Take 1 capsule by mouth 3 times daily. 270 capsule 1   • hydrOXYzine (VISTARIL) 25 MG capsule TAKE ONE TO TWO CAPSULES BY MOUTH THREE TIMES DAILY AS NEEDED FOR ANXIETY 180 capsule 0   • colestipol (COLESTID) 1 g Tab TAKE 1-2 TABLETS BY MOUTH  DAILY  BY 2 HOURS FROM OTHER ORAL MEDICATIONS 60 tablet 10   • sucralfate (CARAFATE) 1 g  tablet Take 1 tablet by mouth 4 times daily. Take 1 TAB po QID 1 hr ac and hs 360 tablet 3   • Naproxen Sodium (ALEVE PO) Take 220 mg by mouth as needed.     • Fish Oil-Cholecalciferol (FISH OIL + D3 PO) Take 1,400 mg by mouth 2 times daily.      • omeprazole-sodium bicarbonate (ZEGERID)  MG capsule Take by mouth nightly.      • Cholecalciferol (VITAMIN D3) 5000 units capsule Take 2,000 Units by mouth daily.      • Alpha-Lipoic Acid 200 MG Cap Take 200 mg by mouth 2 times daily.     • Alum Hydroxide-Mag Trisilicate (GAVISCON) 80-14.2 MG Chew Tab Chew 2 tablets by mouth as needed.     • Nutritional Supplements (PHENYL-FREE 2 PO) Take 1 Units/day by mouth daily. Mixed with phenyl free 370grams, water = 32 oz of formula     • zolmitriptan (ZOMIG) 5 MG tablet TAKE 1 TABLET BY MOUTH AS NEEDED FOR MIGRAINE. 10 tablet 5   • acetaminophen (TYLENOL) 325 MG tablet Take 650 mg by mouth as needed.     • Loperamide HCl (IMODIUM A-D PO) Take  by mouth as needed.     • vitamin E 400 UNIT capsule Take 400 Units by mouth daily.       Current Facility-Administered Medications   Medication Dose Route Frequency Provider Last Rate Last Admin   • zoledronic acid (RECLAST) IVPB 5 mg  5 mg Intravenous Once Leia Mauricio MD   Stopped at 02/13/19 0945     ALLERGIES:  Allergies as of 06/02/2021 - Reviewed 06/02/2021   Allergen Reaction Noted   • Sertraline DIARRHEA    • Thimerosal       REVIEW OF SYSTEMS:  Constitutional:  Denies fever or chills, weight gain or loss.    Respiratory:  Denies shortness of breath, cough or wheezing.   Cardiovascular:  Denies chest pain or palpitations, no PND or orthopnea.  Gastrointestinal:  Denies abdominal pain, nausea, vomiting, bloody stools or diarrhea.  Neurologic:  Denies headache, focal weakness or sensory changes.     PHYSICAL EXAMINATION:  Constitutional:  Well-developed, well-nourished, no acute distress, non-toxic appearance.  Respiratory: Lungs are clear. No wheezing, rhonchi or  rales.  Cardiovascular:  Normal rate, normal rhythm, no murmurs, no gallops, no rubs.     ASSESSMENT:   1. Essential hypertension    2. Hepatic steatosis    3. Castleman disease (CMS/HCC)    4. Sjogren's syndrome, with unspecified organ involvement (CMS/HCC)    5. Severe episode of recurrent major depressive disorder, with psychotic features (CMS/HCC)    6. Phenylketonuria (CMS/HCC)    7. History of migraine headaches    8. Obsessive-compulsive disorder, unspecified type      PLAN:  No orders of the defined types were placed in this encounter.    Return in about 6 months (around 12/2/2021).    SUMMARY OF ISSUES:  1. Hypertension: Blood pressure is very well controlled on losartan 50 mg daily, GFR greater than 90 on 2/8/2021   2.  History of migraine headaches: Patient taken off Topamax due to symptoms of confusion as well as primary polydipsia. No symptoms have resolved/greatly improved since then. Currently on Zomig as need to basis (Patient in the past had been given Fioricet with codeine to use as need to basis for only severe migraines that fail to respond to Zomig). Currently headache free and has been for many months  3. Epigastric abdominal pain/GERD: Patient still somewhat symptomatic on a combination of pantoprazole in the a.m. and at bedtime as well as Carafate twice a day. Also instructed to use Zegerid 20 mg over-the-counter every 8-12 hours as needed for breakthrough symptoms of heartburn or reflux. Patient currently followed by Dr. Hawley, recent unremarkable upper endoscopy. Symptoms overall quite stable  4. Irritable bowel syndrome:  Patient followed by Dr. Hawley, symptoms manifest that has postprandial diarrhea on average twice a day.  Symptoms have remained fairly stable over the past few years  5. Hepatic steatosis: Confirmed by liver ultrasound January 10, 2017. Patient placed on antioxidants per Dr. Hawley with recommendation for yearly follow-up ultrasound. Liver enzymes being monitored on  3 month basis per Dr. Hawley.  6. Sjogren's syndrome based on dry eyes, dry mouth, (+) SSA, joint pain, leukopenia with neutropenia. Poor prognostic factors include leukopenia, possible renal tubular acidosis and history of lymphoma. Patient had been referred by Dr. Hawley to Dr. Mauricio secondary to symptoms of dry eyes, dry mouth and joint aches. Patient is currently on hydroxychloroquine.  7. 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 Dizziness: Episodes associated with visual changes (tunnel vision, blurred vision) and headaches. Symptoms are suggestive of vertiginous migraines. Patient had been referred to neurology in [facility] who concurred with diagnosis and suggested adding Topamax which has been very helpful (discontinued secondary to symptoms of polyuria).  9. 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 Castleman disease: Patient has yearly followup in [place], most recent hemogram 10/5/20 was essentially baseline. Labs are obtained every 3 months  11. Recurrent urinary tract infections:  Patient had been followed by urology felt to be secondary to urethral strictures related to old surgeries as a child. Patient had been told that he may require urethral dilatation in the future. No recent infections in years  12. Polyuria/Primary polydipsia: thought to be secondary to Topamax, recently taken off with complete resolution of symptoms  13. Chronic low back pain with elements of sciatica: Patient has had previous workup including an MRI of the LS-spine 4 years ago. Patient has done reasonably well with chiropractic manipulation.  14. Moderate depressive disorder associated with psychosis versus schizoaffective disorder: followed by Psychiatry.  15. Osteoporosis: Patient is  already being followed by Dr. Mauricio from department of rheumatology, been getting Reclast infusion.           Summary of plan:  1. Follow-up in 6 months  2. Will review chart at next visit prior to decision as far as what lab work in needed       [facility]

## 2022-07-06 NOTE — H&P ADULT - PROBLEM SELECTOR PLAN 3
Prior L sided thoracentesis on 11/30/22 and IR chest tube placement for hydro-pneumothorax on 12/3 and with recent admission on 05/14-05/19 for recurrent L sided pleural effusion s/p thoracentesis on 5/17 and L IPC 6/13  - She has had the Pleurx drained once weekly, last drainage was 6/30.  - s/p Pleurx drainage 350cc in the ED  - continue to monitor respiratory status  - CXR if patient respiratory status worsens for r/o worsening effusion or new Pneumothorax Upon admission Na 128, calculated serum osm 270 manually calculated. Hypotonic hyponatremia.   - f/u morning serum osm  - f/u urine lytes and urine osm   - monitor BMP

## 2022-07-06 NOTE — ED PROVIDER NOTE - CLINICAL SUMMARY MEDICAL DECISION MAKING FREE TEXT BOX
Weakness and TESFAYE in pt with met breast ca, known pleural effusion  with Pleurex cath.  Unclear if increasing weakness and TESFAYE are due to progression of disease, side effects of treatment, or related to an acute process ie pulm infection or UTI.  Unable to care for self at home, and only has help at home twice weekly.  Will check labs, CXR, UA, EKG, US LUE and RLE to eval for DVT.  VS WNL, no hypoxia.  Afebrile and nontoxic appearance.

## 2022-07-06 NOTE — H&P ADULT - NSHPPHYSICALEXAM_GEN_ALL_CORE
.  VITAL SIGNS:  T(F): 98 (07-06-22 @ 20:45), Max: 99.4 (07-06-22 @ 15:37)  HR: 79 (07-06-22 @ 20:45) (79 - 103)  BP: 129/76 (07-06-22 @ 20:45) (110/72 - 135/84)  BP(mean): --  RR: 16 (07-06-22 @ 20:45) (16 - 18)  SpO2: 94% (07-06-22 @ 20:45) (94% - 97%)    PHYSICAL EXAM:    Constitutional: WDWN resting comfortably in bed; NAD  HEENT: NC/AT, PERRL, EOMI, anicteric sclera, no nasal discharge; mildly dry MM  Neck: supple, no thyromegaly, no JVD   Respiratory: CTA R, decrease breah sound in LLL,   Cardiac: +S1/S2; RRR; no M/R/G  Chest: L pleurex tube, wrapped non tender to palpation   Gastrointestinal: soft, NT/ND; no rebound or guarding; +BSx4  Extremities: WWP, no clubbing or cyanosis;  - LUE with non pitting chronic edema, non tender to palpation   - BL LE non pitting chronic edema up to mid shin, non tender to palpation   Musculoskeletal: NROM x4; no joint swelling, tenderness or erythema  Vascular: 2+ radial, femoral, DP/PT pulses B/L  Dermatologic: multiple erythematous patches with minimally raised borders that are not itchy. non painful in the left chest extending to the posterior axillary line   Neurologic: AAOx3; CNII-XII grossly intact; no focal deficits  Psychiatric: affect and characteristics of appearance, verbalizations, behaviors are appropriate

## 2022-07-06 NOTE — REVIEW OF SYSTEMS
[Fever] : no fever [Fatigue] : fatigue [Poor Appetite] : poor appetite [Dry Eyes] : no dry eyes [Ear Disturbance] : no ear disturbance [Eye Irritation] : no eye irritation [Nasal Congestion] : no nasal congestion [Cough] : no cough [Sputum] : no sputum [Dyspnea] : dyspnea [SOB on Exertion] : sob on exertion [Chest Discomfort] : no chest discomfort [Orthopnea] : no orthopnea [Hay Fever] : no hay fever [Nasal Discharge] : no nasal discharge [GERD] : no gerd [Diarrhea] : no diarrhea [Raynaud] : no raynaud [Telangiectasias] : no telangiectasias

## 2022-07-06 NOTE — H&P ADULT - PROBLEM SELECTOR PLAN 2
Patient reports recent hospitalization for pleural effusion s/p Pleurx catheter placement. Patient complaining of no SOB at rest. CXR remarkable for small left-sided pleural effusion. No suspicion for pneumonia at this time. Patient at RA upon admission and speaking in full sentences.   - see below on pleural effusion Patient reports recent hospitalization for pleural effusion s/p Pleurx catheter placement. Patient complaining of no SOB at rest. CXR remarkable for small left-sided pleural effusion. No suspicion for pneumonia at this time. Patient at RA upon admission and speaking in full sentences. Patient not anemic, HGB 11 upon admission. No known history of Cardiovascular disease. TSH WNL.   - see below on pleural effusion

## 2022-07-06 NOTE — H&P ADULT - ASSESSMENT
89F with PMHx metastatic breast cancer (on fulvestrant and Xgeva, followed by Dr. Funez), DM2 (A1c 7.6), hypothyroidism, HLD, lymphedema, endometrial carcinoma s/p hysterectomy and bilateral oophorectomy, benign pancreatic tail adenoma, L malignant effusion s/p IPC 6/13 who presents for weakness. Patient admitted for generalized weakness and rehab placement.       - s/p Pleurx drainage 350cc in the ED   - No suspicion for pneumonia at this time 89F with PMHx metastatic breast cancer (on fulvestrant and Xgeva, followed by Dr. Funez), DM2 (A1c 7.6), hypothyroidism, HLD, lymphedema, endometrial carcinoma s/p hysterectomy and bilateral oophorectomy, benign pancreatic tail adenoma, L malignant effusion s/p IPC 6/13 who presents for weakness. Patient admitted for generalized weakness and rehab placement.

## 2022-07-06 NOTE — PHYSICAL EXAM
[No Acute Distress] : no acute distress [Well Nourished] : well nourished [Normal Oropharynx] : normal oropharynx [Normal Appearance] : normal appearance [No Neck Mass] : no neck mass [Normal Rate/Rhythm] : normal rate/rhythm [Normal S1, S2] : normal s1, s2 [Normal Color/ Pigmentation] : normal color/ pigmentation [No Rash] : no rash [No Focal Deficits] : no focal deficits [Oriented x3] : oriented x3 [Normal Affect] : normal affect [TextBox_68] : decrease breath sounds bilaterally.

## 2022-07-06 NOTE — DISCUSSION/SUMMARY
[FreeTextEntry1] : 89 year old female with metastatic breast cancer on chemotherapy and with recurrent L sided pleural effusions s/p multiple drainage wads admitted to hospital in June 2022 for reaccumulation of pleural effusion. Left side IPC was placed on  6/13. \par \par Review:\par Hospital records\par Pathology report\par \par A/P\par Patient is not safe for going back to home. She has breast cancer with metastasis with poor prognosis. Patient will need placement or plan for home hospice. Plan for admission to the hospital for safe discharge. i called ED for sign out. Pulmonary team will follow up patient for pleural catheter discharge and removing pleural catheter sutures.

## 2022-07-06 NOTE — H&P ADULT - PROBLEM SELECTOR PLAN 4
Patient at home fulvestrant and Xgeva, followed by Dr. Funez. Patient recently started 5 days ago on Ibrance 5 days ago; saw oncology yesterday and agreed to stop Ibrance.  - continue outpatient care w Oncology   - Consider palliative consult in the morning Prior L sided thoracentesis on 11/30/22 and IR chest tube placement for hydro-pneumothorax on 12/3 and with recent admission on 05/14-05/19 for recurrent L sided pleural effusion s/p thoracentesis on 5/17 and L IPC 6/13  - She has had the Pleurx drained once weekly, last drainage was 6/30.  - s/p Pleurx drainage 350cc in the ED  - continue to monitor respiratory status  - CXR if patient respiratory status worsens for r/o worsening effusion or new Pneumothorax

## 2022-07-06 NOTE — ED PROVIDER NOTE - OBJECTIVE STATEMENT
88 yo fem PMHx met breast ca, DM2, hypothyroidism, endometrial ca s/p TAHBSO, recent hospitalization for pleural effusion s/p Pleurex catheter placement; has been feeling weaker for the past week, especially since started Ibrance 5 days ago; saw oncology yesterday and agreed to stop Ibrance.  Today had appointment with pulm, and was sent to hospital for admission due to her weakness, and her inability to perform ADLs.  She is able to walk to bathroom in her apartment, but her TESFAYE is worsening to the point that she is SOB just getting to the next room using her walker.  No energy to prepare even simple meals (ie [employer]).  Denies fever/chills, cough/hemoptysis, chest pain, abd pain, NVD, melena. Has chronic urinary incontinence, but no dysuria/hematuria.  Left arm swelling for the past month, had an US neg for DVT at around the time the swelling was first noticed.

## 2022-07-06 NOTE — H&P ADULT - HISTORY OF PRESENT ILLNESS
89F with PMHx metastatic breast cancer (on fulvestrant and Xgeva, followed by Dr. Funez), DM2 (A1c 7.6), hypothyroidism, HLD, lymphedema, endometrial carcinoma s/p hysterectomy and bilateral oophorectomy, and benign pancreatic tail adenoma, prior L sided thoracentesis on 11/30/22 and IR chest tube placement for hydro-pneumothorax on 12/3 and with recent admission on 05/14-05/19 for recurrent L sided pleural effusion s/p thoracentesis on 5/17 and L IPC 6/13. Patient lives alone, was recently started on Ibrance 5 days ago and started experiencing generalized weakness. She called Dr Funez office in order to discontinue her medication with their approval, and subsequently did so on Thursday. Patient came in after visiting Dr Guzman office and reported TESFAYE and generalized weakness, for which she was unable to perform her ADLs such as showering and cooking.  Patient was sent to the ED by Dr Vasquez for admission due to her weakness, and her inability to perform ADLs. In addition she reports chronic LUE swelling and newly swelling of her RLE. Patient denies pain in the back nor in her extremities.     ED COURSE:  VS: T 98.1 HR 91  /72  RR 18  SaO2 96% RA  LABS: Na 128  Gluc 122 T prot 6.9 Alb 2.9 Alk phos 240 TSH 3.860  - UA negative COVID negative   EKG: NSR, with PAC   IMAGING:  - CXR: Small left-sided pleural effusion. Diffuse osseous sclerotic metastasis.  - US  Duplex LUE: negative for DVT.   - US Duplex RLE: No evidence of right lower extremity deep venous thrombosis.  MANAGEMENT: None   CONSULTS: Pulmonology for pleural effusion

## 2022-07-07 ENCOUNTER — TRANSCRIPTION ENCOUNTER (OUTPATIENT)
Age: 87
End: 2022-07-07

## 2022-07-07 DIAGNOSIS — Z71.89 OTHER SPECIFIED COUNSELING: ICD-10-CM

## 2022-07-07 DIAGNOSIS — Z51.5 ENCOUNTER FOR PALLIATIVE CARE: ICD-10-CM

## 2022-07-07 DIAGNOSIS — R53.81 OTHER MALAISE: ICD-10-CM

## 2022-07-07 LAB
ALBUMIN SERPL ELPH-MCNC: 2.8 G/DL — LOW (ref 3.3–5)
ALP SERPL-CCNC: 248 U/L — HIGH (ref 40–120)
ALT FLD-CCNC: 21 U/L — SIGNIFICANT CHANGE UP (ref 10–45)
ANION GAP SERPL CALC-SCNC: 6 MMOL/L — SIGNIFICANT CHANGE UP (ref 5–17)
AST SERPL-CCNC: 30 U/L — SIGNIFICANT CHANGE UP (ref 10–40)
BASOPHILS # BLD AUTO: 0.06 K/UL — SIGNIFICANT CHANGE UP (ref 0–0.2)
BASOPHILS NFR BLD AUTO: 1.1 % — SIGNIFICANT CHANGE UP (ref 0–2)
BILIRUB SERPL-MCNC: 0.3 MG/DL — SIGNIFICANT CHANGE UP (ref 0.2–1.2)
BUN SERPL-MCNC: 15 MG/DL — SIGNIFICANT CHANGE UP (ref 7–23)
CALCIUM SERPL-MCNC: 8.2 MG/DL — LOW (ref 8.4–10.5)
CHLORIDE SERPL-SCNC: 101 MMOL/L — SIGNIFICANT CHANGE UP (ref 96–108)
CO2 SERPL-SCNC: 25 MMOL/L — SIGNIFICANT CHANGE UP (ref 22–31)
CREAT SERPL-MCNC: 0.75 MG/DL — SIGNIFICANT CHANGE UP (ref 0.5–1.3)
EGFR: 76 ML/MIN/1.73M2 — SIGNIFICANT CHANGE UP
EOSINOPHIL # BLD AUTO: 0.23 K/UL — SIGNIFICANT CHANGE UP (ref 0–0.5)
EOSINOPHIL NFR BLD AUTO: 4.4 % — SIGNIFICANT CHANGE UP (ref 0–6)
GLUCOSE BLDC GLUCOMTR-MCNC: 112 MG/DL — HIGH (ref 70–99)
GLUCOSE BLDC GLUCOMTR-MCNC: 120 MG/DL — HIGH (ref 70–99)
GLUCOSE BLDC GLUCOMTR-MCNC: 130 MG/DL — HIGH (ref 70–99)
GLUCOSE BLDC GLUCOMTR-MCNC: 187 MG/DL — HIGH (ref 70–99)
GLUCOSE SERPL-MCNC: 125 MG/DL — HIGH (ref 70–99)
HCT VFR BLD CALC: 36 % — SIGNIFICANT CHANGE UP (ref 34.5–45)
HGB BLD-MCNC: 11.9 G/DL — SIGNIFICANT CHANGE UP (ref 11.5–15.5)
IMM GRANULOCYTES NFR BLD AUTO: 0.4 % — SIGNIFICANT CHANGE UP (ref 0–1.5)
LYMPHOCYTES # BLD AUTO: 0.83 K/UL — LOW (ref 1–3.3)
LYMPHOCYTES # BLD AUTO: 15.8 % — SIGNIFICANT CHANGE UP (ref 13–44)
MAGNESIUM SERPL-MCNC: 2.1 MG/DL — SIGNIFICANT CHANGE UP (ref 1.6–2.6)
MCHC RBC-ENTMCNC: 30.7 PG — SIGNIFICANT CHANGE UP (ref 27–34)
MCHC RBC-ENTMCNC: 33.1 GM/DL — SIGNIFICANT CHANGE UP (ref 32–36)
MCV RBC AUTO: 92.8 FL — SIGNIFICANT CHANGE UP (ref 80–100)
MONOCYTES # BLD AUTO: 0.46 K/UL — SIGNIFICANT CHANGE UP (ref 0–0.9)
MONOCYTES NFR BLD AUTO: 8.7 % — SIGNIFICANT CHANGE UP (ref 2–14)
NEUTROPHILS # BLD AUTO: 3.66 K/UL — SIGNIFICANT CHANGE UP (ref 1.8–7.4)
NEUTROPHILS NFR BLD AUTO: 69.6 % — SIGNIFICANT CHANGE UP (ref 43–77)
NRBC # BLD: 0 /100 WBCS — SIGNIFICANT CHANGE UP (ref 0–0)
OSMOLALITY SERPL: 286 MOSM/KG — SIGNIFICANT CHANGE UP (ref 280–301)
PHOSPHATE SERPL-MCNC: 2.7 MG/DL — SIGNIFICANT CHANGE UP (ref 2.5–4.5)
PLATELET # BLD AUTO: 424 K/UL — HIGH (ref 150–400)
POTASSIUM SERPL-MCNC: 4.6 MMOL/L — SIGNIFICANT CHANGE UP (ref 3.5–5.3)
POTASSIUM SERPL-SCNC: 4.6 MMOL/L — SIGNIFICANT CHANGE UP (ref 3.5–5.3)
PROT SERPL-MCNC: 6.6 G/DL — SIGNIFICANT CHANGE UP (ref 6–8.3)
RBC # BLD: 3.88 M/UL — SIGNIFICANT CHANGE UP (ref 3.8–5.2)
RBC # FLD: 15 % — HIGH (ref 10.3–14.5)
SODIUM SERPL-SCNC: 132 MMOL/L — LOW (ref 135–145)
WBC # BLD: 5.26 K/UL — SIGNIFICANT CHANGE UP (ref 3.8–10.5)
WBC # FLD AUTO: 5.26 K/UL — SIGNIFICANT CHANGE UP (ref 3.8–10.5)

## 2022-07-07 PROCEDURE — 99358 PROLONG SERVICE W/O CONTACT: CPT | Mod: NC

## 2022-07-07 PROCEDURE — 99223 1ST HOSP IP/OBS HIGH 75: CPT

## 2022-07-07 PROCEDURE — 99233 SBSQ HOSP IP/OBS HIGH 50: CPT | Mod: GC

## 2022-07-07 PROCEDURE — 99497 ADVNCD CARE PLAN 30 MIN: CPT | Mod: 25

## 2022-07-07 RX ORDER — ENOXAPARIN SODIUM 100 MG/ML
40 INJECTION SUBCUTANEOUS EVERY 24 HOURS
Refills: 0 | Status: DISCONTINUED | OUTPATIENT
Start: 2022-07-07 | End: 2022-07-08

## 2022-07-07 RX ADMIN — Medication 1000 UNIT(S): at 12:06

## 2022-07-07 RX ADMIN — ENOXAPARIN SODIUM 40 MILLIGRAM(S): 100 INJECTION SUBCUTANEOUS at 12:44

## 2022-07-07 RX ADMIN — Medication 88 MICROGRAM(S): at 05:06

## 2022-07-07 RX ADMIN — Medication 1 TABLET(S): at 12:06

## 2022-07-07 RX ADMIN — Medication 2: at 12:45

## 2022-07-07 NOTE — CONSULT NOTE ADULT - PROBLEM SELECTOR RECOMMENDATION 3
Patient has a history of Left side pleural effusion and continues to have them as per x-ray that showed Small left-sided pleural effusion, which was drained at 350cc. Continue care as per primary team.

## 2022-07-07 NOTE — DISCHARGE NOTE PROVIDER - CARE PROVIDER_API CALL
James Vasquez)  Critical Care Medicine; Internal Medicine; Pulmonary Disease  [address] [place] [address]  Phone: [phone]  Fax: [phone]  Established Patient  Follow Up Time: 2 weeks    Chrystal Fnuez)  Hematology; Medical Oncology  [address], [address]  [place] [address]  Phone: [phone]  Fax: [phone]  Established Patient  Follow Up Time: 1 week

## 2022-07-07 NOTE — PROGRESS NOTE ADULT - PROBLEM/PLAN-7
[facility]   [address] [place]  [address]  [phone]                                  March 7, 2018    Parent(s) of Josefina [place]  [id number]        Dear Parent(s) of Josefina,    This confirms the diagnosis of nephrotic syndrome.  I need to have someone call to see if they have set up with nephrology and try to get in within the next week or so  If things are worsening again, we should start steroids.    Results for orders placed or performed in visit on 03/01/18   Lipid Profile (Chol, Trig, HDL, LDL calc)   Result Value Ref Range    Cholesterol 528 (H) <170 mg/dL    Triglycerides 298 (H) <75 mg/dL    HDL Cholesterol 80 >45 mg/dL    LDL Cholesterol Calculated 388 (H) <110 mg/dL    Non HDL Cholesterol 448 (H) <120 mg/dL   Hepatic panel (Albumin, ALT, AST, Bili, Alk Phos, TP)   Result Value Ref Range    Bilirubin Direct <0.1 0.0 - 0.2 mg/dL    Bilirubin Total <0.1 (L) 0.2 - 1.3 mg/dL    Albumin 0.6 (L) 3.4 - 5.0 g/dL    Protein Total 4.8 (L) 6.5 - 8.4 g/dL    Alkaline Phosphatase 269 150 - 420 U/L    ALT 12 0 - 50 U/L    AST 34 0 - 50 U/L   Basic metabolic panel   Result Value Ref Range    Sodium 139 133 - 143 mmol/L    Potassium 4.3 3.4 - 5.3 mmol/L    Chloride 105 96 - 110 mmol/L    Carbon Dioxide 26 20 - 32 mmol/L    Anion Gap 8 3 - 14 mmol/L    Glucose 85 70 - 99 mg/dL    Urea Nitrogen 10 9 - 22 mg/dL    Creatinine 0.33 0.15 - 0.53 mg/dL    GFR Estimate GFR not calculated, patient <16 years old. mL/min/1.7m2    GFR Estimate If Black GFR not calculated, patient <16 years old. mL/min/1.7m2    Calcium 7.9 (L) 9.1 - 10.3 mg/dL   Protein  random urine with Creat Ratio   Result Value Ref Range    Protein Random Urine 25.00 g/L    Protein Total Urine g/gr Creatinine 9.03 (H) 0 - 0.2 g/g Cr       If you have any questions please call the clinic at [phone].    Sincerely,    Ambrose Morris MD    bmd         DISPLAY PLAN FREE TEXT

## 2022-07-07 NOTE — CONSULT NOTE ADULT - ASSESSMENT
per Internal Medicine    Problem/Plan - 1:  ·  Problem: General weakness.     Problem/Plan - 2:  ·  Problem: Malignant pleural effusion.     Problem/Plan - 3:  ·  Problem: Metastatic breast cancer.     Problem/Plan - 4:  ·  Problem: Lymphedema.     Problem/Plan - 5:  ·  Problem: Type 2 diabetes mellitus.     Problem/Plan - 6:  ·  Problem: Hypothyroidism.

## 2022-07-07 NOTE — CONSULT NOTE ADULT - PROBLEM SELECTOR RECOMMENDATION 9
Patient presented with generalized weakness after starting Ibrance, as per patient and with her oncologists permission she discontinued her medication. Continue care as per primary team.

## 2022-07-07 NOTE — PROGRESS NOTE ADULT - PROBLEM SELECTOR PLAN 8
·  Problem: Nutrition, metabolism, and development symptoms.   ·  Plan; F: None  E: Replete PRN to K>4, Mg>2  N: CC diet   DVT PPx: Lovenox 40mg SQ  GI PPx: None   Dispo: Consuelo.

## 2022-07-07 NOTE — CONSULT NOTE ADULT - ASSESSMENT
89 year old woman with weakness, Malignant pleural effusion, Metastatic breast cancer, advance care planning and encounter for palliative care.

## 2022-07-07 NOTE — DISCHARGE NOTE PROVIDER - NSDCCPCAREPLAN_GEN_ALL_CORE_FT
PRINCIPAL DISCHARGE DIAGNOSIS  Diagnosis: General weakness  Assessment and Plan of Treatment: You were admitted to [facility] to evaluate your new weakness. Weakness can be due to a number of causes, such as infection, electrolyte imbalances, medication reactions and many other sources. We believe your weakness was most likely caused by your new medication Ibrance. Please see Dr. Funez to further discuss this medication and coordinate your care moving forward.

## 2022-07-07 NOTE — CHART NOTE - NSCHARTNOTEFT_GEN_A_CORE
PALLIATIVE MEDICINE COORDINATION OF CARE NOTE FOR KAI FRANCES  [  ] ED Trigger   [  ] MICU Trigger     [ X ] Consult    [  ] AI Comanagement    Patient last assessed: ___6/14/2022__  to manage: GOC/AD, Symptoms, and Support was recommended: ___support___    __30___ Minutes; Start: ___0800am__  End: ___0830am_, of non-face-to-face prolonged service provided that relates to (face-to-face) care that has or will occur and ongoing patient management, including one or more of the following:   - Reviewed records from other physicians or other health care professional services, including one or more of the following: other medical records and diagnostic / radiology study results     HPI:  89F with PMHx metastatic breast cancer (on fulvestrant and Xgeva, followed by Dr. Funez), DM2 (A1c 7.6), hypothyroidism, HLD, lymphedema, endometrial carcinoma s/p hysterectomy and bilateral oophorectomy, and benign pancreatic tail adenoma, prior L sided thoracentesis on 11/30/22 and IR chest tube placement for hydro-pneumothorax on 12/3 and with recent admission on 05/14-05/19 for recurrent L sided pleural effusion s/p thoracentesis on 5/17 and L IPC 6/13. Patient lives alone, was recently started on Ibrance 5 days ago and started experiencing generalized weakness. She called Dr Funez office in order to discontinue her medication with their approval, and subsequently did so on Thursday. Patient came in after visiting Dr Guzman office and reported TESFAYE and generalized weakness, for which she was unable to perform her ADLs such as showering and cooking.  Patient was sent to the ED by Dr Vasquez for admission due to her weakness, and her inability to perform ADLs. In addition she reports chronic LUE swelling and newly swelling of her RLE. Patient denies pain in the back nor in her extremities.     ED COURSE:  VS: T 98.1 HR 91  /72  RR 18  SaO2 96% RA  LABS: Na 128  Gluc 122 T prot 6.9 Alb 2.9 Alk phos 240 TSH 3.860  - UA negative COVID negative   EKG: NSR, with PAC   IMAGING:  - CXR: Small left-sided pleural effusion. Diffuse osseous sclerotic metastasis.  - US  Duplex LUE: negative for DVT.   - US Duplex RLE: No evidence of right lower extremity deep venous thrombosis.  MANAGEMENT: None   CONSULTS: Pulmonology for pleural effusion    (06 Jul 2022 21:34)      - Other: iStop reviewed.    Rx found on iStop review. Ref #: [id number]    - Other: Medication reviewed.    The patient HAS NOT used PRN's in the last 24h.    MEDICATIONS  (STANDING):  cholecalciferol 1000 Unit(s) Oral daily  dextrose 5%. 1000 milliLiter(s) (50 mL/Hr) IV Continuous <Continuous>  dextrose 50% Injectable 25 Gram(s) IV Push once  enoxaparin Injectable 40 milliGRAM(s) SubCutaneous every 24 hours  glucagon  Injectable 1 milliGRAM(s) IntraMuscular once  insulin lispro (ADMELOG) corrective regimen sliding scale   SubCutaneous Before meals and at bedtime  levothyroxine 88 MICROGram(s) Oral daily  multivitamin 1 Tablet(s) Oral daily    MEDICATIONS  (PRN):  acetaminophen     Tablet .. 650 milliGRAM(s) Oral every 6 hours PRN Temp greater or equal to 38C (100.4F), Mild Pain (1 - 3)  dextrose Oral Gel 15 Gram(s) Oral once PRN Blood Glucose LESS THAN 70 milliGRAM(s)/deciliter  melatonin 3 milliGRAM(s) Oral at bedtime PRN Insomnia        - Other: Advanced directives     DNR/DNI     MOLST form found on patient window under admit page 5/6 for 1/30/2020 admission stating    DNR/DNI     HCP form found on patient window under admit page 2 for 1/30/2020 admission     No Living will / POA / Advance directives found on [place] / patient window     [facility] notes on [facility] on 6/14/2022    - Other: Coordination/Plan of care     __4__ admissions in 1 year     Current admission LOS: __1_ days     LACE score: __11__ ADVANCE ILLNESS PATIENT.     Patient previously seen by palliative medicine consult service.      Consult request for: " Metastatic breast cancer w/ malignant effusion w pleurex. Patient coming for generalized weakness and inability to perform ADLs. Admitted for PT eval.   "    Full consult to follow within 24h.

## 2022-07-07 NOTE — OCCUPATIONAL THERAPY INITIAL EVALUATION ADULT - MANUAL MUSCLE TESTING RESULTS, REHAB EVAL
MMT performed: B/L shoulder flexion 5/5, B/L elbow flexion/extension 5/5, wrist extension/extension 5/5; B/L gross grasp 5/5; B/L hip flexion 5/5, B/L knee extension/flexion 5/5, B/L ankle DF/PF 5/5

## 2022-07-07 NOTE — DISCHARGE NOTE PROVIDER - PROVIDER TOKENS
PROVIDER:[TOKEN:[[id number]],FOLLOWUP:[2 weeks],ESTABLISHEDPATIENT:[T]],PROVIDER:[TOKEN:[[id number]],FOLLOWUP:[1 week],ESTABLISHEDPATIENT:[T]]

## 2022-07-07 NOTE — PHYSICAL THERAPY INITIAL EVALUATION ADULT - PERTINENT HX OF CURRENT PROBLEM, REHAB EVAL
89F with PMHx metastatic breast cancer (on fulvestrant and Xgeva, followed by Dr. Funez), DM2 (A1c 7.6), hypothyroidism, HLD, lymphedema, endometrial carcinoma s/p hysterectomy and bilateral oophorectomy, and benign pancreatic tail adenoma, prior L sided thoracentesis on 11/30/22 and IR chest tube placement for hydro-pneumothorax on 12/3 and with recent admission on 05/14-05/19 for recurrent L sided pleural effusion s/p thoracentesis on 5/17 and L IPC 6/13.

## 2022-07-07 NOTE — PHYSICAL THERAPY INITIAL EVALUATION ADULT - ADDITIONAL COMMENTS
Patient reports living alone in an elevator apt building with no ARON. patient reports being independent with ADL and IADL prior to this admission. patient typically uses her rollator for outdoor functional mobility and cane within her home.

## 2022-07-07 NOTE — CONSULT NOTE ADULT - PROBLEM SELECTOR RECOMMENDATION 2
PPS 60%. Skin care PRN. Assist with ADL's PRN. Appreciate PT involvement, patient most likely to go to [facility] [place] after this admission.

## 2022-07-07 NOTE — OCCUPATIONAL THERAPY INITIAL EVALUATION ADULT - PERTINENT HX OF CURRENT PROBLEM, REHAB EVAL
89F PMHx metastatic breast cancer DM2, hypothyroidism, HLD, lymphedema, endometrial carcinoma s/p hysterectomy and bilateral oophorectomy, and benign pancreatic tail adenoma, prior L sided thoracentesis on 11/30/22 and IR chest tube placement for hydro-pneumothorax on 12/3 and with recent admission on 05/14-05/19 for recurrent L sided pleural effusion s/p thoracentesis on 5/17 and L IPC 6/13. Patient sent to ED by DR Vasquez d/t weakness and inability to complete ADLs.

## 2022-07-07 NOTE — CONSULT NOTE ADULT - PROBLEM SELECTOR RECOMMENDATION 6
Patient made herself a DNR/DNI and wants to continue disease modifying therapies. Please refer to [facility] document above for details.  As discussed during the palliative IDT meeting, the patients PSSA screening did not identify any current psychosocial need or spiritual support deficits.  - Methodist/Spiritual practice: non  - Coping: [x ] well [ ] with difficulty [ ] poor coping   - Support system: [ ] strong [x ] adequate [ ] inadequate  - All questions answered, emotional support provided  -  primary team   - Please contact Palliative Medicine 24/7 at [phone] for any acute symptoms or further questions  - Will continue to follow with you

## 2022-07-07 NOTE — CONSULT NOTE ADULT - SUBJECTIVE AND OBJECTIVE BOX
KAI FRANCES          MRN-[id number]            (1933)    HPI:  89F with PMHx metastatic breast cancer (on fulvestrant and Xgeva, followed by Dr. Funez), DM2 (A1c 7.6), hypothyroidism, HLD, lymphedema, endometrial carcinoma s/p hysterectomy and bilateral oophorectomy, and benign pancreatic tail adenoma, prior L sided thoracentesis on 22 and IR chest tube placement for hydro-pneumothorax on 12/3 and with recent admission on - for recurrent L sided pleural effusion s/p thoracentesis on  and L IPC . Patient lives alone, was recently started on Ibrance 5 days ago and started experiencing generalized weakness. She called Dr Funez office in order to discontinue her medication with their approval, and subsequently did so on Thursday. Patient came in after visiting Dr Guzman office and reported TESFAYE and generalized weakness, for which she was unable to perform her ADLs such as showering and cooking.  Patient was sent to the ED by Dr Vasquez for admission due to her weakness, and her inability to perform ADLs. In addition she reports chronic LUE swelling and newly swelling of her RLE. Patient denies pain in the back nor in her extremities.     ED COURSE:  VS: T 98.1 HR 91  /72  RR 18  SaO2 96% RA  LABS: Na 128  Gluc 122 T prot 6.9 Alb 2.9 Alk phos 240 TSH 3.860  - UA negative COVID negative   EKG: NSR, with PAC   IMAGING:  - CXR: Small left-sided pleural effusion. Diffuse osseous sclerotic metastasis.  - US  Duplex LUE: negative for DVT.   - US Duplex RLE: No evidence of right lower extremity deep venous thrombosis.  MANAGEMENT: None   CONSULTS: Pulmonology for pleural effusion    (2022 21:34)    PAST MEDICAL & SURGICAL HISTORY:  Type 2 diabetes mellitus  DM (diabetes mellitus)    HTN (hypertension)    Breast cancer    Hypothyroid    Endometrial cancer      Other acquired absence of organ  S/P splenectomy    Status post total hysterectomy  S/P hysterectomy    Other acquired absence of organ  S/P cholecystectomy    Status post cataract extraction  S/P cataract extraction    FAMILY HISTORY:  FH: type 2 diabetes (Mother)     Reviewed and found non contributory in mother or father    SOCIAL HISTORY: Patient lives alone, uses a 4  w/ chair. Patient has PT coming at home and a nurse 2x per week for her chest tube. Patient denies smoking, alcohol and illicit drug use. Denies any HHA.  PSYCHOSOCIAL ASSESSMENT:  Baptism/Spiritual practice: none  Role of organized Bahai [ ] important [ ] some [x] non [ ] unable to assess dt pt mentation   Coping: [x ] well [ ] with difficulty [ ] poor coping [ ] unable to assess dt pt mentation  Support system: [ x] strong [ ] adequate [ ] inadequate    ROS:    Unable to attain due to:   n/a     Dyspnea (Elissa 0-10):  0                      N/V (Y/N):               N               Secretions (Y/N) :      N          Agitation(Y/N): N  Pain (Y/N):     N  -Provocation/Palliation: n/a   -Quality/Quantity: n/a   -Radiating: n/a   -Severity: n/a   -Timing/Frequency: n/a   -Impact on ADLs: n/a     General:  +weakness   HEENT:    Denied  Neck:  Denied  CVS:  Denied  Resp:  Denied  GI:  Denied  :  Denied  Musc:  Denied  Neuro:  Denied  Psych:  Denied  Skin:  Denied  Lymph:  Denied    Allergies    No Known Allergies    Intolerances      Opiate Naive (Y/N): Y  -iStop reviewed (Y/N): Y (Ref#: [id number]         )    Medications:      MEDICATIONS  (STANDING):  cholecalciferol 1000 Unit(s) Oral daily  dextrose 5%. 1000 milliLiter(s) (50 mL/Hr) IV Continuous <Continuous>  dextrose 50% Injectable 25 Gram(s) IV Push once  enoxaparin Injectable 40 milliGRAM(s) SubCutaneous every 24 hours  glucagon  Injectable 1 milliGRAM(s) IntraMuscular once  insulin lispro (ADMELOG) corrective regimen sliding scale   SubCutaneous Before meals and at bedtime  levothyroxine 88 MICROGram(s) Oral daily  multivitamin 1 Tablet(s) Oral daily    MEDICATIONS  (PRN):  acetaminophen     Tablet .. 650 milliGRAM(s) Oral every 6 hours PRN Temp greater or equal to 38C (100.4F), Mild Pain (1 - 3)  dextrose Oral Gel 15 Gram(s) Oral once PRN Blood Glucose LESS THAN 70 milliGRAM(s)/deciliter  melatonin 3 milliGRAM(s) Oral at bedtime PRN Insomnia      Labs:    CBC:                        11.9   5.26  )-----------( 424      ( 2022 05:30 )             36.0     CMP:        132<L>  |  101  |  15  ----------------------------<  125<H>  4.6   |  25  |  0.75    Ca    8.2<L>      2022 05:30  Phos  2.7       Mg     2.1         TPro  6.6  /  Alb  2.8<L>  /  TBili  0.3  /  DBili  x   /  AST  30  /  ALT  21  /  AlkPhos  248<H>        Urinalysis Basic - ( 2022 14:42 )    Color: Yellow / Appearance: Clear / S.020 / pH: x  Gluc: x / Ketone: NEGATIVE  / Bili: Negative / Urobili: 0.2 E.U./dL   Blood: x / Protein: NEGATIVE mg/dL / Nitrite: NEGATIVE   Leuk Esterase: NEGATIVE / RBC: x / WBC x   Sq Epi: x / Non Sq Epi: x / Bacteria: x    Imaging:  < from: Xray Chest 1 View- PORTABLE-Urgent (Xray Chest 1 View- PORTABLE-Urgent .) (22 @ 15:32) >  ACC: [id number] EXAM:  XR CHEST PORTABLE URGENT 1V                          PROCEDURE DATE:  2022    IMPRESSION: Small left-sided pleural effusion. Diffuse osseous sclerotic   metastasis.    < end of copied text >    PEx:  T(C): 36.7 (22 @ 20:45), Max: 37.4 (22 @ 15:37)  HR: 79 (22 @ 20:45) (79 - 103)  BP: 129/76 (22 @ 20:45) (110/72 - 135/84)  RR: 16 (22 @ 20:45) (16 - 18)  SpO2: 94% (22 @ 20:45) (94% - 97%)  Wt(kg): 54kG  Daily Height in cm: 157.48 (2022 12:45)    Daily   CAPILLARY BLOOD GLUCOSE    POCT Blood Glucose.: 120 mg/dL (2022 08:26)    I&O's Summary    GENERAL:  [x ]Alert  [x ]Oriented x 4   [ ]Lethargic  [ ]Cachexia  [ ]Unarousable  [x ]Verbal  [ ]Non-Verbal  Behavioral:   [ ] Anxiety  [ ] Delirium [ ] Agitation [x ] Other - calm   HEENT:  [ x]Normal   [ ]Dry mouth   [ ]ET Tube/Trach  [ ]Oral lesions  PULMONARY:   [ x]Clear  [ ]Tachypnea  [ ]Audible excessive secretions   [ ]Rhonchi        [ ]Right [ ]Left [ ]Bilateral  [ ]Crackles        [ ]Right [ ]Left [ ]Bilateral  [ ]Wheezing     [ ]Right [ ]Left [ ]Bilateral  CARDIOVASCULAR:    [x ]Regular [ ]Irregular [ ]Tachy  [ ]Hipolito [ ]Murmur [ ]Other  GASTROINTESTINAL:  [x ]Soft  [ ]Distended   [ ]+BS  [x ]Non tender [ ]Tender  [ ]PEG [ ]OGT/ NGT  Last BM:   GENITOURINARY:  [x ]Normal [ ] Incontinent   [ ]Oliguria/Anuria   [ ]Rosales  MUSCULOSKELETAL:   [ ]Normal   [x ]Weakness  [ ]Bed/Wheelchair bound [x] Edema - RLE +1   NEUROLOGIC:   [ x]No focal deficits  [ ] Cognitive impairment  [ ] Dysphagia [ ]Dysarthria [ ] Paresis [ ]Other   SKIN:   [ ]Normal   [ ]Pressure ulcer(s)  [ ]Rash [x] left sided Pleurx noted       Preadmit Karnofsky: 60 %           Current Karnofsky:    60 %  Cachexia (Y/N): N  BMI: 21.8kg/m2    Advanced Directives:     DNR/DNI     [facility]     HCP    DECISION MAKER: Patient is able to make her own decisions.  LEGAL SURROGATE: Gerson Ana Lilia (HCP/Cousin) [phone]    GOALS OF CARE DISCUSSION       Palliative care info/counseling provided	           Family meeting       Advanced Directives addressed please see Advance Care Planning Note	           See previous Palliative Medicine Note       Documentation of GOC: DNR/DNI	          REFERRALS	        Unit SW/Case Mgmt       PT/OT

## 2022-07-07 NOTE — DISCHARGE NOTE PROVIDER - CARE PROVIDERS DIRECT ADDRESSES
[email or web address],DirectAddress_Unknown

## 2022-07-07 NOTE — DISCHARGE NOTE PROVIDER - HOSPITAL COURSE
89F with PMHx metastatic breast cancer (on fulvestrant and Xgeva, followed by Dr. Funez), DM2 (A1c 8.3 on 6/11), hypothyroidism, HLD, lymphedema, endometrial carcinoma s/p hysterectomy and bilateral oophorectomy, and benign pancreatic tail adenoma, prior L sided thoracentesis on 11/30/22 and IR chest tube placement for hydro-pneumothorax on 12/3 and with recent admission on 05/14-05/19 for recurrent L sided pleural effusion s/p thoracentesis on 5/17 and L IPC 6/13. Patient presented to the ED after 5 days of weakness and increase TESFAYE after beginning Ibrance. Infectious workup, TSH, EKG, U/S LUE and RLE were unremarkable. Patient began to feel much better overnight. PT recommending home PT. OT recommending no needs. Palliative care confirmed desire for DNI/DNR.     Problem List/Main Diagnoses (problem-based):    Problem/Plan - 1:  ·  Problem: General weakness.   ·  Plan: Patient presented with acute generalized weakness and her inability to perform ADLs, after starting Ibrance 5 day that was discontinued on Tuesday.   -Resolved  -Most likely adverse reaction to Ibrance  -Workup unrevealing of alternate cause  -F/U with Dr. Funez for further management     Problem/Plan - 2:  ·  Problem: TESFAYE (dyspnea on exertion).   ·  Plan: Patient reports recent hospitalization for pleural effusion s/p Pleurx catheter placement. Patient complaining of no SOB at rest. CXR remarkable for small left-sided pleural effusion. No suspicion for pneumonia at this time. Patient at RA upon admission and speaking in full sentences. Patient not anemic, HGB 11 upon admission. No known history of Cardiovascular disease. TSH WNL.   -Resolved  -Pleurex drained 350cc in ED  -Patient tolerated PT and OT well while inpatient  -Likely associated with reaction to Ibrance     Problem/Plan - 3:  ·  Problem: Hyponatremia.   ·  Plan: Upon admission (7/6)Na 128. Serum osmolality 286 7/7 with return to baseline Na   -Resolved     Problem/Plan - 4:  ·  Problem: Malignant pleural effusion.   ·  Plan: Prior L sided thoracentesis on 11/30/22 and IR chest tube placement for hydro-pneumothorax on 12/3 and with recent admission on 05/14-05/19 for recurrent L sided pleural effusion s/p thoracentesis on 5/17 and L IPC 6/13  - s/p Pleurx drainage 350cc in the ED  -f/u with Dr. Vasquez for further management       Problem/Plan - 5:  ·  Problem: Metastatic breast cancer.   ·  Plan: Patient at home fulvestrant and Xgeva, followed by Dr. Funez. Patient recently started 5 days ago on Ibrance 5 days ago; saw oncology yesterday and agreed to stop Ibrance.  - continue outpatient care with Dr. Vasquez     Problem/Plan - 6:  ·  Problem: Urinary incontinence.   ·  Plan: Patient reports chronic urinary incontinence, no change in urine color nor dysuria.   - upon admission UA negative  - no concerns for UTI at this time.     Problem/Plan - 7:  ·  Problem: Lymphedema.   ·  Plan: She reports chronic LUE swelling and newly swelling of her RLE. Patient denies pain in the back nor in her extremities.   - US  Duplex LUE: negative for DVT. Patient additional to malignancy has low albumin at 2.9.   - US Duplex RLE: negative for DVT.     Problem/Plan - 8:  ·  Problem: Type 2 diabetes mellitus.   ·  Plan: Patient at home on glimepiride 1 mg BID. Last A1c 8.3 on 6/11/22.  - Continue home medications     Problem/Plan - 9:  ·  Problem: Hypothyroidism.   ·  Plan: Patient at home on Synthroid 88 mcg daily. Upon admission TSH 3.86.   - c/w home med.    New medications:   None    Labs to be followed outpatient:   None    Exam to be followed outpatient:   Oncology, Pulmonology

## 2022-07-07 NOTE — CONSULT NOTE ADULT - PROBLEM SELECTOR RECOMMENDATION 4
Patient with known metastatic breast cancer and follows with Dr. Hernadez. Currently on  fulvestrant and Xgeva which she would like to continue to take, which precludes her from hospice services.

## 2022-07-07 NOTE — CONSULT NOTE ADULT - CONVERSATION DETAILS
In addition to the EM visit, an advance care planning meeting was performed  Start time:1040am  End time: 1100am  Total time: 20 minutes   A face to face meeting to discuss advance care planning was held today regarding: KAI GARCÍA  Primary decision maker:  Kai García  Alternate/surrogate: Gerson Gambinocody (HCP/Cousin) [phone]  Discussed advance directives including, but not limited to, healthcare proxy and code status.  Decision regarding code status: DNR/DNI  Documentation completed today: GLENN     Discussion had with patient regarding her overall wishes at the end of life. At this time she believes that she had a bad reaction to the Ibrance that she started and has since stopped that and is feeling better. She would like to continue to get her current medications that she is receiving. She stated that she felt that she was dying prior to this admission and held onto the MOSLT that stated that she should not be resuscitated. She continues to want remain a DNR/DNI. GLENN placed in chart. Patient understands that she has options like hospice if she decides to stop disease modifying therapies but she is not there yet. Emotional support was provided.

## 2022-07-07 NOTE — CONSULT NOTE ADULT - SUBJECTIVE AND OBJECTIVE BOX
Patient is a 89y old  Female who presents with a chief complaint of weakness (2022 11:25)        HPI:  89F with PMHx metastatic breast cancer (on fulvestrant and Xgeva, followed by Dr. Funez), DM2 (A1c 7.6), hypothyroidism, HLD, lymphedema, endometrial carcinoma s/p hysterectomy and bilateral oophorectomy, and benign pancreatic tail adenoma, prior L sided thoracentesis on 22 and IR chest tube placement for hydro-pneumothorax on 12/3 and with recent admission on - for recurrent L sided pleural effusion s/p thoracentesis on  and L IPC . Patient lives alone, was recently started on Ibrance 5 days ago and started experiencing generalized weakness. She called Dr Funez office in order to discontinue her medication with their approval, and subsequently did so on Thursday. Patient came in after visiting Dr Guzman office and reported TESFAYE and generalized weakness, for which she was unable to perform her ADLs such as showering and cooking.  Patient was sent to the ED by Dr Vasquez for admission due to her weakness, and her inability to perform ADLs. In addition she reports chronic LUE swelling and newly swelling of her RLE. Patient denies pain in the back nor in her extremities.     ED COURSE:  VS: T 98.1 HR 91  /72  RR 18  SaO2 96% RA  LABS: Na 128  Gluc 122 T prot 6.9 Alb 2.9 Alk phos 240 TSH 3.860  - UA negative COVID negative   EKG: NSR, with PAC   IMAGING:  - CXR: Small left-sided pleural effusion. Diffuse osseous sclerotic metastasis.  - US  Duplex LUE: negative for DVT.   - US Duplex RLE: No evidence of right lower extremity deep venous thrombosis.  MANAGEMENT: None   CONSULTS: Pulmonology for pleural effusion    (2022 21:34)      PAST MEDICAL & SURGICAL HISTORY:  Type 2 diabetes mellitus  DM (diabetes mellitus)      HTN (hypertension)      Breast cancer      Hypothyroid      Endometrial cancer      Other acquired absence of organ  S/P splenectomy      Status post total hysterectomy  S/P hysterectomy      Other acquired absence of organ  S/P cholecystectomy      Status post cataract extraction  S/P cataract extraction          MEDICATIONS  (STANDING):  cholecalciferol 1000 Unit(s) Oral daily  dextrose 5%. 1000 milliLiter(s) (50 mL/Hr) IV Continuous <Continuous>  dextrose 50% Injectable 25 Gram(s) IV Push once  enoxaparin Injectable 40 milliGRAM(s) SubCutaneous every 24 hours  glucagon  Injectable 1 milliGRAM(s) IntraMuscular once  insulin lispro (ADMELOG) corrective regimen sliding scale   SubCutaneous Before meals and at bedtime  levothyroxine 88 MICROGram(s) Oral daily  multivitamin 1 Tablet(s) Oral daily    MEDICATIONS  (PRN):  acetaminophen     Tablet .. 650 milliGRAM(s) Oral every 6 hours PRN Temp greater or equal to 38C (100.4F), Mild Pain (1 - 3)  dextrose Oral Gel 15 Gram(s) Oral once PRN Blood Glucose LESS THAN 70 milliGRAM(s)/deciliter  melatonin 3 milliGRAM(s) Oral at bedtime PRN Insomnia        Social History:                   -  Home Living Status:  lives alone in an elevator accessible apartment building         -  Prior Home Care Services :   none    Baseline Functional Level Prior to Admission :             - ADL's/ IADL's :  independent         - ambulatory status PTA :  walks with a cane at home, rollator in the community         FAMILY HISTORY:  FH: type 2 diabetes (Mother)      CBC Full  -  ( 2022 05:30 )  WBC Count : 5.26 K/uL  RBC Count : 3.88 M/uL  Hemoglobin : 11.9 g/dL  Hematocrit : 36.0 %  Platelet Count - Automated : 424 K/uL  Mean Cell Volume : 92.8 fl  Mean Cell Hemoglobin : 30.7 pg  Mean Cell Hemoglobin Concentration : 33.1 gm/dL  Auto Neutrophil # : 3.66 K/uL  Auto Lymphocyte # : 0.83 K/uL  Auto Monocyte # : 0.46 K/uL  Auto Eosinophil # : 0.23 K/uL  Auto Basophil # : 0.06 K/uL  Auto Neutrophil % : 69.6 %  Auto Lymphocyte % : 15.8 %  Auto Monocyte % : 8.7 %  Auto Eosinophil % : 4.4 %  Auto Basophil % : 1.1 %      -    132<L>  |  101  |  15  ----------------------------<  125<H>  4.6   |  25  |  0.75    Ca    8.2<L>      2022 05:30  Phos  2.7     07-  Mg     2.1     07-07    TPro  6.6  /  Alb  2.8<L>  /  TBili  0.3  /  DBili  x   /  AST  30  /  ALT  21  /  AlkPhos  248<H>        Urinalysis Basic - ( 2022 14:42 )    Color: Yellow / Appearance: Clear / S.020 / pH: x  Gluc: x / Ketone: NEGATIVE  / Bili: Negative / Urobili: 0.2 E.U./dL   Blood: x / Protein: NEGATIVE mg/dL / Nitrite: NEGATIVE   Leuk Esterase: NEGATIVE / RBC: x / WBC x   Sq Epi: x / Non Sq Epi: x / Bacteria: x          Radiology :    < from: Xray Chest 1 View- PORTABLE-Urgent (Xray Chest 1 View- PORTABLE-Urgent .) (22 @ 15:32) >  ACC: [id number] EXAM:  XR CHEST PORTABLE URGENT 1V                          PROCEDURE DATE:  2022          INTERPRETATION:  TECHNIQUE: Single portable view of the chest.    COMPARISON:  2022    CLINICAL HISTORY: Shortness of Breath    FINDINGS:    Single frontal view of the chest demonstrates small left-sided effusion.   Left-sided chest tube. Diffuse sclerotic osseous metastasis. The   cardiomediastinal silhouette is normal. No acute osseous abnormalities.   Overlying EKG leads and wires are noted. Possible small left costophrenic   angle pneumothorax.    IMPRESSION: Small left-sided pleural effusion. Diffuse osseous sclerotic   metastasis.                    Vital Signs Last 24 Hrs  T(C): 36.3 (2022 12:09), Max: 37.4 (2022 15:37)  T(F): 97.4 (2022 12:09), Max: 99.4 (2022 15:37)  HR: 79 (2022 12:09) (79 - 103)  BP: 116/71 (2022 12:09) (116/71 - 135/84)  BP(mean): --  RR: 18 (2022 12:09) (16 - 18)  SpO2: 97% (2022 12:09) (94% - 97%)        REVIEW OF SYSTEMS:  per hpi          Physical Exam:  frail 89 yo  woman  lying in semi Pearce's position , awake , alert , feels a little tired    Head : normocephalic , atraumatic    Eyes : PERRLA , EOMI , no nystagmus , sclera anicteric    ENT : nasal discharge , uvula midline , no oropharyngeal erythema / exudate    Neck : supple , negative JVD , negative carotid bruits , no thyromegaly    Chest : dec bs L base , Plx     Cardiovascular: regular rate and rhythm , neg murmurs / rubs / gallops    Abdomen : soft , non distended , non tender to palpation in all 4 quadrants , negative rebound / guarding , normal bowel sounds    Extremities :  LUE edema, jerry LE edema  :     Neurologic Exam:    Alert and oriented  x 3     Motor Exam:    Right UE:           no focal weakness ,  > 3+/5 throughout                                 Left UE:             no focal weakness,  > 3+/5 throughout        Right LE:            no focal weakness,  > 3+/5 throughout    Left LE:              no focal weakness,  > 3+/5 throughout        Sensation:         intact to light touch x 4 extremities                         DTR :                     biceps/brachioradialis : equal                                              patella/ankle : equal     Gait :  not tested              PM&R Impression : admitted for c/o TESFAYE , hyponatremia    1) no focal weakness      Recommendations / Plan :     1) Physical / Occupational therapy focusing on therapeutic exercises , bed mobility/transfer out of bed evaluation , progressive ambulation with assistive       devices prn .    2) Anticipated Disposition Plan / Recs  :    d/c home with no post discharge rehab needs

## 2022-07-07 NOTE — OCCUPATIONAL THERAPY INITIAL EVALUATION ADULT - ADDITIONAL COMMENTS
Patient reports living alone in an elevator apt building with no ARON. patient reports being independent with ADL and IADL prior to this admission. patient typically uses her rollator for outdoor functional mobility and cane within her home. Patient has a shower chair. Patient's occupational therapist recommended grab bars however she is waiting for her building to install them. Patient previously had home OT and PT. Patient stated the occupational therapist signed off on her after a couple of visits. Patient would like to resume home physical therapy as she feels she needs endurance based training.

## 2022-07-08 ENCOUNTER — TRANSCRIPTION ENCOUNTER (OUTPATIENT)
Age: 87
End: 2022-07-08

## 2022-07-08 VITALS
DIASTOLIC BLOOD PRESSURE: 77 MMHG | HEART RATE: 75 BPM | RESPIRATION RATE: 18 BRPM | OXYGEN SATURATION: 97 % | SYSTOLIC BLOOD PRESSURE: 127 MMHG | TEMPERATURE: 98 F

## 2022-07-08 DIAGNOSIS — Z71.89 OTHER SPECIFIED COUNSELING: ICD-10-CM

## 2022-07-08 LAB
GLUCOSE BLDC GLUCOMTR-MCNC: 122 MG/DL — HIGH (ref 70–99)
GLUCOSE BLDC GLUCOMTR-MCNC: 281 MG/DL — HIGH (ref 70–99)

## 2022-07-08 PROCEDURE — 99285 EMERGENCY DEPT VISIT HI MDM: CPT

## 2022-07-08 PROCEDURE — 82962 GLUCOSE BLOOD TEST: CPT

## 2022-07-08 PROCEDURE — 36415 COLL VENOUS BLD VENIPUNCTURE: CPT

## 2022-07-08 PROCEDURE — 84100 ASSAY OF PHOSPHORUS: CPT

## 2022-07-08 PROCEDURE — 87635 SARS-COV-2 COVID-19 AMP PRB: CPT

## 2022-07-08 PROCEDURE — 80053 COMPREHEN METABOLIC PANEL: CPT

## 2022-07-08 PROCEDURE — 81003 URINALYSIS AUTO W/O SCOPE: CPT

## 2022-07-08 PROCEDURE — 83930 ASSAY OF BLOOD OSMOLALITY: CPT

## 2022-07-08 PROCEDURE — 71045 X-RAY EXAM CHEST 1 VIEW: CPT

## 2022-07-08 PROCEDURE — 97161 PT EVAL LOW COMPLEX 20 MIN: CPT

## 2022-07-08 PROCEDURE — 99233 SBSQ HOSP IP/OBS HIGH 50: CPT

## 2022-07-08 PROCEDURE — 85025 COMPLETE CBC W/AUTO DIFF WBC: CPT

## 2022-07-08 PROCEDURE — 99232 SBSQ HOSP IP/OBS MODERATE 35: CPT | Mod: GC

## 2022-07-08 PROCEDURE — 93971 EXTREMITY STUDY: CPT

## 2022-07-08 PROCEDURE — 99358 PROLONG SERVICE W/O CONTACT: CPT | Mod: NC

## 2022-07-08 PROCEDURE — 93005 ELECTROCARDIOGRAM TRACING: CPT

## 2022-07-08 PROCEDURE — 83735 ASSAY OF MAGNESIUM: CPT

## 2022-07-08 PROCEDURE — 84443 ASSAY THYROID STIM HORMONE: CPT

## 2022-07-08 RX ADMIN — Medication 1000 UNIT(S): at 12:30

## 2022-07-08 RX ADMIN — Medication 88 MICROGRAM(S): at 06:50

## 2022-07-08 RX ADMIN — ENOXAPARIN SODIUM 40 MILLIGRAM(S): 100 INJECTION SUBCUTANEOUS at 12:30

## 2022-07-08 RX ADMIN — Medication 6: at 12:30

## 2022-07-08 RX ADMIN — Medication 1 TABLET(S): at 12:30

## 2022-07-08 NOTE — CONSULT NOTE ADULT - SUBJECTIVE AND OBJECTIVE BOX
[facility]    KAI FRANCES  MRN-[id number]    HPI: 89 year old female was admitted yesterday with progressive dyspnea due to enlarging malignant left pleural effusion. Diagnosed more than 2 years ago with Breast cancer, ER+GA- her2 gary - and is treated  with monthly Fulvestrant injections.   Due to progression of disease, she was started on palbociclib however after initiation had an acute decline in functional status.  She was admitted and since that time has returned back to baseline.  No complaints this a.m.        ROS:      No nausea/vomiting/fevers/chills/night sweats.   Appetite is stable without weight loss.  No abdominal pain/diarrhea/constipation.   No history of easy bruising/bleeding.  No leg pain or leg swelling.    ROS is otherwise negative.    PMH/PSH:  PAST MEDICAL & SURGICAL HISTORY:  Type 2 diabetes mellitus  DM (diabetes mellitus)    HTN (hypertension)    Hyperthyroidism    Breast cancer    Hypothyroid    Endometrial cancer    Other acquired absence of organ  S/P splenectomy    Status post total hysterectomy  S/P hysterectomy    Other acquired absence of organ  S/P cholecystectomy    Status post cataract extraction  S/P cataract extraction        Medications:  MEDICATIONS  (STANDING):  cholecalciferol 1000 Unit(s) Oral daily  dextrose 5%. 1000 milliLiter(s) (100 mL/Hr) IV Continuous <Continuous>  dextrose 5%. 1000 milliLiter(s) (50 mL/Hr) IV Continuous <Continuous>  dextrose 50% Injectable 25 Gram(s) IV Push once  dextrose 50% Injectable 12.5 Gram(s) IV Push once  dextrose 50% Injectable 25 Gram(s) IV Push once  glucagon  Injectable 1 milliGRAM(s) IntraMuscular once  insulin lispro (ADMELOG) corrective regimen sliding scale   SubCutaneous three times a day before meals  insulin lispro (ADMELOG) corrective regimen sliding scale   SubCutaneous at bedtime  levothyroxine 88 MICROGram(s) Oral daily    MEDICATIONS  (PRN):  aluminum hydroxide/magnesium hydroxide/simethicone Suspension 30 milliLiter(s) Oral every 4 hours PRN Dyspepsia  dextrose Oral Gel 15 Gram(s) Oral once PRN Blood Glucose LESS THAN 70 milliGRAM(s)/deciliter  melatonin 3 milliGRAM(s) Oral at bedtime PRN Insomnia  ondansetron Injectable 4 milliGRAM(s) IV Push every 8 hours PRN Nausea and/or Vomiting    Allergies    No Known Allergies    Intolerances    Exam:  Vital Signs Last 24 Hrs  T(C): 36.6 (08 Jul 2022 05:05), Max: 36.7 (07 Jul 2022 21:00)  T(F): 97.8 (08 Jul 2022 05:05), Max: 98 (07 Jul 2022 21:00)  HR: 80 (08 Jul 2022 05:05) (78 - 80)  BP: 127/70 (08 Jul 2022 05:05) (114/72 - 127/70)  BP(mean): --  RR: 17 (08 Jul 2022 05:05) (17 - 18)  SpO2: 97% (08 Jul 2022 05:05) (96% - 97%)    Parameters below as of 08 Jul 2022 05:05  Patient On (Oxygen Delivery Method): room air          HEENT: pink mucosae, anicteric sclerae  No palpable peripheral lymphadenopathy  COR: regular rhythm rate, no murmurs, rubs or gallops  PULMO: decreased BS on L  ABD: soft, no palpable masses, no splenomegaly  EXT: no edema  NEURO: intact  SKIN: no lesions on visible skin    Labs:                                    11.9   5.26  )-----------( 424      ( 07 Jul 2022 05:30 )             36.0         CBC Full  -  ( 07 Jul 2022 05:30 )  WBC Count : 5.26 K/uL  RBC Count : 3.88 M/uL  Hemoglobin : 11.9 g/dL  Hematocrit : 36.0 %  Platelet Count - Automated : 424 K/uL  Mean Cell Volume : 92.8 fl  Mean Cell Hemoglobin : 30.7 pg  Mean Cell Hemoglobin Concentration : 33.1 gm/dL  Auto Neutrophil # : 3.66 K/uL  Auto Lymphocyte # : 0.83 K/uL  Auto Monocyte # : 0.46 K/uL  Auto Eosinophil # : 0.23 K/uL  Auto Basophil # : 0.06 K/uL  Auto Neutrophil % : 69.6 %  Auto Lymphocyte % : 15.8 %  Auto Monocyte % : 8.7 %  Auto Eosinophil % : 4.4 %  Auto Basophil % : 1.1 %        07-07    132<L>  |  101  |  15  ----------------------------<  125<H>  4.6   |  25  |  0.75    Ca    8.2<L>      07 Jul 2022 05:30  Phos  2.7     07-07  Mg     2.1     07-07    TPro  6.6  /  Alb  2.8<L>  /  TBili  0.3  /  DBili  x   /  AST  30  /  ALT  21  /  AlkPhos  248<H>  07-07          Pertinent imaging studies: reviewed    Assessment:  Metastatic breast ca  Recurrent L pleural effusion    Plan:  Progressive disease with liver mets and recurrent L pleural effusion  Most recent treatment with faslodex  S/p Pleurx cath placement  -Unable to toelrate palbociclib, will not restart upon discharge  Patient has appointment in 2 weeks with Dr. Funez to discuss options for treatments that will not have such an impact on QoL    Thank you    Chris Sherman for Chrystal Funez MD  [phone]

## 2022-07-08 NOTE — PROGRESS NOTE ADULT - PROBLEM SELECTOR PLAN 2
PPS 70%. Skin care PRN. Assist with ADL's PRN. Plan for home PT.
·  Plan: Patient reports recent hospitalization for pleural effusion s/p Pleurx catheter placement. Patient complaining of no SOB at rest. CXR remarkable for small left-sided pleural effusion. Pleurex drained 350ml in ED  -Resolved  -Patient saturating well on RA  -States back to baseline

## 2022-07-08 NOTE — PROGRESS NOTE ADULT - PROBLEM SELECTOR PROBLEM 3
Malignant pleural effusion
Malignant pleural effusion
Metastatic breast cancer
Metastatic breast cancer

## 2022-07-08 NOTE — PROGRESS NOTE ADULT - SUBJECTIVE AND OBJECTIVE BOX
INTERVAL HPI/OVERNIGHT EVENTS:  Feels stronger;  Will go home with VNS and Physical therapy      MEDICATIONS  (STANDING):  cholecalciferol 1000 Unit(s) Oral daily  dextrose 5%. 1000 milliLiter(s) (50 mL/Hr) IV Continuous <Continuous>  dextrose 50% Injectable 25 Gram(s) IV Push once  enoxaparin Injectable 40 milliGRAM(s) SubCutaneous every 24 hours  glucagon  Injectable 1 milliGRAM(s) IntraMuscular once  insulin lispro (ADMELOG) corrective regimen sliding scale   SubCutaneous Before meals and at bedtime  levothyroxine 88 MICROGram(s) Oral daily  multivitamin 1 Tablet(s) Oral daily    MEDICATIONS  (PRN):  acetaminophen     Tablet .. 650 milliGRAM(s) Oral every 6 hours PRN Temp greater or equal to 38C (100.4F), Mild Pain (1 - 3)  dextrose Oral Gel 15 Gram(s) Oral once PRN Blood Glucose LESS THAN 70 milliGRAM(s)/deciliter  melatonin 3 milliGRAM(s) Oral at bedtime PRN Insomnia      Allergies    No Known Allergies    Intolerances        Vital Signs Last 24 Hrs  T(C): 36.6 (2022 05:05), Max: 36.7 (2022 21:00)  T(F): 97.8 (2022 05:05), Max: 98 (2022 21:00)  HR: 80 (2022 05:05) (78 - 80)  BP: 127/70 (2022 05:05) (114/72 - 127/70)  BP(mean): --  RR: 17 (2022 05:05) (17 - 18)  SpO2: 97% (2022 05:05) (96% - 97%)    Parameters below as of 2022 05:05  Patient On (Oxygen Delivery Method): room air              Constitutional:  Awake     Eyes: BRIDGER    ENMT: Negative    Neck: Supple    Back:  no tenderness     Respiratory:  clear    Cardiovascular: S1 S2    Gastrointestinal:  soft    Genitourinary:    Extremities:  no edema    Vascular:    Neurological:    Skin:    Lymph Nodes:            LABS:                        11.9   5.26  )-----------( 424      ( 2022 05:30 )             36.0     07-07    132<L>  |  101  |  15  ----------------------------<  125<H>  4.6   |  25  |  0.75    Ca    8.2<L>      2022 05:30  Phos  2.7       Mg     2.1         TPro  6.6  /  Alb  2.8<L>  /  TBili  0.3  /  DBili  x   /  AST  30  /  ALT  21  /  AlkPhos  248<H>        Urinalysis Basic - ( 2022 14:42 )    Color: Yellow / Appearance: Clear / S.020 / pH: x  Gluc: x / Ketone: NEGATIVE  / Bili: Negative / Urobili: 0.2 E.U./dL   Blood: x / Protein: NEGATIVE mg/dL / Nitrite: NEGATIVE   Leuk Esterase: NEGATIVE / RBC: x / WBC x   Sq Epi: x / Non Sq Epi: x / Bacteria: x        RADIOLOGY & ADDITIONAL TESTS:  
INTERVAL HPI/OVERNIGHT EVENTS:  Interim reviewed;  Feels much better today  Ultrasound noted;  No clots  Chest xray; Small effusion      MEDICATIONS  (STANDING):  cholecalciferol 1000 Unit(s) Oral daily  dextrose 5%. 1000 milliLiter(s) (50 mL/Hr) IV Continuous <Continuous>  dextrose 50% Injectable 25 Gram(s) IV Push once  enoxaparin Injectable 40 milliGRAM(s) SubCutaneous every 24 hours  glucagon  Injectable 1 milliGRAM(s) IntraMuscular once  insulin lispro (ADMELOG) corrective regimen sliding scale   SubCutaneous Before meals and at bedtime  levothyroxine 88 MICROGram(s) Oral daily  multivitamin 1 Tablet(s) Oral daily    MEDICATIONS  (PRN):  acetaminophen     Tablet .. 650 milliGRAM(s) Oral every 6 hours PRN Temp greater or equal to 38C (100.4F), Mild Pain (1 - 3)  dextrose Oral Gel 15 Gram(s) Oral once PRN Blood Glucose LESS THAN 70 milliGRAM(s)/deciliter  melatonin 3 milliGRAM(s) Oral at bedtime PRN Insomnia      Allergies    No Known Allergies    Intolerances        Vital Signs Last 24 Hrs  T(C): 36.7 (2022 20:45), Max: 37.4 (2022 15:37)  T(F): 98 (2022 20:45), Max: 99.4 (2022 15:37)  HR: 79 (2022 20:45) (79 - 103)  BP: 129/76 (2022 20:45) (110/72 - 135/84)  BP(mean): --  RR: 16 (2022 20:45) (16 - 18)  SpO2: 94% (2022 20:45) (94% - 97%)           Constitutional:  Awake    Eyes: BRIDGER    ENMT: Negative    Neck: Supple    Back:  no tenderness     Respiratory:  clear    Cardiovascular: S1 S2    Gastrointestinal: soft    Genitourinary:    Extremities: less swelling LUE    Vascular:    Neurological:    Skin:    Lymph Nodes:            LABS:                        11.9   5.26  )-----------( 424      ( 2022 05:30 )             36.0     07-07    132<L>  |  101  |  15  ----------------------------<  125<H>  4.6   |  25  |  0.75    Ca    8.2<L>      2022 05:30  Phos  2.7     -  Mg     2.1         TPro  6.6  /  Alb  2.8<L>  /  TBili  0.3  /  DBili  x   /  AST  30  /  ALT  21  /  AlkPhos  248<H>        Urinalysis Basic - ( 2022 14:42 )    Color: Yellow / Appearance: Clear / S.020 / pH: x  Gluc: x / Ketone: NEGATIVE  / Bili: Negative / Urobili: 0.2 E.U./dL   Blood: x / Protein: NEGATIVE mg/dL / Nitrite: NEGATIVE   Leuk Esterase: NEGATIVE / RBC: x / WBC x   Sq Epi: x / Non Sq Epi: x / Bacteria: x        RADIOLOGY & ADDITIONAL TESTS:  
INTERVAL HPI/OVERNIGHT EVENTS:  O/N: Patient afebrile with stable vital signs overnight.     This morning: Patient was seen and examined at bedside. Patient states that she feels dramatically better today. She denies any further weakness or SOB. States she feels at her baseline. PT recommending home with home PT. OT recommending no needs. Palliative saw patient confirming MOLST and DNR/DNI status.     VITAL SIGNS:  T(F): 97.4 (22 @ 12:09)  HR: 79 (22 @ 12:09)  BP: 116/71 (22 @ 12:09)  RR: 18 (22 @ 12:09)  SpO2: 97% (22 @ 12:09)  Wt(kg): --    PHYSICAL EXAM:    Constitutional: NAD  HEENT: PERRL, EOMI, neck supple, trachea midline, no masses,   Respiratory: Diminished breath sounds on L, most prominent at LLB. Diffuse wheezing noted R>L. No accessory muscle use and no intercostal retractions  Cardiovascular: RRR, normal S1S2, no M/R/G. Erythematous patches on L chest with associated scabbing of a few lesions. Indwelling catheter on L chest wall  Gastrointestinal: abdomen soft, NTND, no masses palpable, BS normal  Extremities: Warm, well perfused. Non-pitting edema in bilateral LE up to ankles. Non-pitting edema in LUE. No tenderness to palpation in any extremities  Neurological: AAOx3, CN II-XII intact bilaterally. 4/5 strength in bilateral UE and LE. Sensation to light touch intact and equal in bilateral UE and LE. Normal FNF. Able to ambulate with walker.   Skin: Normal temperature, warm, dry    MEDICATIONS  (STANDING):  cholecalciferol 1000 Unit(s) Oral daily  dextrose 5%. 1000 milliLiter(s) (50 mL/Hr) IV Continuous <Continuous>  dextrose 50% Injectable 25 Gram(s) IV Push once  enoxaparin Injectable 40 milliGRAM(s) SubCutaneous every 24 hours  glucagon  Injectable 1 milliGRAM(s) IntraMuscular once  insulin lispro (ADMELOG) corrective regimen sliding scale   SubCutaneous Before meals and at bedtime  levothyroxine 88 MICROGram(s) Oral daily  multivitamin 1 Tablet(s) Oral daily    MEDICATIONS  (PRN):  acetaminophen     Tablet .. 650 milliGRAM(s) Oral every 6 hours PRN Temp greater or equal to 38C (100.4F), Mild Pain (1 - 3)  dextrose Oral Gel 15 Gram(s) Oral once PRN Blood Glucose LESS THAN 70 milliGRAM(s)/deciliter  melatonin 3 milliGRAM(s) Oral at bedtime PRN Insomnia      Allergies    No Known Allergies    Intolerances        LABS:                        11.9   5.26  )-----------( 424      ( 2022 05:30 )             36.0     07-07    132<L>  |  101  |  15  ----------------------------<  125<H>  4.6   |  25  |  0.75    Ca    8.2<L>      2022 05:30  Phos  2.7     07-  Mg     2.1     07-    TPro  6.6  /  Alb  2.8<L>  /  TBili  0.3  /  DBili  x   /  AST  30  /  ALT  21  /  AlkPhos  248<H>        Urinalysis Basic - ( 2022 14:42 )    Color: Yellow / Appearance: Clear / S.020 / pH: x  Gluc: x / Ketone: NEGATIVE  / Bili: Negative / Urobili: 0.2 E.U./dL   Blood: x / Protein: NEGATIVE mg/dL / Nitrite: NEGATIVE   Leuk Esterase: NEGATIVE / RBC: x / WBC x   Sq Epi: x / Non Sq Epi: x / Bacteria: x              RADIOLOGY & ADDITIONAL TESTS:  Reviewed
KAI FRANCES             MRN-[id number]    CC: weakness    HPI:  89F with PMHx metastatic breast cancer (on fulvestrant and Xgeva, followed by Dr. Funez), DM2 (A1c 7.6), hypothyroidism, HLD, lymphedema, endometrial carcinoma s/p hysterectomy and bilateral oophorectomy, and benign pancreatic tail adenoma, prior L sided thoracentesis on 11/30/22 and IR chest tube placement for hydro-pneumothorax on 12/3 and with recent admission on 05/14-05/19 for recurrent L sided pleural effusion s/p thoracentesis on 5/17 and L IPC 6/13. Patient lives alone, was recently started on Ibrance 5 days ago and started experiencing generalized weakness. She called Dr Funez office in order to discontinue her medication with their approval, and subsequently did so on Thursday. Patient came in after visiting Dr Guzman office and reported TESFAYE and generalized weakness, for which she was unable to perform her ADLs such as showering and cooking.  Patient was sent to the ED by Dr Vasquez for admission due to her weakness, and her inability to perform ADLs. In addition she reports chronic LUE swelling and newly swelling of her RLE. Patient denies pain in the back nor in her extremities.     ED COURSE:  VS: T 98.1 HR 91  /72  RR 18  SaO2 96% RA  LABS: Na 128  Gluc 122 T prot 6.9 Alb 2.9 Alk phos 240 TSH 3.860  - UA negative COVID negative   EKG: NSR, with PAC   IMAGING:  - CXR: Small left-sided pleural effusion. Diffuse osseous sclerotic metastasis.  - US  Duplex LUE: negative for DVT.   - US Duplex RLE: No evidence of right lower extremity deep venous thrombosis.  MANAGEMENT: None   CONSULTS: Pulmonology for pleural effusion    (06 Jul 2022 21:34)    SUBJECTIVE: Patient resting in bed in no distress, ready to go home.     ROS:  DYSPNEA (Elissa): 0  NAUS/VOM: N	  SECRETIONS: N	  AGITATION: N  Pain (Y/N):   N    -Provocation/Palliation: n/a   -Quality/Quantity: n/a   -Radiating: n/a   -Severity: n/a   -Timing/Frequency: n/a   -Impact on ADLs: n/a     OTHER REVIEW OF SYSTEMS: WNL  UNABLE TO OBTAIN  due to: n/a     PEx:  T(C): 36.8 (07-08-22 @ 12:39), Max: 36.8 (07-08-22 @ 12:39)  HR: 75 (07-08-22 @ 12:39) (75 - 80)  BP: 127/77 (07-08-22 @ 12:39) (114/72 - 127/77)  RR: 18 (07-08-22 @ 12:39) (17 - 18)  SpO2: 97% (07-08-22 @ 12:39) (96% - 97%)  Wt(kg): 54kG      GENERAL:  [x ]Alert  [x ]Oriented x 4   [ ]Lethargic  [ ]Cachexia  [ ]Unarousable  [x ]Verbal  [ ]Non-Verbal  Behavioral:   [ ] Anxiety  [ ] Delirium [ ] Agitation [x ] Other - calm   HEENT:  [ x]Normal   [ ]Dry mouth   [ ]ET Tube/Trach  [ ]Oral lesions  PULMONARY:   [ x]Clear  [ ]Tachypnea  [ ]Audible excessive secretions   [ ]Rhonchi        [ ]Right [ ]Left [ ]Bilateral  [ ]Crackles        [ ]Right [ ]Left [ ]Bilateral  [ ]Wheezing     [ ]Right [ ]Left [ ]Bilateral  CARDIOVASCULAR:    [x ]Regular [ ]Irregular [ ]Tachy  [ ]Hipolito [ ]Murmur [ ]Other  GASTROINTESTINAL:  [x ]Soft  [ ]Distended   [ ]+BS  [x ]Non tender [ ]Tender  [ ]PEG [ ]OGT/ NGT  Last BM:   GENITOURINARY:  [x ]Normal [ ] Incontinent   [ ]Oliguria/Anuria   [ ]Rosales  MUSCULOSKELETAL:   [ ]Normal   [x ]Weakness  [ ]Bed/Wheelchair bound [x] Edema - RLE +1   NEUROLOGIC:   [ x]No focal deficits  [ ] Cognitive impairment  [ ] Dysphagia [ ]Dysarthria [ ] Paresis [ ]Other   SKIN:   [ ]Normal   [ ]Pressure ulcer(s)  [ ]Rash [x] left sided Pleurx noted         ALLERGIES: No Known Allergies      OPIATE NAÏVE (Y/N):    MEDICATIONS: REVIEWED  MEDICATIONS  (STANDING):  cholecalciferol 1000 Unit(s) Oral daily  dextrose 5%. 1000 milliLiter(s) (50 mL/Hr) IV Continuous <Continuous>  dextrose 50% Injectable 25 Gram(s) IV Push once  enoxaparin Injectable 40 milliGRAM(s) SubCutaneous every 24 hours  glucagon  Injectable 1 milliGRAM(s) IntraMuscular once  insulin lispro (ADMELOG) corrective regimen sliding scale   SubCutaneous Before meals and at bedtime  levothyroxine 88 MICROGram(s) Oral daily  multivitamin 1 Tablet(s) Oral daily    MEDICATIONS  (PRN):  acetaminophen     Tablet .. 650 milliGRAM(s) Oral every 6 hours PRN Temp greater or equal to 38C (100.4F), Mild Pain (1 - 3)  dextrose Oral Gel 15 Gram(s) Oral once PRN Blood Glucose LESS THAN 70 milliGRAM(s)/deciliter  melatonin 3 milliGRAM(s) Oral at bedtime PRN Insomnia    LABS: REVIEWED  CBC:                        11.9   5.26  )-----------( 424      ( 07 Jul 2022 05:30 )             36.0     CMP:    07-07    132<L>  |  101  |  15  ----------------------------<  125<H>  4.6   |  25  |  0.75    Ca    8.2<L>      07 Jul 2022 05:30  Phos  2.7     07-07  Mg     2.1     07-07    TPro  6.6  /  Alb  2.8<L>  /  TBili  0.3  /  DBili  x   /  AST  30  /  ALT  21  /  AlkPhos  248<H>  07-07      IMAGING: REVIEWED    Advanced Directives:     DNR/DNI     MOL     HCP    DECISION MAKER: Patient is able to make her own decisions.  LEGAL SURROGATE: Gerson Ana Lilia (HCP/Cousin) [phone]    PSYCHOSOCIAL-SPIRITUAL ASSESSMENT:       Reviewed       Care plan unchanged    GOALS OF CARE DISCUSSION       Palliative care info/counseling provided	           Family meeting       Advanced Directives addressed please see Advance Care Planning Note	           See previous Palliative Medicine Note       Documentation of GOC: DNR/DNI	  	      AGENCY CHOICE DISCUSSED:           Homecare    REFERRALS	           Unit SW/Case Mgmt          PT/OT

## 2022-07-08 NOTE — PROGRESS NOTE ADULT - PROBLEM SELECTOR PLAN 3
Patient has a history of Left side pleural effusion and continues to have them as per x-ray that showed Small left-sided pleural effusion, which was drained at 350cc. Continue care as per primary team.
·  Plan: Prior L sided thoracentesis on 11/30/22 and IR chest tube placement for hydro-pneumothorax on 12/3 and with recent admission on 05/14-05/19 for recurrent L sided pleural effusion s/p thoracentesis on 5/17 and L IPC 6/13  - She has had the Pleurx drained once weekly s/p Pleurx drainage 350cc in the ED  -Saturating well on RA  -No evidence of infection

## 2022-07-08 NOTE — PROGRESS NOTE ADULT - TIME BILLING
Patient seen and examined;  Home today  Oncology plans noted
Improved today; Want to hold off on hospice at this time;  Physical therapy to see;

## 2022-07-08 NOTE — DISCHARGE NOTE NURSING/CASE MANAGEMENT/SOCIAL WORK - NSDCPEFALRISK_GEN_ALL_CORE
For information on Fall & Injury Prevention, visit: [email or web address] OR  [email or web address] OR  https://www.cdc.gov/steadi/patient.html

## 2022-07-08 NOTE — DISCHARGE NOTE NURSING/CASE MANAGEMENT/SOCIAL WORK - NSDCVIVACCINE_GEN_ALL_CORE_FT
COVID-19, mRNA, LNP-S, PF, 30 mcg/0.3 mL dose, leona-sucrose ([employer]); 19-May-2022 13:03; Reyes, Luisa (RN); [employer]; [id number] (Exp. Date: 19-May-2022); IntraMuscular; Deltoid Right.; 0.3 milliLiter(s);

## 2022-07-08 NOTE — PROGRESS NOTE ADULT - ASSESSMENT
89 year old woman with weakness, Malignant pleural effusion, Metastatic breast cancer and encounter for palliative care.

## 2022-07-08 NOTE — DISCHARGE NOTE NURSING/CASE MANAGEMENT/SOCIAL WORK - PATIENT PORTAL LINK FT
You can access the FollowMyHealth Patient Portal offered by [facility] by registering at the following website: [email or web address]. By joining [employer]’s FollowMyHealth portal, you will also be able to view your health information using other applications (apps) compatible with our system.

## 2022-07-08 NOTE — PROGRESS NOTE ADULT - PROBLEM SELECTOR PLAN 4
Patient with known metastatic breast cancer and follows with Dr. Hernadez. Currently on  fulvestrant and Xgeva which she would like to continue to take, which precludes her from hospice services. Patient continues to have Progression of disease. Plan to follow up with oncology.
·  Plan: Patient at home fulvestrant and Xgeva, followed by Dr. Funez. Patient recently started 5 days ago on Ibrance 5 days ago; saw oncology yesterday and agreed to stop Ibrance.  - continue outpatient care w Oncology  - Palliative confirmed MOLST and DNR/DNI status  - Patient desires to continue disease modifying therapy and defer hospice at this time

## 2022-07-08 NOTE — PROGRESS NOTE ADULT - PROBLEM SELECTOR PLAN 5
Patient remain a DNR/DNI. Plan for patient to go home today.   - Buddhist/Spiritual practice: non  - Coping: [x ] well [ ] with difficulty [ ] poor coping   - Support system: [ ] strong [x ] adequate [ ] inadequate  - All questions answered, emotional support provided  -  primary team   - Please contact Palliative Medicine 24/7 at [phone] for any acute symptoms or further questions  - Will continue to follow with you.
·  Plan: She reports chronic LUE swelling and newly swelling of her RLE. Patient denies pain in the back nor in her extremities.   - Per patient: LUE and LLE edema are chronic  - RLE edema waxes and wanes   - US  Duplex LUE: negative for DVT.   - US Duplex RLE: negative for DVT.

## 2022-07-08 NOTE — PROGRESS NOTE ADULT - PROBLEM SELECTOR PLAN 1
Weakness has improved and patient is now independent on all ADLs.
·  Plan: Patient presented with acute generalized weakness and her inability to perform ADLs, after starting Ibrance 5 day that was discontinued on Tuesday.   -Resolved  -PT recommending home with home PT  -OT recommending no needs

## 2022-07-08 NOTE — PROGRESS NOTE ADULT - PROBLEM SELECTOR PLAN 6
Patient last assessed: 7/7/2022  to manage: GOC/AD, symptoms, and support.  30 Minutes; Start: 0730am End:  0800am , of non-face-to-face prolonged service provided that relates to (face-to-face) care that has or will occur and ongoing patient management, including one or more of the following:   - Reviewed records from other physicians or other health care professional services, including one or more of the following: other medical records and diagnostic / radiology study results
·  Plan: Patient at home on glimepiride 1 mg BID. Last A1c 8.3 on 6/11/22.  - CC diet  - FSG monitoring  - mISS w/ meals and at bedtime.

## 2022-07-18 ENCOUNTER — APPOINTMENT (OUTPATIENT)
Dept: INTERNAL MEDICINE | Facility: CLINIC | Age: 87
End: 2022-07-18

## 2022-07-18 VITALS
WEIGHT: 120 LBS | BODY MASS INDEX: 22.08 KG/M2 | HEIGHT: 62 IN | TEMPERATURE: 97.8 F | HEART RATE: 99 BPM | DIASTOLIC BLOOD PRESSURE: 85 MMHG | OXYGEN SATURATION: 98 % | SYSTOLIC BLOOD PRESSURE: 149 MMHG

## 2022-07-18 DIAGNOSIS — E03.9 HYPOTHYROIDISM, UNSPECIFIED: ICD-10-CM

## 2022-07-18 DIAGNOSIS — Z79.84 LONG TERM (CURRENT) USE OF ORAL HYPOGLYCEMIC DRUGS: ICD-10-CM

## 2022-07-18 DIAGNOSIS — R32 UNSPECIFIED URINARY INCONTINENCE: ICD-10-CM

## 2022-07-18 DIAGNOSIS — C78.7 SECONDARY MALIGNANT NEOPLASM OF LIVER AND INTRAHEPATIC BILE DUCT: ICD-10-CM

## 2022-07-18 DIAGNOSIS — R53.83 OTHER FATIGUE: ICD-10-CM

## 2022-07-18 DIAGNOSIS — J91.0 MALIGNANT PLEURAL EFFUSION: ICD-10-CM

## 2022-07-18 DIAGNOSIS — R53.1 WEAKNESS: ICD-10-CM

## 2022-07-18 DIAGNOSIS — C50.919 MALIGNANT NEOPLASM OF UNSPECIFIED SITE OF UNSPECIFIED FEMALE BREAST: ICD-10-CM

## 2022-07-18 DIAGNOSIS — E78.00 PURE HYPERCHOLESTEROLEMIA, UNSPECIFIED: ICD-10-CM

## 2022-07-18 DIAGNOSIS — T50.995A ADVERSE EFFECT OF OTHER DRUGS, MEDICAMENTS AND BIOLOGICAL SUBSTANCES, INITIAL ENCOUNTER: ICD-10-CM

## 2022-07-18 DIAGNOSIS — E87.1 HYPO-OSMOLALITY AND HYPONATREMIA: ICD-10-CM

## 2022-07-18 DIAGNOSIS — E11.9 TYPE 2 DIABETES MELLITUS WITHOUT COMPLICATIONS: ICD-10-CM
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
## 2022-07-18 DIAGNOSIS — Z97.8 PRESENCE OF OTHER SPECIFIED DEVICES: ICD-10-CM

## 2022-07-18 DIAGNOSIS — I89.0 LYMPHEDEMA, NOT ELSEWHERE CLASSIFIED: ICD-10-CM

## 2022-07-18 DIAGNOSIS — E78.5 HYPERLIPIDEMIA, UNSPECIFIED: ICD-10-CM

## 2022-07-18 DIAGNOSIS — Z17.0 ESTROGEN RECEPTOR POSITIVE STATUS [ER+]: ICD-10-CM

## 2022-07-18 DIAGNOSIS — C79.9 SECONDARY MALIGNANT NEOPLASM OF UNSPECIFIED SITE: ICD-10-CM

## 2022-07-18 PROCEDURE — 99214 OFFICE O/P EST MOD 30 MIN: CPT

## 2022-07-18 RX ORDER — ONDANSETRON 8 MG/1
8 TABLET, ORALLY DISINTEGRATING ORAL
Qty: 90 | Refills: 0 | Status: ACTIVE | COMMUNITY
Start: 2022-06-29

## 2022-07-18 RX ORDER — TRAZODONE HYDROCHLORIDE 50 MG/1
50 TABLET ORAL
Qty: 60 | Refills: 0 | Status: ACTIVE | COMMUNITY
Start: 2022-07-14

## 2022-07-18 RX ORDER — PALBOCICLIB 75 MG/1
75 CAPSULE ORAL
Qty: 21 | Refills: 0 | Status: ACTIVE | COMMUNITY
Start: 2022-06-29

## 2022-07-18 NOTE — ASSESSMENT
[FreeTextEntry1] : Needs more help at home;\par Will discuss with [facility]\par Otherwise no change in current medication

## 2022-07-18 NOTE — HISTORY OF PRESENT ILLNESS
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

## 2022-07-18 NOTE — HEALTH RISK ASSESSMENT
[Never] : Never [No] : No [No falls in past year] : Patient reported no falls in the past year [0] : 2) Feeling down, depressed, or hopeless: Not at all (0) [AAT5Mwksk] : 0

## 2022-07-18 NOTE — PHYSICAL EXAM
[Normal] : no joint swelling and grossly normal strength and tone [de-identified] : decreased breath sounds

## 2022-07-19 ENCOUNTER — NON-APPOINTMENT (OUTPATIENT)
Age: 87
End: 2022-07-19

## 2022-07-21 NOTE — CHART NOTE - NSCHARTNOTEFT_GEN_A_CORE
Patient with metastatic Breast cancer with recurrent pleual effusion which is malignant despite negativecytology Pleurx will be inserted prior to discharge

## 2022-08-05 ENCOUNTER — NON-APPOINTMENT (OUTPATIENT)
Age: 87
End: 2022-08-05

## 2022-08-23 ENCOUNTER — RX RENEWAL (OUTPATIENT)
Age: 87
End: 2022-08-23

## 2022-08-29 ENCOUNTER — RX RENEWAL (OUTPATIENT)
Age: 87
End: 2022-08-29

## 2022-09-09 NOTE — PROGRESS NOTE ADULT - PROBLEM SELECTOR PLAN 1
Post-thoracentesis CXR showed left upper lobe and left lower lobe pneumothoraces. SpO2 95% on RA. Per pulm, pt placed on NRB w/ improvement to SpO2 98%. CXR stable in AM. PTX likely in setting of trapped lung given chronicity of pleural effusion  -F/u pulm recs   -s/p chest tube with IR with improved tachycardia. Dressings c/d/i. Management of chest tube per IR, clamping trial today   -CT surgery following, f/u recs 209.8

## 2022-10-20 NOTE — CONSULT NOTE ADULT - PROVIDER SPECIALTY LIST ADULT
Rehab Medicine
Heme/Onc
Pulmonology
Palliative Care
uses a cane, does personal care with minimal assistance

## 2022-11-18 NOTE — H&P ADULT - PROBLEM SELECTOR PLAN 6
She reports chronic LUE swelling and newly swelling of her RLE. Patient denies pain in the back nor in her extremities.   - US  Duplex LUE: negative for DVT. Patient additional to malignancy has low albumin at 2.9.   - US Duplex RLE: negative for DVT Patient reports chronic urinary incontinence, no change in urine color nor dysuria.   - upon admission UA negative  - no concerns for UTI at this time no
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
## 2023-02-16 NOTE — PROGRESS NOTE ADULT - PROBLEM SELECTOR PLAN 4
Impression: DIMAS ARRIOLA. Status: Symptomatic.  Plan: AFT's Patient with hx of DM2 on glypiride at home.     - c/w ISS  - monitor FS  - consistent carb diet

## 2023-05-22 NOTE — ED PROVIDER NOTE - WR ORDER ID 1
[FreeTextEntry1] : Rule out tear right medial meniscus\par Rule out early degenerative arthritis\par \par This patient will be treated with diclofenac and physical therapy.  She will return in approximately 3 weeks for reevaluation.  If she is not improving she will be indicated for an MRI of the right knee.
[id number]

## 2024-05-13 NOTE — DISCHARGE NOTE PROVIDER - NSDCCPGOAL_GEN_ALL_CORE_FT
To get better and follow your care plan as instructed. Duration Of Freeze Thaw-Cycle (Seconds): 3 Render Note In Bullet Format When Appropriate: No Detail Level: Detailed Show Applicator Variable?: Yes Consent: The patient's consent was obtained including but not limited to risks of crusting, scabbing, blistering, scarring, darker or lighter pigmentary change, recurrence, incomplete removal and infection. Post-Care Instructions: I reviewed with the patient in detail post-care instructions. Patient is to wear sunprotection, and avoid picking at any of the treated lesions. Pt may apply Vaseline to crusted or scabbing areas.

## 2024-06-19 NOTE — PROGRESS NOTE ADULT - SUBJECTIVE AND OBJECTIVE BOX
Detail Level: Detailed INTERVAL HPI/OVERNIGHT EVENTS:  Interim reviewed;  No chief complaint ;  Pulmonary follow up noted;  Chest film improved;          MEDICATIONS  (STANDING):  cholecalciferol 1000 Unit(s) Oral daily  dextrose 5%. 1000 milliLiter(s) (100 mL/Hr) IV Continuous <Continuous>  dextrose 5%. 1000 milliLiter(s) (50 mL/Hr) IV Continuous <Continuous>  dextrose 50% Injectable 25 Gram(s) IV Push once  dextrose 50% Injectable 12.5 Gram(s) IV Push once  dextrose 50% Injectable 25 Gram(s) IV Push once  glucagon  Injectable 1 milliGRAM(s) IntraMuscular once  insulin lispro (ADMELOG) corrective regimen sliding scale   SubCutaneous three times a day before meals  insulin lispro (ADMELOG) corrective regimen sliding scale   SubCutaneous at bedtime  levothyroxine 88 MICROGram(s) Oral daily    MEDICATIONS  (PRN):  aluminum hydroxide/magnesium hydroxide/simethicone Suspension 30 milliLiter(s) Oral every 4 hours PRN Dyspepsia  dextrose Oral Gel 15 Gram(s) Oral once PRN Blood Glucose LESS THAN 70 milliGRAM(s)/deciliter  melatonin 3 milliGRAM(s) Oral at bedtime PRN Insomnia  ondansetron Injectable 4 milliGRAM(s) IV Push every 8 hours PRN Nausea and/or Vomiting      Allergies    No Known Allergies    Intolerances        Vital Signs Last 24 Hrs  T(C): 36.6 (14 Jun 2022 05:37), Max: 36.6 (13 Jun 2022 11:52)  T(F): 97.9 (14 Jun 2022 05:37), Max: 97.9 (13 Jun 2022 11:52)  HR: 83 (14 Jun 2022 05:37) (68 - 83)  BP: 126/80 (14 Jun 2022 05:37) (122/72 - 126/80)  BP(mean): --  RR: 18 (14 Jun 2022 05:37) (15 - 18)  SpO2: 97% (14 Jun 2022 05:37) (97% - 98%)          Constitutional:  Awake    Eyes: BRIDGER    ENMT: Negative    Neck: Supple    Back:  no tenderness     Respiratory:  clear    Cardiovascular: S1 S2    Gastrointestinal: soft    Genitourinary:    Extremities: no edema    Vascular:    Neurological:    Skin:    Lymph Nodes:            LABS:                        12.5   6.61  )-----------( 315      ( 14 Jun 2022 07:16 )             38.2     06-14    132<L>  |  99  |  12  ----------------------------<  162<H>  4.8   |  26  |  0.80    Ca    8.2<L>      14 Jun 2022 07:16  Phos  2.5     06-14  Mg     2.0     06-14    TPro  6.2  /  Alb  3.1<L>  /  TBili  0.3  /  DBili  x   /  AST  39  /  ALT  29  /  AlkPhos  252<H>  06-13    PT/INR - ( 13 Jun 2022 06:00 )   PT: 12.4 sec;   INR: 1.04          PTT - ( 13 Jun 2022 06:00 )  PTT:72.9 sec      RADIOLOGY & ADDITIONAL TESTS:
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
## 2024-09-20 NOTE — PROGRESS NOTE BEHAVIORAL HEALTH - [facility] ED [facility] MED ROS PSYCHIATRIC
Group Topic:  Group Psychotherapy    Date: 9/20/2024  Start Time:  8:30 AM  End Time: 11:30 AM  Facilitators: Latisha Angel APSW      9/20/2024  Facilitator(s): KEVYN Mcneal    Method: Group  Attendance: Present  Participation: Quiet  Patient report of any safety concerns: No  Drugs/alcohol reported:No  Mood: Depressed and Anxious  Affect: Normal  Thought Process: Poverty of Ideas  Perceptual Problems: None  Speech: Clear/articulate  Behavior/Socialization: Appropriate to Group  Task Performance: Follows directions  Patient Response: Attentive and Interested in topic and Interactive    Group Topic:  Coping Skills Education:    Start Time: 8:30  End Time: 9:25  Number of group participants: 10  Therapeutic intervention(s) introduced to group: Patients were given a presentation on self-compassion.  Patient’s response to group: Patient was attentive during group skills discussion providing appropriate feedback.      Group Topic:  Process Group:    Start Time: 9:35  End Time: 10:30  Number of group participants: 10  Patient’s response to group and/or progress towards treatment goals:  How pt has been since last session in their words:      Patient states: “I am feeling good. Today has been fairly good. I haven't had any issues. They picked me up earlier than yesterday.\"    Has pt struggled to complete tasks due to mental health? Did skills help overcome Sx or Bx related to mental health? none      Group Topic:  Behavioral Activation Group:    Start Time: 10:40  End Time: 11:30  Number of group participants: 10  Goals identified:  Routine: [place] for horse show  Self-care: shower  Value: [employer]  Coping Skills: play some games, TV, YouTube/distractions    Patient’s response to group: Patient attentive during group processing welcoming feedback from peers.         See HPI

## 2024-09-30 NOTE — PROGRESS NOTE BEHAVIORAL HEALTH - THOUGHT ASSOCIATIONS
[FreeTextEntry8] : Patient presents with sore throat, swollen lymph nodes for 5 days.  Swallowing is especially difficult. Took some left-over Amoxicillin which did not help at all.   She is around a lot of children.  Has a mild cough and muscle aches.  Denies fevers, chills, or SOB.   Normal

## 2025-03-07 NOTE — DIETITIAN INITIAL EVALUATION ADULT. - PROBLEM SELECTOR PLAN 4
Patient reports being diagnosed with type 2 diabetes at age 80. 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 Home med: glimepiride 1mg bid  -hold home med  -f/u A1C  -place on ISS for now, start basal bolus tomorrow dental pain/injury

## 2025-05-15 NOTE — HEALTH RISK ASSESSMENT
Patient is resting quietly in bed with eyes closed at this time.  No signs of distress or discomfort noted.  No PRN medications given thus far.  Safety needs met.  No unit problems reported.  Purposeful rounding continued.   [No] : No [No falls in past year] : Patient reported no falls in the past year [0] : 2) Feeling down, depressed, or hopeless: Not at all (0) [] : No [ZLD0Pbptb] : 0

## (undated) DEVICE — DRAIN PLEURX KIT WITH 1000ML VACUUM BOTTLE